# Patient Record
Sex: MALE | Race: WHITE | URBAN - METROPOLITAN AREA
[De-identification: names, ages, dates, MRNs, and addresses within clinical notes are randomized per-mention and may not be internally consistent; named-entity substitution may affect disease eponyms.]

---

## 2024-05-29 ENCOUNTER — APPOINTMENT (OUTPATIENT)
Dept: CT IMAGING | Age: 64
DRG: 023 | End: 2024-05-29
Payer: COMMERCIAL

## 2024-05-29 ENCOUNTER — APPOINTMENT (OUTPATIENT)
Dept: GENERAL RADIOLOGY | Age: 64
DRG: 023 | End: 2024-05-29
Payer: COMMERCIAL

## 2024-05-29 ENCOUNTER — HOSPITAL ENCOUNTER (INPATIENT)
Age: 64
LOS: 8 days | Discharge: NURSING, REHAB OR LONG-TERM CARE | DRG: 023 | End: 2024-06-06
Attending: STUDENT IN AN ORGANIZED HEALTH CARE EDUCATION/TRAINING PROGRAM | Admitting: STUDENT IN AN ORGANIZED HEALTH CARE EDUCATION/TRAINING PROGRAM
Payer: COMMERCIAL

## 2024-05-29 DIAGNOSIS — G06.0 ABSCESS OF BRAIN: ICD-10-CM

## 2024-05-29 DIAGNOSIS — G93.89 BRAIN MASS: Primary | ICD-10-CM

## 2024-05-29 DIAGNOSIS — E04.1 THYROID NODULE: ICD-10-CM

## 2024-05-29 DIAGNOSIS — R29.90 STROKE-LIKE SYMPTOMS: ICD-10-CM

## 2024-05-29 PROBLEM — E11.9 TYPE 2 DIABETES MELLITUS (HCC): Status: ACTIVE | Noted: 2021-02-01

## 2024-05-29 PROBLEM — I82.419 DEEP VENOUS THROMBOSIS OF PROFUNDA FEMORIS VEIN (HCC): Status: ACTIVE | Noted: 2021-02-01

## 2024-05-29 PROBLEM — E78.2 MIXED HYPERLIPIDEMIA: Status: ACTIVE | Noted: 2022-04-14

## 2024-05-29 PROBLEM — N40.0 BENIGN PROSTATIC HYPERPLASIA: Status: ACTIVE | Noted: 2021-02-01

## 2024-05-29 PROBLEM — G47.30 SLEEP APNEA: Status: ACTIVE | Noted: 2022-04-15

## 2024-05-29 LAB
ABO + RH BLD: NORMAL
ALBUMIN SERPL-MCNC: 4 G/DL (ref 3.5–5.2)
ALP SERPL-CCNC: 63 U/L (ref 40–129)
ALT SERPL-CCNC: 13 U/L (ref 0–40)
ANION GAP SERPL CALCULATED.3IONS-SCNC: 11 MMOL/L (ref 7–16)
ARM BAND NUMBER: NORMAL
AST SERPL-CCNC: 8 U/L (ref 0–39)
BASOPHILS # BLD: 0.02 K/UL (ref 0–0.2)
BASOPHILS NFR BLD: 0 % (ref 0–2)
BILIRUB SERPL-MCNC: 0.4 MG/DL (ref 0–1.2)
BLOOD BANK SAMPLE EXPIRATION: NORMAL
BLOOD GROUP ANTIBODIES SERPL: NEGATIVE
BUN SERPL-MCNC: 16 MG/DL (ref 6–23)
CALCIUM SERPL-MCNC: 9.2 MG/DL (ref 8.6–10.2)
CHLORIDE SERPL-SCNC: 98 MMOL/L (ref 98–107)
CO2 SERPL-SCNC: 26 MMOL/L (ref 22–29)
CREAT SERPL-MCNC: 0.9 MG/DL (ref 0.7–1.2)
EOSINOPHIL # BLD: 0.04 K/UL (ref 0.05–0.5)
EOSINOPHILS RELATIVE PERCENT: 0 % (ref 0–6)
ERYTHROCYTE [DISTWIDTH] IN BLOOD BY AUTOMATED COUNT: 14.1 % (ref 11.5–15)
GFR, ESTIMATED: >90 ML/MIN/1.73M2
GLUCOSE BLD-MCNC: 143 MG/DL (ref 74–99)
GLUCOSE SERPL-MCNC: 156 MG/DL (ref 74–99)
HCT VFR BLD AUTO: 39.8 % (ref 37–54)
HGB BLD-MCNC: 13.5 G/DL (ref 12.5–16.5)
IMM GRANULOCYTES # BLD AUTO: 0.06 K/UL (ref 0–0.58)
IMM GRANULOCYTES NFR BLD: 1 % (ref 0–5)
INR PPP: 1.3
LYMPHOCYTES NFR BLD: 0.85 K/UL (ref 1.5–4)
LYMPHOCYTES RELATIVE PERCENT: 9 % (ref 20–42)
MCH RBC QN AUTO: 27.8 PG (ref 26–35)
MCHC RBC AUTO-ENTMCNC: 33.9 G/DL (ref 32–34.5)
MCV RBC AUTO: 81.9 FL (ref 80–99.9)
MONOCYTES NFR BLD: 0.65 K/UL (ref 0.1–0.95)
MONOCYTES NFR BLD: 7 % (ref 2–12)
NEUTROPHILS NFR BLD: 83 % (ref 43–80)
NEUTS SEG NFR BLD: 8.14 K/UL (ref 1.8–7.3)
PARTIAL THROMBOPLASTIN TIME: 28.5 SEC (ref 24.5–35.1)
PLATELET # BLD AUTO: 228 K/UL (ref 130–450)
PMV BLD AUTO: 8.9 FL (ref 7–12)
POTASSIUM SERPL-SCNC: 3.9 MMOL/L (ref 3.5–5)
PROT SERPL-MCNC: 7 G/DL (ref 6.4–8.3)
PROTHROMBIN TIME: 13.7 SEC (ref 9.3–12.4)
RBC # BLD AUTO: 4.86 M/UL (ref 3.8–5.8)
SODIUM SERPL-SCNC: 135 MMOL/L (ref 132–146)
TROPONIN I SERPL HS-MCNC: 18 NG/L (ref 0–11)
WBC OTHER # BLD: 9.8 K/UL (ref 4.5–11.5)

## 2024-05-29 PROCEDURE — 96375 TX/PRO/DX INJ NEW DRUG ADDON: CPT

## 2024-05-29 PROCEDURE — 99285 EMERGENCY DEPT VISIT HI MDM: CPT

## 2024-05-29 PROCEDURE — 86900 BLOOD TYPING SEROLOGIC ABO: CPT

## 2024-05-29 PROCEDURE — 85610 PROTHROMBIN TIME: CPT

## 2024-05-29 PROCEDURE — 2060000000 HC ICU INTERMEDIATE R&B

## 2024-05-29 PROCEDURE — 93005 ELECTROCARDIOGRAM TRACING: CPT | Performed by: STUDENT IN AN ORGANIZED HEALTH CARE EDUCATION/TRAINING PROGRAM

## 2024-05-29 PROCEDURE — 6360000004 HC RX CONTRAST MEDICATION: Performed by: RADIOLOGY

## 2024-05-29 PROCEDURE — 36415 COLL VENOUS BLD VENIPUNCTURE: CPT

## 2024-05-29 PROCEDURE — 86901 BLOOD TYPING SEROLOGIC RH(D): CPT

## 2024-05-29 PROCEDURE — 86850 RBC ANTIBODY SCREEN: CPT

## 2024-05-29 PROCEDURE — 85730 THROMBOPLASTIN TIME PARTIAL: CPT

## 2024-05-29 PROCEDURE — 99222 1ST HOSP IP/OBS MODERATE 55: CPT | Performed by: NEUROLOGICAL SURGERY

## 2024-05-29 PROCEDURE — 80053 COMPREHEN METABOLIC PANEL: CPT

## 2024-05-29 PROCEDURE — 84484 ASSAY OF TROPONIN QUANT: CPT

## 2024-05-29 PROCEDURE — 85025 COMPLETE CBC W/AUTO DIFF WBC: CPT

## 2024-05-29 PROCEDURE — 70496 CT ANGIOGRAPHY HEAD: CPT

## 2024-05-29 PROCEDURE — 71045 X-RAY EXAM CHEST 1 VIEW: CPT

## 2024-05-29 PROCEDURE — 0042T CT BRAIN PERFUSION: CPT

## 2024-05-29 PROCEDURE — 70498 CT ANGIOGRAPHY NECK: CPT

## 2024-05-29 PROCEDURE — 6360000002 HC RX W HCPCS: Performed by: STUDENT IN AN ORGANIZED HEALTH CARE EDUCATION/TRAINING PROGRAM

## 2024-05-29 PROCEDURE — 70450 CT HEAD/BRAIN W/O DYE: CPT

## 2024-05-29 PROCEDURE — 82962 GLUCOSE BLOOD TEST: CPT

## 2024-05-29 PROCEDURE — 96374 THER/PROPH/DIAG INJ IV PUSH: CPT

## 2024-05-29 PROCEDURE — 2580000003 HC RX 258: Performed by: STUDENT IN AN ORGANIZED HEALTH CARE EDUCATION/TRAINING PROGRAM

## 2024-05-29 RX ORDER — ONDANSETRON 4 MG/1
4 TABLET, ORALLY DISINTEGRATING ORAL EVERY 8 HOURS PRN
Status: DISCONTINUED | OUTPATIENT
Start: 2024-05-29 | End: 2024-06-03 | Stop reason: SDUPTHER

## 2024-05-29 RX ORDER — POTASSIUM CHLORIDE 20 MEQ/1
40 TABLET, EXTENDED RELEASE ORAL PRN
Status: DISCONTINUED | OUTPATIENT
Start: 2024-05-29 | End: 2024-06-06 | Stop reason: HOSPADM

## 2024-05-29 RX ORDER — SODIUM CHLORIDE 9 MG/ML
INJECTION, SOLUTION INTRAVENOUS CONTINUOUS
Status: ACTIVE | OUTPATIENT
Start: 2024-05-29 | End: 2024-05-30

## 2024-05-29 RX ORDER — LEVETIRACETAM 500 MG/5ML
500 INJECTION, SOLUTION, CONCENTRATE INTRAVENOUS EVERY 12 HOURS
Status: DISCONTINUED | OUTPATIENT
Start: 2024-05-30 | End: 2024-05-31

## 2024-05-29 RX ORDER — PANTOPRAZOLE SODIUM 40 MG/1
40 TABLET, DELAYED RELEASE ORAL
Status: DISCONTINUED | OUTPATIENT
Start: 2024-05-29 | End: 2024-06-06 | Stop reason: HOSPADM

## 2024-05-29 RX ORDER — MAGNESIUM SULFATE IN WATER 40 MG/ML
2000 INJECTION, SOLUTION INTRAVENOUS PRN
Status: DISCONTINUED | OUTPATIENT
Start: 2024-05-29 | End: 2024-06-06 | Stop reason: HOSPADM

## 2024-05-29 RX ORDER — CELECOXIB 200 MG/1
200 CAPSULE ORAL 2 TIMES DAILY
Status: ON HOLD | COMMUNITY
End: 2024-06-30

## 2024-05-29 RX ORDER — SODIUM CHLORIDE 0.9 % (FLUSH) 0.9 %
5-40 SYRINGE (ML) INJECTION EVERY 12 HOURS SCHEDULED
Status: DISCONTINUED | OUTPATIENT
Start: 2024-05-29 | End: 2024-06-06 | Stop reason: HOSPADM

## 2024-05-29 RX ORDER — ONDANSETRON 2 MG/ML
4 INJECTION INTRAMUSCULAR; INTRAVENOUS EVERY 6 HOURS PRN
Status: DISCONTINUED | OUTPATIENT
Start: 2024-05-29 | End: 2024-06-03 | Stop reason: SDUPTHER

## 2024-05-29 RX ORDER — SODIUM CHLORIDE 9 MG/ML
INJECTION, SOLUTION INTRAVENOUS PRN
Status: DISCONTINUED | OUTPATIENT
Start: 2024-05-29 | End: 2024-06-03 | Stop reason: SDUPTHER

## 2024-05-29 RX ORDER — ROSUVASTATIN CALCIUM 5 MG/1
5 TABLET, COATED ORAL NIGHTLY
Status: DISCONTINUED | OUTPATIENT
Start: 2024-05-29 | End: 2024-06-06 | Stop reason: HOSPADM

## 2024-05-29 RX ORDER — DEXAMETHASONE SODIUM PHOSPHATE 10 MG/ML
10 INJECTION INTRAMUSCULAR; INTRAVENOUS ONCE
Status: COMPLETED | OUTPATIENT
Start: 2024-05-29 | End: 2024-05-29

## 2024-05-29 RX ORDER — POTASSIUM CHLORIDE 7.45 MG/ML
10 INJECTION INTRAVENOUS PRN
Status: DISCONTINUED | OUTPATIENT
Start: 2024-05-29 | End: 2024-06-06 | Stop reason: HOSPADM

## 2024-05-29 RX ORDER — POLYETHYLENE GLYCOL 3350 17 G/17G
17 POWDER, FOR SOLUTION ORAL DAILY PRN
Status: DISCONTINUED | OUTPATIENT
Start: 2024-05-29 | End: 2024-06-06 | Stop reason: HOSPADM

## 2024-05-29 RX ORDER — HEPARIN SODIUM 5000 [USP'U]/ML
5000 INJECTION, SOLUTION INTRAVENOUS; SUBCUTANEOUS EVERY 8 HOURS
Status: DISCONTINUED | OUTPATIENT
Start: 2024-05-29 | End: 2024-06-02

## 2024-05-29 RX ORDER — DEXAMETHASONE SODIUM PHOSPHATE 4 MG/ML
4 INJECTION, SOLUTION INTRA-ARTICULAR; INTRALESIONAL; INTRAMUSCULAR; INTRAVENOUS; SOFT TISSUE EVERY 6 HOURS
Status: DISCONTINUED | OUTPATIENT
Start: 2024-05-30 | End: 2024-06-03

## 2024-05-29 RX ORDER — ACETAMINOPHEN 650 MG/1
650 SUPPOSITORY RECTAL EVERY 6 HOURS PRN
Status: DISCONTINUED | OUTPATIENT
Start: 2024-05-29 | End: 2024-06-06 | Stop reason: HOSPADM

## 2024-05-29 RX ORDER — LEVETIRACETAM 500 MG/5ML
1500 INJECTION, SOLUTION, CONCENTRATE INTRAVENOUS ONCE
Status: COMPLETED | OUTPATIENT
Start: 2024-05-29 | End: 2024-05-29

## 2024-05-29 RX ORDER — SODIUM CHLORIDE 0.9 % (FLUSH) 0.9 %
5-40 SYRINGE (ML) INJECTION PRN
Status: DISCONTINUED | OUTPATIENT
Start: 2024-05-29 | End: 2024-06-06 | Stop reason: HOSPADM

## 2024-05-29 RX ORDER — ACETAMINOPHEN 325 MG/1
650 TABLET ORAL EVERY 6 HOURS PRN
Status: DISCONTINUED | OUTPATIENT
Start: 2024-05-29 | End: 2024-06-06 | Stop reason: HOSPADM

## 2024-05-29 RX ORDER — ROSUVASTATIN CALCIUM 5 MG/1
5 TABLET, COATED ORAL DAILY
Status: ON HOLD | COMMUNITY

## 2024-05-29 RX ADMIN — IOPAMIDOL 100 ML: 755 INJECTION, SOLUTION INTRAVENOUS at 19:18

## 2024-05-29 RX ADMIN — DEXAMETHASONE SODIUM PHOSPHATE 10 MG: 10 INJECTION INTRAMUSCULAR; INTRAVENOUS at 20:18

## 2024-05-29 RX ADMIN — SODIUM CHLORIDE: 9 INJECTION, SOLUTION INTRAVENOUS at 21:23

## 2024-05-29 RX ADMIN — LEVETIRACETAM 1500 MG: 100 INJECTION, SOLUTION INTRAVENOUS at 20:19

## 2024-05-29 RX ADMIN — SODIUM CHLORIDE, PRESERVATIVE FREE 10 ML: 5 INJECTION INTRAVENOUS at 21:24

## 2024-05-29 NOTE — ED PROVIDER NOTES
5/30/24 0027)   iopamidol (ISOVUE-370) 76 % injection 100 mL (100 mLs IntraVENous Given 5/29/24 1918)   levETIRAcetam (KEPPRA) injection 1,500 mg (1,500 mg IntraVENous Given 5/29/24 2019)   dexAMETHasone (DECADRON) injection 10 mg (10 mg IntraVENous Given 5/29/24 2018)       ED Course as of 05/30/24 0217   Wed May 29, 2024   1914 Spoke with Dr. Shelley, neuro IR, who states there is a large mass present.  [KG]   2109 EKG:  This EKG is signed and interpreted by me.    Rate: 63  Rhythm: Sinus  Interpretation: Normal sinus rhythm, normal axis, no acute ST elevations or depressions, QTc is 427  Comparison: no previous EKG available    [KG]      ED Course User Index  [KG] Amber Frederick,             Medical Decision Making/Differential Diagnosis:    CC/HPI Summary, Social Determinants of health, Records Reviewed, DDx, testing done/not done, ED Course, Reassessment, disposition considerations/shared decision making with patient, consults, disposition:        The patient is a 63-year-old male presenting to emergency department with strokelike symptoms.  Patient's right arm is flaccid he has significant weakness of his right leg and forced right-sided gaze that is overcome.  NIH is 10 and last known well was 1300.  Done alert was called on arrival.    The patient is a non-smoker and takes medications as prescribed.  There are no known social detriment to his health.    Prior records reviewed and patient did follow-up with family doctor 3- and records reviewed from this visit.    Differential diagnosis includes was not limited to stroke versus intracranial hemorrhage versus intracranial mass versus TIA.    CMP does not show any significant acute abnormal findings.  Troponin is 18.  CBC is reassuring.  EKG does not show evidence of acute ischemia.  Chest x-ray is reassuring.  CT head does not show any evidence of acute intracranial hemorrhage.  Patient does have multiple intracranial masses present.  No

## 2024-05-29 NOTE — ED NOTES
Stroke/ Marta Alert Time: Marta 1901      Time Neurologist called:  :    Time CT Notified: 1901      BRAIN alert time: (If applicable)

## 2024-05-30 ENCOUNTER — APPOINTMENT (OUTPATIENT)
Dept: MRI IMAGING | Age: 64
DRG: 023 | End: 2024-05-30
Payer: COMMERCIAL

## 2024-05-30 ENCOUNTER — APPOINTMENT (OUTPATIENT)
Dept: ULTRASOUND IMAGING | Age: 64
DRG: 023 | End: 2024-05-30
Payer: COMMERCIAL

## 2024-05-30 ENCOUNTER — HOSPITAL ENCOUNTER (OUTPATIENT)
Dept: RADIATION ONCOLOGY | Age: 64
Discharge: HOME | End: 2024-05-30
Payer: COMMERCIAL

## 2024-05-30 ENCOUNTER — APPOINTMENT (OUTPATIENT)
Dept: NEUROLOGY | Age: 64
DRG: 023 | End: 2024-05-30
Payer: COMMERCIAL

## 2024-05-30 ENCOUNTER — APPOINTMENT (OUTPATIENT)
Dept: CT IMAGING | Age: 64
DRG: 023 | End: 2024-05-30
Attending: STUDENT IN AN ORGANIZED HEALTH CARE EDUCATION/TRAINING PROGRAM
Payer: COMMERCIAL

## 2024-05-30 LAB
ANION GAP SERPL CALCULATED.3IONS-SCNC: 15 MMOL/L (ref 7–16)
BASOPHILS # BLD: 0.01 K/UL (ref 0–0.2)
BASOPHILS NFR BLD: 0 % (ref 0–2)
BUN SERPL-MCNC: 15 MG/DL (ref 6–23)
CALCIUM SERPL-MCNC: 9.4 MG/DL (ref 8.6–10.2)
CHLORIDE SERPL-SCNC: 103 MMOL/L (ref 98–107)
CO2 SERPL-SCNC: 20 MMOL/L (ref 22–29)
CREAT SERPL-MCNC: 0.8 MG/DL (ref 0.7–1.2)
EKG ATRIAL RATE: 63 BPM
EKG P AXIS: 64 DEGREES
EKG P-R INTERVAL: 176 MS
EKG Q-T INTERVAL: 418 MS
EKG QRS DURATION: 96 MS
EKG QTC CALCULATION (BAZETT): 427 MS
EKG R AXIS: -2 DEGREES
EKG T AXIS: 29 DEGREES
EKG VENTRICULAR RATE: 63 BPM
EOSINOPHIL # BLD: 0 K/UL (ref 0.05–0.5)
EOSINOPHILS RELATIVE PERCENT: 0 % (ref 0–6)
ERYTHROCYTE [DISTWIDTH] IN BLOOD BY AUTOMATED COUNT: 14.1 % (ref 11.5–15)
GFR, ESTIMATED: >90 ML/MIN/1.73M2
GLUCOSE SERPL-MCNC: 174 MG/DL (ref 74–99)
HCT VFR BLD AUTO: 43.3 % (ref 37–54)
HGB BLD-MCNC: 14 G/DL (ref 12.5–16.5)
IMM GRANULOCYTES # BLD AUTO: 0.03 K/UL (ref 0–0.58)
IMM GRANULOCYTES NFR BLD: 0 % (ref 0–5)
LYMPHOCYTES NFR BLD: 0.72 K/UL (ref 1.5–4)
LYMPHOCYTES RELATIVE PERCENT: 10 % (ref 20–42)
MCH RBC QN AUTO: 26.7 PG (ref 26–35)
MCHC RBC AUTO-ENTMCNC: 32.3 G/DL (ref 32–34.5)
MCV RBC AUTO: 82.6 FL (ref 80–99.9)
MONOCYTES NFR BLD: 0.17 K/UL (ref 0.1–0.95)
MONOCYTES NFR BLD: 2 % (ref 2–12)
NEUTROPHILS NFR BLD: 88 % (ref 43–80)
NEUTS SEG NFR BLD: 6.67 K/UL (ref 1.8–7.3)
PLATELET # BLD AUTO: 274 K/UL (ref 130–450)
PMV BLD AUTO: 9.3 FL (ref 7–12)
POTASSIUM SERPL-SCNC: 4.3 MMOL/L (ref 3.5–5)
RBC # BLD AUTO: 5.24 M/UL (ref 3.8–5.8)
SODIUM SERPL-SCNC: 138 MMOL/L (ref 132–146)
T4 FREE SERPL-MCNC: 1.5 NG/DL (ref 0.9–1.7)
TSH SERPL DL<=0.05 MIU/L-ACNC: 0.49 UIU/ML (ref 0.27–4.2)
WBC OTHER # BLD: 7.6 K/UL (ref 4.5–11.5)

## 2024-05-30 PROCEDURE — 74177 CT ABD & PELVIS W/CONTRAST: CPT

## 2024-05-30 PROCEDURE — 85025 COMPLETE CBC W/AUTO DIFF WBC: CPT

## 2024-05-30 PROCEDURE — 6360000002 HC RX W HCPCS: Performed by: STUDENT IN AN ORGANIZED HEALTH CARE EDUCATION/TRAINING PROGRAM

## 2024-05-30 PROCEDURE — 99232 SBSQ HOSP IP/OBS MODERATE 35: CPT | Performed by: INTERNAL MEDICINE

## 2024-05-30 PROCEDURE — 87040 BLOOD CULTURE FOR BACTERIA: CPT

## 2024-05-30 PROCEDURE — 6360000004 HC RX CONTRAST MEDICATION: Performed by: RADIOLOGY

## 2024-05-30 PROCEDURE — 84443 ASSAY THYROID STIM HORMONE: CPT

## 2024-05-30 PROCEDURE — 95819 EEG AWAKE AND ASLEEP: CPT | Performed by: PSYCHIATRY & NEUROLOGY

## 2024-05-30 PROCEDURE — 93010 ELECTROCARDIOGRAM REPORT: CPT | Performed by: INTERNAL MEDICINE

## 2024-05-30 PROCEDURE — 36415 COLL VENOUS BLD VENIPUNCTURE: CPT

## 2024-05-30 PROCEDURE — 99223 1ST HOSP IP/OBS HIGH 75: CPT | Performed by: SPECIALIST

## 2024-05-30 PROCEDURE — 80048 BASIC METABOLIC PNL TOTAL CA: CPT

## 2024-05-30 PROCEDURE — 76536 US EXAM OF HEAD AND NECK: CPT

## 2024-05-30 PROCEDURE — 84439 ASSAY OF FREE THYROXINE: CPT

## 2024-05-30 PROCEDURE — 71260 CT THORAX DX C+: CPT

## 2024-05-30 PROCEDURE — 70553 MRI BRAIN STEM W/O & W/DYE: CPT

## 2024-05-30 PROCEDURE — 2060000000 HC ICU INTERMEDIATE R&B

## 2024-05-30 PROCEDURE — 95819 EEG AWAKE AND ASLEEP: CPT

## 2024-05-30 PROCEDURE — A9579 GAD-BASE MR CONTRAST NOS,1ML: HCPCS | Performed by: RADIOLOGY

## 2024-05-30 PROCEDURE — 2580000003 HC RX 258: Performed by: STUDENT IN AN ORGANIZED HEALTH CARE EDUCATION/TRAINING PROGRAM

## 2024-05-30 PROCEDURE — 6370000000 HC RX 637 (ALT 250 FOR IP): Performed by: STUDENT IN AN ORGANIZED HEALTH CARE EDUCATION/TRAINING PROGRAM

## 2024-05-30 RX ORDER — SODIUM CHLORIDE 0.9 % (FLUSH) 0.9 %
5-40 SYRINGE (ML) INJECTION EVERY 12 HOURS SCHEDULED
Status: DISCONTINUED | OUTPATIENT
Start: 2024-05-30 | End: 2024-05-30

## 2024-05-30 RX ORDER — SODIUM CHLORIDE 9 MG/ML
INJECTION, SOLUTION INTRAVENOUS PRN
Status: DISCONTINUED | OUTPATIENT
Start: 2024-05-30 | End: 2024-05-30

## 2024-05-30 RX ORDER — OMEPRAZOLE 20 MG/1
20 CAPSULE, DELAYED RELEASE ORAL DAILY
Status: ON HOLD | COMMUNITY

## 2024-05-30 RX ORDER — SODIUM CHLORIDE 0.9 % (FLUSH) 0.9 %
5-40 SYRINGE (ML) INJECTION PRN
Status: DISCONTINUED | OUTPATIENT
Start: 2024-05-30 | End: 2024-05-30

## 2024-05-30 RX ORDER — SODIUM CHLORIDE 9 MG/ML
INJECTION, SOLUTION INTRAVENOUS CONTINUOUS
Status: DISCONTINUED | OUTPATIENT
Start: 2024-05-30 | End: 2024-05-30

## 2024-05-30 RX ADMIN — HEPARIN SODIUM 5000 UNITS: 5000 INJECTION INTRAVENOUS; SUBCUTANEOUS at 06:23

## 2024-05-30 RX ADMIN — DEXAMETHASONE SODIUM PHOSPHATE 4 MG: 4 INJECTION, SOLUTION INTRAMUSCULAR; INTRAVENOUS at 22:39

## 2024-05-30 RX ADMIN — PANTOPRAZOLE SODIUM 40 MG: 40 TABLET, DELAYED RELEASE ORAL at 06:23

## 2024-05-30 RX ADMIN — SODIUM CHLORIDE: 9 INJECTION, SOLUTION INTRAVENOUS at 06:15

## 2024-05-30 RX ADMIN — LEVETIRACETAM 500 MG: 100 INJECTION INTRAVENOUS at 10:48

## 2024-05-30 RX ADMIN — ACETAMINOPHEN 650 MG: 325 TABLET ORAL at 03:12

## 2024-05-30 RX ADMIN — HEPARIN SODIUM 5000 UNITS: 5000 INJECTION INTRAVENOUS; SUBCUTANEOUS at 22:39

## 2024-05-30 RX ADMIN — LEVETIRACETAM 500 MG: 100 INJECTION INTRAVENOUS at 20:07

## 2024-05-30 RX ADMIN — SODIUM CHLORIDE, PRESERVATIVE FREE 10 ML: 5 INJECTION INTRAVENOUS at 10:49

## 2024-05-30 RX ADMIN — DEXAMETHASONE SODIUM PHOSPHATE 4 MG: 4 INJECTION, SOLUTION INTRAMUSCULAR; INTRAVENOUS at 10:48

## 2024-05-30 RX ADMIN — SODIUM CHLORIDE, PRESERVATIVE FREE 10 ML: 5 INJECTION INTRAVENOUS at 20:08

## 2024-05-30 RX ADMIN — IOPAMIDOL 75 ML: 755 INJECTION, SOLUTION INTRAVENOUS at 14:50

## 2024-05-30 RX ADMIN — ROSUVASTATIN CALCIUM 5 MG: 5 TABLET, COATED ORAL at 00:27

## 2024-05-30 RX ADMIN — ROSUVASTATIN CALCIUM 5 MG: 5 TABLET, COATED ORAL at 20:09

## 2024-05-30 RX ADMIN — DEXAMETHASONE SODIUM PHOSPHATE 4 MG: 4 INJECTION, SOLUTION INTRAMUSCULAR; INTRAVENOUS at 17:28

## 2024-05-30 RX ADMIN — HEPARIN SODIUM 5000 UNITS: 5000 INJECTION INTRAVENOUS; SUBCUTANEOUS at 00:27

## 2024-05-30 RX ADMIN — DEXAMETHASONE SODIUM PHOSPHATE 4 MG: 4 INJECTION, SOLUTION INTRAMUSCULAR; INTRAVENOUS at 03:04

## 2024-05-30 RX ADMIN — GADOTERIDOL 20 ML: 279.3 INJECTION, SOLUTION INTRAVENOUS at 07:26

## 2024-05-30 RX ADMIN — HEPARIN SODIUM 5000 UNITS: 5000 INJECTION INTRAVENOUS; SUBCUTANEOUS at 14:06

## 2024-05-30 ASSESSMENT — PAIN SCALES - GENERAL
PAINLEVEL_OUTOF10: 0
PAINLEVEL_OUTOF10: 0
PAINLEVEL_OUTOF10: 3
PAINLEVEL_OUTOF10: 0

## 2024-05-30 ASSESSMENT — PAIN DESCRIPTION - ORIENTATION: ORIENTATION: MID

## 2024-05-30 ASSESSMENT — PAIN DESCRIPTION - PAIN TYPE: TYPE: ACUTE PAIN

## 2024-05-30 ASSESSMENT — PAIN DESCRIPTION - FREQUENCY: FREQUENCY: INTERMITTENT

## 2024-05-30 ASSESSMENT — PAIN DESCRIPTION - DESCRIPTORS: DESCRIPTORS: ACHING;DULL;DISCOMFORT

## 2024-05-30 ASSESSMENT — PAIN DESCRIPTION - ONSET: ONSET: GRADUAL

## 2024-05-30 ASSESSMENT — PAIN DESCRIPTION - LOCATION: LOCATION: HEAD

## 2024-05-30 NOTE — H&P
Hospitalist History & Physical      PCP: No primary care provider on file.    Date of Admission: 5/29/2024    Date of Service: Pt seen/examined on 5/29/2024 and is admitted to Inpatient with expected LOS greater than two midnights due to medical therapy.      Chief Complaint: Right-sided weakness and seizure-like activity    History Of Present Illness:  63 year-old male patient with history of type 2 diabetes mellitus, HLD, BPH, PARTH, multiple orthopedic surgeries, right lower extremities DVT 2 years ago after he had spider bite, not on anticoagulation, who presented to ER from home with complaints of right side weakness and seizure-like activity at home.  Per wife at bedside patient has been having neck and lower back pain and has been going to chiropractor.  He also was having some confusion and they thought it was a UTI and he went to his primary care physician today and was prescribed Bactrim.  He went into his daily routine, got to work and returned home.  At around 2:00 when his wife came home found him in the floor, unable to move the right side, appeared that he comprehended but he was unable to speak.  He later said that he had a seizure-like activity sitting on the chair and a slide himself down to the floor, that lasted for about 5 minutes.  When his wife saw him he was having shaking on right arm and right leg.  The symptoms are new for him.  He denies headaches, chest pain, palpitations, shortness of breath, cough, abdominal pain, nausea, vomiting, diarrhea, fevers, chills.  He had a colonoscopy done recently with removal of a tiny polyp.  Per wife, a couple years ago his sister was diagnosed with frontal lobe benign brain tumor and had surgery.  His aunt was also diagnosed with a tumor \"in the back of the head\" around the same time    ER evaluation: Down alert was called upon arrival. Interventional neurology was contacted. CT head/ CT perfusion/ CTA head and neck: Perfusion mismatch located in

## 2024-05-30 NOTE — ACP (ADVANCE CARE PLANNING)
Advance Care Planning   Healthcare Decision Maker:    Primary Decision Maker: Valeri Leach - [facility] - [phone]    Click here to complete Healthcare Decision Makers including selection of the Healthcare Decision Maker Relationship (ie \"Primary\").

## 2024-05-30 NOTE — CONSULTS
established.    Encounter time for this patient including preparation and review of imaging and the pertinent clinical history: 85 minutes    NCCN guidelines, version 2024 is used to help review treatment recommendations.        Procedures and process involved with CT simulation and daily radiation treatments were explained.  Toxicities, both expected and less common, were reviewed in detail.  Questions were answered to their apparent satisfaction.     Thank you for the opportunity to participate in multidisciplinary management of this remarkable and pleasant patient.      Dannie Manriquez MD, GRAYSON, FACRO, FACR, FCTSI, FASTRO    Department of Radiation Oncology  [facility]: [phone] (FAX: [phone])  [facility]: [phone] (FAX: [phone])  [facility]:  [phone] (FAX:  [phone])    NOTE: This report was transcribed using voice recognition software. Every effort was made to ensure accuracy; however, inadvertent computerized transcription errors may be present.

## 2024-05-30 NOTE — CARE COORDINATION
I met with pt and his daughter in the room. Pt lives with wife and they are both still working. No hhc pta  but has a fww from past hip surgery. The couple has 5 grown children with all in the area and work. Pt is not a . He has a 2 story home with 2nd floor bed and bath and a 1/2 bath on the first floor with side door to enter and 3 steps. No rails but per family bilateral walls on both sides to enter home to hold on to if needed. His pcp is dr. Velázquez in [place] and saw him yesterday. Pt is diabetic not on insulin but has a working glucometer. Plan per pt is home with wife. Per daughter pt appeared unsteady a little in the room so when testing is done then we will get PT and OT ordered. Oncology , radiation oncology and neurology were consulted. Pt is on iv fluids, decadron and keppra. MRI of the brain, U/S of the thyroid and EEG was done per pt this a.m. CT of chest and abd/pelvis is pending. Pt is having RUE and RLE weakness per nsg. CHIQUITA Dong  5/30/2024    Case Management Assessment  Initial Evaluation    Date/Time of Evaluation: 5/30/2024 11:20 AM  Assessment Completed by: CHIQUITA Dong    If patient is discharged prior to next notation, then this note serves as note for discharge by case management.    Patient Name: Conrado Leach                   YOB: 1960  Diagnosis: Brain mass [G93.89]                   Date / Time: 5/29/2024  7:07 PM    Patient Admission Status: Inpatient   Readmission Risk (Low < 19, Mod (19-27), High > 27): Readmission Risk Score: 8.2    Current PCP: No primary care provider on file.  PCP verified by CM? Yes    Chart Reviewed: Yes      History Provided by: Patient, Child/Family  Patient Orientation: Alert and Oriented    Patient Cognition: Alert    Hospitalization in the last 30 days (Readmission):  No    If yes, Readmission Assessment in  Navigator will be completed.    Advance Directives:      Code Status: Full Code   Patient's Primary Decision Maker is:

## 2024-05-30 NOTE — PROCEDURES
[facility] Neurodiagnostic Report    MRN: [id number]  PATIENT NAME: Conrado Leach  DATE OF REPORT: 2024  DATE OF SERVICE: 2024  PHYSICIAN NAME: Wilber Miguel DO  Referring Physician: Maria Marino Milanes      Patient's : 1960  Patient's Age: 63 y.o.  Gender: male    PROCEDURE: Routine EEG without video      Clinical Interpretation:   This abnormal study showed evidence of:    A potential for focal seizures from the left frontocentral region  Moderate nonspecific cerebral dysfunction of the left frontocentrotemporal region    Structural abnormalities should be considered for the findings above and appropriate imaging obtained if clinically indicated.     No seizures were noted during this study.      ____________________________  Electronically signed by: Wilber Miguel DO, 2024 8:37 AM      Patient Clinical Information   Reason for Study: Patient undergoing evaluation for first time seizure  Patient State: Awake  Primary neurological diagnosis: Seizure  Primary indication for monitoring: Risk stratification    Pertinent Medications and Treatments    levetiracetam     dexamethasone     Sedatives administered: No  Intubated: No  Pharmacological paralytic: No    Reporting Period  Start of Study: 842, 2024   End of Study:  908, 2024       EEG Description  Digital video and scalp EEG monitoring was performed using the standard protocol for this laboratory. Scalp electrodes were applied in the international 10/20 system. Multiple digital montage arrangements were utilized for evaluation. EKG was recorded.     Background:      Occipital rhythm (posterior dominant rhythm or PDR): Present  Frequency: 10 Hz  Voltage: Low  Organization: fair   Reactivity to eye opening/closure: fair     Drowsiness: Present - normal  Sleep: Stage 2 present - normal      Technical and Activation Procedures:  Hyperventilation: Not done  Photic stimulation: Done - physiologic driving

## 2024-05-31 PROCEDURE — 2580000003 HC RX 258: Performed by: STUDENT IN AN ORGANIZED HEALTH CARE EDUCATION/TRAINING PROGRAM

## 2024-05-31 PROCEDURE — 6360000002 HC RX W HCPCS: Performed by: STUDENT IN AN ORGANIZED HEALTH CARE EDUCATION/TRAINING PROGRAM

## 2024-05-31 PROCEDURE — 6370000000 HC RX 637 (ALT 250 FOR IP): Performed by: STUDENT IN AN ORGANIZED HEALTH CARE EDUCATION/TRAINING PROGRAM

## 2024-05-31 PROCEDURE — 99222 1ST HOSP IP/OBS MODERATE 55: CPT | Performed by: PSYCHIATRY & NEUROLOGY

## 2024-05-31 PROCEDURE — 2060000000 HC ICU INTERMEDIATE R&B

## 2024-05-31 PROCEDURE — 6360000002 HC RX W HCPCS: Performed by: PSYCHIATRY & NEUROLOGY

## 2024-05-31 RX ORDER — LEVETIRACETAM 500 MG/1
750 TABLET ORAL 2 TIMES DAILY
Status: COMPLETED | OUTPATIENT
Start: 2024-05-31 | End: 2024-06-02

## 2024-05-31 RX ORDER — LEVETIRACETAM 500 MG/5ML
1000 INJECTION, SOLUTION, CONCENTRATE INTRAVENOUS 2 TIMES DAILY
Status: DISCONTINUED | OUTPATIENT
Start: 2024-06-02 | End: 2024-06-06 | Stop reason: HOSPADM

## 2024-05-31 RX ORDER — LEVETIRACETAM 500 MG/1
1000 TABLET ORAL 2 TIMES DAILY
Status: DISCONTINUED | OUTPATIENT
Start: 2024-06-02 | End: 2024-06-06 | Stop reason: HOSPADM

## 2024-05-31 RX ORDER — LEVETIRACETAM 500 MG/5ML
750 INJECTION, SOLUTION, CONCENTRATE INTRAVENOUS 2 TIMES DAILY
Status: COMPLETED | OUTPATIENT
Start: 2024-05-31 | End: 2024-06-02

## 2024-05-31 RX ADMIN — HEPARIN SODIUM 5000 UNITS: 5000 INJECTION INTRAVENOUS; SUBCUTANEOUS at 15:00

## 2024-05-31 RX ADMIN — DEXAMETHASONE SODIUM PHOSPHATE 4 MG: 4 INJECTION, SOLUTION INTRAMUSCULAR; INTRAVENOUS at 11:26

## 2024-05-31 RX ADMIN — PANTOPRAZOLE SODIUM 40 MG: 40 TABLET, DELAYED RELEASE ORAL at 05:13

## 2024-05-31 RX ADMIN — DEXAMETHASONE SODIUM PHOSPHATE 4 MG: 4 INJECTION, SOLUTION INTRAMUSCULAR; INTRAVENOUS at 22:32

## 2024-05-31 RX ADMIN — DEXAMETHASONE SODIUM PHOSPHATE 4 MG: 4 INJECTION, SOLUTION INTRAMUSCULAR; INTRAVENOUS at 16:57

## 2024-05-31 RX ADMIN — HEPARIN SODIUM 5000 UNITS: 5000 INJECTION INTRAVENOUS; SUBCUTANEOUS at 22:32

## 2024-05-31 RX ADMIN — LEVETIRACETAM 750 MG: 100 INJECTION INTRAVENOUS at 20:52

## 2024-05-31 RX ADMIN — SODIUM CHLORIDE, PRESERVATIVE FREE 10 ML: 5 INJECTION INTRAVENOUS at 20:54

## 2024-05-31 RX ADMIN — LEVETIRACETAM 500 MG: 100 INJECTION INTRAVENOUS at 08:35

## 2024-05-31 RX ADMIN — ROSUVASTATIN CALCIUM 5 MG: 5 TABLET, COATED ORAL at 20:52

## 2024-05-31 RX ADMIN — DEXAMETHASONE SODIUM PHOSPHATE 4 MG: 4 INJECTION, SOLUTION INTRAMUSCULAR; INTRAVENOUS at 05:13

## 2024-05-31 RX ADMIN — SODIUM CHLORIDE, PRESERVATIVE FREE 10 ML: 5 INJECTION INTRAVENOUS at 08:36

## 2024-05-31 RX ADMIN — HEPARIN SODIUM 5000 UNITS: 5000 INJECTION INTRAVENOUS; SUBCUTANEOUS at 05:12

## 2024-05-31 NOTE — CARE COORDINATION
SOCIAL WORK/CASEMANAGEMENT TRANSITION OF CARE PLANNING( TASHA AQUINO, [phone]): pt is for the OR with neurosurgery on Monday for a crani with bx of abxcess. He is on iv keppra and decadron. ID and neurology tos ee. Oncology and radiation oncology are following. PT and OT to eval. I met with pt this a.m. and went over this all with him. Tasha Aquino, LSW  5/31/2024

## 2024-06-01 PROCEDURE — 2580000003 HC RX 258: Performed by: STUDENT IN AN ORGANIZED HEALTH CARE EDUCATION/TRAINING PROGRAM

## 2024-06-01 PROCEDURE — 99232 SBSQ HOSP IP/OBS MODERATE 35: CPT | Performed by: INTERNAL MEDICINE

## 2024-06-01 PROCEDURE — 6370000000 HC RX 637 (ALT 250 FOR IP): Performed by: STUDENT IN AN ORGANIZED HEALTH CARE EDUCATION/TRAINING PROGRAM

## 2024-06-01 PROCEDURE — 99231 SBSQ HOSP IP/OBS SF/LOW 25: CPT | Performed by: PHYSICIAN ASSISTANT

## 2024-06-01 PROCEDURE — 6360000002 HC RX W HCPCS: Performed by: STUDENT IN AN ORGANIZED HEALTH CARE EDUCATION/TRAINING PROGRAM

## 2024-06-01 PROCEDURE — 2060000000 HC ICU INTERMEDIATE R&B

## 2024-06-01 PROCEDURE — 6370000000 HC RX 637 (ALT 250 FOR IP): Performed by: PSYCHIATRY & NEUROLOGY

## 2024-06-01 RX ADMIN — SODIUM CHLORIDE, PRESERVATIVE FREE 10 ML: 5 INJECTION INTRAVENOUS at 05:42

## 2024-06-01 RX ADMIN — HEPARIN SODIUM 5000 UNITS: 5000 INJECTION INTRAVENOUS; SUBCUTANEOUS at 05:40

## 2024-06-01 RX ADMIN — DEXAMETHASONE SODIUM PHOSPHATE 4 MG: 4 INJECTION, SOLUTION INTRAMUSCULAR; INTRAVENOUS at 11:06

## 2024-06-01 RX ADMIN — DEXAMETHASONE SODIUM PHOSPHATE 4 MG: 4 INJECTION, SOLUTION INTRAMUSCULAR; INTRAVENOUS at 05:40

## 2024-06-01 RX ADMIN — PANTOPRAZOLE SODIUM 40 MG: 40 TABLET, DELAYED RELEASE ORAL at 05:41

## 2024-06-01 RX ADMIN — ROSUVASTATIN CALCIUM 5 MG: 5 TABLET, COATED ORAL at 21:03

## 2024-06-01 RX ADMIN — SODIUM CHLORIDE, PRESERVATIVE FREE 5 ML: 5 INJECTION INTRAVENOUS at 09:09

## 2024-06-01 RX ADMIN — LEVETIRACETAM 750 MG: 500 TABLET, FILM COATED ORAL at 21:03

## 2024-06-01 RX ADMIN — SODIUM CHLORIDE, PRESERVATIVE FREE 10 ML: 5 INJECTION INTRAVENOUS at 21:02

## 2024-06-01 RX ADMIN — HEPARIN SODIUM 5000 UNITS: 5000 INJECTION INTRAVENOUS; SUBCUTANEOUS at 23:02

## 2024-06-01 RX ADMIN — DEXAMETHASONE SODIUM PHOSPHATE 4 MG: 4 INJECTION, SOLUTION INTRAMUSCULAR; INTRAVENOUS at 15:57

## 2024-06-01 RX ADMIN — DEXAMETHASONE SODIUM PHOSPHATE 4 MG: 4 INJECTION, SOLUTION INTRAMUSCULAR; INTRAVENOUS at 23:02

## 2024-06-01 RX ADMIN — HEPARIN SODIUM 5000 UNITS: 5000 INJECTION INTRAVENOUS; SUBCUTANEOUS at 15:57

## 2024-06-01 RX ADMIN — LEVETIRACETAM 750 MG: 500 TABLET, FILM COATED ORAL at 09:08

## 2024-06-01 ASSESSMENT — PAIN SCALES - GENERAL: PAINLEVEL_OUTOF10: 0

## 2024-06-02 ENCOUNTER — ANESTHESIA EVENT (OUTPATIENT)
Dept: OPERATING ROOM | Age: 64
DRG: 023 | End: 2024-06-02
Payer: COMMERCIAL

## 2024-06-02 LAB
ABO + RH BLD: NORMAL
ARM BAND NUMBER: NORMAL
BLOOD BANK SAMPLE EXPIRATION: NORMAL
BLOOD GROUP ANTIBODIES SERPL: NEGATIVE

## 2024-06-02 PROCEDURE — 2580000003 HC RX 258: Performed by: STUDENT IN AN ORGANIZED HEALTH CARE EDUCATION/TRAINING PROGRAM

## 2024-06-02 PROCEDURE — 86901 BLOOD TYPING SEROLOGIC RH(D): CPT

## 2024-06-02 PROCEDURE — 2060000000 HC ICU INTERMEDIATE R&B

## 2024-06-02 PROCEDURE — 36415 COLL VENOUS BLD VENIPUNCTURE: CPT

## 2024-06-02 PROCEDURE — 6370000000 HC RX 637 (ALT 250 FOR IP): Performed by: PSYCHIATRY & NEUROLOGY

## 2024-06-02 PROCEDURE — 86900 BLOOD TYPING SEROLOGIC ABO: CPT

## 2024-06-02 PROCEDURE — 99232 SBSQ HOSP IP/OBS MODERATE 35: CPT | Performed by: INTERNAL MEDICINE

## 2024-06-02 PROCEDURE — 99231 SBSQ HOSP IP/OBS SF/LOW 25: CPT | Performed by: PHYSICIAN ASSISTANT

## 2024-06-02 PROCEDURE — 86850 RBC ANTIBODY SCREEN: CPT

## 2024-06-02 PROCEDURE — 6370000000 HC RX 637 (ALT 250 FOR IP): Performed by: STUDENT IN AN ORGANIZED HEALTH CARE EDUCATION/TRAINING PROGRAM

## 2024-06-02 PROCEDURE — 6360000002 HC RX W HCPCS: Performed by: STUDENT IN AN ORGANIZED HEALTH CARE EDUCATION/TRAINING PROGRAM

## 2024-06-02 RX ADMIN — SODIUM CHLORIDE, PRESERVATIVE FREE 10 ML: 5 INJECTION INTRAVENOUS at 21:13

## 2024-06-02 RX ADMIN — LEVETIRACETAM 1000 MG: 500 TABLET, FILM COATED ORAL at 21:14

## 2024-06-02 RX ADMIN — DEXAMETHASONE SODIUM PHOSPHATE 4 MG: 4 INJECTION, SOLUTION INTRAMUSCULAR; INTRAVENOUS at 05:38

## 2024-06-02 RX ADMIN — DEXAMETHASONE SODIUM PHOSPHATE 4 MG: 4 INJECTION, SOLUTION INTRAMUSCULAR; INTRAVENOUS at 23:25

## 2024-06-02 RX ADMIN — DEXAMETHASONE SODIUM PHOSPHATE 4 MG: 4 INJECTION, SOLUTION INTRAMUSCULAR; INTRAVENOUS at 17:02

## 2024-06-02 RX ADMIN — ROSUVASTATIN CALCIUM 5 MG: 5 TABLET, COATED ORAL at 21:14

## 2024-06-02 RX ADMIN — DEXAMETHASONE SODIUM PHOSPHATE 4 MG: 4 INJECTION, SOLUTION INTRAMUSCULAR; INTRAVENOUS at 12:33

## 2024-06-02 RX ADMIN — PANTOPRAZOLE SODIUM 40 MG: 40 TABLET, DELAYED RELEASE ORAL at 05:39

## 2024-06-02 RX ADMIN — HEPARIN SODIUM 5000 UNITS: 5000 INJECTION INTRAVENOUS; SUBCUTANEOUS at 06:23

## 2024-06-02 RX ADMIN — LEVETIRACETAM 750 MG: 500 TABLET, FILM COATED ORAL at 08:38

## 2024-06-02 RX ADMIN — SODIUM CHLORIDE, PRESERVATIVE FREE 10 ML: 5 INJECTION INTRAVENOUS at 08:39

## 2024-06-02 ASSESSMENT — PAIN SCALES - GENERAL: PAINLEVEL_OUTOF10: 0

## 2024-06-03 ENCOUNTER — ANESTHESIA (OUTPATIENT)
Dept: OPERATING ROOM | Age: 64
DRG: 023 | End: 2024-06-03
Payer: COMMERCIAL

## 2024-06-03 PROBLEM — R56.9 NEW ONSET SEIZURE (HCC): Status: ACTIVE | Noted: 2024-06-03

## 2024-06-03 LAB
ANION GAP SERPL CALCULATED.3IONS-SCNC: 11 MMOL/L (ref 7–16)
BASOPHILS # BLD: 0.02 K/UL (ref 0–0.2)
BASOPHILS NFR BLD: 0 % (ref 0–2)
BUN SERPL-MCNC: 22 MG/DL (ref 6–23)
CALCIUM SERPL-MCNC: 9.6 MG/DL (ref 8.6–10.2)
CHLORIDE SERPL-SCNC: 101 MMOL/L (ref 98–107)
CO2 SERPL-SCNC: 24 MMOL/L (ref 22–29)
CREAT SERPL-MCNC: 0.8 MG/DL (ref 0.7–1.2)
EOSINOPHIL # BLD: 0 K/UL (ref 0.05–0.5)
EOSINOPHILS RELATIVE PERCENT: 0 % (ref 0–6)
ERYTHROCYTE [DISTWIDTH] IN BLOOD BY AUTOMATED COUNT: 14.1 % (ref 11.5–15)
GFR, ESTIMATED: >90 ML/MIN/1.73M2
GLUCOSE SERPL-MCNC: 156 MG/DL (ref 74–99)
HCT VFR BLD AUTO: 47.1 % (ref 37–54)
HGB BLD-MCNC: 15.6 G/DL (ref 12.5–16.5)
IMM GRANULOCYTES # BLD AUTO: 0.12 K/UL (ref 0–0.58)
IMM GRANULOCYTES NFR BLD: 1 % (ref 0–5)
LYMPHOCYTES NFR BLD: 1.19 K/UL (ref 1.5–4)
LYMPHOCYTES RELATIVE PERCENT: 11 % (ref 20–42)
MCH RBC QN AUTO: 27.5 PG (ref 26–35)
MCHC RBC AUTO-ENTMCNC: 33.1 G/DL (ref 32–34.5)
MCV RBC AUTO: 82.9 FL (ref 80–99.9)
MONOCYTES NFR BLD: 0.62 K/UL (ref 0.1–0.95)
MONOCYTES NFR BLD: 6 % (ref 2–12)
NEUTROPHILS NFR BLD: 83 % (ref 43–80)
NEUTS SEG NFR BLD: 9.41 K/UL (ref 1.8–7.3)
PLATELET # BLD AUTO: 305 K/UL (ref 130–450)
PMV BLD AUTO: 9 FL (ref 7–12)
POTASSIUM SERPL-SCNC: 5 MMOL/L (ref 3.5–5)
RBC # BLD AUTO: 5.68 M/UL (ref 3.8–5.8)
SODIUM SERPL-SCNC: 136 MMOL/L (ref 132–146)
WBC OTHER # BLD: 11.4 K/UL (ref 4.5–11.5)

## 2024-06-03 PROCEDURE — 87205 SMEAR GRAM STAIN: CPT

## 2024-06-03 PROCEDURE — 2580000003 HC RX 258

## 2024-06-03 PROCEDURE — 7100000001 HC PACU RECOVERY - ADDTL 15 MIN

## 2024-06-03 PROCEDURE — 6360000002 HC RX W HCPCS: Performed by: NEUROLOGICAL SURGERY

## 2024-06-03 PROCEDURE — 61320 CRNEC/CRNOT DRG ICR ABS STTL: CPT | Performed by: NEUROLOGICAL SURGERY

## 2024-06-03 PROCEDURE — 6360000002 HC RX W HCPCS: Performed by: NURSE ANESTHETIST, CERTIFIED REGISTERED

## 2024-06-03 PROCEDURE — 2500000003 HC RX 250 WO HCPCS: Performed by: NEUROLOGICAL SURGERY

## 2024-06-03 PROCEDURE — 6360000002 HC RX W HCPCS: Performed by: STUDENT IN AN ORGANIZED HEALTH CARE EDUCATION/TRAINING PROGRAM

## 2024-06-03 PROCEDURE — 3600000015 HC SURGERY LEVEL 5 ADDTL 15MIN: Performed by: NEUROLOGICAL SURGERY

## 2024-06-03 PROCEDURE — C1713 ANCHOR/SCREW BN/BN,TIS/BN: HCPCS | Performed by: NEUROLOGICAL SURGERY

## 2024-06-03 PROCEDURE — C1729 CATH, DRAINAGE: HCPCS | Performed by: NEUROLOGICAL SURGERY

## 2024-06-03 PROCEDURE — 2580000003 HC RX 258: Performed by: NEUROLOGICAL SURGERY

## 2024-06-03 PROCEDURE — 2580000003 HC RX 258: Performed by: STUDENT IN AN ORGANIZED HEALTH CARE EDUCATION/TRAINING PROGRAM

## 2024-06-03 PROCEDURE — 6360000002 HC RX W HCPCS

## 2024-06-03 PROCEDURE — 6370000000 HC RX 637 (ALT 250 FOR IP): Performed by: NEUROLOGICAL SURGERY

## 2024-06-03 PROCEDURE — 99232 SBSQ HOSP IP/OBS MODERATE 35: CPT | Performed by: NURSE PRACTITIONER

## 2024-06-03 PROCEDURE — 7100000000 HC PACU RECOVERY - FIRST 15 MIN

## 2024-06-03 PROCEDURE — 2500000003 HC RX 250 WO HCPCS

## 2024-06-03 PROCEDURE — A4216 STERILE WATER/SALINE, 10 ML: HCPCS | Performed by: NEUROLOGICAL SURGERY

## 2024-06-03 PROCEDURE — 3600000005 HC SURGERY LEVEL 5 BASE: Performed by: NEUROLOGICAL SURGERY

## 2024-06-03 PROCEDURE — 61320 CRNEC/CRNOT DRG ICR ABS STTL: CPT | Performed by: PHYSICIAN ASSISTANT

## 2024-06-03 PROCEDURE — 87206 SMEAR FLUORESCENT/ACID STAI: CPT

## 2024-06-03 PROCEDURE — 85025 COMPLETE CBC W/AUTO DIFF WBC: CPT

## 2024-06-03 PROCEDURE — 6370000000 HC RX 637 (ALT 250 FOR IP): Performed by: PSYCHIATRY & NEUROLOGY

## 2024-06-03 PROCEDURE — 87077 CULTURE AEROBIC IDENTIFY: CPT

## 2024-06-03 PROCEDURE — 2500000003 HC RX 250 WO HCPCS: Performed by: NURSE ANESTHETIST, CERTIFIED REGISTERED

## 2024-06-03 PROCEDURE — 2000000000 HC ICU R&B

## 2024-06-03 PROCEDURE — 87102 FUNGUS ISOLATION CULTURE: CPT

## 2024-06-03 PROCEDURE — C1763 CONN TISS, NON-HUMAN: HCPCS | Performed by: NEUROLOGICAL SURGERY

## 2024-06-03 PROCEDURE — 87070 CULTURE OTHR SPECIMN AEROBIC: CPT

## 2024-06-03 PROCEDURE — 2720000010 HC SURG SUPPLY STERILE: Performed by: NEUROLOGICAL SURGERY

## 2024-06-03 PROCEDURE — 87116 MYCOBACTERIA CULTURE: CPT

## 2024-06-03 PROCEDURE — 6370000000 HC RX 637 (ALT 250 FOR IP): Performed by: STUDENT IN AN ORGANIZED HEALTH CARE EDUCATION/TRAINING PROGRAM

## 2024-06-03 PROCEDURE — 87015 SPECIMEN INFECT AGNT CONCNTJ: CPT

## 2024-06-03 PROCEDURE — 87075 CULTR BACTERIA EXCEPT BLOOD: CPT

## 2024-06-03 PROCEDURE — A4217 STERILE WATER/SALINE, 500 ML: HCPCS | Performed by: NEUROLOGICAL SURGERY

## 2024-06-03 PROCEDURE — 3700000001 HC ADD 15 MINUTES (ANESTHESIA): Performed by: NEUROLOGICAL SURGERY

## 2024-06-03 PROCEDURE — 80048 BASIC METABOLIC PNL TOTAL CA: CPT

## 2024-06-03 PROCEDURE — 87176 TISSUE HOMOGENIZATION CULTR: CPT

## 2024-06-03 PROCEDURE — 99232 SBSQ HOSP IP/OBS MODERATE 35: CPT | Performed by: INTERNAL MEDICINE

## 2024-06-03 PROCEDURE — 61781 SCAN PROC CRANIAL INTRA: CPT | Performed by: NEUROLOGICAL SURGERY

## 2024-06-03 PROCEDURE — 3700000000 HC ANESTHESIA ATTENDED CARE: Performed by: NEUROLOGICAL SURGERY

## 2024-06-03 PROCEDURE — 2709999900 HC NON-CHARGEABLE SUPPLY: Performed by: NEUROLOGICAL SURGERY

## 2024-06-03 PROCEDURE — 36415 COLL VENOUS BLD VENIPUNCTURE: CPT

## 2024-06-03 DEVICE — PLATE, RIGID
Type: IMPLANTABLE DEVICE | Site: CRANIAL | Status: FUNCTIONAL
Brand: UNIVERSAL NEURO 2, QUIKFLAP

## 2024-06-03 DEVICE — LOW PROFILE BURR HOLE COVER, W/TAB, 14MM
Type: IMPLANTABLE DEVICE | Site: CRANIAL | Status: FUNCTIONAL
Brand: UNIVERSAL NEURO 2

## 2024-06-03 DEVICE — DURAGEN® PLUS DURAL REGENERATION MATRIX, 2 IN X 2 IN (5 CM X 5 CM)
Type: IMPLANTABLE DEVICE | Site: BRAIN | Status: FUNCTIONAL
Brand: DURAGEN® PLUS

## 2024-06-03 DEVICE — SCREW, AXS, SELF-DRILLING
Type: IMPLANTABLE DEVICE | Site: CRANIAL | Status: FUNCTIONAL
Brand: UNIVERSAL NEURO 3

## 2024-06-03 RX ORDER — PROPOFOL 10 MG/ML
INJECTION, EMULSION INTRAVENOUS PRN
Status: DISCONTINUED | OUTPATIENT
Start: 2024-06-03 | End: 2024-06-03 | Stop reason: SDUPTHER

## 2024-06-03 RX ORDER — BACITRACIN ZINC 500 [USP'U]/G
OINTMENT TOPICAL PRN
Status: DISCONTINUED | OUTPATIENT
Start: 2024-06-03 | End: 2024-06-03 | Stop reason: ALTCHOICE

## 2024-06-03 RX ORDER — ROCURONIUM BROMIDE 10 MG/ML
INJECTION, SOLUTION INTRAVENOUS PRN
Status: DISCONTINUED | OUTPATIENT
Start: 2024-06-03 | End: 2024-06-03 | Stop reason: SDUPTHER

## 2024-06-03 RX ORDER — SENNA AND DOCUSATE SODIUM 50; 8.6 MG/1; MG/1
1 TABLET, FILM COATED ORAL 2 TIMES DAILY
Status: DISCONTINUED | OUTPATIENT
Start: 2024-06-03 | End: 2024-06-06 | Stop reason: HOSPADM

## 2024-06-03 RX ORDER — OXYCODONE HYDROCHLORIDE 5 MG/1
5 TABLET ORAL EVERY 4 HOURS PRN
Status: DISCONTINUED | OUTPATIENT
Start: 2024-06-03 | End: 2024-06-06 | Stop reason: HOSPADM

## 2024-06-03 RX ORDER — SODIUM CHLORIDE 9 MG/ML
INJECTION, SOLUTION INTRAVENOUS PRN
Status: DISCONTINUED | OUTPATIENT
Start: 2024-06-03 | End: 2024-06-06 | Stop reason: HOSPADM

## 2024-06-03 RX ORDER — MORPHINE SULFATE 4 MG/ML
4 INJECTION, SOLUTION INTRAMUSCULAR; INTRAVENOUS
Status: DISCONTINUED | OUTPATIENT
Start: 2024-06-03 | End: 2024-06-06 | Stop reason: HOSPADM

## 2024-06-03 RX ORDER — MORPHINE SULFATE 2 MG/ML
2 INJECTION, SOLUTION INTRAMUSCULAR; INTRAVENOUS
Status: DISCONTINUED | OUTPATIENT
Start: 2024-06-03 | End: 2024-06-06 | Stop reason: HOSPADM

## 2024-06-03 RX ORDER — SODIUM CHLORIDE 0.9 % (FLUSH) 0.9 %
5-40 SYRINGE (ML) INJECTION EVERY 12 HOURS SCHEDULED
Status: DISCONTINUED | OUTPATIENT
Start: 2024-06-03 | End: 2024-06-03 | Stop reason: HOSPADM

## 2024-06-03 RX ORDER — NALOXONE HYDROCHLORIDE 0.4 MG/ML
INJECTION, SOLUTION INTRAMUSCULAR; INTRAVENOUS; SUBCUTANEOUS PRN
Status: DISCONTINUED | OUTPATIENT
Start: 2024-06-03 | End: 2024-06-03 | Stop reason: HOSPADM

## 2024-06-03 RX ORDER — SODIUM CHLORIDE, SODIUM LACTATE, POTASSIUM CHLORIDE, CALCIUM CHLORIDE 600; 310; 30; 20 MG/100ML; MG/100ML; MG/100ML; MG/100ML
INJECTION, SOLUTION INTRAVENOUS CONTINUOUS PRN
Status: DISCONTINUED | OUTPATIENT
Start: 2024-06-03 | End: 2024-06-03 | Stop reason: SDUPTHER

## 2024-06-03 RX ORDER — DEXAMETHASONE SODIUM PHOSPHATE 10 MG/ML
INJECTION INTRAMUSCULAR; INTRAVENOUS PRN
Status: DISCONTINUED | OUTPATIENT
Start: 2024-06-03 | End: 2024-06-03 | Stop reason: SDUPTHER

## 2024-06-03 RX ORDER — SODIUM CHLORIDE 0.9 % (FLUSH) 0.9 %
5-40 SYRINGE (ML) INJECTION EVERY 12 HOURS SCHEDULED
Status: DISCONTINUED | OUTPATIENT
Start: 2024-06-03 | End: 2024-06-06 | Stop reason: HOSPADM

## 2024-06-03 RX ORDER — FAMOTIDINE 20 MG/1
20 TABLET, FILM COATED ORAL 2 TIMES DAILY
Status: DISCONTINUED | OUTPATIENT
Start: 2024-06-03 | End: 2024-06-04

## 2024-06-03 RX ORDER — SODIUM CHLORIDE 0.9 % (FLUSH) 0.9 %
5-40 SYRINGE (ML) INJECTION PRN
Status: DISCONTINUED | OUTPATIENT
Start: 2024-06-03 | End: 2024-06-06 | Stop reason: HOSPADM

## 2024-06-03 RX ORDER — LABETALOL HYDROCHLORIDE 5 MG/ML
20 INJECTION, SOLUTION INTRAVENOUS EVERY 10 MIN PRN
Status: DISCONTINUED | OUTPATIENT
Start: 2024-06-03 | End: 2024-06-06 | Stop reason: HOSPADM

## 2024-06-03 RX ORDER — MEPERIDINE HYDROCHLORIDE 25 MG/ML
12.5 INJECTION INTRAMUSCULAR; INTRAVENOUS; SUBCUTANEOUS EVERY 5 MIN PRN
Status: DISCONTINUED | OUTPATIENT
Start: 2024-06-03 | End: 2024-06-03 | Stop reason: HOSPADM

## 2024-06-03 RX ORDER — SODIUM CHLORIDE 0.9 % (FLUSH) 0.9 %
5-40 SYRINGE (ML) INJECTION PRN
Status: DISCONTINUED | OUTPATIENT
Start: 2024-06-03 | End: 2024-06-03 | Stop reason: HOSPADM

## 2024-06-03 RX ORDER — ONDANSETRON 2 MG/ML
4 INJECTION INTRAMUSCULAR; INTRAVENOUS EVERY 6 HOURS PRN
Status: DISCONTINUED | OUTPATIENT
Start: 2024-06-03 | End: 2024-06-06 | Stop reason: HOSPADM

## 2024-06-03 RX ORDER — OXYCODONE HYDROCHLORIDE 10 MG/1
10 TABLET ORAL EVERY 4 HOURS PRN
Status: DISCONTINUED | OUTPATIENT
Start: 2024-06-03 | End: 2024-06-06 | Stop reason: HOSPADM

## 2024-06-03 RX ORDER — SODIUM CHLORIDE 9 MG/ML
INJECTION, SOLUTION INTRAVENOUS CONTINUOUS
Status: DISCONTINUED | OUTPATIENT
Start: 2024-06-03 | End: 2024-06-06 | Stop reason: HOSPADM

## 2024-06-03 RX ORDER — DIPHENHYDRAMINE HYDROCHLORIDE 50 MG/ML
12.5 INJECTION INTRAMUSCULAR; INTRAVENOUS
Status: DISCONTINUED | OUTPATIENT
Start: 2024-06-03 | End: 2024-06-03 | Stop reason: HOSPADM

## 2024-06-03 RX ORDER — LABETALOL HYDROCHLORIDE 5 MG/ML
INJECTION, SOLUTION INTRAVENOUS PRN
Status: DISCONTINUED | OUTPATIENT
Start: 2024-06-03 | End: 2024-06-03 | Stop reason: SDUPTHER

## 2024-06-03 RX ORDER — NITROGLYCERIN 5 MG/ML
INJECTION, SOLUTION INTRAVENOUS PRN
Status: DISCONTINUED | OUTPATIENT
Start: 2024-06-03 | End: 2024-06-03 | Stop reason: SDUPTHER

## 2024-06-03 RX ORDER — HYDROMORPHONE HYDROCHLORIDE 1 MG/ML
0.25 INJECTION, SOLUTION INTRAMUSCULAR; INTRAVENOUS; SUBCUTANEOUS EVERY 5 MIN PRN
Status: DISCONTINUED | OUTPATIENT
Start: 2024-06-03 | End: 2024-06-03 | Stop reason: HOSPADM

## 2024-06-03 RX ORDER — SODIUM CHLORIDE 9 MG/ML
INJECTION, SOLUTION INTRAVENOUS CONTINUOUS PRN
Status: DISCONTINUED | OUTPATIENT
Start: 2024-06-03 | End: 2024-06-03 | Stop reason: SDUPTHER

## 2024-06-03 RX ORDER — BISACODYL 5 MG/1
5 TABLET, DELAYED RELEASE ORAL DAILY
Status: DISCONTINUED | OUTPATIENT
Start: 2024-06-03 | End: 2024-06-06 | Stop reason: HOSPADM

## 2024-06-03 RX ORDER — LIDOCAINE HYDROCHLORIDE AND EPINEPHRINE 5; 5 MG/ML; UG/ML
INJECTION, SOLUTION INFILTRATION; PERINEURAL PRN
Status: DISCONTINUED | OUTPATIENT
Start: 2024-06-03 | End: 2024-06-03 | Stop reason: ALTCHOICE

## 2024-06-03 RX ORDER — EPHEDRINE SULFATE/0.9% NACL/PF 25 MG/5 ML
SYRINGE (ML) INTRAVENOUS PRN
Status: DISCONTINUED | OUTPATIENT
Start: 2024-06-03 | End: 2024-06-03 | Stop reason: SDUPTHER

## 2024-06-03 RX ORDER — SODIUM CHLORIDE 9 MG/ML
INJECTION, SOLUTION INTRAVENOUS CONTINUOUS
Status: DISCONTINUED | OUTPATIENT
Start: 2024-06-03 | End: 2024-06-03 | Stop reason: HOSPADM

## 2024-06-03 RX ORDER — FENTANYL CITRATE 50 UG/ML
INJECTION, SOLUTION INTRAMUSCULAR; INTRAVENOUS PRN
Status: DISCONTINUED | OUTPATIENT
Start: 2024-06-03 | End: 2024-06-03 | Stop reason: SDUPTHER

## 2024-06-03 RX ORDER — SODIUM CHLORIDE 9 MG/ML
INJECTION, SOLUTION INTRAVENOUS PRN
Status: DISCONTINUED | OUTPATIENT
Start: 2024-06-03 | End: 2024-06-03 | Stop reason: HOSPADM

## 2024-06-03 RX ORDER — ONDANSETRON 4 MG/1
4 TABLET, ORALLY DISINTEGRATING ORAL EVERY 8 HOURS PRN
Status: DISCONTINUED | OUTPATIENT
Start: 2024-06-03 | End: 2024-06-06 | Stop reason: HOSPADM

## 2024-06-03 RX ORDER — POLYETHYLENE GLYCOL 3350 17 G/17G
17 POWDER, FOR SOLUTION ORAL DAILY
Status: DISCONTINUED | OUTPATIENT
Start: 2024-06-03 | End: 2024-06-06 | Stop reason: HOSPADM

## 2024-06-03 RX ORDER — ONDANSETRON 2 MG/ML
INJECTION INTRAMUSCULAR; INTRAVENOUS PRN
Status: DISCONTINUED | OUTPATIENT
Start: 2024-06-03 | End: 2024-06-03 | Stop reason: SDUPTHER

## 2024-06-03 RX ORDER — HYDROMORPHONE HYDROCHLORIDE 1 MG/ML
0.5 INJECTION, SOLUTION INTRAMUSCULAR; INTRAVENOUS; SUBCUTANEOUS EVERY 5 MIN PRN
Status: DISCONTINUED | OUTPATIENT
Start: 2024-06-03 | End: 2024-06-03 | Stop reason: HOSPADM

## 2024-06-03 RX ORDER — HYDRALAZINE HYDROCHLORIDE 20 MG/ML
20 INJECTION INTRAMUSCULAR; INTRAVENOUS
Status: DISCONTINUED | OUTPATIENT
Start: 2024-06-03 | End: 2024-06-06 | Stop reason: HOSPADM

## 2024-06-03 RX ORDER — LIDOCAINE HYDROCHLORIDE 20 MG/ML
INJECTION, SOLUTION INTRAVENOUS PRN
Status: DISCONTINUED | OUTPATIENT
Start: 2024-06-03 | End: 2024-06-03 | Stop reason: SDUPTHER

## 2024-06-03 RX ADMIN — WATER 1000 MG: 1 INJECTION INTRAMUSCULAR; INTRAVENOUS; SUBCUTANEOUS at 20:17

## 2024-06-03 RX ADMIN — SODIUM CHLORIDE, POTASSIUM CHLORIDE, SODIUM LACTATE AND CALCIUM CHLORIDE: 600; 310; 30; 20 INJECTION, SOLUTION INTRAVENOUS at 17:00

## 2024-06-03 RX ADMIN — NITROGLYCERIN 25 MCG: 5 INJECTION, SOLUTION INTRAVENOUS at 18:46

## 2024-06-03 RX ADMIN — SODIUM CHLORIDE: 9 INJECTION, SOLUTION INTRAVENOUS at 19:34

## 2024-06-03 RX ADMIN — DEXAMETHASONE SODIUM PHOSPHATE 4 MG: 4 INJECTION, SOLUTION INTRAMUSCULAR; INTRAVENOUS at 11:42

## 2024-06-03 RX ADMIN — SENNOSIDES AND DOCUSATE SODIUM 1 TABLET: 50; 8.6 TABLET ORAL at 20:15

## 2024-06-03 RX ADMIN — PROPOFOL 50 MCG/KG/MIN: 10 INJECTION, EMULSION INTRAVENOUS at 17:35

## 2024-06-03 RX ADMIN — HYDRALAZINE HYDROCHLORIDE 20 MG: 20 INJECTION INTRAMUSCULAR; INTRAVENOUS at 21:32

## 2024-06-03 RX ADMIN — ONDANSETRON 4 MG: 2 INJECTION INTRAMUSCULAR; INTRAVENOUS at 18:23

## 2024-06-03 RX ADMIN — SUGAMMADEX 200 MG: 100 INJECTION, SOLUTION INTRAVENOUS at 18:31

## 2024-06-03 RX ADMIN — FAMOTIDINE 20 MG: 10 INJECTION, SOLUTION INTRAVENOUS at 20:16

## 2024-06-03 RX ADMIN — SODIUM CHLORIDE: 9 INJECTION, SOLUTION INTRAVENOUS at 07:00

## 2024-06-03 RX ADMIN — LIDOCAINE HYDROCHLORIDE 100 MG: 20 INJECTION, SOLUTION INTRAVENOUS at 17:08

## 2024-06-03 RX ADMIN — SODIUM CHLORIDE, PRESERVATIVE FREE 10 ML: 5 INJECTION INTRAVENOUS at 20:15

## 2024-06-03 RX ADMIN — SODIUM CHLORIDE: 9 INJECTION, SOLUTION INTRAVENOUS at 13:18

## 2024-06-03 RX ADMIN — CEFAZOLIN 2000 MG: 2 INJECTION, POWDER, FOR SOLUTION INTRAMUSCULAR; INTRAVENOUS at 17:29

## 2024-06-03 RX ADMIN — SODIUM CHLORIDE: 9 INJECTION, SOLUTION INTRAVENOUS at 17:00

## 2024-06-03 RX ADMIN — OXYCODONE 5 MG: 5 TABLET ORAL at 20:16

## 2024-06-03 RX ADMIN — ROCURONIUM BROMIDE 50 MG: 10 INJECTION, SOLUTION INTRAVENOUS at 17:08

## 2024-06-03 RX ADMIN — PANTOPRAZOLE SODIUM 40 MG: 40 TABLET, DELAYED RELEASE ORAL at 05:49

## 2024-06-03 RX ADMIN — VANCOMYCIN HYDROCHLORIDE 2000 MG: 10 INJECTION, POWDER, LYOPHILIZED, FOR SOLUTION INTRAVENOUS at 20:39

## 2024-06-03 RX ADMIN — DEXAMETHASONE SODIUM PHOSPHATE 4 MG: 4 INJECTION, SOLUTION INTRAMUSCULAR; INTRAVENOUS at 05:49

## 2024-06-03 RX ADMIN — ROCURONIUM BROMIDE 20 MG: 10 INJECTION, SOLUTION INTRAVENOUS at 18:13

## 2024-06-03 RX ADMIN — DEXAMETHASONE SODIUM PHOSPHATE 10 MG: 10 INJECTION INTRAMUSCULAR; INTRAVENOUS at 17:25

## 2024-06-03 RX ADMIN — HYDRALAZINE HYDROCHLORIDE 20 MG: 20 INJECTION INTRAMUSCULAR; INTRAVENOUS at 19:31

## 2024-06-03 RX ADMIN — NITROGLYCERIN 50 MCG: 5 INJECTION, SOLUTION INTRAVENOUS at 18:29

## 2024-06-03 RX ADMIN — LABETALOL HYDROCHLORIDE 5 MG: 5 INJECTION INTRAVENOUS at 18:46

## 2024-06-03 RX ADMIN — EPHEDRINE SULFATE 5 MG: 5 INJECTION INTRAVENOUS at 17:43

## 2024-06-03 RX ADMIN — LEVETIRACETAM 1000 MG: 500 TABLET, FILM COATED ORAL at 08:23

## 2024-06-03 RX ADMIN — LEVETIRACETAM 1000 MG: 500 TABLET, FILM COATED ORAL at 20:15

## 2024-06-03 RX ADMIN — EPHEDRINE SULFATE 5 MG: 5 INJECTION INTRAVENOUS at 17:56

## 2024-06-03 RX ADMIN — PROPOFOL 150 MG: 10 INJECTION, EMULSION INTRAVENOUS at 17:08

## 2024-06-03 RX ADMIN — FENTANYL CITRATE 100 MCG: 0.05 INJECTION, SOLUTION INTRAMUSCULAR; INTRAVENOUS at 17:08

## 2024-06-03 ASSESSMENT — PAIN SCALES - GENERAL
PAINLEVEL_OUTOF10: 0
PAINLEVEL_OUTOF10: 0
PAINLEVEL_OUTOF10: 5
PAINLEVEL_OUTOF10: 0
PAINLEVEL_OUTOF10: 0
PAINLEVEL_OUTOF10: 2
PAINLEVEL_OUTOF10: 0

## 2024-06-03 ASSESSMENT — PAIN DESCRIPTION - DESCRIPTORS: DESCRIPTORS: ACHING;DISCOMFORT;DULL;NAGGING

## 2024-06-03 ASSESSMENT — PAIN - FUNCTIONAL ASSESSMENT: PAIN_FUNCTIONAL_ASSESSMENT: PREVENTS OR INTERFERES SOME ACTIVE ACTIVITIES AND ADLS

## 2024-06-03 ASSESSMENT — PAIN DESCRIPTION - LOCATION: LOCATION: HEAD

## 2024-06-03 ASSESSMENT — PAIN DESCRIPTION - ORIENTATION: ORIENTATION: LEFT

## 2024-06-03 ASSESSMENT — PAIN DESCRIPTION - PAIN TYPE: TYPE: ACUTE PAIN;SURGICAL PAIN

## 2024-06-03 ASSESSMENT — PAIN DESCRIPTION - FREQUENCY: FREQUENCY: INTERMITTENT

## 2024-06-03 NOTE — BRIEF OP NOTE
Brief Postoperative Note      Patient: Conrado Leach  YOB: 1960  MRN: [id number]    Date of Procedure: 6/3/2024    Pre-Op Diagnosis Codes:     * Abscess of brain [G06.0]    Post-Op Diagnosis: Same       Procedure(s):  LEFT CRANIOTOMY ABCESS EVACUATION    Surgeon(s):  Mary Henderson MD    Assistant:  Physician Assistant: Jean Salas PA    Anesthesia: General    Estimated Blood Loss (mL): less than 50     Complications: None    Specimens:   ID Type Source Tests Collected by Time Destination   1 : left frontal mass Tissue Brain CULTURE, ANAEROBIC, CULTURE, FUNGUS, GRAM STAIN, CULTURE, SURGICAL, CULTURE WITH SMEAR, ACID FAST BACILLIUS Mary Henderson MD 6/3/2024 1753    A : left frontal lobe mass Tissue Brain SURGICAL PATHOLOGY Mary Henderson MD 6/3/2024 1752        Implants:  Implant Name Type Inv. Item Serial No.  Lot No. LRB No. Used Action   GRAFT DURA [id number] ULTRAPURE DURAGN + EA - [id number]  GRAFT DURA [id number] ULTRAPURE DURAGN + EA  [employer]  Left 1 Implanted   PLATE BNE BAR L12MM 2 H CRANIOMAXILLOFACIAL TI RIG FOR UNIV - [id number]  PLATE BNE BAR L12MM 2 H CRANIOMAXILLOFACIAL TI RIG FOR UNIV  ESTRELLA CRANIOMAXILLOFACIAL-WD  Left 2 Implanted   COVER BUR H [id number] CRANIOMAXILLOFACIAL PLT LO PROF W/ TAB - [id number]  COVER BUR H [id number] CRANIOMAXILLOFACIAL PLT LO PROF W/ TAB  ESTRELLA CRANIOMAXILLOFACIAL-WD  Left 1 Implanted   SCREW CRANIOMAXILLOFACIAL UNIVERSAL NEURO UN3 SLFTP 1.5X4MM - [id number]  SCREW CRANIOMAXILLOFACIAL UNIVERSAL NEURO UN3 SLFTP 1.5X4MM  ESTRELLA CRANIOMAXILLOFACIAL-WD  Left 5 Implanted         Drains:   Urinary Catheter 06/03/24 Limon (Active)       Findings:  Infection Present At Time Of Surgery (PATOS) (choose all levels that have infection present):  - Organ Space infection (below fascia) present as evidenced by abscess  Other Findings: see dictated op note    Electronically signed by Mary Henderson MD on 6/3/2024 at 6:47 PM   18

## 2024-06-03 NOTE — ANESTHESIA PRE PROCEDURE
Department of Anesthesiology  Preprocedure Note       Name:  Conrado Leach   Age:  63 y.o.  :  1960                                          MRN:  [id number]         Date:  6/3/2024      Surgeon: Surgeon(s):  Mary Henderson MD    Procedure: Procedure(s):  LEFT CRANIOTOMY ABCESS EVACUATION, NIRMALA RUTH    Medications prior to admission:   Prior to Admission medications    Medication Sig Start Date End Date Taking? Authorizing Provider   omeprazole (PRILOSEC) 20 MG delayed release capsule Take 1 capsule by mouth daily   Yes Provider, MD Homar   metFORMIN (GLUCOPHAGE) 500 MG tablet Take 1 tablet by mouth 2 times daily (with meals)   Yes ProviderHomar MD   rosuvastatin (CRESTOR) 5 MG tablet Take 1 tablet by mouth daily   Yes ProviderHomar MD   celecoxib (CELEBREX) 200 MG capsule Take 1 capsule by mouth 2 times daily  24 Yes ProviderHomar MD       Current medications:    Current Facility-Administered Medications   Medication Dose Route Frequency Provider Last Rate Last Admin   • sodium chloride flush 0.9 % injection 5-40 mL  5-40 mL IntraVENous 2 times per day Margaret Birmingham PA       • sodium chloride flush 0.9 % injection 5-40 mL  5-40 mL IntraVENous PRN Margaret Birmingham PA       • 0.9 % sodium chloride infusion   IntraVENous PRN Margaret Birmingham PA       • 0.9 % sodium chloride infusion   IntraVENous Continuous Margaret Birmingham  mL/hr at 24 1318 New Bag at 24 1318   • ceFAZolin (ANCEF) 2,000 mg in sterile water 20 mL IV syringe  2,000 mg IntraVENous See Admin Instructions Margaret Birmingham PA       • sodium chloride flush 0.9 % injection 5-40 mL  5-40 mL IntraVENous 2 times per day Mary Henderson MD       • sodium chloride flush 0.9 % injection 5-40 mL  5-40 mL IntraVENous PRN Mary Henderson MD       • 0.9 % sodium chloride infusion   IntraVENous PRN Mary Henderson MD       • levETIRAcetam (KEPPRA) tablet 1,000 mg  1,000 mg Oral BID Wilber Miguel DO

## 2024-06-03 NOTE — FLOWSHEET NOTE
1852 Admitted to [facility]  via bed from surgery.Drowsy,arousable to name.Walis.Neuro status intact.Dressing dry and intact to left scalp.Call light within reach.Continue to monitor.

## 2024-06-03 NOTE — PROCEDURES
PROCEDURE NOTE  Date: 6/3/2024   Name: Conrado Leach  YOB: 1960    Procedures        MRI screening needed prior to MRI scan.

## 2024-06-03 NOTE — CARE COORDINATION
SOCIAL WORK/CASEMANAGEMENT TRANSITION OF CARE PLANNING( SKYLER PAULSON, JIGNESHW [phone]): pt sitting up in chair at bedside this a.m. PT and OT on hold until after surgery. Pt is  for Craniotomy for biopsy of abscess today with neurosurgery.  ID following but holding off on abx. Hem/onc and radiation oncology following as well. Pt is on iv decadron , keppra and fluids. Will follow. CHIQUITA Dong  6/3/2024

## 2024-06-03 NOTE — ANESTHESIA PROCEDURE NOTES
Arterial Line:    An arterial line was placed using surface landmarks, in the OR for the following indication(s): continuous blood pressure monitoring and blood sampling needed.    A 20 gauge (size), 1 and 3/4 inch (length), Arrow (type) catheter was placed, Seldinger technique not used, into the left radial artery, secured by tape and Tegaderm.  Anesthesia type: General    Events:  patient tolerated procedure well with no complications and EBL < 5mL.  Anesthesiologist: Anthony Bang MD  Resident/CRNA: Darwin Regan APRN - CRNA  Other anesthesia staff: Kaylee Mckeon RN  Performed: Other anesthesia staff   Preanesthetic Checklist  Completed: patient identified, IV checked, site marked, risks and benefits discussed, surgical/procedural consents, equipment checked, pre-op evaluation, timeout performed, anesthesia consent given, oxygen available, monitors applied/VS acknowledged, fire risk safety assessment completed and verbalized and blood product R/B/A discussed and consented

## 2024-06-04 DIAGNOSIS — G93.89 BRAIN MASS: Primary | ICD-10-CM

## 2024-06-04 PROBLEM — G06.0 ABSCESS OF BRAIN: Status: ACTIVE | Noted: 2024-05-29

## 2024-06-04 LAB
ANION GAP SERPL CALCULATED.3IONS-SCNC: 12 MMOL/L (ref 7–16)
BASOPHILS # BLD: 0.01 K/UL (ref 0–0.2)
BASOPHILS NFR BLD: 0 % (ref 0–2)
BUN SERPL-MCNC: 14 MG/DL (ref 6–23)
CALCIUM SERPL-MCNC: 6.7 MG/DL (ref 8.6–10.2)
CHLORIDE SERPL-SCNC: 109 MMOL/L (ref 98–107)
CO2 SERPL-SCNC: 15 MMOL/L (ref 22–29)
CREAT SERPL-MCNC: 0.5 MG/DL (ref 0.7–1.2)
DATE LAST DOSE: ABNORMAL
EOSINOPHIL # BLD: 0.01 K/UL (ref 0.05–0.5)
EOSINOPHILS RELATIVE PERCENT: 0 % (ref 0–6)
ERYTHROCYTE [DISTWIDTH] IN BLOOD BY AUTOMATED COUNT: 14.1 % (ref 11.5–15)
GFR, ESTIMATED: >90 ML/MIN/1.73M2
GLUCOSE SERPL-MCNC: 109 MG/DL (ref 74–99)
HCT VFR BLD AUTO: 41.4 % (ref 37–54)
HGB BLD-MCNC: 13.9 G/DL (ref 12.5–16.5)
IMM GRANULOCYTES # BLD AUTO: 0.12 K/UL (ref 0–0.58)
IMM GRANULOCYTES NFR BLD: 1 % (ref 0–5)
LYMPHOCYTES NFR BLD: 1.24 K/UL (ref 1.5–4)
LYMPHOCYTES RELATIVE PERCENT: 9 % (ref 20–42)
MCH RBC QN AUTO: 27.8 PG (ref 26–35)
MCHC RBC AUTO-ENTMCNC: 33.6 G/DL (ref 32–34.5)
MCV RBC AUTO: 82.8 FL (ref 80–99.9)
MICROORGANISM SPEC CULT: NORMAL
MICROORGANISM SPEC CULT: NORMAL
MONOCYTES NFR BLD: 1.33 K/UL (ref 0.1–0.95)
MONOCYTES NFR BLD: 9 % (ref 2–12)
NEUTROPHILS NFR BLD: 81 % (ref 43–80)
NEUTS SEG NFR BLD: 11.48 K/UL (ref 1.8–7.3)
PLATELET # BLD AUTO: 269 K/UL (ref 130–450)
PMV BLD AUTO: 9 FL (ref 7–12)
POTASSIUM SERPL-SCNC: 3.1 MMOL/L (ref 3.5–5)
RBC # BLD AUTO: 5 M/UL (ref 3.8–5.8)
SERVICE CMNT-IMP: NORMAL
SERVICE CMNT-IMP: NORMAL
SODIUM SERPL-SCNC: 136 MMOL/L (ref 132–146)
SPECIMEN DESCRIPTION: NORMAL
SPECIMEN DESCRIPTION: NORMAL
TME LAST DOSE: ABNORMAL H
VANCOMYCIN DOSE: ABNORMAL MG
VANCOMYCIN TROUGH SERPL-MCNC: 4.2 UG/ML (ref 5–16)
WBC OTHER # BLD: 14.2 K/UL (ref 4.5–11.5)

## 2024-06-04 PROCEDURE — 6370000000 HC RX 637 (ALT 250 FOR IP): Performed by: NEUROLOGICAL SURGERY

## 2024-06-04 PROCEDURE — 2580000003 HC RX 258: Performed by: NEUROLOGICAL SURGERY

## 2024-06-04 PROCEDURE — 2580000003 HC RX 258: Performed by: INTERNAL MEDICINE

## 2024-06-04 PROCEDURE — 05HY33Z INSERTION OF INFUSION DEVICE INTO UPPER VEIN, PERCUTANEOUS APPROACH: ICD-10-PCS | Performed by: STUDENT IN AN ORGANIZED HEALTH CARE EDUCATION/TRAINING PROGRAM

## 2024-06-04 PROCEDURE — 6370000000 HC RX 637 (ALT 250 FOR IP): Performed by: NURSE PRACTITIONER

## 2024-06-04 PROCEDURE — 80202 ASSAY OF VANCOMYCIN: CPT

## 2024-06-04 PROCEDURE — 37799 UNLISTED PX VASCULAR SURGERY: CPT

## 2024-06-04 PROCEDURE — 97530 THERAPEUTIC ACTIVITIES: CPT

## 2024-06-04 PROCEDURE — 80048 BASIC METABOLIC PNL TOTAL CA: CPT

## 2024-06-04 PROCEDURE — 6360000002 HC RX W HCPCS: Performed by: INTERNAL MEDICINE

## 2024-06-04 PROCEDURE — 99232 SBSQ HOSP IP/OBS MODERATE 35: CPT | Performed by: NURSE PRACTITIONER

## 2024-06-04 PROCEDURE — C1751 CATH, INF, PER/CENT/MIDLINE: HCPCS

## 2024-06-04 PROCEDURE — 97535 SELF CARE MNGMENT TRAINING: CPT

## 2024-06-04 PROCEDURE — 6360000002 HC RX W HCPCS: Performed by: NEUROLOGICAL SURGERY

## 2024-06-04 PROCEDURE — 2060000000 HC ICU INTERMEDIATE R&B

## 2024-06-04 PROCEDURE — 97166 OT EVAL MOD COMPLEX 45 MIN: CPT

## 2024-06-04 PROCEDURE — 0W910ZZ DRAINAGE OF CRANIAL CAVITY, OPEN APPROACH: ICD-10-PCS | Performed by: NEUROLOGICAL SURGERY

## 2024-06-04 PROCEDURE — 99291 CRITICAL CARE FIRST HOUR: CPT | Performed by: NURSE PRACTITIONER

## 2024-06-04 PROCEDURE — 85025 COMPLETE CBC W/AUTO DIFF WBC: CPT

## 2024-06-04 PROCEDURE — 99231 SBSQ HOSP IP/OBS SF/LOW 25: CPT | Performed by: STUDENT IN AN ORGANIZED HEALTH CARE EDUCATION/TRAINING PROGRAM

## 2024-06-04 PROCEDURE — 36569 INSJ PICC 5 YR+ W/O IMAGING: CPT

## 2024-06-04 PROCEDURE — 76937 US GUIDE VASCULAR ACCESS: CPT

## 2024-06-04 PROCEDURE — 00U20KZ SUPPLEMENT DURA MATER WITH NONAUTOLOGOUS TISSUE SUBSTITUTE, OPEN APPROACH: ICD-10-PCS | Performed by: NEUROLOGICAL SURGERY

## 2024-06-04 PROCEDURE — 97162 PT EVAL MOD COMPLEX 30 MIN: CPT

## 2024-06-04 RX ORDER — SODIUM CHLORIDE 0.9 % (FLUSH) 0.9 %
5-40 SYRINGE (ML) INJECTION EVERY 12 HOURS SCHEDULED
Status: DISCONTINUED | OUTPATIENT
Start: 2024-06-04 | End: 2024-06-06 | Stop reason: HOSPADM

## 2024-06-04 RX ORDER — SODIUM CHLORIDE 9 MG/ML
INJECTION, SOLUTION INTRAVENOUS PRN
Status: DISCONTINUED | OUTPATIENT
Start: 2024-06-04 | End: 2024-06-06 | Stop reason: HOSPADM

## 2024-06-04 RX ORDER — HEPARIN 100 UNIT/ML
3 SYRINGE INTRAVENOUS PRN
Status: DISCONTINUED | OUTPATIENT
Start: 2024-06-04 | End: 2024-06-06 | Stop reason: HOSPADM

## 2024-06-04 RX ORDER — LIDOCAINE HYDROCHLORIDE 10 MG/ML
5 INJECTION, SOLUTION EPIDURAL; INFILTRATION; INTRACAUDAL; PERINEURAL ONCE
Status: DISCONTINUED | OUTPATIENT
Start: 2024-06-04 | End: 2024-06-06 | Stop reason: HOSPADM

## 2024-06-04 RX ORDER — LABETALOL HYDROCHLORIDE 5 MG/ML
20 INJECTION, SOLUTION INTRAVENOUS EVERY 4 HOURS PRN
Status: CANCELLED | OUTPATIENT
Start: 2024-06-04

## 2024-06-04 RX ORDER — HEPARIN 100 UNIT/ML
3 SYRINGE INTRAVENOUS EVERY 12 HOURS SCHEDULED
Status: DISCONTINUED | OUTPATIENT
Start: 2024-06-04 | End: 2024-06-06 | Stop reason: HOSPADM

## 2024-06-04 RX ORDER — BUTALBITAL, ACETAMINOPHEN AND CAFFEINE 50; 325; 40 MG/1; MG/1; MG/1
1 TABLET ORAL EVERY 4 HOURS PRN
Status: DISCONTINUED | OUTPATIENT
Start: 2024-06-04 | End: 2024-06-06 | Stop reason: HOSPADM

## 2024-06-04 RX ORDER — HYDRALAZINE HYDROCHLORIDE 20 MG/ML
20 INJECTION INTRAMUSCULAR; INTRAVENOUS EVERY 4 HOURS PRN
Status: CANCELLED | OUTPATIENT
Start: 2024-06-04

## 2024-06-04 RX ORDER — SODIUM CHLORIDE 0.9 % (FLUSH) 0.9 %
5-40 SYRINGE (ML) INJECTION PRN
Status: DISCONTINUED | OUTPATIENT
Start: 2024-06-04 | End: 2024-06-06 | Stop reason: HOSPADM

## 2024-06-04 RX ADMIN — PANTOPRAZOLE SODIUM 40 MG: 40 TABLET, DELAYED RELEASE ORAL at 09:14

## 2024-06-04 RX ADMIN — SENNOSIDES AND DOCUSATE SODIUM 1 TABLET: 50; 8.6 TABLET ORAL at 09:11

## 2024-06-04 RX ADMIN — BISACODYL 5 MG: 5 TABLET, COATED ORAL at 09:11

## 2024-06-04 RX ADMIN — SENNOSIDES AND DOCUSATE SODIUM 1 TABLET: 50; 8.6 TABLET ORAL at 22:24

## 2024-06-04 RX ADMIN — BUTALBITAL, ACETAMINOPHEN, AND CAFFEINE 1 TABLET: 50; 325; 40 TABLET ORAL at 14:01

## 2024-06-04 RX ADMIN — SODIUM CHLORIDE, PRESERVATIVE FREE 10 ML: 5 INJECTION INTRAVENOUS at 09:12

## 2024-06-04 RX ADMIN — FAMOTIDINE 20 MG: 20 TABLET, FILM COATED ORAL at 09:11

## 2024-06-04 RX ADMIN — LEVETIRACETAM 1000 MG: 500 TABLET, FILM COATED ORAL at 09:11

## 2024-06-04 RX ADMIN — MEROPENEM 2000 MG: 1 INJECTION, POWDER, FOR SOLUTION INTRAVENOUS at 11:54

## 2024-06-04 RX ADMIN — BUTALBITAL, ACETAMINOPHEN, AND CAFFEINE 1 TABLET: 50; 325; 40 TABLET ORAL at 20:48

## 2024-06-04 RX ADMIN — OXYCODONE 5 MG: 5 TABLET ORAL at 05:54

## 2024-06-04 RX ADMIN — OXYCODONE 5 MG: 5 TABLET ORAL at 17:00

## 2024-06-04 RX ADMIN — OXYCODONE HYDROCHLORIDE 10 MG: 10 TABLET ORAL at 22:24

## 2024-06-04 RX ADMIN — LEVETIRACETAM 1000 MG: 500 TABLET, FILM COATED ORAL at 22:25

## 2024-06-04 RX ADMIN — VANCOMYCIN HYDROCHLORIDE 1750 MG: 10 INJECTION, POWDER, LYOPHILIZED, FOR SOLUTION INTRAVENOUS at 09:56

## 2024-06-04 RX ADMIN — OXYCODONE 5 MG: 5 TABLET ORAL at 00:23

## 2024-06-04 RX ADMIN — POTASSIUM CHLORIDE 40 MEQ: 1500 TABLET, EXTENDED RELEASE ORAL at 09:14

## 2024-06-04 RX ADMIN — BUTALBITAL, ACETAMINOPHEN, AND CAFFEINE 1 TABLET: 50; 325; 40 TABLET ORAL at 10:00

## 2024-06-04 RX ADMIN — MEROPENEM 2000 MG: 1 INJECTION, POWDER, FOR SOLUTION INTRAVENOUS at 22:20

## 2024-06-04 ASSESSMENT — PAIN SCALES - GENERAL
PAINLEVEL_OUTOF10: 0
PAINLEVEL_OUTOF10: 6
PAINLEVEL_OUTOF10: 6
PAINLEVEL_OUTOF10: 0
PAINLEVEL_OUTOF10: 6
PAINLEVEL_OUTOF10: 0
PAINLEVEL_OUTOF10: 6
PAINLEVEL_OUTOF10: 6
PAINLEVEL_OUTOF10: 0
PAINLEVEL_OUTOF10: 6
PAINLEVEL_OUTOF10: 8
PAINLEVEL_OUTOF10: 7
PAINLEVEL_OUTOF10: 0
PAINLEVEL_OUTOF10: 4
PAINLEVEL_OUTOF10: 0
PAINLEVEL_OUTOF10: 10

## 2024-06-04 ASSESSMENT — PAIN DESCRIPTION - LOCATION
LOCATION: HEAD

## 2024-06-04 ASSESSMENT — PAIN DESCRIPTION - FREQUENCY
FREQUENCY: INTERMITTENT

## 2024-06-04 ASSESSMENT — PAIN DESCRIPTION - DESCRIPTORS
DESCRIPTORS: ACHING;SORE
DESCRIPTORS: ACHING;DISCOMFORT;NAGGING
DESCRIPTORS: ACHING;DISCOMFORT;DULL;POUNDING
DESCRIPTORS: ACHING;DISCOMFORT;NAGGING
DESCRIPTORS: THROBBING
DESCRIPTORS: ACHING
DESCRIPTORS: THROBBING
DESCRIPTORS: ACHING
DESCRIPTORS: DISCOMFORT;ACHING;SORE

## 2024-06-04 ASSESSMENT — PAIN DESCRIPTION - ORIENTATION
ORIENTATION: LEFT
ORIENTATION: LEFT
ORIENTATION: LEFT;MID
ORIENTATION: MID;LEFT
ORIENTATION: LEFT

## 2024-06-04 ASSESSMENT — PAIN DESCRIPTION - ONSET
ONSET: GRADUAL
ONSET: GRADUAL

## 2024-06-04 ASSESSMENT — PAIN DESCRIPTION - PAIN TYPE
TYPE: ACUTE PAIN;SURGICAL PAIN
TYPE: ACUTE PAIN;SURGICAL PAIN

## 2024-06-04 ASSESSMENT — PAIN - FUNCTIONAL ASSESSMENT: PAIN_FUNCTIONAL_ASSESSMENT: PREVENTS OR INTERFERES SOME ACTIVE ACTIVITIES AND ADLS

## 2024-06-04 NOTE — OP NOTE
to undergo.  Next, induction of generalized endotracheal anesthesia was then commenced.  Upon completion of induction of generalized endotracheal anesthesia, he received preoperative antibiotics.  Limon catheter was placed.  His head was then pinned using Fay head diaz and his head was then turned toward the right side with a shoulder roll underneath his left shoulder. After this was done, I then used the frameless stereotactic navigation system to create a 3D model of his brain on the stealth navigation system.  Next, I then proceeded to leanne an approximately 6 cm incision in the left frontal region.  His hair was clipped.  This incision was then prepped and draped in usual sterile fashion.  After this was done, a #10 blade was used to make skin incision.  Monopolar cautery was used to dissect through subcutaneous tissue down through the 5 layers of the scalp to the pericranium.  I placed self-retaining Weitlaner retractors into the wound.  I then used a high-speed ludivina with a  bit to drill a single ludivina hole.  After the ludivina hole was drilled, I used a small straight curette to remove the thin layer of pericranium that was left in place.  I then used a Penfield #3 to dissect the dura from the undersurface of the skull.  Next, I then used a craniotome to elevate an approximately 4 x 4 cm bone flap.  Once this bone flap was elevated, I then proceeded to open up the dura using Metzenbaum scissors and a pair of Vicente's in a curvilinear fashion.  I then reflected the dural leaflets posteriorly using single 4-0 Nurolon tack-up stitch.  After this was done, I then used the frameless stereotactic navigation system to map out my corticectomy.  Once the corticectomy was mapped out, I then proceeded to then use the bipolar to start my corticectomy.  Once I performed a corticectomy, I did encounter some purulent material that was sent off for Gram stain and culture and also to Pathology.  I then proceeded to

## 2024-06-04 NOTE — ANESTHESIA POSTPROCEDURE EVALUATION
Department of Anesthesiology  Postprocedure Note    Patient: Conrado Leach  MRN: [id number]  YOB: 1960  Date of evaluation: 6/4/2024    Procedure Summary       Date: 06/03/24 Room / Location: [address]    Anesthesia Start: 1700 Anesthesia Stop: 1847    Procedure: LEFT CRANIOTOMY ABCESS EVACUATION (Left: Head) Diagnosis:       Abscess of brain      (Abscess of brain [G06.0])    Surgeons: Mary Henderson MD Responsible Provider: Anthony Bang MD    Anesthesia Type: General ASA Status: 3            Anesthesia Type: General    Angelita Phase I: Angelita Score: 9    Angelita Phase II:      Anesthesia Post Evaluation    Patient location during evaluation: ICU  Patient participation: complete - patient participated  Level of consciousness: awake and alert  Airway patency: patent  Nausea & Vomiting: no nausea and no vomiting  Cardiovascular status: hemodynamically stable  Respiratory status: acceptable  Hydration status: euvolemic  Multimodal analgesia pain management approach  Pain management: adequate        No notable events documented.

## 2024-06-04 NOTE — CARE COORDINATION
6/4 Care Coordination:6/3  Pt in [facility] POD#1.  s/p left crani and abscess evacuation. Awake,Alert. Await PT/OT evals. There was a Order placed for PM&R.  With Current status unclear on discharge needs await Therapy.    CM/SW will continue to follow for discharge planning.   Amaury YAÑEZ,RN--BC  [phone]

## 2024-06-05 ENCOUNTER — APPOINTMENT (OUTPATIENT)
Dept: MRI IMAGING | Age: 64
DRG: 023 | End: 2024-06-05
Payer: COMMERCIAL

## 2024-06-05 LAB
ANION GAP SERPL CALCULATED.3IONS-SCNC: 11 MMOL/L (ref 7–16)
BASOPHILS # BLD: 0.02 K/UL (ref 0–0.2)
BASOPHILS NFR BLD: 0 % (ref 0–2)
BUN SERPL-MCNC: 16 MG/DL (ref 6–23)
CALCIUM SERPL-MCNC: 8.7 MG/DL (ref 8.6–10.2)
CHLORIDE SERPL-SCNC: 98 MMOL/L (ref 98–107)
CO2 SERPL-SCNC: 22 MMOL/L (ref 22–29)
CREAT SERPL-MCNC: 0.8 MG/DL (ref 0.7–1.2)
EOSINOPHIL # BLD: 0.03 K/UL (ref 0.05–0.5)
EOSINOPHILS RELATIVE PERCENT: 0 % (ref 0–6)
ERYTHROCYTE [DISTWIDTH] IN BLOOD BY AUTOMATED COUNT: 14.6 % (ref 11.5–15)
GFR, ESTIMATED: >90 ML/MIN/1.73M2
GLUCOSE SERPL-MCNC: 144 MG/DL (ref 74–99)
HCT VFR BLD AUTO: 40.7 % (ref 37–54)
HGB BLD-MCNC: 14 G/DL (ref 12.5–16.5)
IMM GRANULOCYTES # BLD AUTO: 0.06 K/UL (ref 0–0.58)
IMM GRANULOCYTES NFR BLD: 1 % (ref 0–5)
LYMPHOCYTES NFR BLD: 1.02 K/UL (ref 1.5–4)
LYMPHOCYTES RELATIVE PERCENT: 11 % (ref 20–42)
MCH RBC QN AUTO: 27.6 PG (ref 26–35)
MCHC RBC AUTO-ENTMCNC: 34.4 G/DL (ref 32–34.5)
MCV RBC AUTO: 80.1 FL (ref 80–99.9)
MONOCYTES NFR BLD: 1.21 K/UL (ref 0.1–0.95)
MONOCYTES NFR BLD: 13 % (ref 2–12)
NEUTROPHILS NFR BLD: 76 % (ref 43–80)
NEUTS SEG NFR BLD: 7.22 K/UL (ref 1.8–7.3)
PLATELET # BLD AUTO: 213 K/UL (ref 130–450)
PMV BLD AUTO: 9 FL (ref 7–12)
POTASSIUM SERPL-SCNC: 4.2 MMOL/L (ref 3.5–5)
RBC # BLD AUTO: 5.08 M/UL (ref 3.8–5.8)
SODIUM SERPL-SCNC: 131 MMOL/L (ref 132–146)
WBC OTHER # BLD: 9.6 K/UL (ref 4.5–11.5)

## 2024-06-05 PROCEDURE — 6370000000 HC RX 637 (ALT 250 FOR IP): Performed by: NEUROLOGICAL SURGERY

## 2024-06-05 PROCEDURE — A9577 INJ MULTIHANCE: HCPCS | Performed by: RADIOLOGY

## 2024-06-05 PROCEDURE — 6360000004 HC RX CONTRAST MEDICATION: Performed by: RADIOLOGY

## 2024-06-05 PROCEDURE — 70553 MRI BRAIN STEM W/O & W/DYE: CPT

## 2024-06-05 PROCEDURE — 2580000003 HC RX 258: Performed by: NEUROLOGICAL SURGERY

## 2024-06-05 PROCEDURE — 6360000002 HC RX W HCPCS: Performed by: INTERNAL MEDICINE

## 2024-06-05 PROCEDURE — 85025 COMPLETE CBC W/AUTO DIFF WBC: CPT

## 2024-06-05 PROCEDURE — 99231 SBSQ HOSP IP/OBS SF/LOW 25: CPT | Performed by: STUDENT IN AN ORGANIZED HEALTH CARE EDUCATION/TRAINING PROGRAM

## 2024-06-05 PROCEDURE — 6370000000 HC RX 637 (ALT 250 FOR IP): Performed by: NURSE PRACTITIONER

## 2024-06-05 PROCEDURE — 6360000002 HC RX W HCPCS: Performed by: NEUROLOGICAL SURGERY

## 2024-06-05 PROCEDURE — 2580000003 HC RX 258: Performed by: INTERNAL MEDICINE

## 2024-06-05 PROCEDURE — 2060000000 HC ICU INTERMEDIATE R&B

## 2024-06-05 PROCEDURE — 80048 BASIC METABOLIC PNL TOTAL CA: CPT

## 2024-06-05 RX ADMIN — ROSUVASTATIN CALCIUM 5 MG: 5 TABLET, COATED ORAL at 00:49

## 2024-06-05 RX ADMIN — PANTOPRAZOLE SODIUM 40 MG: 40 TABLET, DELAYED RELEASE ORAL at 06:05

## 2024-06-05 RX ADMIN — LEVETIRACETAM 1000 MG: 500 TABLET, FILM COATED ORAL at 09:08

## 2024-06-05 RX ADMIN — SODIUM CHLORIDE, PRESERVATIVE FREE 10 ML: 5 INJECTION INTRAVENOUS at 04:19

## 2024-06-05 RX ADMIN — SENNOSIDES AND DOCUSATE SODIUM 1 TABLET: 50; 8.6 TABLET ORAL at 09:08

## 2024-06-05 RX ADMIN — LEVETIRACETAM 1000 MG: 500 TABLET, FILM COATED ORAL at 21:41

## 2024-06-05 RX ADMIN — OXYCODONE HYDROCHLORIDE 10 MG: 10 TABLET ORAL at 21:40

## 2024-06-05 RX ADMIN — SODIUM CHLORIDE, PRESERVATIVE FREE 10 ML: 5 INJECTION INTRAVENOUS at 00:54

## 2024-06-05 RX ADMIN — MEROPENEM 2000 MG: 1 INJECTION, POWDER, FOR SOLUTION INTRAVENOUS at 04:18

## 2024-06-05 RX ADMIN — HEPARIN 300 UNITS: 100 SYRINGE at 09:08

## 2024-06-05 RX ADMIN — BISACODYL 5 MG: 5 TABLET, COATED ORAL at 09:11

## 2024-06-05 RX ADMIN — BUTALBITAL, ACETAMINOPHEN, AND CAFFEINE 1 TABLET: 50; 325; 40 TABLET ORAL at 16:35

## 2024-06-05 RX ADMIN — MEROPENEM 2000 MG: 1 INJECTION, POWDER, FOR SOLUTION INTRAVENOUS at 12:46

## 2024-06-05 RX ADMIN — VANCOMYCIN HYDROCHLORIDE 1750 MG: 10 INJECTION, POWDER, LYOPHILIZED, FOR SOLUTION INTRAVENOUS at 00:49

## 2024-06-05 RX ADMIN — MEROPENEM 2000 MG: 1 INJECTION, POWDER, FOR SOLUTION INTRAVENOUS at 21:42

## 2024-06-05 RX ADMIN — GADOBENATE DIMEGLUMINE 20 ML: 529 INJECTION, SOLUTION INTRAVENOUS at 17:45

## 2024-06-05 RX ADMIN — SENNOSIDES AND DOCUSATE SODIUM 1 TABLET: 50; 8.6 TABLET ORAL at 21:40

## 2024-06-05 RX ADMIN — BUTALBITAL, ACETAMINOPHEN, AND CAFFEINE 1 TABLET: 50; 325; 40 TABLET ORAL at 12:36

## 2024-06-05 ASSESSMENT — PAIN - FUNCTIONAL ASSESSMENT: PAIN_FUNCTIONAL_ASSESSMENT: PREVENTS OR INTERFERES SOME ACTIVE ACTIVITIES AND ADLS

## 2024-06-05 ASSESSMENT — PAIN DESCRIPTION - ORIENTATION
ORIENTATION: LEFT
ORIENTATION: LEFT

## 2024-06-05 ASSESSMENT — PAIN DESCRIPTION - LOCATION
LOCATION: HEAD

## 2024-06-05 ASSESSMENT — PAIN SCALES - GENERAL
PAINLEVEL_OUTOF10: 6
PAINLEVEL_OUTOF10: 6
PAINLEVEL_OUTOF10: 4
PAINLEVEL_OUTOF10: 6
PAINLEVEL_OUTOF10: 6
PAINLEVEL_OUTOF10: 3

## 2024-06-05 ASSESSMENT — PAIN DESCRIPTION - FREQUENCY: FREQUENCY: INTERMITTENT

## 2024-06-05 ASSESSMENT — PAIN DESCRIPTION - DESCRIPTORS
DESCRIPTORS: THROBBING
DESCRIPTORS: ACHING
DESCRIPTORS: ACHING
DESCRIPTORS: THROBBING

## 2024-06-05 ASSESSMENT — PAIN DESCRIPTION - ONSET: ONSET: GRADUAL

## 2024-06-05 ASSESSMENT — PAIN DESCRIPTION - PAIN TYPE: TYPE: ACUTE PAIN

## 2024-06-05 NOTE — CARE COORDINATION
Met with the patient and patient's wife at the bedside.  Discussed ARU program with both of them.  Both are in agreement that patient should admit to our ARU.  Patient lives with wife in a 2 story home with bed and hath upstairs. There are 3 steps to enter.  Patient was independent, drove and worked PTA.  Patient and wife understand he will be discharged home with IV antibiotics.  Wife states their daughter is a nurse and can help.  Will begin precert for ARU.

## 2024-06-05 NOTE — ADT AUTH CERT
mg  4 mg IntraVENous Q6H Milanes Marino, Maria, MD   4 mg at 06/03/24 0549    rosuvastatin (CRESTOR) tablet 5 mg  5 mg Oral Nightly Milanes Marino, Maria, MD   5 mg at 06/02/24 2114                  REVIEW OF SYSTEMS:    CONSTITUTIONAL:  Denies fever, chill or rigors.  HEENT: denies blurring of vision or double vision, denies hearing problem  RESPIRATORY: denies cough, shortness of breath, sputum expectoration.  CARDIOVASCULAR:  Denies palpitation or chest pain   GASTROINTESTINAL:  Denies abdomen pain, diarrhea or constipation,, nausea or vomiting.  GENITOURINARY:  Denies burning urination or frequency of urination  INTEGUMENT: denies wound , rash  HEMATOLOGIC/LYMPHATIC:  Denies lymph node swelling, gum bleeding or easy bruising.  MUSCULOSKELETAL:  Denies leg pain , joint pain , joint swelling  NEUROLOGICAL: weakness         PHYSICAL EXAM:       Vitals:          Vitals:     06/03/24 0753   BP: (Abnormal) 140/71   Pulse: 50   Resp: 18   Temp: 97.8 °F (36.6 °C)   SpO2: 97%         General Appearance:    Awake, alert , no acute distress.   Head:    Normocephalic, atraumatic   Eyes:    No pallor, no icterus,   Ears:    No obvious deformity or drainage.   Nose:   No nasal drainage   Throat:   Mucosa moist, no oral thrush   Neck:   Supple, no lymphadenopathy   Back:     no CVA tenderness   Lungs:     Clear to auscultation bilaterally, no wheeze    Heart:    Regular rate and rhythm, no murmur   Abdomen:     Soft, non-tender, bowel sounds present    Extremities:   No edema, no cyanosis    Pulses:   Dorsalis pedis palpable    Skin:   no rashes       CBC with Differential:            Lab Results   Component Value Date/Time     WBC 11.4 06/03/2024 09:19 AM     RBC 5.68 06/03/2024 09:19 AM     HGB 15.6 06/03/2024 09:19 AM     HCT 47.1 06/03/2024 09:19 AM      06/03/2024 09:19 AM     MCV 82.9 06/03/2024 09:19 AM     MCH 27.5 06/03/2024 09:19 AM     MCHC 33.1 06/03/2024 09:19 AM     RDW 14.1 06/03/2024 09:19 AM     
located in the left frontal lobe corresponding to   intra-axial masses with surrounding edema.  These findings suggest   intracranial neoplasm.  MRI with and without contrast recommended.   2. Additional enhancing lesion is seen along right aspect of midline falx   measuring 1.2 x 0.6 cm.   3. No evidence of intracranial hemorrhage.   4. No evidence of intracranial arterial stenosis.   5. No stenosis involving the carotid arteries or vertebral arteries.   6. Hypoattenuating nodule associated with the left lobe of the thyroid gland   measures up to 9 mm.  Ultrasound recommended for further evaluation.   7. Results were called by Dr. Kevin Mitchell to Dr. PAULA on   5/29/2024 at 19:54.           CTA NECK W CONTRAST   Final Result   1. Perfusion mismatch located in the left frontal lobe corresponding to   intra-axial masses with surrounding edema.  These findings suggest   intracranial neoplasm.  MRI with and without contrast recommended.   2. Additional enhancing lesion is seen along right aspect of midline falx   measuring 1.2 x 0.6 cm.   3. No evidence of intracranial hemorrhage.   4. No evidence of intracranial arterial stenosis.   5. No stenosis involving the carotid arteries or vertebral arteries.   6. Hypoattenuating nodule associated with the left lobe of the thyroid gland   measures up to 9 mm.  Ultrasound recommended for further evaluation.   7. Results were called by Dr. Kevin Mitchell to Dr. PAULA on   5/29/2024 at 19:54.           CT BRAIN PERFUSION   Final Result   1. Perfusion mismatch located in the left frontal lobe corresponding to   intra-axial masses with surrounding edema.  These findings suggest   intracranial neoplasm.  MRI with and without contrast recommended.   2. Additional enhancing lesion is seen along right aspect of midline falx   measuring 1.2 x 0.6 cm.   3. No evidence of intracranial hemorrhage.   4. No evidence of intracranial arterial stenosis.   5. No stenosis

## 2024-06-05 NOTE — CARE COORDINATION
Chart reviewed and case reviewed in IDR.   Patient POD #2 left crani and abscess evacuation  with Dr Henderson.  MRI of the brain to be completed.  Call palced to MRI department re: timing.  Patient refused yesterday d/t HA, they can send for him today at 5 pm.  IV Vancomycin discontinued, IV Merrem Q 8 hrs for 6-8 weeks per ID and PICC in place.  Spoke with Debra, liaison with ARU at [facility].  They can accept and will initiate authorization for transition of care today.  Met with the patient, his wife Valeri, and Debra, liaison with ARU at the bedside and reviewed above.  Patient agreeable to MRI.  Understanding of ARU program expressed and all questions answered to the best of our combined abilities.  Will Continue to follow for further transition of care planning needs.       Xin Landin RN.  P:  [phone]

## 2024-06-05 NOTE — CONSULTS
[facility]              [address] [place] [address]                              CONSULTATION      PATIENT NAME: GERBER HINSON                : 1960  MED REC NO: [id number]                        ROOM: 8503  ACCOUNT NO: [id number]                       ADMIT DATE: 2024  PROVIDER: Mary Henderson MD    NEUROSURGERY CONSULT    CONSULT DATE:  2024      REASON FOR CONSULT:  Left frontal brain lesion.    HISTORY OF PRESENT ILLNESS:  The patient is a 63-year-old gentleman, who has had some altered mental status over the last 3-4 days.  He has had confusion and some brain fog.  He saw his primary care physician today who suspected having a UTI, was started on some antibiotics.  However, when he got home, he apparently had a seizure and was ultimately brought to [facility] for further evaluation and management.  Since then, he has had some right upper extremity and right lower extremity weakness.  Denies any headache or any new loss of control of bowel or bladder function.  He does have tingling in his right upper extremity.    PAST MEDICAL HISTORY:  Positive for diabetes.    PAST SURGICAL HISTORY:  Positive for bilateral hip surgery, ankle surgery, shoulder surgery, and appendectomy.    SOCIAL HISTORY:  Negative tobacco or alcohol use.    FAMILY HISTORY:  Positive for heart disease in his father.    ALLERGIES:  HE HAS NO KNOWN DRUG ALLERGIES.      HOME MEDICATIONS:  Include metformin.    REVIEW OF SYSTEMS:  HEENT:  Negative for headache, double vision, or blurry vision.  CARDIOVASCULAR:  Negative for chest pain, arrhythmia, or palpitations.  RESPIRATORY:  Negative for shortness of breath, asthma, bronchitis, or pneumonia.  GASTROINTESTINAL:  Negative for heartburn, nausea, vomiting, diarrhea, or constipation.  GENITOURINARY:  Negative for hematuria or dysuria.  HEMATOLOGIC:  Negative for easy bleeding or 
[facility]  Hematology/Oncology  Consult      Patient Name: Conrado Leach  YOB: 1960  PCP: Mohan Velázquez DO   Referring Provider:      Reason for Consultation:   Chief Complaint   Patient presents with    Cerebrovascular Accident     L[address] Right side flaccid naida alert        History of Present Illness:   This is a 63-year-old male patient with no known history on file who presented with R sided weakness, confusion, aphasia, and seizure activity. CT of the head with contrast demonstrated two lesions the first of which measured 3 x 2.4 and the second 2.7 x 2.3 cm in the left frontal lobe region. These lesions were associated with vasogenic edema. MRI of the brain on 5/30/2024 demonstrated 2 lobulated peripherally enhancing lesion involving the left frontal lobe measuring 3.1 and 2.6 cm and a third extra-axial lesion along the right anterior falx measuring 12 mm which may represent a meningioma.  CT A/P and CT chest are active in process for complete staging. Radiation oncology are following. Neurosurgery consulted with plans for left craniotomy. CMP, LFT',s nad CBC all unremarkable. Consultation for brain mass      Diagnostic Data:     History reviewed. No pertinent past medical history.    Patient Active Problem List    Diagnosis Date Noted    Brain mass 05/29/2024    Sleep apnea 04/15/2022    Mixed hyperlipidemia 04/14/2022    Benign prostatic hyperplasia 02/01/2021    Deep venous thrombosis of profunda femoris vein (HCC) 02/01/2021    Type 2 diabetes mellitus (HCC) 02/01/2021        History reviewed. No pertinent surgical history.    Family History  History reviewed. No pertinent family history.    Social History    TOBACCO:   reports that he has never smoked. He has never used smokeless tobacco.  ETOH:   reports no history of alcohol use.    Home Medications  Prior to Admission medications    Medication Sig Start Date End Date Taking? Authorizing Provider   omeprazole 
Patient not seen but chart reviewed.  Reviewed PT/OT evaluations.  Patient is appropriate for inpatient rehab.  PM&R will follow for payor source pre-authorization and ARU bed availability.  Thank you for the consult.      Marek Mcknight,   Physical Medicine and Rehabilitation   
for abscess VS mass.  EEG was done which did show a potential for seizures from the left frontocentral region.    He reports no smoking history or history of radiation exposure.  He states that he works for [employer] in [facility] and has not had any chemical exposures while working there.        Social History:  Social History     Tobacco Use    Smoking status: Never    Smokeless tobacco: Never   Vaping Use    Vaping Use: Never used   Substance Use Topics    Alcohol use: Never    Drug use: Never        Current Medications:      Current Facility-Administered Medications   Medication Dose Route Frequency Provider Last Rate Last Admin    levETIRAcetam (KEPPRA) tablet 750 mg  750 mg Oral BID Wilber Miguel DO        Or    levETIRAcetam (KEPPRA) injection 750 mg  750 mg IntraVENous BID Wilber Miguel DO        [START ON 6/2/2024] levETIRAcetam (KEPPRA) tablet 1,000 mg  1,000 mg Oral BID Wilber Miguel DO        Or    [START ON 6/2/2024] levETIRAcetam (KEPPRA) injection 1,000 mg  1,000 mg IntraVENous BID Wilber Miguel DO        sodium chloride flush 0.9 % injection 5-40 mL  5-40 mL IntraVENous 2 times per day Milanes Marino, Maria, MD   10 mL at 05/31/24 0836    sodium chloride flush 0.9 % injection 5-40 mL  5-40 mL IntraVENous PRN Milanes Marino, Maria, MD        0.9 % sodium chloride infusion   IntraVENous PRN Milanes Marino, Maria, MD        potassium chloride (KLOR-CON M) extended release tablet 40 mEq  40 mEq Oral PRN Milanes Marino, Maria, MD        Or    potassium bicarb-citric acid (EFFER-K) effervescent tablet 40 mEq  40 mEq Oral PRN Milanes Marino, Maria, MD        Or    potassium chloride 10 mEq/100 mL IVPB (Peripheral Line)  10 mEq IntraVENous PRN Milanes Marino, Maria, MD        magnesium sulfate 2000 mg in 50 mL IVPB premix  2,000 mg IntraVENous PRN Milanes Marino, Maria, MD        ondansetron (ZOFRAN-ODT) disintegrating tablet 4 mg  4 mg Oral Q8H PRN Milanes Marino, Maria, MD        Or 
HGB 14.0 05/30/2024 05:36 AM    HCT 43.3 05/30/2024 05:36 AM     05/30/2024 05:36 AM    MCV 82.6 05/30/2024 05:36 AM    MCH 26.7 05/30/2024 05:36 AM    MCHC 32.3 05/30/2024 05:36 AM    RDW 14.1 05/30/2024 05:36 AM    LYMPHOPCT 10 05/30/2024 05:36 AM    MONOPCT 2 05/30/2024 05:36 AM    EOSPCT 0 05/30/2024 05:36 AM    BASOPCT 0 05/30/2024 05:36 AM    MONOSABS 0.17 05/30/2024 05:36 AM    EOSABS 0.00 05/30/2024 05:36 AM    BASOSABS 0.01 05/30/2024 05:36 AM       CMP     Lab Results   Component Value Date/Time     05/30/2024 05:36 AM    K 4.3 05/30/2024 05:36 AM     05/30/2024 05:36 AM    CO2 20 05/30/2024 05:36 AM    BUN 15 05/30/2024 05:36 AM    CREATININE 0.8 05/30/2024 05:36 AM    LABGLOM >90 05/30/2024 05:36 AM    GLUCOSE 174 05/30/2024 05:36 AM    CALCIUM 9.4 05/30/2024 05:36 AM    BILITOT 0.4 05/29/2024 07:28 PM    ALKPHOS 63 05/29/2024 07:28 PM    AST 8 05/29/2024 07:28 PM    ALT 13 05/29/2024 07:28 PM         Hepatic Function Panel:    Lab Results   Component Value Date/Time    ALKPHOS 63 05/29/2024 07:28 PM    ALT 13 05/29/2024 07:28 PM    AST 8 05/29/2024 07:28 PM    BILITOT 0.4 05/29/2024 07:28 PM       PT/INR:    Lab Results   Component Value Date/Time    PROTIME 13.7 05/29/2024 07:28 PM    INR 1.3 05/29/2024 07:28 PM       TSH:    Lab Results   Component Value Date/Time    TSH 0.49 05/30/2024 05:36 AM       U/A:  No results found for: \"NITRITE\", \"COLORU\", \"PHUR\", \"LABCAST\", \"WBCUA\", \"RBCUA\", \"MUCUS\", \"TRICHOMONAS\", \"YEAST\", \"BACTERIA\", \"CLARITYU\", \"SPECGRAV\", \"LEUKOCYTESUR\", \"UROBILINOGEN\", \"BILIRUBINUR\", \"BLOODU\", \"GLUCOSEU\", \"AMORPHOUS\"    ABG:  No results found for: \"BWA0OZS\", \"BEART\", \"D9TNEDIZ\", \"PHART\", \"THGBART\", \"QYR1FFK\", \"PO2ART\", \"QJL1HJE\"    MICROBIOLOGY:    Blood culture - neg to date         Radiology :    Circumferential thickening of the rectum concerning for proctitis.   Underlying polyp or mass be difficult to exclude with some degrees of   limitations due to metallic

## 2024-06-06 ENCOUNTER — HOSPITAL ENCOUNTER (INPATIENT)
Age: 64
LOS: 29 days | Discharge: HOME | End: 2024-07-05
Attending: PHYSICAL MEDICINE & REHABILITATION | Admitting: PHYSICAL MEDICINE & REHABILITATION
Payer: COMMERCIAL

## 2024-06-06 VITALS
OXYGEN SATURATION: 95 % | WEIGHT: 217 LBS | SYSTOLIC BLOOD PRESSURE: 140 MMHG | DIASTOLIC BLOOD PRESSURE: 69 MMHG | RESPIRATION RATE: 20 BRPM | TEMPERATURE: 98.1 F | HEIGHT: 71 IN | BODY MASS INDEX: 30.38 KG/M2 | HEART RATE: 53 BPM

## 2024-06-06 DIAGNOSIS — R56.9 NEW ONSET SEIZURE (HCC): ICD-10-CM

## 2024-06-06 DIAGNOSIS — R60.0 EDEMA OF RIGHT UPPER ARM: ICD-10-CM

## 2024-06-06 DIAGNOSIS — G06.0 BRAIN ABSCESS: Primary | ICD-10-CM

## 2024-06-06 LAB
ANION GAP SERPL CALCULATED.3IONS-SCNC: 11 MMOL/L (ref 7–16)
BASOPHILS # BLD: 0.02 K/UL (ref 0–0.2)
BASOPHILS NFR BLD: 0 % (ref 0–2)
BUN SERPL-MCNC: 15 MG/DL (ref 6–23)
CALCIUM SERPL-MCNC: 8.3 MG/DL (ref 8.6–10.2)
CHLORIDE SERPL-SCNC: 100 MMOL/L (ref 98–107)
CO2 SERPL-SCNC: 23 MMOL/L (ref 22–29)
CREAT SERPL-MCNC: 0.7 MG/DL (ref 0.7–1.2)
EOSINOPHIL # BLD: 0.17 K/UL (ref 0.05–0.5)
EOSINOPHILS RELATIVE PERCENT: 2 % (ref 0–6)
ERYTHROCYTE [DISTWIDTH] IN BLOOD BY AUTOMATED COUNT: 14.6 % (ref 11.5–15)
GFR, ESTIMATED: >90 ML/MIN/1.73M2
GLUCOSE SERPL-MCNC: 126 MG/DL (ref 74–99)
HCT VFR BLD AUTO: 42 % (ref 37–54)
HGB BLD-MCNC: 13.9 G/DL (ref 12.5–16.5)
IMM GRANULOCYTES # BLD AUTO: 0.06 K/UL (ref 0–0.58)
IMM GRANULOCYTES NFR BLD: 1 % (ref 0–5)
LYMPHOCYTES NFR BLD: 1.21 K/UL (ref 1.5–4)
LYMPHOCYTES RELATIVE PERCENT: 15 % (ref 20–42)
MCH RBC QN AUTO: 27.2 PG (ref 26–35)
MCHC RBC AUTO-ENTMCNC: 33.1 G/DL (ref 32–34.5)
MCV RBC AUTO: 82.2 FL (ref 80–99.9)
MICROORGANISM SPEC CULT: ABNORMAL
MICROORGANISM/AGENT SPEC: ABNORMAL
MONOCYTES NFR BLD: 0.94 K/UL (ref 0.1–0.95)
MONOCYTES NFR BLD: 12 % (ref 2–12)
NEUTROPHILS NFR BLD: 70 % (ref 43–80)
NEUTS SEG NFR BLD: 5.69 K/UL (ref 1.8–7.3)
PLATELET # BLD AUTO: 209 K/UL (ref 130–450)
PMV BLD AUTO: 9 FL (ref 7–12)
POTASSIUM SERPL-SCNC: 4.1 MMOL/L (ref 3.5–5)
RBC # BLD AUTO: 5.11 M/UL (ref 3.8–5.8)
SERVICE CMNT-IMP: ABNORMAL
SODIUM SERPL-SCNC: 134 MMOL/L (ref 132–146)
SPECIMEN DESCRIPTION: ABNORMAL
WBC OTHER # BLD: 8.1 K/UL (ref 4.5–11.5)

## 2024-06-06 PROCEDURE — 2580000003 HC RX 258: Performed by: PHYSICAL MEDICINE & REHABILITATION

## 2024-06-06 PROCEDURE — 6370000000 HC RX 637 (ALT 250 FOR IP): Performed by: STUDENT IN AN ORGANIZED HEALTH CARE EDUCATION/TRAINING PROGRAM

## 2024-06-06 PROCEDURE — 85025 COMPLETE CBC W/AUTO DIFF WBC: CPT

## 2024-06-06 PROCEDURE — 6370000000 HC RX 637 (ALT 250 FOR IP): Performed by: NURSE PRACTITIONER

## 2024-06-06 PROCEDURE — 2580000003 HC RX 258: Performed by: INTERNAL MEDICINE

## 2024-06-06 PROCEDURE — 97530 THERAPEUTIC ACTIVITIES: CPT

## 2024-06-06 PROCEDURE — 2580000003 HC RX 258: Performed by: NEUROLOGICAL SURGERY

## 2024-06-06 PROCEDURE — 6370000000 HC RX 637 (ALT 250 FOR IP): Performed by: NEUROLOGICAL SURGERY

## 2024-06-06 PROCEDURE — 80048 BASIC METABOLIC PNL TOTAL CA: CPT

## 2024-06-06 PROCEDURE — 6370000000 HC RX 637 (ALT 250 FOR IP): Performed by: PHYSICAL MEDICINE & REHABILITATION

## 2024-06-06 PROCEDURE — 97535 SELF CARE MNGMENT TRAINING: CPT

## 2024-06-06 PROCEDURE — 6360000002 HC RX W HCPCS: Performed by: PHYSICAL MEDICINE & REHABILITATION

## 2024-06-06 PROCEDURE — 6360000002 HC RX W HCPCS: Performed by: INTERNAL MEDICINE

## 2024-06-06 PROCEDURE — 6360000002 HC RX W HCPCS: Performed by: STUDENT IN AN ORGANIZED HEALTH CARE EDUCATION/TRAINING PROGRAM

## 2024-06-06 PROCEDURE — 99231 SBSQ HOSP IP/OBS SF/LOW 25: CPT | Performed by: STUDENT IN AN ORGANIZED HEALTH CARE EDUCATION/TRAINING PROGRAM

## 2024-06-06 PROCEDURE — 1280000000 HC REHAB R&B

## 2024-06-06 RX ORDER — LEVETIRACETAM 500 MG/1
1000 TABLET ORAL 2 TIMES DAILY
Qty: 60 TABLET | Refills: 0 | Status: ON HOLD | OUTPATIENT
Start: 2024-06-06

## 2024-06-06 RX ORDER — POLYETHYLENE GLYCOL 3350 17 G/17G
17 POWDER, FOR SOLUTION ORAL DAILY
Status: DISCONTINUED | OUTPATIENT
Start: 2024-06-07 | End: 2024-06-07

## 2024-06-06 RX ORDER — SENNA AND DOCUSATE SODIUM 50; 8.6 MG/1; MG/1
1 TABLET, FILM COATED ORAL 2 TIMES DAILY
Status: CANCELLED | OUTPATIENT
Start: 2024-06-06

## 2024-06-06 RX ORDER — ROSUVASTATIN CALCIUM 10 MG/1
5 TABLET, COATED ORAL NIGHTLY
Status: CANCELLED | OUTPATIENT
Start: 2024-06-06

## 2024-06-06 RX ORDER — OXYCODONE HYDROCHLORIDE 5 MG/1
5 TABLET ORAL EVERY 4 HOURS PRN
Status: CANCELLED | OUTPATIENT
Start: 2024-06-06

## 2024-06-06 RX ORDER — HEPARIN 100 UNIT/ML
3 SYRINGE INTRAVENOUS EVERY 12 HOURS SCHEDULED
Status: DISCONTINUED | OUTPATIENT
Start: 2024-06-06 | End: 2024-07-05 | Stop reason: HOSPADM

## 2024-06-06 RX ORDER — ROSUVASTATIN CALCIUM 10 MG/1
5 TABLET, COATED ORAL NIGHTLY
Status: DISCONTINUED | OUTPATIENT
Start: 2024-06-06 | End: 2024-07-05 | Stop reason: HOSPADM

## 2024-06-06 RX ORDER — OXYCODONE HYDROCHLORIDE 10 MG/1
10 TABLET ORAL EVERY 4 HOURS PRN
Status: CANCELLED | OUTPATIENT
Start: 2024-06-06

## 2024-06-06 RX ORDER — OXYCODONE HYDROCHLORIDE 10 MG/1
10 TABLET ORAL EVERY 4 HOURS PRN
Status: DISCONTINUED | OUTPATIENT
Start: 2024-06-06 | End: 2024-06-21

## 2024-06-06 RX ORDER — BUTALBITAL, ACETAMINOPHEN AND CAFFEINE 50; 325; 40 MG/1; MG/1; MG/1
1 TABLET ORAL EVERY 4 HOURS PRN
Status: DISCONTINUED | OUTPATIENT
Start: 2024-06-06 | End: 2024-07-05 | Stop reason: HOSPADM

## 2024-06-06 RX ORDER — MECOBALAMIN 5000 MCG
5 TABLET,DISINTEGRATING ORAL NIGHTLY
Status: DISCONTINUED | OUTPATIENT
Start: 2024-06-06 | End: 2024-07-05 | Stop reason: HOSPADM

## 2024-06-06 RX ORDER — PANTOPRAZOLE SODIUM 40 MG/1
40 TABLET, DELAYED RELEASE ORAL
Status: CANCELLED | OUTPATIENT
Start: 2024-06-07

## 2024-06-06 RX ORDER — LACTOBACILLUS RHAMNOSUS GG 10B CELL
1 CAPSULE ORAL DAILY
Status: DISCONTINUED | OUTPATIENT
Start: 2024-06-07 | End: 2024-07-05 | Stop reason: HOSPADM

## 2024-06-06 RX ORDER — ACETAMINOPHEN 325 MG/1
650 TABLET ORAL EVERY 4 HOURS PRN
Status: DISCONTINUED | OUTPATIENT
Start: 2024-06-06 | End: 2024-06-06 | Stop reason: HOSPADM

## 2024-06-06 RX ORDER — BISACODYL 5 MG/1
5 TABLET, DELAYED RELEASE ORAL DAILY
Status: CANCELLED | OUTPATIENT
Start: 2024-06-07

## 2024-06-06 RX ORDER — BUTALBITAL, ACETAMINOPHEN AND CAFFEINE 50; 325; 40 MG/1; MG/1; MG/1
1 TABLET ORAL EVERY 4 HOURS PRN
Status: CANCELLED | OUTPATIENT
Start: 2024-06-06

## 2024-06-06 RX ORDER — ERGOCALCIFEROL 1.25 MG/1
50000 CAPSULE ORAL WEEKLY
Status: DISCONTINUED | OUTPATIENT
Start: 2024-06-07 | End: 2024-07-05 | Stop reason: HOSPADM

## 2024-06-06 RX ORDER — CALCIUM CARBONATE 500 MG/1
500 TABLET, CHEWABLE ORAL 3 TIMES DAILY PRN
Status: DISCONTINUED | OUTPATIENT
Start: 2024-06-06 | End: 2024-07-05 | Stop reason: HOSPADM

## 2024-06-06 RX ORDER — HEPARIN 100 UNIT/ML
3 SYRINGE INTRAVENOUS PRN
Status: DISCONTINUED | OUTPATIENT
Start: 2024-06-06 | End: 2024-07-05 | Stop reason: HOSPADM

## 2024-06-06 RX ORDER — SODIUM CHLORIDE 0.9 % (FLUSH) 0.9 %
5-40 SYRINGE (ML) INJECTION 2 TIMES DAILY
Status: DISCONTINUED | OUTPATIENT
Start: 2024-06-06 | End: 2024-07-05 | Stop reason: HOSPADM

## 2024-06-06 RX ORDER — POLYETHYLENE GLYCOL 3350 17 G/17G
17 POWDER, FOR SOLUTION ORAL DAILY
Status: CANCELLED | OUTPATIENT
Start: 2024-06-07

## 2024-06-06 RX ORDER — POLYETHYLENE GLYCOL 3350 17 G/17G
17 POWDER, FOR SOLUTION ORAL DAILY PRN
Status: CANCELLED | OUTPATIENT
Start: 2024-06-06

## 2024-06-06 RX ORDER — HEPARIN 100 UNIT/ML
3 SYRINGE INTRAVENOUS PRN
Status: CANCELLED | OUTPATIENT
Start: 2024-06-06

## 2024-06-06 RX ORDER — SENNA AND DOCUSATE SODIUM 50; 8.6 MG/1; MG/1
1 TABLET, FILM COATED ORAL 2 TIMES DAILY
Status: DISCONTINUED | OUTPATIENT
Start: 2024-06-06 | End: 2024-06-07

## 2024-06-06 RX ORDER — OXYCODONE HYDROCHLORIDE 5 MG/1
5 TABLET ORAL EVERY 4 HOURS PRN
Status: DISCONTINUED | OUTPATIENT
Start: 2024-06-06 | End: 2024-06-21

## 2024-06-06 RX ORDER — PANTOPRAZOLE SODIUM 40 MG/1
40 TABLET, DELAYED RELEASE ORAL
Status: DISCONTINUED | OUTPATIENT
Start: 2024-06-07 | End: 2024-07-05 | Stop reason: HOSPADM

## 2024-06-06 RX ORDER — HEPARIN 100 UNIT/ML
3 SYRINGE INTRAVENOUS EVERY 12 HOURS SCHEDULED
Status: CANCELLED | OUTPATIENT
Start: 2024-06-06

## 2024-06-06 RX ORDER — BISACODYL 5 MG/1
5 TABLET, DELAYED RELEASE ORAL DAILY
Status: DISCONTINUED | OUTPATIENT
Start: 2024-06-07 | End: 2024-06-06

## 2024-06-06 RX ORDER — BISACODYL 10 MG
10 SUPPOSITORY, RECTAL RECTAL DAILY PRN
Status: DISCONTINUED | OUTPATIENT
Start: 2024-06-06 | End: 2024-06-11

## 2024-06-06 RX ORDER — ONDANSETRON 4 MG/1
4 TABLET, ORALLY DISINTEGRATING ORAL EVERY 8 HOURS PRN
Status: DISCONTINUED | OUTPATIENT
Start: 2024-06-06 | End: 2024-07-05 | Stop reason: HOSPADM

## 2024-06-06 RX ORDER — POLYETHYLENE GLYCOL 3350 17 G/17G
17 POWDER, FOR SOLUTION ORAL DAILY PRN
Status: DISCONTINUED | OUTPATIENT
Start: 2024-06-06 | End: 2024-06-06

## 2024-06-06 RX ADMIN — ROSUVASTATIN CALCIUM 5 MG: 5 TABLET, COATED ORAL at 03:18

## 2024-06-06 RX ADMIN — OXYCODONE HYDROCHLORIDE 10 MG: 10 TABLET ORAL at 21:21

## 2024-06-06 RX ADMIN — ROSUVASTATIN CALCIUM 5 MG: 10 TABLET, FILM COATED ORAL at 21:22

## 2024-06-06 RX ADMIN — BUTALBITAL, ACETAMINOPHEN, AND CAFFEINE 1 TABLET: 50; 325; 40 TABLET ORAL at 18:45

## 2024-06-06 RX ADMIN — Medication 5 MG: at 21:21

## 2024-06-06 RX ADMIN — SENNOSIDES AND DOCUSATE SODIUM 1 TABLET: 50; 8.6 TABLET ORAL at 09:12

## 2024-06-06 RX ADMIN — SODIUM CHLORIDE, PRESERVATIVE FREE 10 ML: 5 INJECTION INTRAVENOUS at 03:32

## 2024-06-06 RX ADMIN — BUTALBITAL, ACETAMINOPHEN, AND CAFFEINE 1 TABLET: 50; 325; 40 TABLET ORAL at 14:47

## 2024-06-06 RX ADMIN — WATER 2000 MG: 1 INJECTION INTRAMUSCULAR; INTRAVENOUS; SUBCUTANEOUS at 17:04

## 2024-06-06 RX ADMIN — HEPARIN 300 UNITS: 100 SYRINGE at 21:22

## 2024-06-06 RX ADMIN — OXYCODONE HYDROCHLORIDE 10 MG: 10 TABLET ORAL at 03:17

## 2024-06-06 RX ADMIN — PANTOPRAZOLE SODIUM 40 MG: 40 TABLET, DELAYED RELEASE ORAL at 05:53

## 2024-06-06 RX ADMIN — MEROPENEM 2000 MG: 1 INJECTION, POWDER, FOR SOLUTION INTRAVENOUS at 03:32

## 2024-06-06 RX ADMIN — LEVETIRACETAM 1000 MG: 500 TABLET, FILM COATED ORAL at 09:12

## 2024-06-06 RX ADMIN — MEROPENEM 2000 MG: 1 INJECTION, POWDER, FOR SOLUTION INTRAVENOUS at 12:50

## 2024-06-06 RX ADMIN — SODIUM CHLORIDE: 9 INJECTION, SOLUTION INTRAVENOUS at 02:51

## 2024-06-06 RX ADMIN — SODIUM CHLORIDE, PRESERVATIVE FREE 10 ML: 5 INJECTION INTRAVENOUS at 21:23

## 2024-06-06 RX ADMIN — DOCUSATE SODIUM 50MG AND SENNOSIDES 8.6MG 1 TABLET: 8.6; 5 TABLET, FILM COATED ORAL at 21:21

## 2024-06-06 RX ADMIN — BISACODYL 5 MG: 5 TABLET, COATED ORAL at 09:12

## 2024-06-06 ASSESSMENT — PAIN DESCRIPTION - ORIENTATION
ORIENTATION: LEFT
ORIENTATION: LEFT

## 2024-06-06 ASSESSMENT — PAIN DESCRIPTION - LOCATION
LOCATION: HEAD

## 2024-06-06 ASSESSMENT — PAIN DESCRIPTION - DESCRIPTORS
DESCRIPTORS: ACHING
DESCRIPTORS: ACHING;NAGGING
DESCRIPTORS: THROBBING;SHARP
DESCRIPTORS: ACHING

## 2024-06-06 ASSESSMENT — PAIN SCALES - GENERAL
PAINLEVEL_OUTOF10: 3
PAINLEVEL_OUTOF10: 7
PAINLEVEL_OUTOF10: 2
PAINLEVEL_OUTOF10: 5
PAINLEVEL_OUTOF10: 4
PAINLEVEL_OUTOF10: 0

## 2024-06-06 ASSESSMENT — PAIN - FUNCTIONAL ASSESSMENT: PAIN_FUNCTIONAL_ASSESSMENT: PREVENTS OR INTERFERES SOME ACTIVE ACTIVITIES AND ADLS

## 2024-06-06 ASSESSMENT — PAIN SCALES - WONG BAKER
WONGBAKER_NUMERICALRESPONSE: HURTS A LITTLE BIT
WONGBAKER_NUMERICALRESPONSE: HURTS A LITTLE BIT

## 2024-06-06 NOTE — PROGRESS NOTES
4 Eyes Skin Assessment     NAME:  Conrado Leach  YOB: 1960  MEDICAL RECORD NUMBER:  [id number]    The patient is being assessed for  Admission    I agree that at least one RN has performed a thorough Head to Toe Skin Assessment on the patient. ALL assessment sites listed below have been assessed.      Areas assessed by both nurses:    Head, Face, Ears, Shoulders, Back, Chest, Arms, Elbows, Hands, Sacrum. Buttock, Coccyx, Ischium, Legs. Feet and Heels, and Under Medical Devices         Does the Patient have a Wound? Yes wound(s) were present on assessment. LDA wound assessment was Initiated and completed by RN       Johnnie Prevention initiated by RN: Yes  Wound Care Orders initiated by RN: No    Pressure Injury (Stage 3,4, Unstageable, DTI, NWPT, and Complex wounds) if present, place Wound referral order by RN under : No    New Ostomies, if present place, Ostomy referral order under : No     Nurse 1 eSignature: Electronically signed by Chelsy Maria RN on 6/6/24 at 5:17 PM EDT    **SHARE this note so that the co-signing nurse can place an eSignature**    Nurse 2 eSignature: Electronically signed by Juan M Ugalde RN on 6/6/24 at 5:28 PM EDT

## 2024-06-06 NOTE — PROGRESS NOTES
Dr mcknight notifed of RUE and RLE flaccid since admit to floor. Pt and his wife state this is not new. Therapies stated pt very weak but had some strength. Dr Mcknight aware of change. No other neuro deficits noted and pt has full sensation of RUE and RLE. Dr Mcknight stated given results of pts ct of head today no intervention at this time just continue to monitor. Left message to notify cooper LOVE of above.

## 2024-06-06 NOTE — CARE COORDINATION
Met with the patient at the bedside and gave him ARU room number and visiting hours. Attempted to call wife but only number listed for her is her work number and she was not there.  Patient to admit to [id number].

## 2024-06-06 NOTE — DISCHARGE SUMMARY
Additional enhancing extra-axial lesion is seen along right aspect of the midline falx measuring 1.2 x 0.6 cm. Anterior cerebral arteries are patent without evidence of stenosis.  No evidence of stenosis involving the middle cerebral arteries.  No evidence of stenosis involving the posterior cerebral arteries.  No stenosis involving the basilar artery.  Normal vertebrobasilar junction.  No findings to suggest dural venous sinus thrombosis.  No evidence of intracranial aneurysm CTA neck: No stenosis involving the common carotid arteries.  Carotid bulbs are unremarkable.  No stenosis involving proximal or distal cervical internal carotid arteries.  Both vertebral arteries are patent without evidence of stenosis or dissection.  There is a hypoattenuating nodule associated with the left lobe of the thyroid gland measuring up to 9 mm. CT head: There are masslike opacities located in the left frontal lobe as described above measuring approximately 3 x 2.4 cm and 2.7 x 2.3 cm with surrounding edema.  No evidence of acute intracranial hemorrhage.  No midline shift. Basilar cisterns are patent.  No hydrocephalus.     1. Perfusion mismatch located in the left frontal lobe corresponding to intra-axial masses with surrounding edema.  These findings suggest intracranial neoplasm.  MRI with and without contrast recommended. 2. Additional enhancing lesion is seen along right aspect of midline falx measuring 1.2 x 0.6 cm. 3. No evidence of intracranial hemorrhage. 4. No evidence of intracranial arterial stenosis. 5. No stenosis involving the carotid arteries or vertebral arteries. 6. Hypoattenuating nodule associated with the left lobe of the thyroid gland measures up to 9 mm.  Ultrasound recommended for further evaluation. 7. Results were called by Dr. Kevin Mitchell to Dr. PAULA on 5/29/2024 at 19:54.       Discharge Medications:      Medication List        START taking these medications      levETIRAcetam 500 MG

## 2024-06-06 NOTE — CARE COORDINATION
Chart reviewed and case reviewed in IDR and with Dr Burden.  MRI of the brain completed last evening with multiple findings.  Message sent to KATE Robles via [employer]where with Dr Henderson and notified of above.  They will review the scan.  IV Merrem Q 8 hrs with right PICC in place.  Call received from Debra, liaison with ARU at [facility].  Patient has been approved to transition to rehab when medically stable.  Dr Mcknight would like to see what neurosurgery thinks of the MRI results prior to transition of care.  Await input from Neurosurgery for transition of care planning.  Auth is good for 48 hours for transition to ARU per Debra.  Will continue to follow for further transition of care planning needs.       Xin Landin RN.  P:  [phone]      Input from Neurosurgery noted.  Perfect Serve message sent to Dr Milanes Marino re: discharge to ARU.  She will discharge and ID will need to reconcile antibiotics.  Spoke with bedside nurse Steve and notified of above.  He will notify ID of need for antibiotics to be ordered.  Patient to transition to ARU at [facility] today.       Xin Landin RN.

## 2024-06-06 NOTE — PROGRESS NOTES
[facility] Hospitalist Progress Note    Admitting Date and Time: 5/29/2024  7:07 PM  Admit Dx: Brain mass [G93.89]    Synopsis:    63 year-old male patient with history of type 2 diabetes mellitus, HLD, BPH, PARTH, multiple orthopedic surgeries, right lower extremities DVT 2 years ago after he had spider bite, not on anticoagulation, who presented to ER from home with complaints of right side weakness and seizure-like activity at home.  Per wife at bedside patient has been having neck and lower back pain and has been going to chiropractor.  He also was having some confusion and they thought it was a UTI and he went to his primary care physician today and was prescribed Bactrim.  He went into his daily routine, got to work and returned home.  At around 2:00 when his wife came home found him in the floor, unable to move the right side, appeared that he comprehended but he was unable to speak.  He later said that he had a seizure-like activity sitting on the chair and a slide himself down to the floor, that lasted for about 5 minutes.  When his wife saw him he was having shaking on right arm and right leg.  The symptoms are new for him.  He denies headaches, chest pain, palpitations, shortness of breath, cough, abdominal pain, nausea, vomiting, diarrhea, fevers, chills.  He had a colonoscopy done recently with removal of a tiny polyp.  Per wife, a couple years ago his sister was diagnosed with frontal lobe benign brain tumor and had surgery.  His aunt was also diagnosed with a tumor \"in the back of the head\" around the same time     Marta alert was called upon arrival. Interventional neurology was contacted. CT head/ CT perfusion/ CTA head and neck: Perfusion mismatch located in the left frontal lobe corresponding to intra-axial masses with surrounding edema.  These findings suggest intracranial neoplasm. No evidence of intracranial hemorrhage. No evidence of intracranial arterial stenosis. No stenosis involving 
       [facility] Hospitalist Progress Note    Admitting Date and Time: 5/29/2024  7:07 PM  Admit Dx: Brain mass [G93.89]    Synopsis:    63 year-old male patient with history of type 2 diabetes mellitus, HLD, BPH, PARTH, multiple orthopedic surgeries, right lower extremities DVT 2 years ago after he had spider bite, not on anticoagulation, who presented to ER from home with complaints of right side weakness and seizure-like activity at home.  Per wife at bedside patient has been having neck and lower back pain and has been going to chiropractor.  He also was having some confusion and they thought it was a UTI and he went to his primary care physician today and was prescribed Bactrim.  He went into his daily routine, got to work and returned home.  At around 2:00 when his wife came home found him in the floor, unable to move the right side, appeared that he comprehended but he was unable to speak.  He later said that he had a seizure-like activity sitting on the chair and a slide himself down to the floor, that lasted for about 5 minutes.  When his wife saw him he was having shaking on right arm and right leg.  The symptoms are new for him.  He denies headaches, chest pain, palpitations, shortness of breath, cough, abdominal pain, nausea, vomiting, diarrhea, fevers, chills.  He had a colonoscopy done recently with removal of a tiny polyp.  Per wife, a couple years ago his sister was diagnosed with frontal lobe benign brain tumor and had surgery.  His aunt was also diagnosed with a tumor \"in the back of the head\" around the same time     Marta alert was called upon arrival. Interventional neurology was contacted. CT head/ CT perfusion/ CTA head and neck: Perfusion mismatch located in the left frontal lobe corresponding to intra-axial masses with surrounding edema.  These findings suggest intracranial neoplasm. No evidence of intracranial hemorrhage. No evidence of intracranial arterial stenosis. No stenosis involving 
       [facility] Hospitalist Progress Note    Admitting Date and Time: 5/29/2024  7:07 PM  Admit Dx: Brain mass [G93.89]    Synopsis:    63 year-old male patient with history of type 2 diabetes mellitus, HLD, BPH, PARTH, multiple orthopedic surgeries, right lower extremities DVT 2 years ago after he had spider bite, not on anticoagulation, who presented to ER from home with complaints of right side weakness and seizure-like activity at home.  Per wife at bedside patient has been having neck and lower back pain and has been going to chiropractor.  He also was having some confusion and they thought it was a UTI and he went to his primary care physician today and was prescribed Bactrim.  He went into his daily routine, got to work and returned home.  At around 2:00 when his wife came home found him in the floor, unable to move the right side, appeared that he comprehended but he was unable to speak.  He later said that he had a seizure-like activity sitting on the chair and a slide himself down to the floor, that lasted for about 5 minutes.  When his wife saw him he was having shaking on right arm and right leg.  The symptoms are new for him.  He denies headaches, chest pain, palpitations, shortness of breath, cough, abdominal pain, nausea, vomiting, diarrhea, fevers, chills.  He had a colonoscopy done recently with removal of a tiny polyp.  Per wife, a couple years ago his sister was diagnosed with frontal lobe benign brain tumor and had surgery.  His aunt was also diagnosed with a tumor \"in the back of the head\" around the same time     Marta alert was called upon arrival. Interventional neurology was contacted. CT head/ CT perfusion/ CTA head and neck: Perfusion mismatch located in the left frontal lobe corresponding to intra-axial masses with surrounding edema.  These findings suggest intracranial neoplasm. No evidence of intracranial hemorrhage. No evidence of intracranial arterial stenosis. No stenosis involving 
  Date: 6/3/2024       Patient Name: Conrado Laech  : 1960      MRN: [id number]    PT order received. Chart has been reviewed. PT evaluation will be on hold due to planned OR 6/3/24 \"Craniotomy for biopsy of abscess\". Will continue to follow and complete evaluation at later time.     Wilber Blas, PT     
  [facility]  [facility]  Progress Note    SUBJECTIVE:  Chief Complaint   Patient presents with    Cerebrovascular Accident     Lkw 1300 Right side flaccid naida alert     Sitting on side of bed.  Afebrile. No nausea, vomiting, diarrhea.    Review of systems:  As stated above in the chief complaint, otherwise negative.    Medications:  Scheduled Meds:   levETIRAcetam  750 mg Oral BID    Or    levETIRAcetam  750 mg IntraVENous BID    [START ON 2024] levETIRAcetam  1,000 mg Oral BID    Or    [START ON 2024] levETIRAcetam  1,000 mg IntraVENous BID    sodium chloride flush  5-40 mL IntraVENous 2 times per day    pantoprazole  40 mg Oral QAM AC    dexAMETHasone  4 mg IntraVENous Q6H    rosuvastatin  5 mg Oral Nightly    heparin (porcine)  5,000 Units SubCUTAneous Q8H     Continuous Infusions:   sodium chloride       PRN Meds:sodium chloride flush, sodium chloride, potassium chloride **OR** potassium alternative oral replacement **OR** potassium chloride, magnesium sulfate, ondansetron **OR** ondansetron, polyethylene glycol, acetaminophen **OR** acetaminophen    OBJECTIVE:  BP (!) 123/55   Pulse 52   Temp 97 °F (36.1 °C) (Temporal)   Resp 18   Ht 1.803 m (5' 11\")   Wt 99.2 kg (218 lb 11.2 oz)   SpO2 98%   BMI 30.50 kg/m²   Temp  Av °F (36.1 °C)  Min: 95.7 °F (35.4 °C)  Max: 98.1 °F (36.7 °C)  Constitutional: No acute distress  Skin: Warm and dry. No rashes were noted.   Neuro: Alert and oriented  HEENT: Round and reactive pupils.  Moist mucous membranes.  No ulcerations or thrush.  Chest: .Respirations unlabored. Breath sounds clear.   Cardiovascular: RRR  Abdomen: Soft.  Bowel sounds present  Extremities: No lower extremity edema    Lines: PIV      Laboratory and Tests:  Lab Results   Component Value Date    WBC 7.6 2024    WBC 9.8 2024    HGB 14.0 2024    HCT 43.3 2024    MCV 82.6 2024     2024 
  [facility]  [facility]  Progress Note    SUBJECTIVE:  Chief Complaint   Patient presents with    Cerebrovascular Accident     Lkw 1300 Right side flaccid naida alert     Returning from bathroom.  Afebrile. No nausea, vomiting, diarrhea.    Review of systems:  As stated above in the chief complaint, otherwise negative.    Medications:  Scheduled Meds:   levETIRAcetam  1,000 mg Oral BID    Or    levETIRAcetam  1,000 mg IntraVENous BID    sodium chloride flush  5-40 mL IntraVENous 2 times per day    pantoprazole  40 mg Oral QAM AC    dexAMETHasone  4 mg IntraVENous Q6H    rosuvastatin  5 mg Oral Nightly    heparin (porcine)  5,000 Units SubCUTAneous Q8H     Continuous Infusions:   sodium chloride       PRN Meds:sodium chloride flush, sodium chloride, potassium chloride **OR** potassium alternative oral replacement **OR** potassium chloride, magnesium sulfate, ondansetron **OR** ondansetron, polyethylene glycol, acetaminophen **OR** acetaminophen    OBJECTIVE:  /62   Pulse 59   Temp 98.6 °F (37 °C) (Temporal)   Resp 16   Ht 1.803 m (5' 11\")   Wt 99.2 kg (218 lb 11.2 oz)   SpO2 98%   BMI 30.50 kg/m²   Temp  Av.7 °F (36.5 °C)  Min: 97 °F (36.1 °C)  Max: 98.6 °F (37 °C)  Constitutional: No acute distress  Skin: Warm and dry. No rashes were noted.   Neuro: Alert and oriented  HEENT: Round and reactive pupils.  Moist mucous membranes.  No ulcerations or thrush.  Chest: .Respirations unlabored. Breath sounds clear.   Cardiovascular: RRR  Abdomen: Soft.  Bowel sounds present  Extremities: No lower extremity edema    Lines: PIV      Laboratory and Tests:  Lab Results   Component Value Date    WBC 7.6 2024    WBC 9.8 2024    HGB 14.0 2024    HCT 43.3 2024    MCV 82.6 2024     2024       Radiology:  Reviewed    Microbiology:   Blood cultures 2024 no growth 1 day    IMPRESSION:      Brain lesions - likely malignant lesion    
4 Eyes Skin Assessment     NAME:  Conrado Leach  YOB: 1960  MEDICAL RECORD NUMBER:  [id number]    The patient is being assessed for  Admission    I agree that at least one RN has performed a thorough Head to Toe Skin Assessment on the patient. ALL assessment sites listed below have been assessed.      Areas assessed by both nurses:    Head, Face, Ears, Shoulders, Back, Chest, Arms, Elbows, Hands, Sacrum. Buttock, Coccyx, Ischium, Legs. Feet and Heels, and Under Medical Devices         Does the Patient have a Wound? No noted wound(s)       Johnnie Prevention initiated by RN: Yes  Wound Care Orders initiated by RN: No    Pressure Injury (Stage 3,4, Unstageable, DTI, NWPT, and Complex wounds) if present, place Wound referral order by RN under : No    New Ostomies, if present place, Ostomy referral order under : No     Nurse 1 eSignature: Electronically signed by Jonnie Cisneros RN on 5/30/24 at 12:48 AM EDT    **SHARE this note so that the co-signing nurse can place an eSignature**    Nurse 2 eSignature: Electronically signed by Janis Cristina RN on 5/30/24 at 4:14 AM EDT  
Acute Rehab Pre-Admission Screen      Referral date: 06/04/2024  Onset/Hospital Admit Date: 5/29/2024  7:07 PM    Current Location: 8518/8518-B    Name: Conrado Leach  YOB: 1960  Age: 63 y.o.  Admitting Diagnosis: Brain Abscess   Address: [address] [address]  Home Phone: [phone] (home)  Social Security #:     Sex: male  Race:A. White  Ethnicity: A. No, not of ,  or Filipino origin    Marital Status:      Ethnic/Cultural/Confucianism Considerations: none                ADVANCED DIRECTIVES:     Full code   Copies are not in the chart                 COVERAGE INFORMATION   Primary Insurer: [employer]  Authorization #: [id number]  Verified coverage with : Patient, Family/Caregiver, Financial department, and Insurance carrier      MEDICAL UPDATE:  History of present admission:  Patient is a 63 year-old male with history of type 2 diabetes mellitus, HLD, BPH, PARTH, multiple orthopedic surgeries, right lower extremities DVT 2 years ago after he had spider bite, not on anticoagulation, who presented to ER from home with complaints of right side weakness and seizure-like activity at home.  Per wife at bedside patient has been having neck and lower back pain and has been going to chiropractor.  He also was having some confusion and they thought it was a UTI and he went to his primary care physician and was prescribed Bactrim.  He went into his daily routine, went to work and returned home.  At around 2:00 when his wife came home found him in the floor, unable to move the right side, appeared that he comprehended but he was unable to speak.  He later said that he had a seizure-like activity sitting on the chair and a slide himself down to the floor, that lasted for about 5 minutes. Workup revealed two masses in the left frontal lobe with adjacent vasogenic edema. EEG positive for left fronto central seizures. Neurosurgery and neurology was consulted. On 06/03/2024 
Brief Neurology Note  Patient off floor when attempting to see today. Will see tomorrow. Please call in interim if any neurologic concerns.    Electronically signed by Wilber Miguel DO on 5/30/2024 at 5:15 PM   
Called and notified Dr. Henderson MRI brain results.  
Chg double lumen picc Placement 6/4/2024    Product number: [id number]   Lot Number: [id number]      Ultrasound: yes   Right Basilic vein:                Upper Arm Circumference: (CM) 40cm    Size:(FR)/GUAGE 5.5fr/43cm    Exposed Length: (CM) 2cm    Internal Length: (CM) 41cm   Cut: (CM) 12cm   Vein Measurement: 0.58cm              Lidocaine Given: yes-given in picc kit  Cecily Pineda RN  6/4/2024  11:30 AM        beba                  
Comprehensive Nutrition Assessment    Type and Reason for Visit:  Initial, Wound (ICU LOS)    Nutrition Recommendations/Plan:   Continue Diet, consider Carb Controlled however d/t noted DM hx.    Will Start ONS and monitor.       Malnutrition Assessment:  Malnutrition Status:  At risk for malnutrition (Comment) (06/04/24 1332)    Context:  Acute Illness     Findings of the 6 clinical characteristics of malnutrition:  Energy Intake:  Mild decrease in energy intake (Comment) (decreased post-op, ~75% avg prior)  Weight Loss:  Unable to assess (2/2 poor EMR wt hx pta)     Body Fat Loss:  No significant body fat loss     Muscle Mass Loss:  No significant muscle mass loss    Fluid Accumulation:  No significant fluid accumulation     Strength:  Not Performed    Nutrition Assessment:    Pt adm w/ AMS x ~3-4d and sudden onset Rt sided ataxia and Sz-like activity just pta.  PMHx DM, HLD, BPH, DVT, PARTH.  Adm w/ New onset Sz, Brain Abscesses, vasogenic edema and mutliple lesions concerrning for mets/possible meningioma, s/p Lt Crani w/ Abscess Evac 6/3.  At risk d/t increased needs for wound healing and decreased susie/intake post-op.  Will Start ONS to aid in wound healing/PO intake and monitor.    Nutrition Related Findings:    A&O, dentition WNL, Abd/BS WDL, trace RLE edema, -I/O's, hyperglycemia, hypo K+/Cr Wound Type: Surgical Incision       Current Nutrition Intake & Therapies:    Average Meal Intake: 26-50%  Average Supplements Intake: None Ordered  ADULT DIET; Regular    Anthropometric Measures:  Height: 180.3 cm (5' 11\")  Ideal Body Weight (IBW): 172 lbs (78 kg)    Admission Body Weight: 100.7 kg (222 lb) (actual 5/29)  Current Body Weight: 98.4 kg (217 lb) (bed 6/1),   IBW. Weight Source: Bed Scale  Current BMI (kg/m2): 30.3  Usual Body Weight:  (UTO UBW 2/2 poor EMR wt hx pta)     Weight Adjustment For: No Adjustment                 BMI Categories: Obese Class 1 (BMI 30.0-34.9)    Estimated Daily Nutrient 
Consult received. Will discuss with Dr. Mcknight for appropriateness for ARU. Therapies pending.   
Department of Internal Medicine  Infectious Diseases  Progress  Note      C/C :   Brain abscess (?)    Denies fever or chills  Denies headache , nausea, vomiting   Afebrile     Current Facility-Administered Medications   Medication Dose Route Frequency Provider Last Rate Last Admin    sodium chloride flush 0.9 % injection 5-40 mL  5-40 mL IntraVENous 2 times per day Margaret Birmingham PA        sodium chloride flush 0.9 % injection 5-40 mL  5-40 mL IntraVENous PRN Margaret Birmingham PA        0.9 % sodium chloride infusion   IntraVENous PRN Margaret Birmingham PA        0.9 % sodium chloride infusion   IntraVENous Continuous Margaret Birmingham  mL/hr at 06/03/24 0700 New Bag at 06/03/24 0700    ceFAZolin (ANCEF) 2,000 mg in sterile water 20 mL IV syringe  2,000 mg IntraVENous See Admin Instructions Margaret Birmingham PA        sodium chloride flush 0.9 % injection 5-40 mL  5-40 mL IntraVENous 2 times per day Mary Henderson MD        sodium chloride flush 0.9 % injection 5-40 mL  5-40 mL IntraVENous PRN Mary Henderson MD        0.9 % sodium chloride infusion   IntraVENous PRN Mary Henderson MD        levETIRAcetam (KEPPRA) tablet 1,000 mg  1,000 mg Oral BID Wilber Miguel DO   1,000 mg at 06/03/24 0823    Or    levETIRAcetam (KEPPRA) injection 1,000 mg  1,000 mg IntraVENous BID Wilber Miguel,         sodium chloride flush 0.9 % injection 5-40 mL  5-40 mL IntraVENous 2 times per day Milanes Marino, Maria, MD   10 mL at 06/02/24 2113    sodium chloride flush 0.9 % injection 5-40 mL  5-40 mL IntraVENous PRN Milanes Marino, Maria, MD   10 mL at 06/01/24 0542    0.9 % sodium chloride infusion   IntraVENous PRN Milanes Marino, Maria, MD        potassium chloride (KLOR-CON M) extended release tablet 40 mEq  40 mEq Oral PRN Milanes Marino, Maria, MD        Or    potassium bicarb-citric acid (EFFER-K) effervescent tablet 40 mEq  40 mEq Oral PRN Milanes Marino, Maria, MD        Or    potassium chloride 10 mEq/100 mL IVPB 
Department of Internal Medicine  Infectious Diseases  Progress  Note      C/C :   Brain abscess s/p craniotomy and drainage  ( 6/3)     Denies fever or chills  Reports headache   Afebrile     Current Facility-Administered Medications   Medication Dose Route Frequency Provider Last Rate Last Admin    butalbital-acetaminophen-caffeine (FIORICET, ESGIC) per tablet 1 tablet  1 tablet Oral Q4H PRN Chitra RenéeVALERIE howard - CNS   1 tablet at 06/04/24 2048    meropenem (MERREM) 2,000 mg in sodium chloride 0.9 % 100 mL IVPB  2,000 mg IntraVENous Q8H Limbu, Macho BRADY MD   Stopped at 06/05/24 0718    lidocaine PF 1 % injection 5 mL  5 mL IntraDERmal Once LimbuMacho MD        sodium chloride flush 0.9 % injection 5-40 mL  5-40 mL IntraVENous 2 times per day LimbuMacho MD   10 mL at 06/05/24 0054    sodium chloride flush 0.9 % injection 5-40 mL  5-40 mL IntraVENous PRN LimbuMacho MD        0.9 % sodium chloride infusion   IntraVENous PRN Limbrosemary, Macho BRADY MD        heparin (PF) 100 UNIT/ML injection 300 Units  3 mL IntraVENous 2 times per day Macho Peck MD   300 Units at 06/05/24 0908    heparin (PF) 100 UNIT/ML injection 300 Units  3 mL IntraVENous PRN LimbMacho riley MD        sodium chloride flush 0.9 % injection 5-40 mL  5-40 mL IntraVENous 2 times per day Anthony Bang MD        sodium chloride flush 0.9 % injection 5-40 mL  5-40 mL IntraVENous PRN Anthony Bang MD        0.9 % sodium chloride infusion   IntraVENous PRN Anthony Bang MD        sodium chloride flush 0.9 % injection 5-40 mL  5-40 mL IntraVENous 2 times per day Mary Henderson MD        sodium chloride flush 0.9 % injection 5-40 mL  5-40 mL IntraVENous PRN Mary Henderson MD   10 mL at 06/05/24 0419    0.9 % sodium chloride infusion   IntraVENous PRN Mary Henderson MD        ondansetron (ZOFRAN-ODT) disintegrating tablet 4 mg  4 mg Oral Q8H PRN Mary Henderson MD        Or    ondansetron (ZOFRAN) injection 4 mg  4 mg IntraVENous Q6H PRN 
Department of Internal Medicine  Infectious Diseases  Progress  Note      C/C :   Brain abscess s/p craniotomy and drainage  ( 6/3)     Denies fever or chills  Reports headache   Afebrile     Current Facility-Administered Medications   Medication Dose Route Frequency Provider Last Rate Last Admin    vancomycin (VANCOCIN) 1,750 mg in sodium chloride 0.9 % 500 mL IVPB  1,750 mg IntraVENous Q12H Mary Henderson MD        butalbital-acetaminophen-caffeine (FIORICET, ESGIC) per tablet 1 tablet  1 tablet Oral Q4H PRN Renée Buenrostro APRN - CNS        sodium chloride flush 0.9 % injection 5-40 mL  5-40 mL IntraVENous 2 times per day Anthony Bang MD        sodium chloride flush 0.9 % injection 5-40 mL  5-40 mL IntraVENous PRN Anthony Bang MD        0.9 % sodium chloride infusion   IntraVENous PRN Anthony Bang MD        sodium chloride flush 0.9 % injection 5-40 mL  5-40 mL IntraVENous 2 times per day Mary Henderson MD        sodium chloride flush 0.9 % injection 5-40 mL  5-40 mL IntraVENous PRN Mary Henderson MD   10 mL at 06/03/24 2015    0.9 % sodium chloride infusion   IntraVENous PRN Mary Henderson MD        ondansetron (ZOFRAN-ODT) disintegrating tablet 4 mg  4 mg Oral Q8H PRN Mary Henderson MD        Or    ondansetron (ZOFRAN) injection 4 mg  4 mg IntraVENous Q6H PRN Mary Henderson MD        0.9 % sodium chloride infusion   IntraVENous Continuous Mary Henderson MD 50 mL/hr at 06/04/24 0645 Rate Verify at 06/04/24 0645    oxyCODONE (ROXICODONE) immediate release tablet 5 mg  5 mg Oral Q4H PRN Mary Henderson MD   5 mg at 06/04/24 0554    Or    oxyCODONE HCl (OXY-IR) immediate release tablet 10 mg  10 mg Oral Q4H PRN Mary Henderson MD        morphine (PF) injection 2 mg  2 mg IntraVENous Q2H PRN Mary Henderson MD        Or    morphine sulfate (PF) injection 4 mg  4 mg IntraVENous Q2H PRN Mary Henderson MD        polyethylene glycol (GLYCOLAX) packet 17 g  17 g Oral Daily Mary Henderson MD        bisacodyl (DULCOLAX) EC 
Department of Neurosurgery  Progress Note    CHIEF COMPLAINT: left brain abscess     SUBJECTIVE:  Sitting up shaving. All questions answered at this time    REVIEW OF SYSTEMS :  Constitutional: Negative for chills and fever.    Neurological: Negative for dizziness, tremors and speech change.     OBJECTIVE:   VITALS:  BP (!) 123/55   Pulse 52   Temp 97 °F (36.1 °C) (Temporal)   Resp 18   Ht 1.803 m (5' 11\")   Wt 99.2 kg (218 lb 11.2 oz)   SpO2 98%   BMI 30.50 kg/m²     PHYSICAL:  Alert, oriented  Appears stated age  PERRL  EOMI  RUE 4/5, RLE 1/5 otherwise Strength full  Sensation intact to light touch    DATA:  CBC:   Lab Results   Component Value Date/Time    WBC 7.6 05/30/2024 05:36 AM    RBC 5.24 05/30/2024 05:36 AM    HGB 14.0 05/30/2024 05:36 AM    HCT 43.3 05/30/2024 05:36 AM    MCV 82.6 05/30/2024 05:36 AM    MCH 26.7 05/30/2024 05:36 AM    MCHC 32.3 05/30/2024 05:36 AM    RDW 14.1 05/30/2024 05:36 AM     05/30/2024 05:36 AM    MPV 9.3 05/30/2024 05:36 AM     BMP:    Lab Results   Component Value Date/Time     05/30/2024 05:36 AM    K 4.3 05/30/2024 05:36 AM     05/30/2024 05:36 AM    CO2 20 05/30/2024 05:36 AM    BUN 15 05/30/2024 05:36 AM    CREATININE 0.8 05/30/2024 05:36 AM    CALCIUM 9.4 05/30/2024 05:36 AM    LABGLOM >90 05/30/2024 05:36 AM    GLUCOSE 174 05/30/2024 05:36 AM     PT/INR:    Lab Results   Component Value Date/Time    PROTIME 13.7 05/29/2024 07:28 PM    INR 1.3 05/29/2024 07:28 PM     PTT:    Lab Results   Component Value Date/Time    APTT 28.5 05/29/2024 07:28 PM   [APTT}    Current Inpatient Medications  Current Facility-Administered Medications: levETIRAcetam (KEPPRA) tablet 750 mg, 750 mg, Oral, BID **OR** levETIRAcetam (KEPPRA) injection 750 mg, 750 mg, IntraVENous, BID  [START ON 6/2/2024] levETIRAcetam (KEPPRA) tablet 1,000 mg, 1,000 mg, Oral, BID **OR** [START ON 6/2/2024] levETIRAcetam (KEPPRA) injection 1,000 mg, 1,000 mg, IntraVENous, BID  sodium 
Department of Neurosurgery  Progress Note    CHIEF COMPLAINT: left brain abscess     SUBJECTIVE:  Walking halls. Plan for OR tomorrow    REVIEW OF SYSTEMS :  Constitutional: Negative for chills and fever.    Neurological: Negative for dizziness, tremors and speech change.     OBJECTIVE:   VITALS:  /83   Pulse 54   Temp 97.4 °F (36.3 °C)   Resp 18   Ht 1.803 m (5' 11\")   Wt 99.2 kg (218 lb 11.2 oz)   SpO2 98%   BMI 30.50 kg/m²     PHYSICAL:  Alert, oriented  Appears stated age  PERRL  EOMI  RUE 4/5, RLE 1/5 otherwise Strength full  Sensation intact to light touch    DATA:  CBC:   Lab Results   Component Value Date/Time    WBC 7.6 05/30/2024 05:36 AM    RBC 5.24 05/30/2024 05:36 AM    HGB 14.0 05/30/2024 05:36 AM    HCT 43.3 05/30/2024 05:36 AM    MCV 82.6 05/30/2024 05:36 AM    MCH 26.7 05/30/2024 05:36 AM    MCHC 32.3 05/30/2024 05:36 AM    RDW 14.1 05/30/2024 05:36 AM     05/30/2024 05:36 AM    MPV 9.3 05/30/2024 05:36 AM     BMP:    Lab Results   Component Value Date/Time     05/30/2024 05:36 AM    K 4.3 05/30/2024 05:36 AM     05/30/2024 05:36 AM    CO2 20 05/30/2024 05:36 AM    BUN 15 05/30/2024 05:36 AM    CREATININE 0.8 05/30/2024 05:36 AM    CALCIUM 9.4 05/30/2024 05:36 AM    LABGLOM >90 05/30/2024 05:36 AM    GLUCOSE 174 05/30/2024 05:36 AM     PT/INR:    Lab Results   Component Value Date/Time    PROTIME 13.7 05/29/2024 07:28 PM    INR 1.3 05/29/2024 07:28 PM     PTT:    Lab Results   Component Value Date/Time    APTT 28.5 05/29/2024 07:28 PM   [APTT}    Current Inpatient Medications  Current Facility-Administered Medications: levETIRAcetam (KEPPRA) tablet 1,000 mg, 1,000 mg, Oral, BID **OR** levETIRAcetam (KEPPRA) injection 1,000 mg, 1,000 mg, IntraVENous, BID  sodium chloride flush 0.9 % injection 5-40 mL, 5-40 mL, IntraVENous, 2 times per day  sodium chloride flush 0.9 % injection 5-40 mL, 5-40 mL, IntraVENous, PRN  0.9 % sodium chloride infusion, , IntraVENous, 
Department of Neurosurgery  Progress Note    CHIEF COMPLAINT: s/p left crani and abscess evacuation 6/3    SUBJECTIVE:  Awake and alert. C/o headache    REVIEW OF SYSTEMS :  Constitutional: Negative for chills and fever.    Neurological: Negative for dizziness, tremors and speech change.     OBJECTIVE:   VITALS:  /60   Pulse 62   Temp 98.8 °F (37.1 °C) (Oral)   Resp 14   Ht 1.803 m (5' 11\")   Wt 98.9 kg (218 lb)   SpO2 97%   BMI 30.40 kg/m²     PHYSICAL:  Alert, oriented  Appears stated age  PERRL  EOMI  RUE 4/5, RLE 1/5 otherwise Strength full  Sensation intact to light touch    DATA:  CBC:   Lab Results   Component Value Date/Time    WBC 14.2 06/04/2024 05:35 AM    RBC 5.00 06/04/2024 05:35 AM    HGB 13.9 06/04/2024 05:35 AM    HCT 41.4 06/04/2024 05:35 AM    MCV 82.8 06/04/2024 05:35 AM    MCH 27.8 06/04/2024 05:35 AM    MCHC 33.6 06/04/2024 05:35 AM    RDW 14.1 06/04/2024 05:35 AM     06/04/2024 05:35 AM    MPV 9.0 06/04/2024 05:35 AM     BMP:    Lab Results   Component Value Date/Time     06/04/2024 05:35 AM    K 3.1 06/04/2024 05:35 AM     06/04/2024 05:35 AM    CO2 15 06/04/2024 05:35 AM    BUN 14 06/04/2024 05:35 AM    CREATININE 0.5 06/04/2024 05:35 AM    CALCIUM 6.7 06/04/2024 05:35 AM    LABGLOM >90 06/04/2024 05:35 AM    GLUCOSE 109 06/04/2024 05:35 AM     PT/INR:    Lab Results   Component Value Date/Time    PROTIME 13.7 05/29/2024 07:28 PM    INR 1.3 05/29/2024 07:28 PM     PTT:    Lab Results   Component Value Date/Time    APTT 28.5 05/29/2024 07:28 PM   [APTT}    Current Inpatient Medications  Current Facility-Administered Medications: vancomycin (VANCOCIN) 1,750 mg in sodium chloride 0.9 % 500 mL IVPB, 1,750 mg, IntraVENous, Q12H  butalbital-acetaminophen-caffeine (FIORICET, ESGIC) per tablet 1 tablet, 1 tablet, Oral, Q4H PRN  meropenem (MERREM) 2,000 mg in sodium chloride 0.9 % 100 mL IVPB, 2,000 mg, IntraVENous, Q8H  lidocaine PF 1 % injection 5 mL, 5 mL, 
Department of Neurosurgery  Progress Note    CHIEF COMPLAINT: s/p left crani and abscess evacuation 6/3    SUBJECTIVE: Doing well, still with some headaches, no new complaints.     REVIEW OF SYSTEMS :  Constitutional: Negative for chills and fever.    Neurological: Negative for dizziness, tremors and speech change.     OBJECTIVE:   VITALS:  /64   Pulse 62   Temp 96.8 °F (36 °C)   Resp 18   Ht 1.803 m (5' 11\")   Wt 98.4 kg (217 lb)   SpO2 94%   BMI 30.27 kg/m²     PHYSICAL:  Alert, oriented  Appears stated age  PERRL  EOMI  RUE 4/5, RLE 2/5 otherwise Strength full  Sensation intact to light touch    DATA:  CBC:   Lab Results   Component Value Date/Time    WBC 8.1 06/06/2024 05:56 AM    RBC 5.11 06/06/2024 05:56 AM    HGB 13.9 06/06/2024 05:56 AM    HCT 42.0 06/06/2024 05:56 AM    MCV 82.2 06/06/2024 05:56 AM    MCH 27.2 06/06/2024 05:56 AM    MCHC 33.1 06/06/2024 05:56 AM    RDW 14.6 06/06/2024 05:56 AM     06/06/2024 05:56 AM    MPV 9.0 06/06/2024 05:56 AM     BMP:    Lab Results   Component Value Date/Time     06/06/2024 05:56 AM    K 4.1 06/06/2024 05:56 AM     06/06/2024 05:56 AM    CO2 23 06/06/2024 05:56 AM    BUN 15 06/06/2024 05:56 AM    CREATININE 0.7 06/06/2024 05:56 AM    CALCIUM 8.3 06/06/2024 05:56 AM    LABGLOM >90 06/06/2024 05:56 AM    GLUCOSE 126 06/06/2024 05:56 AM     PT/INR:    Lab Results   Component Value Date/Time    PROTIME 13.7 05/29/2024 07:28 PM    INR 1.3 05/29/2024 07:28 PM     PTT:    Lab Results   Component Value Date/Time    APTT 28.5 05/29/2024 07:28 PM   [APTT}    Current Inpatient Medications  Current Facility-Administered Medications: butalbital-acetaminophen-caffeine (FIORICET, ESGIC) per tablet 1 tablet, 1 tablet, Oral, Q4H PRN  meropenem (MERREM) 2,000 mg in sodium chloride 0.9 % 100 mL IVPB, 2,000 mg, IntraVENous, Q8H  lidocaine PF 1 % injection 5 mL, 5 mL, IntraDERmal, Once  sodium chloride flush 0.9 % injection 5-40 mL, 5-40 mL, IntraVENous, 2 
Dr Miguel notified of neuro consult per trini.  Pt added to census  
Dr Myers notified via office personnel of radiation oncology consult.  Pt added to census  
Dr bellamy notified of oncology consult per perfectserve/answering service. Pt added to census  
EEG completed.  Report to follow.  Bri Ervin    
[facility]  Neurology Follow Up     Date:  6/4/2024  Patient Name:  Conrado Leach  YOB: 1960  MRN: [id number]     PCP:  Mohan Velázquez DO   Referring:  No ref. provider found      Chief Complaint: seizure    Significant PMH: Type 2 diabetes, HLD, BPH, PARTH, DVT      History of Present Illness:  Conrado Leach is a 63 y.o. right handed male presenting on 5/29 for evaluation of seizure.  Around 4 PM on May 29 the patient reports initially that his right side had gone numb.  This was accompanied shortly by weakness.  He was unable to lift his arm or speak.  This was followed by about 10 minutes of shaking on his right side.  He was awake and alert during this event and was able to lower himself to the floor. He reports that he was unable to speak until approximately 5:30 PM after arriving at [facility].    MRI brain with and without contrast was done which did show an enhancing lesion within the left frontal lobe concerning for abscess VS mass.      EEG was done which did show a potential for seizures from the left frontocentral region--started on Keppra    He reports no smoking history or history of radiation exposure.  He states that he works for [employer] in [facility] and has not had any chemical exposures while working there.    6/3: to OR for L frontal crani abscess    Subjective:  He is seen in ICU, sitting up in bed eating lunch.  He states that his right-sided weakness is \"about the same\", and is complaining of a 10/10 headache from his surgery.  No other new neuro issues and no breakthrough seizures.    No family at the bedside but neurology spoke with his wife yesterday    No chest pain or palpitations  No SOB  No vertigo, lightheadedness or loss of consciousness  No falls, tripping or stumbling  No incontinence of bowels or bladder  No itching or bruising appreciated  No speech or swallowing troubles    ROS otherwise negative     Allergies:    
ID was consulted @ 08:27 am.  
INTRAOPERATIVE CONSULTATION (with FROZEN SECTION)  Left frontal lobe mass -acute inflammation,necrosis, and increased cellularity                                      -no additional tissue available for permanent sections per surgeon                                      - gram stain and culture sent by surgeon  
Infectious diease consult sent to Dr. Peck.  
Neuro Science Intensive Care Unit  Critical Care  Critical Care Consult Note  6/4/2024      Date of Admission: 05/30/2024  Date of ICU Admission: 06/03/2024    CC: Follow up for post operatively for crani    HOSPITAL COURSE/OVERNIGHT EVENTS:  62 yo man presented to ED with right side weakness and seizure-like activity at home. PMH includes: type 2 diabetes mellitus, HLD, BPH, PARTH, multiple orthopedic surgeries, right lower extremities DVT 2 years ago after he had spider bite. Stroke alert called.  Imaging revealed left frontal lobe masses.  Admitted to [facility].    06/03 Underwent craniotomy with abscess evacuation.  Admitted to [facility].    06/04 No issues overnight.  Required 2 doses of hydralazine.      PMH:   Past Medical History:   Diagnosis Date    BPH (benign prostatic hyperplasia)     DVT (deep venous thrombosis) (HCC)     HLD (hyperlipidemia)     PARTH (obstructive sleep apnea)     Type 2 diabetes mellitus (HCC)      PSH:   Past Surgical History:   Procedure Laterality Date    CRANIOTOMY Left 6/3/2024    LEFT CRANIOTOMY ABCESS EVACUATION performed by Mary Henderson MD at [facility]     Home Medications:   Prior to Admission medications    Medication Sig Start Date End Date Taking? Authorizing Provider   omeprazole (PRILOSEC) 20 MG delayed release capsule Take 1 capsule by mouth daily   Yes ProviderHomar MD   metFORMIN (GLUCOPHAGE) 500 MG tablet Take 1 tablet by mouth 2 times daily (with meals)   Yes Homar Mendez MD   rosuvastatin (CRESTOR) 5 MG tablet Take 1 tablet by mouth daily   Yes Homar Mendez MD   celecoxib (CELEBREX) 200 MG capsule Take 1 capsule by mouth 2 times daily  6/30/24 Yes Homar Mendez MD     Allergies:   Allergies   Allergen Reactions    Coumadin [Warfarin] Nausea And Vomiting       Social History:  Social History     Socioeconomic History    Marital status:      Spouse name: Not on file    Number of children: Not on file    Years of 
Notified Dr. Calderon of pt's HR in the low 40s  
Nurse to nurse report called to 85.   
Occupational Therapy  OT BEDSIDE TREATMENT NOTE   [facility]  [address], [place]       Date:2024  Patient Name: Conrado Leach  MRN: [id number]  : 1960  Room: [facility]     Per OT Eval:    Evaluating OT: Elizabeth Carlson, OTD,  OTR/L; [id number]     Referring Provider:     Mary Henderson MD       Specific Provider Orders/Date: OT eval and treat (24)     Diagnosis: Brain mass [G93.89]      Reason for admission: Pt admitted with brain mass requiring L craniotomy.     Surgery/Procedures: 6/3: LEFT CRANIOTOMY ABCESS EVACUATION       Pertinent Medical History:    Past Medical History        Past Medical History:   Diagnosis Date    BPH (benign prostatic hyperplasia)      Brain abscess 2024    DVT (deep venous thrombosis) ([facility])      HLD (hyperlipidemia)      New onset seizure ([facility]) 2024    PARTH (obstructive sleep apnea)      Status post craniotomy 2024    Type 2 diabetes mellitus (HCC)           *Precautions:  Fall Risk, R inattention, R ekaterina, cognition, alarms+      Assessment of current deficits   [x] Functional mobility          [x]ADLs           [x] Strength                  [x]Cognition   [x] Functional transfers        [x] IADLs         [x] Safety Awareness   [x]Endurance   [x] Fine Coordination           [x] ROM           [] Vision/perception    [x]Sensation     [x]Gross Motor Coordination [x] Balance    [] Delirium                  [x]Motor Control     [x] Communication     OT PLAN OF CARE   OT POC based on physician orders, patient diagnosis and results of clinical assessment.        Frequency/Duration: 2-5 days/wk for 1-2 weeks PRN    Specific OT Treatment Interventions to include:   * Instruction/training on adapted ADL techniques and AE recommendations to increase functional independence within precautions       * Training on energy conservation strategies, correct breathing pattern and techniques to improve 
Occupational Therapy  Received OT order, Reviewed Chart.  Spoke w/ patient and RN.  Both confirmed pt is completing ADLs, Transfers, Mobility in room w/ SUP for safety. RN will obtain WW for pt's use in room.  He is tentatively scheduled to undergo Craniotomy w/ Dr. Henderson on 6-3-24.  Will hold Therapy assessment until pt is medically appropriate to participate in upright ax post-op.  Thank you for this referral.  Fany Cervantes, MOT, OTR/L  # [id number]    
Omar;led Dr. Peck's office to inform of the need to reconcile DC antibiotics for the patient to go to ARU.     Steve Diallo RN    
PM&R was consulted @ 6432 pm.  
Pharmacy Consultation Note  (Antibiotic Dosing and Monitoring)    Initial consult date: 6/3/24  Consulting physician/provider: Dr. Henderson  Drug: Vancomycin  Indication: CNS    Age/  Gender Height Weight IBW  Allergy Information   63 y.o./male 180.3 cm (5' 11\") 100.7 kg (222 lb 1.6 oz)     Ideal body weight: 75.3 kg (166 lb 0.1 oz)  Adjusted ideal body weight: 84.7 kg (186 lb 12.9 oz)   Coumadin [warfarin]      Renal Function:  Recent Labs     06/03/24  0919 06/04/24  0535   BUN 22 14   CREATININE 0.8 0.5*         Intake/Output Summary (Last 24 hours) at 6/4/2024 0900  Last data filed at 6/4/2024 0800  Gross per 24 hour   Intake 4167.22 ml   Output 2700 ml   Net 1467.22 ml         Vancomycin Monitoring:  Trough:    Recent Labs     06/04/24  0535   VANCOTROUGH 4.2*     Random:  No results for input(s): \"VANCORANDOM\" in the last 72 hours.    Vancomycin Administration Times:    Recent vancomycin administrations                     vancomycin (VANCOCIN) 2,000 mg in sodium chloride 0.9 % 500 mL IVPB (mg) 2,000 mg New Bag 06/03/24 2039                  Assessment:  Patient is a 63 y.o. male who has been initiated on vancomycin  Estimated Creatinine Clearance: 181 mL/min (A) (based on SCr of 0.5 mg/dL (L)).    Plan:  Continue vancomycin 1750mg q12h  Plan to check vancomycin trough tomorrow  Will continue to monitor renal function   Pharmacy to follow    Thank you for your consult    Jocelyn Torres, PharmD, BCPS, BCCCP 6/4/2024 9:00 AM      
Pharmacy Consultation Note  (Antibiotic Dosing and Monitoring)    Initial consult date: 6/3/24  Consulting physician/provider: Dr. Henderson  Drug: Vancomycin  Indication: CNS    Age/  Gender Height Weight IBW  Allergy Information   63 y.o./male 180.3 cm (5' 11\") 100.7 kg (222 lb 1.6 oz)     Ideal body weight: 75.3 kg (166 lb 0.1 oz)  Adjusted ideal body weight: 84.9 kg (187 lb 1.3 oz)   Coumadin [warfarin]      Renal Function:  Recent Labs     06/03/24  0919   BUN 22   CREATININE 0.8       Intake/Output Summary (Last 24 hours) at 6/3/2024 2324  Last data filed at 6/3/2024 2213  Gross per 24 hour   Intake 2944.3 ml   Output 1400 ml   Net 1544.3 ml       Vancomycin Monitoring:  Trough:  No results for input(s): \"VANCOTROUGH\" in the last 72 hours.  Random:  No results for input(s): \"VANCORANDOM\" in the last 72 hours.    Vancomycin Administration Times:    Recent vancomycin administrations                     vancomycin (VANCOCIN) 2,000 mg in sodium chloride 0.9 % 500 mL IVPB (mg) 2,000 mg New Bag 06/03/24 2039                          Assessment:  Patient is a 63 y.o. male who has been initiated on vancomycin  Estimated Creatinine Clearance: 113 mL/min (based on SCr of 0.8 mg/dL).  To dose vancomycin, pharmacy will be utilizing ilustrum calculation software for goal AUC/MELVA 400-600 mg/L-hr (predicted AUC/MELVA = 500, Tr =15.5 mcg/mL)    Plan:  Received 2000 mg load.  Will start vancomycin 1500 mg IV every 12 hours  Will check vancomycin levels when appropriate  Will continue to monitor renal function   Pharmacy to follow    Thank you for your consult    Fabian Colon PharmD BCPS 6/3/2024 11:24 PM    
Pt attached to telemetry monitor in line room.  8503-B telemetry removed and given to 8SE RN  
To MRI via cart    
Vancomycin has been discontinued   Clinical Pharmacy to sign-off  Physician to re-consult pharmacy if future dosing is needed    Thank you for the consult,  Emily Sullivan, PharmD, BCPS 6/5/2024 8:46 AM    
Functional transfer/mobility training/DME recommendations for increased independence, safety, and fall prevention  * Patient/Family education to increase follow through with safety techniques and functional independence  * Recommendation of environmental modifications for increased safety with functional transfers/mobility and ADLs  * Cognitive retraining/development of therapeutic activities to improve problem solving, judgement, memory, and attention for increased safety/participation in ADL/IADL tasks  * Sensory re-education to improve body/limb awareness, maintain/improve skin integrity, and improve hand/UE motor function  * Visual-perceptual training to improve environmental scanning, visual attention/focus, and oculomotor skills for increased safety/independence with functional transfers/mobility and ADLs  * Splinting/positioning for increased function, prevention of contractures, and improve skin integrity  * Therapeutic exercise to improve motor endurance, ROM, and functional strength for ADLs/functional transfers  * Therapeutic activities to facilitate/challenge dynamic balance, stand tolerance for increased safety and independence with ADLs  * Therapeutic activities to facilitate gross/fine motor skills for increased independence with ADLs  * Neuro-muscular re-education: facilitation of righting/equilibrium reactions, midline orientation, scapular stability/mobility, normalization of muscle tone, and facilitation of volitional active controled movement  * Positioning to improve skin integrity, interaction with environment and functional independence  * Delirium prevention/treatment  * Manual techniques for edema management    Modified Juan Scale   Score     Description  0             No symptoms  1             No significant disability despite symptoms  2             Slight disability; able to look after own affairs  3             Moderate disability; able to ambulate without assist/ requires assist with 
PRN **OR** potassium bicarb-citric acid (EFFER-K) effervescent tablet 40 mEq, 40 mEq, Oral, PRN **OR** potassium chloride 10 mEq/100 mL IVPB (Peripheral Line), 10 mEq, IntraVENous, PRN  magnesium sulfate 2000 mg in 50 mL IVPB premix, 2,000 mg, IntraVENous, PRN  ondansetron (ZOFRAN-ODT) disintegrating tablet 4 mg, 4 mg, Oral, Q8H PRN **OR** ondansetron (ZOFRAN) injection 4 mg, 4 mg, IntraVENous, Q6H PRN  polyethylene glycol (GLYCOLAX) packet 17 g, 17 g, Oral, Daily PRN  acetaminophen (TYLENOL) tablet 650 mg, 650 mg, Oral, Q6H PRN **OR** acetaminophen (TYLENOL) suppository 650 mg, 650 mg, Rectal, Q6H PRN  pantoprazole (PROTONIX) tablet 40 mg, 40 mg, Oral, QAM AC  levETIRAcetam (KEPPRA) injection 500 mg, 500 mg, IntraVENous, Q12H  dexAMETHasone (DECADRON) injection 4 mg, 4 mg, IntraVENous, Q6H  rosuvastatin (CRESTOR) tablet 5 mg, 5 mg, Oral, Nightly  heparin (porcine) injection 5,000 Units, 5,000 Units, SubCUTAneous, Q8H    ASSESSMENT:   This is a 62 y/o male who MRI shows 2 left frontal abscesses     PLAN:  MRI reviewed and discussed in detail  Plan for OR on Monday for Craniotomy for biopsy of abscess       Electronically signed by KATE Carl on 5/30/2024 at 4:16 PM    
at rest 123/65 mmHg Blood Pressure post session 112/68 mmHg   BP in chair: 130/66    Functional Status Score-Intensive Care Unit (FSS-ICU)   Rolling -/7   Supine to sit transfer 3/7   Unsupported sitting  4/7   Sit to stand transfers 3/7   Ambulation 2/7   Total  12/35     Therapeutic Exercises:  STS x2 reps     Patient education  Pt educated on safety, PT POC     Patient response to education:   Pt verbalized understanding Pt demonstrated skill Pt requires further education in this area   Yes  Yes  Yes      ASSESSMENT:    Conditions Requiring Skilled Therapeutic Intervention:    [x]Decreased strength     []Decreased ROM  [x]Decreased functional mobility  [x]Decreased balance   [x]Decreased endurance   [x]Decreased posture  []Decreased sensation  []Decreased coordination   []Decreased vision  [x]Decreased safety awareness   []Increased pain       Comments:  NP reported pt was medically stable.  Pt was in bed upon arrival, agreeable to initial evaluation.  Pt reported pain in head prior to activity, but remained constant throughout session. Pt initiated bed mobility but required increased assistance to sit EOB. No significant strength deficits noted in BLEs with MMT. Upon standing, pt was unsteady and completed shuffled steps to chair with difficulty advancing RLE. WW given for further ambulation into hallway, demonstrating step-to gait pattern. IR inattention noted with activity and pt required cues to attend to RUE and shuffled RLE. Pt also demonstrated R drift and required assistance to occasionally manage WW. Fatigue limited activity.  Pt was left in chair with all needs met and call light in reach.  All lines remained intact and chair alarm on.  Educated pt on safety and need for assistance when getting out of chair; pt understood and agreed to use call light.  RN aware. Pt would benefit from an intensive rehabilitation program.     Treatment:  Patient practiced and was instructed in the following treatment:  
for abscess VS mass.  EEG was done which did show a potential for seizures from the left frontocentral region.    He reports no smoking history or history of radiation exposure.  He states that he works for [employer] in [facility] and has not had any chemical exposures while working there.    6/1: doing okay. Sitting in chair at bedside with wife present. Planned for OR on Monday. No seizure activity. On Keppra 750 mg BID with plans to increase to 1000 mg BID on 6/3.   Allergies:      Allergies   Allergen Reactions    Coumadin [Warfarin] Nausea And Vomiting        Physical Examination  Vitals   Vitals:    06/01/24 0354 06/01/24 0747 06/01/24 1136 06/01/24 1543   BP:  121/68 (!) 123/55 124/68   Pulse:  51 52 51   Resp:  18 18 18   Temp:  97.4 °F (36.3 °C) 97 °F (36.1 °C) 97.6 °F (36.4 °C)   TempSrc:  Temporal Temporal Temporal   SpO2:  97% 98% 98%   Weight: 99.2 kg (218 lb 11.2 oz)      Height:            General: Patient appears in no acute distress.   HEENT: Normocephalic, atraumatic  Chest: no dyspnea  Heart: RRR  Extremities/Peripheral vascular: No edema/swelling noted.     Neurologic Examination    Mental Status  Alert, and oriented to person, place and time. Speech is fluent with intact comprehension.  Attention and memory are good.    Cranial Nerves  II. Visual fields full to confrontation bilaterally.   III, IV, VI: Pupils equally round and reactive to light, 3 to 2 mm bilaterally.   EOMs: full, no nystagmus.   V. Facial sensation intact to light touch bilaterally  VII: Facial movements: Mild right nasolabial fold flattening  VIII: Hearing intact to voice  IX,X: Palate elevates symmetrically. No dysarthria  XI: Sternocleidomastoid and trapezius 5/5 bilaterally   XII: Tongue is midline    Motor  5/5 in left upper and lower extremities, 4+/5 in right upper and lower extremity    Sensation  Light Touch: Intact distally in all four limbs    Reflexes    No babinski     Coordination  Rapid alternating 
99.2 kg (218 lb 11.2 oz)   SpO2 98%   BMI 30.50 kg/m²     General Appearance: alert and oriented to person, place and time and in no acute distress  Skin: warm and dry  Head: normocephalic and atraumatic  Eyes: pupils equal, round, and reactive to light, extraocular eye movements intact, conjunctivae normal  Neck: neck supple and non tender without mass   Pulmonary/Chest: clear to auscultation bilaterally- no wheezes, rales or rhonchi, normal air movement, no respiratory distress  Cardiovascular: normal rate, normal S1 and S2 and no carotid bruits  Abdomen: soft, non-tender, non-distended, normal bowel sounds, no masses or organomegaly  Extremities: no cyanosis, no clubbing and no edema  Neurologic: no cranial nerve deficit and speech normal        No results for input(s): \"NA\", \"K\", \"CL\", \"CO2\", \"BUN\", \"CREATININE\", \"GLUCOSE\", \"CALCIUM\" in the last 72 hours.      No results for input(s): \"WBC\", \"RBC\", \"HGB\", \"HCT\", \"MCV\", \"MCH\", \"MCHC\", \"RDW\", \"PLT\", \"MPV\" in the last 72 hours.        Assessment:    Seizure-like activity/right-sided weakness likely 2/2 new multiple brain masses - MRI showed at least 3 brain lesions  Left inferior thyroid gland nodule seen on US  T2DM  HLD  Chronic neck/low back pain      Plan:  Neurosurgery following: for craniotomy biopsy of abscess on 6/3  Neurology following:  Keppra uptitrated to 1000 mg twice daily  Continue dexamethasone 4 mg every 6 hours  ID following: Recommended holding off antibiotics for now as patient is not septic, follow brain biopsy/culture  Cancer workup with CT chest, abdomen/pelvis negative for metastatic disease  Continue home meds with Protonix and Crestor      DC planning: Pending above    NOTE: This report was transcribed using voice recognition software. Every effort was made to ensure accuracy; however, inadvertent computerized transcription errors may be present.  Electronically signed by Ant Jasso MD on 6/2/2024 at 11:05 AM  
Line)  10 mEq IntraVENous PRN Mary Henderson MD        magnesium sulfate 2000 mg in 50 mL IVPB premix  2,000 mg IntraVENous PRN Mary Henderson MD        polyethylene glycol (GLYCOLAX) packet 17 g  17 g Oral Daily PRN Mary Henderson MD        acetaminophen (TYLENOL) tablet 650 mg  650 mg Oral Q6H PRN Mary Henderson MD   650 mg at 05/30/24 0312    Or    acetaminophen (TYLENOL) suppository 650 mg  650 mg Rectal Q6H PRN Mary Henderson MD        pantoprazole (PROTONIX) tablet 40 mg  40 mg Oral QAM AC Mary Henderson MD   40 mg at 06/06/24 0553    rosuvastatin (CRESTOR) tablet 5 mg  5 mg Oral Nightly Mary Henderson MD   5 mg at 06/06/24 0318           REVIEW OF SYSTEMS:    CONSTITUTIONAL:  Denies fever, chill or rigors.  HEENT: Headache   RESPIRATORY: denies cough, shortness of breath, sputum expectoration.  CARDIOVASCULAR:  Denies palpitation or chest pain   GASTROINTESTINAL:  Denies abdomen pain, diarrhea or constipation,, nausea or vomiting.  GENITOURINARY:  Denies burning urination or frequency of urination  INTEGUMENT: denies wound , rash  HEMATOLOGIC/LYMPHATIC:  Denies lymph node swelling, gum bleeding or easy bruising.  MUSCULOSKELETAL:  Denies leg pain , joint pain , joint swelling  NEUROLOGICAL: weakness       PHYSICAL EXAM:      Vitals:     Blood Pressure (Abnormal) 140/69   Pulse 53   Temperature 98.1 °F (36.7 °C) (Temporal)   Respiration 20   Height 1.803 m (5' 11\")   Weight 98.4 kg (217 lb)   Oxygen Saturation 95%   Body Mass Index 30.27 kg/m²     General Appearance:    Awake, alert , no acute distress.   Head:    Normocephalic, wound dressed    Eyes:    No pallor, no icterus,   Ears:    No obvious deformity or drainage.   Nose:   No nasal drainage   Throat:   Mucosa moist, no oral thrush   Neck:   Supple, no lymphadenopathy   Back:     no CVA tenderness   Lungs:     Clear to auscultation bilaterally, no wheeze    Heart:    Regular rate and rhythm, no murmur   Abdomen:     Soft, non-tender, bowel sounds 
PRN         Objective:    BP (!) 123/55   Pulse 52   Temp 97 °F (36.1 °C) (Temporal)   Resp 18   Ht 1.803 m (5' 11\")   Wt 99.2 kg (218 lb 11.2 oz)   SpO2 98%   BMI 30.50 kg/m²     General Appearance: alert and oriented to person, place and time and in no acute distress  Skin: warm and dry  Head: normocephalic and atraumatic  Eyes: pupils equal, round, and reactive to light, extraocular eye movements intact, conjunctivae normal  Neck: neck supple and non tender without mass   Pulmonary/Chest: clear to auscultation bilaterally- no wheezes, rales or rhonchi, normal air movement, no respiratory distress  Cardiovascular: normal rate, normal S1 and S2 and no carotid bruits  Abdomen: soft, non-tender, non-distended, normal bowel sounds, no masses or organomegaly  Extremities: no cyanosis, no clubbing and no edema  Neurologic: no cranial nerve deficit and speech normal        Recent Labs     05/29/24 1928 05/30/24  0536    138   K 3.9 4.3   CL 98 103   CO2 26 20*   BUN 16 15   CREATININE 0.9 0.8   GLUCOSE 156* 174*   CALCIUM 9.2 9.4       Recent Labs     05/29/24 1928 05/30/24  0536   WBC 9.8 7.6   RBC 4.86 5.24   HGB 13.5 14.0   HCT 39.8 43.3   MCV 81.9 82.6   MCH 27.8 26.7   MCHC 33.9 32.3   RDW 14.1 14.1    274   MPV 8.9 9.3         Assessment:    Seizure-like activity/right-sided weakness likely 2/2 new multiple brain masses - MRI showed at least 3 brain lesions  Left inferior thyroid gland nodule seen on US  T2DM  HLD  Chronic neck/low back pain      Plan:  Neurosurgery following: for craniotomy biopsy of abscess on 6/3  Neurology following:  Planning to titrate Keppra up to 1000 mg twice daily over the next 3 days  Continue dexamethasone 4 mg every 6 hours  Continue Decadron and Keppra  ID consulted: Recommended holding off antibiotics for now as patient is not septic, follow brain biopsy culture  Cancer workup with CT chest, abdomen/pelvis negative for metastatic disease  Continue home meds with 
PRN  potassium chloride (KLOR-CON M) extended release tablet 40 mEq, 40 mEq, Oral, PRN **OR** potassium bicarb-citric acid (EFFER-K) effervescent tablet 40 mEq, 40 mEq, Oral, PRN **OR** potassium chloride 10 mEq/100 mL IVPB (Peripheral Line), 10 mEq, IntraVENous, PRN  magnesium sulfate 2000 mg in 50 mL IVPB premix, 2,000 mg, IntraVENous, PRN  polyethylene glycol (GLYCOLAX) packet 17 g, 17 g, Oral, Daily PRN  acetaminophen (TYLENOL) tablet 650 mg, 650 mg, Oral, Q6H PRN **OR** acetaminophen (TYLENOL) suppository 650 mg, 650 mg, Rectal, Q6H PRN  pantoprazole (PROTONIX) tablet 40 mg, 40 mg, Oral, QAM AC  rosuvastatin (CRESTOR) tablet 5 mg, 5 mg, Oral, Nightly    ASSESSMENT:   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  
rehab   Incidental sub centimeter hypo attenuating left thyroid nodule:  Ultrasound done.  Findings meets criteria for FNA.  Likely as outpatient  Type 2 diabetes mellitus. Hold metformin due to IV contrast use. Humalog small dose correction scale.  Monitor blood glucose especially in the light of use of steroids.  Hb A1C  Hyperlipidemia: Resume rosuvastatin 5 mg nightly  Chronic neck and low back pain.  On Celebrex.  Hold it  Asymmetry calf diameter. No pain. Hx of DVT. LE venous doppler .   DVT prophylaxis: Heparin held                DVT prophylaxis SCD    DISPOSITION: Continue hospitalization, on IV antibiotics. Awaiting precert for ARU     Medications:  REVIEWED DAILY    Infusion Medications    sodium chloride      sodium chloride      sodium chloride      sodium chloride 50 mL/hr at 06/06/24 0251     Scheduled Medications    meropenem  2,000 mg IntraVENous Q8H    lidocaine PF  5 mL IntraDERmal Once    sodium chloride flush  5-40 mL IntraVENous 2 times per day    heparin flush  3 mL IntraVENous 2 times per day    sodium chloride flush  5-40 mL IntraVENous 2 times per day    sodium chloride flush  5-40 mL IntraVENous 2 times per day    polyethylene glycol  17 g Oral Daily    bisacodyl  5 mg Oral Daily    sennosides-docusate sodium  1 tablet Oral BID    levETIRAcetam  1,000 mg Oral BID    Or    levETIRAcetam  1,000 mg IntraVENous BID    sodium chloride flush  5-40 mL IntraVENous 2 times per day    pantoprazole  40 mg Oral QAM AC    rosuvastatin  5 mg Oral Nightly     PRN Meds: butalbital-acetaminophen-caffeine, sodium chloride flush, sodium chloride, heparin flush, sodium chloride flush, sodium chloride, sodium chloride flush, sodium chloride, ondansetron **OR** ondansetron, oxyCODONE **OR** oxyCODONE, morphine **OR** morphine, labetalol, hydrALAZINE, sodium chloride flush, potassium chloride **OR** potassium alternative oral replacement **OR** potassium chloride, magnesium sulfate, polyethylene glycol, 
for further characterization.      ACR TI-RADS recommendations:      TR5 (>= 7 points):  FNA if >= 1 cm; follow-up if 0.5-0.9 cm in 1, 2, 3, 4,   and 5 years      TR4 (4-6 points):  FNA if >= 1.5 cm; follow-up if 1.0-1.4 cm in 1, 2, 3, and   5 years      TR3 (3 points):  FNA if >= 2.5 cm; follow-up if 1.5-2.4 cm in 1, 3, and 5   years      TR2 (2 points):  No FNA or follow-up      TR1 (0 points):  No FNA or follow-up      ACR TI-RADS recommends that no more than two nodules with the highest ACR   TI-RADS point total should be biopsied and no more than four nodules should   be followed.         MRI BRAIN W WO CONTRAST   Final Result   1. There are 2 lobulated peripherally enhancing lesions within the left   frontal lobe measuring up to 3.1 and 2.6 cm.  These demonstrate central   restricted diffusion with peripheral susceptibility suggesting   intraparenchymal abscesses.  There is moderate surrounding edema with minimal   left-to-right bowing of the anterior midline structures.   2. Otherwise, no convincing acute intracranial abnormality.  No acute infarct.   3. There is an extra-axial enhancing mass along the right anterior falx   measuring up to 12 mm likely representing a meningioma.   These results were sent to the CORE Team on 5/30/2024 at 8:05 am to be   communicated to the referring/covering health care provider/office.         XR CHEST PORTABLE   Final Result   No acute cardiopulmonary process.         CT HEAD WO CONTRAST   Final Result   1. Perfusion mismatch located in the left frontal lobe corresponding to   intra-axial masses with surrounding edema.  These findings suggest   intracranial neoplasm.  MRI with and without contrast recommended.   2. Additional enhancing lesion is seen along right aspect of midline falx   measuring 1.2 x 0.6 cm.   3. No evidence of intracranial hemorrhage.   4. No evidence of intracranial arterial stenosis.   5. No stenosis involving the carotid arteries or vertebral arteries. 
x 2.4 cm and 2.7 x 2.3 cm with surrounding edema.  No evidence of acute intracranial hemorrhage.  No midline shift. Basilar cisterns are patent.  No hydrocephalus.     1. Perfusion mismatch located in the left frontal lobe corresponding to intra-axial masses with surrounding edema.  These findings suggest intracranial neoplasm.  MRI with and without contrast recommended. 2. Additional enhancing lesion is seen along right aspect of midline falx measuring 1.2 x 0.6 cm. 3. No evidence of intracranial hemorrhage. 4. No evidence of intracranial arterial stenosis. 5. No stenosis involving the carotid arteries or vertebral arteries. 6. Hypoattenuating nodule associated with the left lobe of the thyroid gland measures up to 9 mm.  Ultrasound recommended for further evaluation. 7. Results were called by Dr. Kevin Mitchell to Dr. PAULA on 5/29/2024 at 19:54.     CT BRAIN PERFUSION    Result Date: 5/29/2024  EXAMINATION: CTA OF THE HEAD WITH CONTRAST WITH PERFUSION; CT OF THE HEAD WITHOUT CONTRAST; CTA OF THE NECK; CTA OF THE HEAD WITH CONTRAST 5/29/2024 7:18 pm: TECHNIQUE: CTA of the head/brain was performed with the administration of intravenous contrast. Multiplanar reformatted images are provided for review.  MIP images are provided for review. Automated exposure control, iterative reconstruction, and/or weight based adjustment of the mA/kV was utilized to reduce the radiation dose to as low as reasonably achievable.; CT of the head was performed without the administration of intravenous contrast. Automated exposure control, iterative reconstruction, and/or weight based adjustment of the mA/kV was utilized to reduce the radiation dose to as low as reasonably achievable.; CTA of the neck was performed with the administration of intravenous contrast. Multiplanar reformatted images are provided for review. MIP images are provided for review. Stenosis of the internal carotid arteries measured using NASCET criteria. 
focus.  Neurology recommendations appreciated.  Now on Keppra at 1000 mg twice daily  Right arm flaccidity in the setting of above. PT/OT kasey  Incidental sub centimeter hypo attenuating left thyroid nodule:  Ultrasound done.  Findings meets criteria for FNA.  Likely as outpatient  Type 2 diabetes mellitus. Hold metformin due to IV contrast use. Humalog small dose correction scale.  Monitor blood glucose especially in the light of use of steroids.  Hb A1C  Hyperlipidemia: Resume rosuvastatin 5 mg nightly  Chronic neck and low back pain.  On Celebrex.  Hold it  Asymmetry calf diameter. No pain. Hx of DVT. LE venous doppler .   DVT prophylaxis: Heparin held                DVT prophylaxis SCD    DISPOSITION: Continue hospitalization, on IV antibiotics    Medications:  REVIEWED DAILY    Infusion Medications    sodium chloride      sodium chloride      sodium chloride      sodium chloride 50 mL/hr at 06/04/24 1441     Scheduled Medications    vancomycin  1,750 mg IntraVENous Q12H    meropenem  2,000 mg IntraVENous Q8H    lidocaine PF  5 mL IntraDERmal Once    sodium chloride flush  5-40 mL IntraVENous 2 times per day    heparin flush  3 mL IntraVENous 2 times per day    sodium chloride flush  5-40 mL IntraVENous 2 times per day    sodium chloride flush  5-40 mL IntraVENous 2 times per day    polyethylene glycol  17 g Oral Daily    bisacodyl  5 mg Oral Daily    sennosides-docusate sodium  1 tablet Oral BID    levETIRAcetam  1,000 mg Oral BID    Or    levETIRAcetam  1,000 mg IntraVENous BID    sodium chloride flush  5-40 mL IntraVENous 2 times per day    pantoprazole  40 mg Oral QAM AC    rosuvastatin  5 mg Oral Nightly     PRN Meds: butalbital-acetaminophen-caffeine, sodium chloride flush, sodium chloride, heparin flush, sodium chloride flush, sodium chloride, sodium chloride flush, sodium chloride, ondansetron **OR** ondansetron, oxyCODONE **OR** oxyCODONE, morphine **OR** morphine, labetalol, hydrALAZINE, sodium chloride

## 2024-06-06 NOTE — PLAN OF CARE
Problem: Chronic Conditions and Co-morbidities  Goal: Patient's chronic conditions and co-morbidity symptoms are monitored and maintained or improved  5/30/2024 1712 by Grayson Begum RN  Outcome: Progressing     Problem: Discharge Planning  Goal: Discharge to home or other facility with appropriate resources  5/30/2024 1712 by Grayson Begum RN  Outcome: Progressing     Problem: Safety - Adult  Goal: Free from fall injury  5/31/2024 0023 by Elsa Lind RN  Outcome: Progressing  5/31/2024 0023 by Elsa Lind RN  Outcome: Progressing  5/30/2024 1712 by Grayson Begum RN  Outcome: Progressing     Problem: Pain  Goal: Verbalizes/displays adequate comfort level or baseline comfort level  5/30/2024 1712 by Grayson Begum RN  Outcome: Progressing     
  Problem: Chronic Conditions and Co-morbidities  Goal: Patient's chronic conditions and co-morbidity symptoms are monitored and maintained or improved  Outcome: Progressing     Problem: Discharge Planning  Goal: Discharge to home or other facility with appropriate resources  Outcome: Progressing     
  Problem: Chronic Conditions and Co-morbidities  Goal: Patient's chronic conditions and co-morbidity symptoms are monitored and maintained or improved  Outcome: Progressing     Problem: Discharge Planning  Goal: Discharge to home or other facility with appropriate resources  Outcome: Progressing     Problem: Safety - Adult  Goal: Free from fall injury  Outcome: Progressing     
  Problem: Chronic Conditions and Co-morbidities  Goal: Patient's chronic conditions and co-morbidity symptoms are monitored and maintained or improved  Outcome: Progressing     Problem: Discharge Planning  Goal: Discharge to home or other facility with appropriate resources  Outcome: Progressing     Problem: Safety - Adult  Goal: Free from fall injury  Outcome: Progressing     Problem: Pain  Goal: Verbalizes/displays adequate comfort level or baseline comfort level  Outcome: Progressing     
  Problem: Chronic Conditions and Co-morbidities  Goal: Patient's chronic conditions and co-morbidity symptoms are monitored and maintained or improved  Outcome: Progressing     Problem: Discharge Planning  Goal: Discharge to home or other facility with appropriate resources  Outcome: Progressing     Problem: Safety - Adult  Goal: Free from fall injury  Outcome: Progressing     Problem: Pain  Goal: Verbalizes/displays adequate comfort level or baseline comfort level  Outcome: Progressing  Flowsheets  Taken 6/5/2024 0000  Verbalizes/displays adequate comfort level or baseline comfort level: Encourage patient to monitor pain and request assistance  Taken 6/4/2024 2048  Verbalizes/displays adequate comfort level or baseline comfort level: Encourage patient to monitor pain and request assistance     Problem: Neurosensory - Adult  Goal: Absence of seizures  Outcome: Progressing  Flowsheets  Taken 6/5/2024 0000  Absence of seizures: Monitor for seizure activity.  If seizure occurs, document type and location of movements and any associated apnea  Taken 6/4/2024 2048  Absence of seizures: Monitor for seizure activity.  If seizure occurs, document type and location of movements and any associated apnea     Problem: Nutrition Deficit:  Goal: Optimize nutritional status  Outcome: Progressing     
  Problem: Chronic Conditions and Co-morbidities  Goal: Patient's chronic conditions and co-morbidity symptoms are monitored and maintained or improved  Outcome: Progressing  Flowsheets (Taken 6/4/2024 0831)  Care Plan - Patient's Chronic Conditions and Co-Morbidity Symptoms are Monitored and Maintained or Improved:   Update acute care plan with appropriate goals if chronic or comorbid symptoms are exacerbated and prevent overall improvement and discharge   Collaborate with multidisciplinary team to address chronic and comorbid conditions and prevent exacerbation or deterioration   Monitor and assess patient's chronic conditions and comorbid symptoms for stability, deterioration, or improvement     Problem: Discharge Planning  Goal: Discharge to home or other facility with appropriate resources  Outcome: Progressing  Flowsheets (Taken 6/4/2024 0831)  Discharge to home or other facility with appropriate resources:   Arrange for needed discharge resources and transportation as appropriate   Arrange for interpreters to assist at discharge as needed   Refer to discharge planning if patient needs post-hospital services based on physician order or complex needs related to functional status, cognitive ability or social support system   Identify discharge learning needs (meds, wound care, etc)   Identify barriers to discharge with patient and caregiver     Problem: Neurosensory - Adult  Goal: Absence of seizures  Outcome: Progressing  Flowsheets (Taken 6/4/2024 0831)  Absence of seizures:   Support airway/breathing, administer oxygen as needed   If seizure occurs, turn head to side and suction secretions as needed   Diagnostic studies as ordered   Administer anticonvulsants as ordered   Monitor for seizure activity.  If seizure occurs, document type and location of movements and any associated apnea     
  Problem: Chronic Conditions and Co-morbidities  Goal: Patient's chronic conditions and co-morbidity symptoms are monitored and maintained or improved  Outcome: Progressing  Flowsheets (Taken 6/5/2024 2015)  Care Plan - Patient's Chronic Conditions and Co-Morbidity Symptoms are Monitored and Maintained or Improved: Monitor and assess patient's chronic conditions and comorbid symptoms for stability, deterioration, or improvement     Problem: Discharge Planning  Goal: Discharge to home or other facility with appropriate resources  Outcome: Progressing  Flowsheets (Taken 6/5/2024 2015)  Discharge to home or other facility with appropriate resources: Identify barriers to discharge with patient and caregiver     Problem: Safety - Adult  Goal: Free from fall injury  Outcome: Progressing     Problem: Pain  Goal: Verbalizes/displays adequate comfort level or baseline comfort level  Outcome: Progressing  Flowsheets  Taken 6/6/2024 0000  Verbalizes/displays adequate comfort level or baseline comfort level: Encourage patient to monitor pain and request assistance  Taken 6/5/2024 2015  Verbalizes/displays adequate comfort level or baseline comfort level: Encourage patient to monitor pain and request assistance     Problem: Neurosensory - Adult  Goal: Absence of seizures  Outcome: Progressing  Flowsheets (Taken 6/5/2024 2015)  Absence of seizures: Monitor for seizure activity.  If seizure occurs, document type and location of movements and any associated apnea     Problem: Nutrition Deficit:  Goal: Optimize nutritional status  Outcome: Progressing     
Monitor for seizure activity.  If seizure occurs, document type and location of movements and any associated apnea     Problem: Nutrition Deficit:  Goal: Optimize nutritional status  6/6/2024 1427 by Steve Diallo, RN  Outcome: Adequate for Discharge  6/6/2024 0617 by Dora Sebastian, RN  Outcome: Progressing

## 2024-06-07 LAB
ALBUMIN SERPL-MCNC: 3.3 G/DL (ref 3.5–5.2)
ALP SERPL-CCNC: 58 U/L (ref 40–129)
ALT SERPL-CCNC: 29 U/L (ref 0–40)
ANION GAP SERPL CALCULATED.3IONS-SCNC: 11 MMOL/L (ref 7–16)
AST SERPL-CCNC: 14 U/L (ref 0–39)
BILIRUB SERPL-MCNC: 0.4 MG/DL (ref 0–1.2)
BUN SERPL-MCNC: 16 MG/DL (ref 6–23)
CALCIUM SERPL-MCNC: 8.8 MG/DL (ref 8.6–10.2)
CHLORIDE SERPL-SCNC: 98 MMOL/L (ref 98–107)
CO2 SERPL-SCNC: 23 MMOL/L (ref 22–29)
CREAT SERPL-MCNC: 0.7 MG/DL (ref 0.7–1.2)
ERYTHROCYTE [DISTWIDTH] IN BLOOD BY AUTOMATED COUNT: 14.6 % (ref 11.5–15)
GFR, ESTIMATED: >90 ML/MIN/1.73M2
GLUCOSE SERPL-MCNC: 123 MG/DL (ref 74–99)
HCT VFR BLD AUTO: 43.3 % (ref 37–54)
HGB BLD-MCNC: 14.5 G/DL (ref 12.5–16.5)
MCH RBC QN AUTO: 27.5 PG (ref 26–35)
MCHC RBC AUTO-ENTMCNC: 33.5 G/DL (ref 32–34.5)
MCV RBC AUTO: 82.2 FL (ref 80–99.9)
MICROORGANISM SPEC CULT: NORMAL
MICROORGANISM/AGENT SPEC: NORMAL
MICROORGANISM/AGENT SPEC: NORMAL
PLATELET # BLD AUTO: 196 K/UL (ref 130–450)
PMV BLD AUTO: 9 FL (ref 7–12)
POTASSIUM SERPL-SCNC: 4.3 MMOL/L (ref 3.5–5)
PROT SERPL-MCNC: 6.2 G/DL (ref 6.4–8.3)
RBC # BLD AUTO: 5.27 M/UL (ref 3.8–5.8)
SERVICE CMNT-IMP: NORMAL
SODIUM SERPL-SCNC: 132 MMOL/L (ref 132–146)
SPECIMEN DESCRIPTION: NORMAL
WBC OTHER # BLD: 7.8 K/UL (ref 4.5–11.5)

## 2024-06-07 PROCEDURE — 97110 THERAPEUTIC EXERCISES: CPT

## 2024-06-07 PROCEDURE — 6370000000 HC RX 637 (ALT 250 FOR IP): Performed by: STUDENT IN AN ORGANIZED HEALTH CARE EDUCATION/TRAINING PROGRAM

## 2024-06-07 PROCEDURE — 97530 THERAPEUTIC ACTIVITIES: CPT

## 2024-06-07 PROCEDURE — 1280000000 HC REHAB R&B

## 2024-06-07 PROCEDURE — 92610 EVALUATE SWALLOWING FUNCTION: CPT

## 2024-06-07 PROCEDURE — 97166 OT EVAL MOD COMPLEX 45 MIN: CPT

## 2024-06-07 PROCEDURE — 80053 COMPREHEN METABOLIC PANEL: CPT

## 2024-06-07 PROCEDURE — 92523 SPEECH SOUND LANG COMPREHEN: CPT

## 2024-06-07 PROCEDURE — 36415 COLL VENOUS BLD VENIPUNCTURE: CPT

## 2024-06-07 PROCEDURE — 97161 PT EVAL LOW COMPLEX 20 MIN: CPT

## 2024-06-07 PROCEDURE — 6370000000 HC RX 637 (ALT 250 FOR IP): Performed by: PHYSICAL MEDICINE & REHABILITATION

## 2024-06-07 PROCEDURE — 6360000002 HC RX W HCPCS: Performed by: STUDENT IN AN ORGANIZED HEALTH CARE EDUCATION/TRAINING PROGRAM

## 2024-06-07 PROCEDURE — 97112 NEUROMUSCULAR REEDUCATION: CPT

## 2024-06-07 PROCEDURE — 97535 SELF CARE MNGMENT TRAINING: CPT

## 2024-06-07 PROCEDURE — 99223 1ST HOSP IP/OBS HIGH 75: CPT | Performed by: PHYSICAL MEDICINE & REHABILITATION

## 2024-06-07 PROCEDURE — 2580000003 HC RX 258: Performed by: PHYSICAL MEDICINE & REHABILITATION

## 2024-06-07 PROCEDURE — 6360000002 HC RX W HCPCS: Performed by: PHYSICAL MEDICINE & REHABILITATION

## 2024-06-07 PROCEDURE — 85027 COMPLETE CBC AUTOMATED: CPT

## 2024-06-07 RX ORDER — DEXAMETHASONE 1 MG
1 TABLET ORAL EVERY 8 HOURS SCHEDULED
Status: DISCONTINUED | OUTPATIENT
Start: 2024-06-20 | End: 2024-06-07

## 2024-06-07 RX ORDER — DEXAMETHASONE 1 MG
2 TABLET ORAL EVERY 8 HOURS SCHEDULED
Status: DISCONTINUED | OUTPATIENT
Start: 2024-06-14 | End: 2024-06-07

## 2024-06-07 RX ORDER — ACETAMINOPHEN 325 MG/1
650 TABLET ORAL EVERY 6 HOURS PRN
Status: DISCONTINUED | OUTPATIENT
Start: 2024-06-07 | End: 2024-07-05 | Stop reason: HOSPADM

## 2024-06-07 RX ORDER — POLYETHYLENE GLYCOL 3350 17 G/17G
17 POWDER, FOR SOLUTION ORAL 2 TIMES DAILY
Status: DISCONTINUED | OUTPATIENT
Start: 2024-06-07 | End: 2024-07-05 | Stop reason: HOSPADM

## 2024-06-07 RX ORDER — DEXAMETHASONE 1 MG
2 TABLET ORAL EVERY 12 HOURS SCHEDULED
Status: DISCONTINUED | OUTPATIENT
Start: 2024-06-17 | End: 2024-06-07

## 2024-06-07 RX ORDER — SENNA AND DOCUSATE SODIUM 50; 8.6 MG/1; MG/1
2 TABLET, FILM COATED ORAL 2 TIMES DAILY
Status: DISCONTINUED | OUTPATIENT
Start: 2024-06-07 | End: 2024-07-05 | Stop reason: HOSPADM

## 2024-06-07 RX ORDER — LEVETIRACETAM 500 MG/1
1000 TABLET ORAL 2 TIMES DAILY
Status: DISCONTINUED | OUTPATIENT
Start: 2024-06-07 | End: 2024-07-05 | Stop reason: HOSPADM

## 2024-06-07 RX ORDER — DEXAMETHASONE 1 MG
1 TABLET ORAL EVERY 12 HOURS SCHEDULED
Status: DISCONTINUED | OUTPATIENT
Start: 2024-06-23 | End: 2024-06-07

## 2024-06-07 RX ORDER — DEXAMETHASONE 1 MG
4 TABLET ORAL EVERY 12 HOURS SCHEDULED
Status: DISCONTINUED | OUTPATIENT
Start: 2024-06-07 | End: 2024-06-07

## 2024-06-07 RX ORDER — DEXAMETHASONE 1 MG
4 TABLET ORAL ONCE
Status: DISCONTINUED | OUTPATIENT
Start: 2024-06-07 | End: 2024-06-07

## 2024-06-07 RX ORDER — DEXAMETHASONE 1 MG
1 TABLET ORAL DAILY
Status: DISCONTINUED | OUTPATIENT
Start: 2024-06-27 | End: 2024-06-07

## 2024-06-07 RX ADMIN — HEPARIN 300 UNITS: 100 SYRINGE at 12:33

## 2024-06-07 RX ADMIN — OXYCODONE 5 MG: 5 TABLET ORAL at 19:25

## 2024-06-07 RX ADMIN — PANTOPRAZOLE SODIUM 40 MG: 40 TABLET, DELAYED RELEASE ORAL at 05:42

## 2024-06-07 RX ADMIN — DOCUSATE SODIUM 50MG AND SENNOSIDES 8.6MG 1 TABLET: 8.6; 5 TABLET, FILM COATED ORAL at 09:26

## 2024-06-07 RX ADMIN — Medication 1 CAPSULE: at 09:26

## 2024-06-07 RX ADMIN — LEVETIRACETAM 1000 MG: 500 TABLET, FILM COATED ORAL at 22:00

## 2024-06-07 RX ADMIN — SODIUM CHLORIDE, PRESERVATIVE FREE 10 ML: 5 INJECTION INTRAVENOUS at 12:33

## 2024-06-07 RX ADMIN — SENNOSIDES AND DOCUSATE SODIUM 2 TABLET: 50; 8.6 TABLET ORAL at 22:00

## 2024-06-07 RX ADMIN — HEPARIN 300 UNITS: 100 SYRINGE at 22:00

## 2024-06-07 RX ADMIN — SODIUM CHLORIDE, PRESERVATIVE FREE 10 ML: 5 INJECTION INTRAVENOUS at 22:00

## 2024-06-07 RX ADMIN — POLYETHYLENE GLYCOL 3350 17 GRAM ORAL POWDER PACKET 17 G: at 22:00

## 2024-06-07 RX ADMIN — ROSUVASTATIN CALCIUM 5 MG: 10 TABLET, FILM COATED ORAL at 22:00

## 2024-06-07 RX ADMIN — Medication 5 MG: at 22:00

## 2024-06-07 RX ADMIN — OXYCODONE HYDROCHLORIDE 10 MG: 10 TABLET ORAL at 05:45

## 2024-06-07 RX ADMIN — LEVETIRACETAM 1000 MG: 500 TABLET, FILM COATED ORAL at 13:02

## 2024-06-07 RX ADMIN — WATER 2000 MG: 1 INJECTION INTRAMUSCULAR; INTRAVENOUS; SUBCUTANEOUS at 03:30

## 2024-06-07 RX ADMIN — ERGOCALCIFEROL 50000 UNITS: 1.25 CAPSULE ORAL at 09:26

## 2024-06-07 RX ADMIN — POLYETHYLENE GLYCOL 3350 17 G: 17 POWDER, FOR SOLUTION ORAL at 09:26

## 2024-06-07 RX ADMIN — WATER 2000 MG: 1 INJECTION INTRAMUSCULAR; INTRAVENOUS; SUBCUTANEOUS at 16:42

## 2024-06-07 ASSESSMENT — PAIN SCALES - WONG BAKER: WONGBAKER_NUMERICALRESPONSE: HURTS A LITTLE BIT

## 2024-06-07 ASSESSMENT — PAIN SCALES - GENERAL
PAINLEVEL_OUTOF10: 3
PAINLEVEL_OUTOF10: 7
PAINLEVEL_OUTOF10: 3

## 2024-06-07 ASSESSMENT — PAIN DESCRIPTION - DESCRIPTORS
DESCRIPTORS: ACHING;DISCOMFORT;DULL
DESCRIPTORS: ACHING;DISCOMFORT

## 2024-06-07 ASSESSMENT — PAIN DESCRIPTION - LOCATION
LOCATION: HEAD
LOCATION: HEAD

## 2024-06-07 NOTE — PROGRESS NOTES
SPEECH/LANGUAGE PATHOLOGY  CLINICAL ASSESSMENT OF SWALLOWING FUNCTION   and PLAN OF CARE    PATIENT NAME:  Conrado Leach  (male)     MRN:  [id number]    :  1960  (63 y.o.)  STATUS:  Inpatient: Room 5509/5509-A    TODAY'S DATE:  2024  REFERRING PROVIDER: Dr. Mcknight  SPECIFIC PROVIDER ORDER: Speech language pathology evaluation Date of order:  24  REASON FOR REFERRAL: brain abscess    EVALUATING THERAPIST: Courtney Amor, SLP                 RESULTS:    DYSPHAGIA DIAGNOSIS:   Clinical indicators of mild-moderate oral phase dysphagia       DIET RECOMMENDATIONS:  Soft and bite size consistency solids (IDDSI level 6) with  thin liquids (IDDSI level 0)     FEEDING RECOMMENDATIONS:     Assistance level:  Supervision is needed during all oral intake      Compensatory strategies recommended: Thorough oral care to prevent colonization of oral bacteria   Upright in bed/ chair as tolerated  Encourage oral clearing of bolus before next bite/sip is taken  Slow rate of intake   SINGLE cup sips  SMALL bites  Liquid wash to help clear oral cavity of thicker consistency items      Discussed recommendations with:  patient nurse in person    SPEECH THERAPY  PLAN OF CARE   The dysphagia POC is established based on physician order, dysphagia diagnosis and results of clinical assessment     Skilled SLP intervention for dysphagia management up to 6 x per week until goals met, pt plateaus in function and/or discharged from Acute Rehab    Conditions Requiring Skilled Therapeutic Intervention for dysphagia:     Patient is performing below functional baseline d/t  current acute condition, Multiple diagnoses, multiple medications, and increased dependency upon caregivers.    Specific dysphagia interventions to include:     Training in positioning for improved integrity of swallow  Compensatory strategy training   Trials of upgraded diet/liquid     Specific instructions for next treatment:  development and training of compensatory

## 2024-06-07 NOTE — CARE COORDINATION
Acute Rehab Social Work Assessment     PCP: Dr. Jules Velázquez ([facility]) [phone]    Patient Resides: \"Se\" lives with wife- Valeri.  They have (2) dogs - 25#     Home Architecture : They live in an old farm house built in 1800's.  There are (2) entrances if he would enter thru the back there is (1) step (1) rail.  If he would enter thru the side there are (4 + 1) step up (1) rail.  Once inside- there are another (3) steps (0) rail to get to main living area.  Se's bed/full bath are on the 2nd floor with (7 + 7) steps up (0) rails.  Se uses a tub/shower with curtain.  There is a 1/2 bath on 1st floor.    Will you return to your own home? Yes         Primary Caregivers: Valeri (62) works at [employer] 8-5pm.  They have (5) children that all live local.  Sons; Chris (40), Kelvin (32) and Fly (25) along with dauhgters; Carmen Dozier (44) is an RN at [employer] and Kaylen Yancey (38).  Valeri speaks of a niece- Pam Solomon that helps out as needed.       Level of Function PTA : Se was independent prior to admission.  He graduated from .    Employment: He was working as a  for [employer] 40hrs/wk.    DME Pta  : None    Community Agency Involvement PTA : None    Family Teaching: to be scheduled    NAME RELATION PRIMARY # ADDITIONAL #    Valeri Leach wife W: [phone] C: [phone]    Carmen Dozier,RN daughter C: [phone] [facility]             Height: 5'11  Weight: 218  SSN#:     According to Valeri, Se is \"a man of few words.  He is an introvert and doesn't like to conversate. Prefers to just answer yes/no questions.\"    Aba Pearce, LSW  6/7/2024

## 2024-06-07 NOTE — PROGRESS NOTES
Physical Therapy  ARU initial evaluation     Evaluating Therapist: Mindy Canchola, PT, DPT [id number]    ROOM: [address] [place], [place] [address]A  DIAGNOSIS: brain abscess   PRECAUTIONS: Falls, R hemiparesis, R UE sling, R inattention, flat affect, cognition    HPI: Pt is a 63 year old pt who presented to ER from home with complaints of right side weakness and seizure-like activity at home. NIH of 10 upon arrival. Per wife at bedside patient has been having neck and lower back pain and has been going to chiropractor.  He also was having some confusion and they thought it was a UTI and he went to his primary care physician and was prescribed Bactrim.  He went into his daily routine, went to work and returned home.  At around 2:00 when his wife came home found him in the floor, unable to move the right side, appeared that he comprehended but he was unable to speak.  He later said that he had a seizure-like activity sitting on the chair and a slide himself down to the floor, that lasted for about 5 minutes. Workup revealed two masses in the left frontal lobe with adjacent vasogenic edema. EEG positive for left fronto central seizures. Neurosurgery and neurology was consulted. On 06/03/2024 patient underwent a left craniotomy abscess evacuation by Dr. Henderson. ID consulted and placed patient on IV antibiotics. Updated MRI brain w/wo contrast on 6/3/24 showed interval surgical drainage of one of the two left frontal lobe abscesses; mild increase in size of other abscess; slight increase in vasogenic edema; 3mm left-to-right MLS; stable right meningioma.  NSGY reviewed MRI brain prior to admission to ARU with no additional recommendations.  Hospital course was complicated by seizure, weakness, hypotension.      Pt has PMHx of type 2 diabetes mellitus, HLD, BPH, PARTH, multiple orthopedic surgeries, right lower extremities DVT 2 years ago after he had spider bite, not on anticoagulation     Social:  Pt lives with wife in a 2 story home with 3 step(s)  to enter and 1 rail(s).    Prior to admission: Pt was independent and working full time.     Owns [employer].      Initial Evaluation  Date: 6/7/24 AM     PM    Short Term Goals Long Term Goals    Was pt agreeable to Eval/treatment? Yes        Does pt have pain? Denies pain        Bed Mobility  Rolling: ModA  Supine to sit: ModA  Sit to supine: ModA  Scooting: ModA   SBA Independent   Transfers Sit to stand: MaxA  Stand to sit: MaxA  Stand pivot: ModA x2 with L limb advancement assistance no AD  Slide board: WC <> Mat table Mak    5xSTS: NT   Mak  5xSTS: TBD Supervision   5xSTS: TBD   Ambulation    15 feet x2 reps with L hallway rail and ModA assistance with R limb advancement (R KI, R DF ace wrap, sock over toe box)     10mWT: NT  6mWT: NT   150 feet with AAD with Mak    10mWT: TBD  6mWT: TBD >300 feet with AAD with supervision     10mWT: TBD  6mWT: TBD   Walking 10 feet on uneven surface NT    10 feet with AAD with Mak 10 feet with AAD with supervision    Wheel Chair Mobility NT       Car Transfers NT   Mak Supervision    Stair negotiation: ascended and descended  NT    4-8 steps with 1 rail with Mak 8-12 steps with 1 rail with supervision    Curb Step:   ascended and descended NT   4 inch step with AAD and Mak 4 inch step with AAD and supervision    Picking up object off the floor NT    Will  cone/shoe with Min assist Will  cone/shoe with supervision assist   BLE ROM WFL       BLE Strength L LE: 5/5 all major muscle groups     R LE: 0/5 hip flexion, extension, knee flexion, extension, ankle DF, PF       Balance  Static sitting: SBA  Dynamic sitting: Mak    Static standing: Mak at hallway rail    Dynamic standing: ModA at hallway rail     BBS: NT  FGA: NT     BBS: TBD  FGA: TBD   BBS: TBD  FGA: TBD   Date Family Teach Completed None to date        Is additional Family Teaching Needed?  Y or N Y       Hindering Progress R hemiparesis and inattention        PT recommended ELOS 3-4 weeks        Team's

## 2024-06-07 NOTE — PROGRESS NOTES
Occupational Therapy  Facility/Department: [address] REHAB  Occupational Therapy Initial Assessment    Name: Conrado Leach  : 1960  MRN: [id number]  Date of Service: 2024  Room: 5509A    Referring Practitioner: MD Juan Luis  Diagnosis: Brain mass L frontal lobe- 6/3/24- L crani abscess evacuation  Additional Pertinent Hx: hx R shoulder surgery, BPH, hx DVT, HTN, DM, HLD & seizures  Restrictions/Precautions: Fall Risk, R neglect, seizures, R hemiplegia & R UE edema  Discharge Recommendations: Home with 24 hour Sup/assist     Subjective   General  Chart Reviewed: Yes  Additional Pertinent Hx:  hx R shoulder surgery, BPH, hx DVT, HTN, DM, HLD & seizures  Family / Caregiver Present: No  Referring Practitioner: MD Mckenzie  Diagnosis:  Brain mass L frontal lobe- 6/3/24- L crani abscess evacuation  Subjective: Pt presents supine in bed & was agreeable to OT intervention  Pain Comments: Pt did c/o pain @ end range RUE during PROM during OT session     Social/Functional History  Lives With: wife Radha  Type of Home: 2-story  Home Layout: 2 level  Home Access: Stairs to enter with rails  Entrance Stairs - Number of Steps: 3 STEVEN 1HR & 3 STEVEN 1HR to living level; pt report 6 steps no HR landing 4 steps no HR to 2nd floor  Bathroom Shower/Tub: Tub/Shower unit with curtain  Bathroom Toilet:  standard  Home Equipment: none  ADL Assistance: independent  Homemaking Assistance: independent  Ambulation Assistance: independent  Transfer Assistance: independent  Active : Yes  Mode of Transportation: Car  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

## 2024-06-07 NOTE — H&P
PM&R Admission History & Physical Examination  Patient: Conrado Leach  Age/sex: 63 y.o. male  Medical Record #: [id number]    Consulting physician: Dr. Marek Mcknight DO  Primary care provider: Mohan Velázquez DO    Chief complaint:   Impairments and activities limitations in ADLs and mobility secondary to brain abscess    HPI:   Conrado Leach is a 63 y.o. male with past medical history significant for BPH, history of DVT, HLD, PARTH, T2DM who presented to [facility] on 5/29/24 for AMS.  Reportedly, he had approximately 3-4 day history of confusion, brain fog, and ataxia.  He presented to ED with an apparent seizure.  Initial CTH revealed 2 frontal lobe brain tumors.  He underwent left craniotomy with abscess evacuation on 6/3/24 with Dr. Henderson.  He was seen by ID and is now on IV Rocephin 2 g q12H x 6-8 weeks.  Updated MRI brain w/wo contrast on 6/3/24 showed interval surgical drainage of one of the two left frontal lobe abscesses; mild increase in size of other abscess; slight increase in vasogenic edema; 3mm left-to-right MLS; stable right meningioma.  NSGY reviewed MRI brain prior to admission to ARU with no additional recommendations.  Hospital course was complicated by seizure, weakness, hypotension.  Patient was admitted to [facility] ARU on 6/6/2024.     Present status: Currently has no major complaints.  States he is constipated.  Afebrile.    Pain: None  PO intake: Adequate  BM: None for several days  Micturition: Volitional   DVT prophylaxis with SCDs.   Weight bearing status: WBAT      Past Medical History:   Diagnosis Date    BPH (benign prostatic hyperplasia)     Brain abscess 05/2024    DVT (deep venous thrombosis) (HCC)     HLD (hyperlipidemia)     New onset seizure (HCC) 05/2024    PARTH (obstructive sleep apnea)     Status post craniotomy 05/2024    Type 2 diabetes mellitus (HCC)        Past Surgical History:   Procedure Laterality Date    CRANIOTOMY Left 6/3/2024     risks of significant decompensation.  His rehab diagnosis is brain abscess with comorbidities of BPH, history of DVT, HLD, PARTH, T2DM.  More detailed medical necessity is documented in Post-admission physician evaluation above.  He will begin a comprehensive rehabilitation program with intense therapies (Physiatry, RN, PT, OT, ST) for at least 3 hours/day, 5 days/week for an estimated duration of 21 days.  Current functional levels are noted above.  Functional goals are as follows:     To have patient become more independent and to return home.  Medical/ Functional Prognosis: Good  Anticipated Functional Outcomes/Goals and Interventions:  1.Therapy Functional Outcome/Goal: Anticipate for modified independent for bed mobility  Anticipated interventions: Physician management, PT, OT, ST, Dietitian, Rehab Nursing, Case management  2. Therapy Functional Outcome/Goal: Anticipate modified independent for transfers  Anticipated interventions: Physician management, PT, OT, ST, Case management, Dietitian, Rehab Nursing  3. Therapy Functional Outcome/Goal: Anticipate modified independent for ambulation  Anticipated interventions: Physician management, PT, OT, ST, Case management, Dietitian, Rehab Nursing  4.Therapy Functional Outcome/Goal: Anticipate modified independent for self-care  Anticipated interventions: Physician management, PT, OT, ST, Case management, Dietitian, Rehab Nursing  5.Therapy Functional Outcome/Goal: Anticipate modified independent for functional communication and swallowing  Anticipated interventions: Physician management, PT, OT, ST, Case management, Dietitian, Rehab Nursing    Required Therapy  PT: 1 hour per day at least 5 days per week with additional therapy on as needed basis.  Comments: PT to improve pt's strength, endurance, bed mobility, transfers (sit-stand), standing balance, gait quality on level surfaces and stairs, coordination and functional ADL skills. Will also work to improve pt's

## 2024-06-07 NOTE — PLAN OF CARE
Problem: Safety - Adult  Goal: Free from fall injury  6/6/2024 2307 by Miracle Ko RN  Outcome: Progressing  Flowsheets (Taken 6/6/2024 2100)  Free From Fall Injury: Instruct family/caregiver on patient safety  6/6/2024 1643 by Chelsy Maria RN  Outcome: Progressing     Problem: Discharge Planning  Goal: Discharge to home or other facility with appropriate resources  6/6/2024 2307 by Miracle Ko RN  Outcome: Progressing  6/6/2024 1643 by Chelsy Maria RN  Outcome: Progressing     Problem: Skin/Tissue Integrity  Goal: Absence of new skin breakdown  Description: 1.  Monitor for areas of redness and/or skin breakdown  2.  Assess vascular access sites hourly  3.  Every 4-6 hours minimum:  Change oxygen saturation probe site  4.  Every 4-6 hours:  If on nasal continuous positive airway pressure, respiratory therapy assess nares and determine need for appliance change or resting period.  Outcome: Progressing     Problem: ABCDS Injury Assessment  Goal: Absence of physical injury  Outcome: Progressing     Problem: Pain  Goal: Verbalizes/displays adequate comfort level or baseline comfort level  Outcome: Progressing

## 2024-06-07 NOTE — PROGRESS NOTES
Notified Dr. Henderson via phone of re-consult, MRI reviewed, no plans for surgical intervention. NSGY to continue to follow.

## 2024-06-07 NOTE — PROGRESS NOTES
Occupational Therapy  OCCUPATIONAL THERAPY DAILY NOTE    Date:2024  Patient Name: Conrado Leach  MRN: [id number]  : 1960  Room: [address] [place] [address]-A     Referring Practitioner: MD Juan Luis  Diagnosis: Brain mass L frontal lobe- 6/3/24- L crani abscess evacuation  Additional Pertinent Hx: hx R shoulder surgery, BPH, hx DVT, HTN, DM, HLD & seizures    Precautions: Fall Risk, R neglect, seizures, R hemiplegia & R UE edema  Discharge Recommendations: Home with 24 hour Sup/assist      Functional Assessment:   Date Status AE  Comments   Feeding 24  Min A  Per eval    Grooming 24 Max A  \"         Oral Care 24  Max A     Bathing 24  Max A/Dep     UB Dressing 24  Max A     LB Dressing 24  Dep     Footwear 24  Dep     Toileting 24  Dep     Homemaking   TBD  \"     Functional Transfers / Balance:   Date Status DME  Comments   Sit Balance 24  Min A Blade tray on w/c  Demo'd sitting balance in w/c during therapeutic exercises at table.    Stand Balance 24  Max A  Per eval    [] Tub  [] Shower   Transfer   TBD  \"   Commode   Transfer 24  NT- for patient safety     Functional   Mobility 24  NT     Other: bed rolling R<>L    Supine to sit     SPT    Sit <>stand  24 Mod/max A      Max A    Mod/Max A x2 assist    Max A- 2 person for safety                  \"     Functional Exercises / Activity:   IE    RUE PROM exercises with assist for shoulder, scapula, elbow, wrist and hand to increase ROM at all joints while promoting muscular input to facilitate movement.   RUE PROM ex's using skate on figure 8 board needing therapist assist for follow thru and proper technique.  Pt tolerating 3-4 reps of 10 ea with therapist incorporating muscle facilitation and tapping techniques during task. PT educated using  LUE to assist RUE using skate for additional RUE ROM exercises.  Pt tolerated 3-4 reps of 10  needing cues and assist for follow thru and visually

## 2024-06-07 NOTE — CONSULTS
Department of Internal Medicine  Infectious Diseases  Progress  Note      C/C :   Brain abscess s/p craniotomy and drainage  ( 6/3)     Denies fever or chills  Reports headache - improving   Denies  nausea, vomiting   Afebrile     Current Facility-Administered Medications   Medication Dose Route Frequency Provider Last Rate Last Admin    sennosides-docusate sodium (SENOKOT-S) 8.6-50 MG tablet 2 tablet  2 tablet Oral BID Marek Mcknight DO        polyethylene glycol (GLYCOLAX) packet 17 g  17 g Oral BID Marek Mcknight DO        levETIRAcetam (KEPPRA) tablet 1,000 mg  1,000 mg Oral BID Marek Mcknight DO   1,000 mg at 06/07/24 1302    acetaminophen (TYLENOL) tablet 650 mg  650 mg Oral Q6H PRN Marek Mcknight DO        pantoprazole (PROTONIX) tablet 40 mg  40 mg Oral QAM AC Milanes Marino, Maria, MD   40 mg at 06/07/24 0542    rosuvastatin (CRESTOR) tablet 5 mg  5 mg Oral Nightly Milanes Marino, Maria, MD   5 mg at 06/06/24 2122    oxyCODONE (ROXICODONE) immediate release tablet 5 mg  5 mg Oral Q4H PRN Milanes Marino, Maria, MD        Or    oxyCODONE HCl (OXY-IR) immediate release tablet 10 mg  10 mg Oral Q4H PRN Milanes Marino, Maria, MD   10 mg at 06/07/24 0545    heparin (PF) 100 UNIT/ML injection 300 Units  3 mL IntraVENous PRN Milanes Marino, Maria, MD        butalbital-acetaminophen-caffeine (FIORICET, ESGIC) per tablet 1 tablet  1 tablet Oral Q4H PRN Milanes Marino, Maria, MD   1 tablet at 06/06/24 1845    heparin (PF) 100 UNIT/ML injection 300 Units  3 mL IntraVENous 2 times per day Milanes Marino, Maria, MD   300 Units at 06/07/24 1233    bisacodyl (DULCOLAX) suppository 10 mg  10 mg Rectal Daily PRN Marek Mcknight DO        lactobacillus (CULTURELLE) capsule 1 capsule  1 capsule Oral Daily Marek Mcknight DO   1 capsule at 06/07/24 0926    melatonin disintegrating tablet 5 mg  5 mg Oral Nightly Marek Mcknight DO   5 mg at 06/06/24 2121    ondansetron (ZOFRAN-ODT) disintegrating

## 2024-06-07 NOTE — PROGRESS NOTES
Physical Therapy  ARU treatment note     Evaluating Therapist: Mindy Canchola, PT, DPT [id number]    ROOM: [address] [place], [place] [address]  DIAGNOSIS: brain abscess   PRECAUTIONS: Falls, R hemiparesis, R UE sling, R inattention, flat affect, cognition    HPI: Pt is a 63 year old pt who presented to ER from home with complaints of right side weakness and seizure-like activity at home. NIH of 10 upon arrival. Per wife at bedside patient has been having neck and lower back pain and has been going to chiropractor.  He also was having some confusion and they thought it was a UTI and he went to his primary care physician and was prescribed Bactrim.  He went into his daily routine, went to work and returned home.  At around 2:00 when his wife came home found him in the floor, unable to move the right side, appeared that he comprehended but he was unable to speak.  He later said that he had a seizure-like activity sitting on the chair and a slide himself down to the floor, that lasted for about 5 minutes. Workup revealed two masses in the left frontal lobe with adjacent vasogenic edema. EEG positive for left fronto central seizures. Neurosurgery and neurology was consulted. On 06/03/2024 patient underwent a left craniotomy abscess evacuation by Dr. Henderson. ID consulted and placed patient on IV antibiotics. Updated MRI brain w/wo contrast on 6/3/24 showed interval surgical drainage of one of the two left frontal lobe abscesses; mild increase in size of other abscess; slight increase in vasogenic edema; 3mm left-to-right MLS; stable right meningioma.  NSGY reviewed MRI brain prior to admission to ARU with no additional recommendations.  Hospital course was complicated by seizure, weakness, hypotension.      Pt has PMHx of type 2 diabetes mellitus, HLD, BPH, APRTH, multiple orthopedic surgeries, right lower extremities DVT 2 years ago after he had spider bite, not on anticoagulation     Social:  Pt lives with wife in a 2 story home with 3 step(s) to

## 2024-06-07 NOTE — PROGRESS NOTES
SPEECH/LANGUAGE PATHOLOGY  SPEECH/LANGUAGE/COGNITIVE EVALUATION      PATIENT NAME:  Conrado Leach  (male)     MRN:  [id number]    :  1960  (63 y.o.)  STATUS:  Inpatient: Room 5509/5509-A    TODAY'S DATE:  2024  REFERRING PROVIDER:  Dr. Mcknight   SPECIFIC PROVIDER ORDER: Speech language pathology evaluation  Date of order:  24  REASON FOR REFERRAL: brain abscess  EVALUATING THERAPIST: Courtney Amor, SLP    ADMITTING DIAGNOSIS: Brain abscess [G06.0]    VISIT DIAGNOSIS:      SPEECH THERAPY  PLAN OF CARE   The speech therapy  POC is established based on physician order, speech pathology diagnosis and results of clinical assessment     SPEECH PATHOLOGY DIAGNOSIS:    Moderate cognitive-linguistic deficits    Speech Pathology intervention is recommended up to 6 times per week for LOS or when goals are met with emphasis on the following:      Conditions Requiring Skilled Therapeutic Intervention for speech, language and/or cognition    Cognitive linguistic impairment  Decreased short term memory  Decreased problem solving skills   Decreased thought organization    Specific Speech Therapy Interventions to Include:   Therapeutic tasks for Cognition    Specific instructions for next treatment:     To initiate POC    SHORT/LONG TERM GOALS  Pt will improve immediate, short term, recent memory during structured and unstructured tasks with 90% accuracy   Pt will improve problem solving/thought organization during structured and unstructured tasks with 90% accuracy   Pt will improve receptive and expressive language skills with adequate thought content, organization, and processing time to facilitate improved communication with minimal.    Patient stated goals: Agreed with above,     Rehabilitation Potential/Prognosis: fair     _______________________________________________________________________  ASSESSMENT:  MOTOR SPEECH       Oral Peripheral Examination   Adequate lingual/labial strength     Parameters of Speech

## 2024-06-08 PROCEDURE — 51798 US URINE CAPACITY MEASURE: CPT

## 2024-06-08 PROCEDURE — 6370000000 HC RX 637 (ALT 250 FOR IP): Performed by: STUDENT IN AN ORGANIZED HEALTH CARE EDUCATION/TRAINING PROGRAM

## 2024-06-08 PROCEDURE — 92526 ORAL FUNCTION THERAPY: CPT

## 2024-06-08 PROCEDURE — 97110 THERAPEUTIC EXERCISES: CPT

## 2024-06-08 PROCEDURE — 6360000002 HC RX W HCPCS: Performed by: PHYSICAL MEDICINE & REHABILITATION

## 2024-06-08 PROCEDURE — 1280000000 HC REHAB R&B

## 2024-06-08 PROCEDURE — 6370000000 HC RX 637 (ALT 250 FOR IP): Performed by: PHYSICAL MEDICINE & REHABILITATION

## 2024-06-08 PROCEDURE — 6360000002 HC RX W HCPCS: Performed by: STUDENT IN AN ORGANIZED HEALTH CARE EDUCATION/TRAINING PROGRAM

## 2024-06-08 PROCEDURE — 2580000003 HC RX 258: Performed by: PHYSICAL MEDICINE & REHABILITATION

## 2024-06-08 RX ADMIN — Medication 5 MG: at 22:07

## 2024-06-08 RX ADMIN — WATER 2000 MG: 1 INJECTION INTRAMUSCULAR; INTRAVENOUS; SUBCUTANEOUS at 17:03

## 2024-06-08 RX ADMIN — POLYETHYLENE GLYCOL 3350 17 GRAM ORAL POWDER PACKET 17 G: at 08:03

## 2024-06-08 RX ADMIN — PANTOPRAZOLE SODIUM 40 MG: 40 TABLET, DELAYED RELEASE ORAL at 05:19

## 2024-06-08 RX ADMIN — ROSUVASTATIN CALCIUM 5 MG: 10 TABLET, FILM COATED ORAL at 22:07

## 2024-06-08 RX ADMIN — SODIUM CHLORIDE, PRESERVATIVE FREE 10 ML: 5 INJECTION INTRAVENOUS at 09:45

## 2024-06-08 RX ADMIN — OXYCODONE HYDROCHLORIDE 10 MG: 10 TABLET ORAL at 22:14

## 2024-06-08 RX ADMIN — SENNOSIDES AND DOCUSATE SODIUM 2 TABLET: 50; 8.6 TABLET ORAL at 22:07

## 2024-06-08 RX ADMIN — LEVETIRACETAM 1000 MG: 500 TABLET, FILM COATED ORAL at 08:03

## 2024-06-08 RX ADMIN — LEVETIRACETAM 1000 MG: 500 TABLET, FILM COATED ORAL at 22:07

## 2024-06-08 RX ADMIN — SODIUM CHLORIDE, PRESERVATIVE FREE 10 ML: 5 INJECTION INTRAVENOUS at 22:08

## 2024-06-08 RX ADMIN — SENNOSIDES AND DOCUSATE SODIUM 2 TABLET: 50; 8.6 TABLET ORAL at 08:03

## 2024-06-08 RX ADMIN — Medication 1 CAPSULE: at 08:03

## 2024-06-08 RX ADMIN — HEPARIN 300 UNITS: 100 SYRINGE at 22:07

## 2024-06-08 RX ADMIN — HEPARIN 300 UNITS: 100 SYRINGE at 09:45

## 2024-06-08 RX ADMIN — POLYETHYLENE GLYCOL 3350 17 GRAM ORAL POWDER PACKET 17 G: at 22:09

## 2024-06-08 RX ADMIN — WATER 2000 MG: 1 INJECTION INTRAMUSCULAR; INTRAVENOUS; SUBCUTANEOUS at 05:18

## 2024-06-08 ASSESSMENT — PAIN SCALES - GENERAL: PAINLEVEL_OUTOF10: 7

## 2024-06-08 ASSESSMENT — PAIN DESCRIPTION - DESCRIPTORS: DESCRIPTORS: DISCOMFORT;ACHING

## 2024-06-08 ASSESSMENT — PAIN DESCRIPTION - LOCATION: LOCATION: HEAD

## 2024-06-08 NOTE — PROGRESS NOTES
Progress Note - covering Marek Mcknight DO     Subjective/   63 y.o. year old male on the rehab unit for right-sided weakness secondary to brain abscess and resection.  He is reporting some constipation.  His wife is concerned that he is not voiding enough.  He would like his bowel medicines increased.  No new weakness.  No new numbness or tingling.          Objective/   VITALS:  /61   Pulse 65   Temp 97.1 °F (36.2 °C) (Temporal)   Resp 18   Ht 1.803 m (5' 11\")   Wt 99 kg (218 lb 4.1 oz)   SpO2 97%   BMI 30.44 kg/m²   24HR INTAKE/OUTPUT:  No intake or output data in the 24 hours ending 06/08/24 1356  Constitutional:  Alert, awake, no apparent distress   Cardiovascular:  S1, S2 without m/r/g   Respiratory:  CTA B without w/r/r   Abdomen: Positive bowel sounds  Ext: No pitting LE edema, mild swelling of the right upper extremity.  Neuro: Awake and alert.  Right-sided weakness.  Flat affect.    Functional Level        Swallowing                          Current Status             4--Minimal Assistance               Short Term Goal         5--Supervision               Long Term Goal          6--Modified Independent                                      Receptive                          Current Status             4--Minimal Assistance               Short Term Goal         5--Supervision               Long Term Goal          6--Modified Independent              Expressive                          Current Status             7--Independent                         Short Term Goal         7--Independent                         Long Term Goal          7--Independent                                                                           Problem Solving                          Current Status             3--Moderate Assistance                        Short Term Goal         4--Minimal Assistance               Long Term Goal          5--Supervision                  Memory

## 2024-06-08 NOTE — PROGRESS NOTES
Occupational Therapy  OCCUPATIONAL THERAPY DAILY NOTE    Date:2024  Patient Name: Conrado Leach  MRN: [id number]  : 1960  Room: [address] [place] [address]A     Referring Practitioner: MD Juan Luis  Diagnosis: Brain mass L frontal lobe- 6/3/24- L crani abscess evacuation  Additional Pertinent Hx: hx R shoulder surgery, BPH, hx DVT, HTN, DM, HLD & seizures    Precautions: Fall Risk, R neglect, seizures, R hemiplegia & R UE edema  Discharge Recommendations: Home with 24 hour Sup/assist      Functional Assessment:   Date Status AE  Comments   Feeding 24  Min A     Grooming 24 Max A  Deferred ADL tasks since patient in bed with gown on arrival after having staff change his clothes earlier. .          Oral Care 24  Max A  \"   Bathing 24  Max A/Dep     UB Dressing 24  Max A     LB Dressing 24  Dep     Footwear 24  Dep     Toileting 24  Dep     Homemaking   TBD  \"     Functional Transfers / Balance:   Date Status DME  Comments   Sit Balance 24  Min A Blade tray on w/c  Deferred sitting, standing, or transfers since patient out of w/c and lying down in bed per request.      Stand Balance 24  Max A  \"   [] Tub  [] Shower   Transfer   TBD     Commode   Transfer 24  NT- for patient safety     Functional   Mobility 24  NT     Other: bed rolling R<>L    Supine to sit     SPT    Sit <>stand  24 Mod/max A      Max A    Mod/Max A x2 assist    Max A- 2 person for safety                       Functional Exercises / Activity:    RUE PROM exercises conducted in bed (gravity eliminated) for shoulder flexion/extension, shoulder abduction/adduction, elbow and wrist flexion/extension and open/close R hand/fingers needing assistance to increase ROM at all joints.   RUE positioning by therapist prior to ROM exercises to maintain joint integrity and pain mgmt with any movement.  Pt tolerated 4-5 reps of 10 ea.  Pt needed assist and cues for RUE SROM using LUE to assist

## 2024-06-08 NOTE — PROGRESS NOTES
Department of Internal Medicine  Infectious Diseases  Progress  Note      C/C :   Brain abscess s/p craniotomy and drainage  ( 6/3)     Denies fever or chills  Denies headache   Denies  nausea, vomiting   Afebrile     Current Facility-Administered Medications   Medication Dose Route Frequency Provider Last Rate Last Admin    sennosides-docusate sodium (SENOKOT-S) 8.6-50 MG tablet 2 tablet  2 tablet Oral BID Marek Mcknight L, DO   2 tablet at 06/08/24 0803    polyethylene glycol (GLYCOLAX) packet 17 g  17 g Oral BID Venkat Mcknightdict L, DO   17 g at 06/08/24 0803    levETIRAcetam (KEPPRA) tablet 1,000 mg  1,000 mg Oral BID Marek Mcknight L, DO   1,000 mg at 06/08/24 0803    acetaminophen (TYLENOL) tablet 650 mg  650 mg Oral Q6H PRN Marek Mcknight DO        pantoprazole (PROTONIX) tablet 40 mg  40 mg Oral QAM AC Milanes Marino, Maria, MD   40 mg at 06/08/24 0519    rosuvastatin (CRESTOR) tablet 5 mg  5 mg Oral Nightly Milanes Marino, Maria, MD   5 mg at 06/07/24 2200    oxyCODONE (ROXICODONE) immediate release tablet 5 mg  5 mg Oral Q4H PRN Milanes Marino, Maria, MD   5 mg at 06/07/24 1925    Or    oxyCODONE HCl (OXY-IR) immediate release tablet 10 mg  10 mg Oral Q4H PRN Milanes Marino, Maria, MD   10 mg at 06/07/24 0545    heparin (PF) 100 UNIT/ML injection 300 Units  3 mL IntraVENous PRN Milanes Marino, Maria, MD        butalbital-acetaminophen-caffeine (FIORICET, ESGIC) per tablet 1 tablet  1 tablet Oral Q4H PRN Milanes Marino, Maria, MD   1 tablet at 06/06/24 1845    heparin (PF) 100 UNIT/ML injection 300 Units  3 mL IntraVENous 2 times per day Milanes Marino, Maria, MD   300 Units at 06/08/24 0945    bisacodyl (DULCOLAX) suppository 10 mg  10 mg Rectal Daily PRN Marek Mcknight DO        lactobacillus (CULTURELLE) capsule 1 capsule  1 capsule Oral Daily Marek Mcknight DO   1 capsule at 06/08/24 0803    melatonin disintegrating tablet 5 mg  5 mg Oral Nightly Marek Mcknight DO   5 mg at

## 2024-06-08 NOTE — PLAN OF CARE
Problem: Safety - Adult  Goal: Free from fall injury  6/8/2024 1312 by Kimberli Jacques RN  Outcome: Progressing  Flowsheets (Taken 6/8/2024 1311)  Free From Fall Injury: Instruct family/caregiver on patient safety  6/8/2024 0023 by Cristian Beach RN  Outcome: Progressing     Problem: Discharge Planning  Goal: Discharge to home or other facility with appropriate resources  6/8/2024 1312 by Kimberli Jacques RN  Outcome: Progressing  6/8/2024 0023 by Cristian Beach RN  Outcome: Progressing     Problem: Skin/Tissue Integrity  Goal: Absence of new skin breakdown  Description: 1.  Monitor for areas of redness and/or skin breakdown  2.  Assess vascular access sites hourly  3.  Every 4-6 hours minimum:  Change oxygen saturation probe site  4.  Every 4-6 hours:  If on nasal continuous positive airway pressure, respiratory therapy assess nares and determine need for appliance change or resting period.  6/8/2024 0023 by Cristian Beach RN  Outcome: Progressing     Problem: ABCDS Injury Assessment  Goal: Absence of physical injury  6/8/2024 0023 by Cristian Beach RN  Outcome: Progressing     Problem: Pain  Goal: Verbalizes/displays adequate comfort level or baseline comfort level  6/8/2024 1312 by Kimberli Jacques RN  Outcome: Progressing  6/8/2024 0023 by Cristian Beach RN  Outcome: Progressing     Problem: Chronic Conditions and Co-morbidities  Goal: Patient's chronic conditions and co-morbidity symptoms are monitored and maintained or improved  6/8/2024 0023 by Cristian Beach RN  Outcome: Progressing

## 2024-06-08 NOTE — PROGRESS NOTES
Speech Language Pathology  ACUTE REHABILITATION--DAILY PROGRESS NOTE            SWALLOWING:      Diet:  Dysphagia 3, Soft/advanced (Soft & Bite-sized) solids with thin liquids (IDDSI level 0)    Patient actively participated in functionally-based mealtime assessment for breakfast meal; required min assistance to set up meal tray; pt was able to feed self independently. Reviewed need for slow rate of intake and regulated bite size prior to initiation of PO intake.     Oral prep/oral-     Mild to mod oral containment issues were identified.     Prolonged but functional oral prep and A-P propulsion of bolus were noted with eventual clearance of oral residuals given liquid wash or additional mastication/dry swallow. Cues needed to assure oral clearance at this time.      Pharyngeal-     Clear vocal quality was maintained throughout.     No overt clinical indicators of aspiration were apparent w/ thin from cup or soft/bite sized solids.      Intake: Fair this meal, pt reported food was cold but did not inform SLP of this to warm up for pt, SLP encouraged pt to inform staff of pt needs.     Completed education with the patient regarding type of swallowing impairment. Reviewed current solid/liquid consistency diet recommendations and reinforced need for consistent use of compensatory strategies to ensure safe PO intake. Reviewed aspiration precautions. Encouraged patient to engage SLP in unstructured Q&A session relative to identified deficit areas -- patient indicated understanding of all information provided via satisfactory verbal response. Pt left w/ nursing staff following session. Pt reported not able to have bowel movement as of late, RN informed.       LANGUAGE:    Not formally addressed, appropriate response times noted.      COGNITION:      Pt alert, cooperative for session, not formally addressed.        SPEECH:    Functional       Minute Tracking:    Individual minutes:     0 minutes  Concurrent minutes:    30

## 2024-06-08 NOTE — CONSULTS
Already following.  MRI reviewed. No additional surgical intervention   Continue ABX    Mary Henderson MD

## 2024-06-09 PROCEDURE — 2580000003 HC RX 258: Performed by: PHYSICAL MEDICINE & REHABILITATION

## 2024-06-09 PROCEDURE — 6370000000 HC RX 637 (ALT 250 FOR IP): Performed by: STUDENT IN AN ORGANIZED HEALTH CARE EDUCATION/TRAINING PROGRAM

## 2024-06-09 PROCEDURE — 1280000000 HC REHAB R&B

## 2024-06-09 PROCEDURE — 6370000000 HC RX 637 (ALT 250 FOR IP): Performed by: PHYSICAL MEDICINE & REHABILITATION

## 2024-06-09 PROCEDURE — 97530 THERAPEUTIC ACTIVITIES: CPT

## 2024-06-09 PROCEDURE — 6360000002 HC RX W HCPCS: Performed by: STUDENT IN AN ORGANIZED HEALTH CARE EDUCATION/TRAINING PROGRAM

## 2024-06-09 PROCEDURE — 6360000002 HC RX W HCPCS: Performed by: PHYSICAL MEDICINE & REHABILITATION

## 2024-06-09 RX ADMIN — SODIUM CHLORIDE, PRESERVATIVE FREE 10 ML: 5 INJECTION INTRAVENOUS at 09:41

## 2024-06-09 RX ADMIN — SENNOSIDES AND DOCUSATE SODIUM 2 TABLET: 50; 8.6 TABLET ORAL at 09:06

## 2024-06-09 RX ADMIN — OXYCODONE 5 MG: 5 TABLET ORAL at 21:00

## 2024-06-09 RX ADMIN — POLYETHYLENE GLYCOL 3350 17 GRAM ORAL POWDER PACKET 17 G: at 09:06

## 2024-06-09 RX ADMIN — HEPARIN 300 UNITS: 100 SYRINGE at 21:02

## 2024-06-09 RX ADMIN — PANTOPRAZOLE SODIUM 40 MG: 40 TABLET, DELAYED RELEASE ORAL at 06:38

## 2024-06-09 RX ADMIN — WATER 2000 MG: 1 INJECTION INTRAMUSCULAR; INTRAVENOUS; SUBCUTANEOUS at 15:55

## 2024-06-09 RX ADMIN — LEVETIRACETAM 1000 MG: 500 TABLET, FILM COATED ORAL at 21:00

## 2024-06-09 RX ADMIN — POLYETHYLENE GLYCOL 3350 17 GRAM ORAL POWDER PACKET 17 G: at 21:00

## 2024-06-09 RX ADMIN — MAGNESIUM HYDROXIDE 30 ML: 400 SUSPENSION ORAL at 09:12

## 2024-06-09 RX ADMIN — ROSUVASTATIN CALCIUM 5 MG: 10 TABLET, FILM COATED ORAL at 21:00

## 2024-06-09 RX ADMIN — LEVETIRACETAM 1000 MG: 500 TABLET, FILM COATED ORAL at 09:06

## 2024-06-09 RX ADMIN — Medication 1 CAPSULE: at 09:06

## 2024-06-09 RX ADMIN — WATER 2000 MG: 1 INJECTION INTRAMUSCULAR; INTRAVENOUS; SUBCUTANEOUS at 04:36

## 2024-06-09 RX ADMIN — Medication 5 MG: at 21:00

## 2024-06-09 RX ADMIN — SODIUM CHLORIDE, PRESERVATIVE FREE 10 ML: 5 INJECTION INTRAVENOUS at 21:01

## 2024-06-09 RX ADMIN — HEPARIN 300 UNITS: 100 SYRINGE at 09:40

## 2024-06-09 RX ADMIN — SENNOSIDES AND DOCUSATE SODIUM 2 TABLET: 50; 8.6 TABLET ORAL at 21:01

## 2024-06-09 ASSESSMENT — PAIN SCALES - GENERAL: PAINLEVEL_OUTOF10: 3

## 2024-06-09 ASSESSMENT — PAIN DESCRIPTION - LOCATION: LOCATION: ARM;HEAD

## 2024-06-09 ASSESSMENT — PAIN DESCRIPTION - ORIENTATION: ORIENTATION: LEFT

## 2024-06-09 ASSESSMENT — PAIN DESCRIPTION - DESCRIPTORS: DESCRIPTORS: ACHING

## 2024-06-09 NOTE — PROGRESS NOTES
Department of Internal Medicine  Infectious Diseases  Progress  Note      C/C :   Brain abscess s/p craniotomy and drainage  ( 6/3)     Denies fever or chills  Denies headache   Denies  nausea, vomiting   Reports weakness right side     Afebrile     Current Facility-Administered Medications   Medication Dose Route Frequency Provider Last Rate Last Admin    sennosides-docusate sodium (SENOKOT-S) 8.6-50 MG tablet 2 tablet  2 tablet Oral BID BelMarek moore L, DO   2 tablet at 06/09/24 0906    polyethylene glycol (GLYCOLAX) packet 17 g  17 g Oral BID Venkat Mcknightdict L, DO   17 g at 06/09/24 0906    levETIRAcetam (KEPPRA) tablet 1,000 mg  1,000 mg Oral BID Kendra Mcknightct L, DO   1,000 mg at 06/09/24 0906    acetaminophen (TYLENOL) tablet 650 mg  650 mg Oral Q6H PRN Marek Mcknight DO        pantoprazole (PROTONIX) tablet 40 mg  40 mg Oral QAM AC Milanes Marino, Maria, MD   40 mg at 06/09/24 0638    rosuvastatin (CRESTOR) tablet 5 mg  5 mg Oral Nightly Milanes Marino, Maria, MD   5 mg at 06/08/24 2207    oxyCODONE (ROXICODONE) immediate release tablet 5 mg  5 mg Oral Q4H PRN Milanes Marino, Maria, MD   5 mg at 06/07/24 1925    Or    oxyCODONE HCl (OXY-IR) immediate release tablet 10 mg  10 mg Oral Q4H PRN Milanes Marino, Maria, MD   10 mg at 06/08/24 2214    heparin (PF) 100 UNIT/ML injection 300 Units  3 mL IntraVENous PRN Milanes Marino, Maria, MD        butalbital-acetaminophen-caffeine (FIORICET, ESGIC) per tablet 1 tablet  1 tablet Oral Q4H PRN Milanes Marino, Maria, MD   1 tablet at 06/06/24 1845    heparin (PF) 100 UNIT/ML injection 300 Units  3 mL IntraVENous 2 times per day Milanes Marino, Maria, MD   300 Units at 06/09/24 0940    bisacodyl (DULCOLAX) suppository 10 mg  10 mg Rectal Daily PRN Marek Mcknight DO        lactobacillus (CULTURELLE) capsule 1 capsule  1 capsule Oral Daily Marek Mcknight DO   1 capsule at 06/09/24 0906    melatonin disintegrating tablet 5 mg  5 mg Oral Nightly Juan Luis,      no CVA tenderness   Lungs:     Clear to auscultation bilaterally, no wheeze    Heart:    Regular rate and rhythm, no murmur   Abdomen:     Soft, non-tender, bowel sounds present    Extremities:   No edema, no cyanosis    Pulses:   Dorsalis pedis palpable    Skin:   no rashes      CBC with Differential:      Lab Results   Component Value Date/Time    WBC 7.8 06/07/2024 05:08 AM    RBC 5.27 06/07/2024 05:08 AM    HGB 14.5 06/07/2024 05:08 AM    HCT 43.3 06/07/2024 05:08 AM     06/07/2024 05:08 AM    MCV 82.2 06/07/2024 05:08 AM    MCH 27.5 06/07/2024 05:08 AM    MCHC 33.5 06/07/2024 05:08 AM    RDW 14.6 06/07/2024 05:08 AM    LYMPHOPCT 15 06/06/2024 05:56 AM    MONOPCT 12 06/06/2024 05:56 AM    EOSPCT 2 06/06/2024 05:56 AM    BASOPCT 0 06/06/2024 05:56 AM    MONOSABS 0.94 06/06/2024 05:56 AM    EOSABS 0.17 06/06/2024 05:56 AM    BASOSABS 0.02 06/06/2024 05:56 AM       CMP     Lab Results   Component Value Date/Time     06/07/2024 05:08 AM    K 4.3 06/07/2024 05:08 AM    CL 98 06/07/2024 05:08 AM    CO2 23 06/07/2024 05:08 AM    BUN 16 06/07/2024 05:08 AM    CREATININE 0.7 06/07/2024 05:08 AM    LABGLOM >90 06/07/2024 05:08 AM    GLUCOSE 123 06/07/2024 05:08 AM    CALCIUM 8.8 06/07/2024 05:08 AM    BILITOT 0.4 06/07/2024 05:08 AM    ALKPHOS 58 06/07/2024 05:08 AM    AST 14 06/07/2024 05:08 AM    ALT 29 06/07/2024 05:08 AM         Hepatic Function Panel:    Lab Results   Component Value Date/Time    ALKPHOS 58 06/07/2024 05:08 AM    ALT 29 06/07/2024 05:08 AM    AST 14 06/07/2024 05:08 AM    BILITOT 0.4 06/07/2024 05:08 AM       PT/INR:    Lab Results   Component Value Date/Time    PROTIME 13.7 05/29/2024 07:28 PM    INR 1.3 05/29/2024 07:28 PM       TSH:    Lab Results   Component Value Date/Time    TSH 0.49 05/30/2024 05:36 AM       U/A:  No results found for: \"NITRITE\", \"COLORU\", \"PHUR\", \"LABCAST\", \"WBCUA\", \"RBCUA\", \"MUCUS\", \"TRICHOMONAS\", \"YEAST\", \"BACTERIA\", \"CLARITYU\", \"SPECGRAV\", \"LEUKOCYTESUR\",

## 2024-06-09 NOTE — PROGRESS NOTES
Physical Therapy  ARU treatment note     Evaluating Therapist: Mindy Canchola, PT, DPT [id number]    ROOM: [address] [place], [place] [address]  DIAGNOSIS: brain abscess   PRECAUTIONS: Falls, R hemiparesis, R UE sling, R inattention, flat affect, cognition    HPI: Pt is a 63 year old pt who presented to ER from home with complaints of right side weakness and seizure-like activity at home. NIH of 10 upon arrival. Per wife at bedside patient has been having neck and lower back pain and has been going to chiropractor.  He also was having some confusion and they thought it was a UTI and he went to his primary care physician and was prescribed Bactrim.  He went into his daily routine, went to work and returned home.  At around 2:00 when his wife came home found him in the floor, unable to move the right side, appeared that he comprehended but he was unable to speak.  He later said that he had a seizure-like activity sitting on the chair and a slide himself down to the floor, that lasted for about 5 minutes. Workup revealed two masses in the left frontal lobe with adjacent vasogenic edema. EEG positive for left fronto central seizures. Neurosurgery and neurology was consulted. On 06/03/2024 patient underwent a left craniotomy abscess evacuation by Dr. Henderson. ID consulted and placed patient on IV antibiotics. Updated MRI brain w/wo contrast on 6/3/24 showed interval surgical drainage of one of the two left frontal lobe abscesses; mild increase in size of other abscess; slight increase in vasogenic edema; 3mm left-to-right MLS; stable right meningioma.  NSGY reviewed MRI brain prior to admission to ARU with no additional recommendations.  Hospital course was complicated by seizure, weakness, hypotension.      Pt has PMHx of type 2 diabetes mellitus, HLD, BPH, PARTH, multiple orthopedic surgeries, right lower extremities DVT 2 years ago after he had spider bite, not on anticoagulation     Social:  Pt lives with wife in a 2 story home with 3 step(s) to

## 2024-06-09 NOTE — PROGRESS NOTES
Progress Note - covering Marek Mcknight, DO     Subjective/   63 y.o. year old male on the rehab unit for brain abscess status postresection and resultant right hemiplegia.  He did have 2 good bowel movements yesterday.  He is tolerating therapy.  Seeing little in the way of neuro recovery to date.  No shortness of breath or chest pain.          Objective/   VITALS:  BP (!) 125/58   Pulse 64   Temp 97 °F (36.1 °C) (Temporal)   Resp 18   Ht 1.803 m (5' 11\")   Wt 99 kg (218 lb 4.1 oz)   SpO2 98%   BMI 30.44 kg/m²   24HR INTAKE/OUTPUT:    Intake/Output Summary (Last 24 hours) at 6/9/2024 1331  Last data filed at 6/9/2024 0948  Gross per 24 hour   Intake --   Output 200 ml   Net -200 ml     Constitutional:  Alert, awake, no apparent distress   Cardiovascular:  S1, S2 without m/r/g   Respiratory:  CTA B without w/r/r   Abdomen: Positive bowel sounds  Ext: Mild subluxation right shoulder.  No calf tenderness or cords.  There does appear to be less swelling of the right upper extremity.  He has 1+ bilateral LE edema, worse on the right.  Neuro: Dense right-sided weakness.  Flat affect.  Good sitting balance in his wheelchair.    Functional Level      Initial Evaluation  Date: 6/7/24 AM         Short Term Goals Long Term Goals    Was pt agreeable to Eval/treatment? Yes  Yes         Does pt have pain? Denies pain  Denied pain          Bed Mobility  Rolling: ModA  Supine to sit: ModA  Sit to supine: ModA  Scooting: ModA Rolling: NT  Supine to sit: NT  Sit to supine: NT  Scooting: ModA   SBA Independent   Transfers Sit to stand: MaxA  Stand to sit: MaxA  Stand pivot: ModA x2 with L limb advancement assistance no AD  Slide board: WC <> Mat table Mak     5xSTS: NT Sit to stand: MaxA  Stand to sit: MaxA  Stand pivot: NT  Slide board: NT   Mak  5xSTS: TBD Supervision   5xSTS: TBD   Ambulation    15 feet x2 reps with L hallway rail and ModA assistance with R limb advancement (R KI, R DF ace wrap, sock over toe  calcium carbonate, magnesium hydroxide  No intake/output data recorded.  I/O this shift:  In: -   Out: 200 [Urine:200]    Labs reviewed  CBC:   Recent Labs     06/07/24  0508   WBC 7.8   HGB 14.5        BMP:    Recent Labs     06/07/24  0508      K 4.3   CL 98   CO2 23   BUN 16   CREATININE 0.7   GLUCOSE 123*     Hepatic:   Recent Labs     06/07/24  0508   AST 14   ALT 29   BILITOT 0.4   ALKPHOS 58     BNP: No results for input(s): \"BNP\" in the last 72 hours.  Lipids: No results for input(s): \"CHOL\", \"HDL\" in the last 72 hours.    Invalid input(s): \"LDLCALCU\"  INR: No results for input(s): \"INR\" in the last 72 hours.    Assessment/  Patient Active Problem List:     Benign prostatic hyperplasia     Deep venous thrombosis of profunda femoris vein (HCC)     Mixed hyperlipidemia     Sleep apnea     Type 2 diabetes mellitus (HCC)     Brain mass     New onset seizure (HCC)     Brain abscess      Plan/  Continue rehab program  Continue antibiotics per infectious disease.  Note reviewed  Continue mechanical DVT prophylaxis  Continue bowel regimen he did have a good bowel movement yesterday.  He typically has 2 bowel movements per day.  Family present at the bedside.  All questions answered.          Conrado Hunter MD

## 2024-06-09 NOTE — PLAN OF CARE
Problem: Safety - Adult  Goal: Free from fall injury  6/9/2024 1249 by Claudio Patrick RN  Outcome: Progressing  6/9/2024 0118 by Cristian Beach RN  Outcome: Progressing     Problem: Discharge Planning  Goal: Discharge to home or other facility with appropriate resources  6/9/2024 1249 by Claudio Patrick RN  Outcome: Progressing  6/9/2024 0118 by Cristian Beach RN  Outcome: Progressing     Problem: Skin/Tissue Integrity  Goal: Absence of new skin breakdown  Description: 1.  Monitor for areas of redness and/or skin breakdown  2.  Assess vascular access sites hourly  3.  Every 4-6 hours minimum:  Change oxygen saturation probe site  4.  Every 4-6 hours:  If on nasal continuous positive airway pressure, respiratory therapy assess nares and determine need for appliance change or resting period.  6/9/2024 1249 by Claudio Patrick RN  Outcome: Progressing  6/9/2024 0118 by Cristian Beach RN  Outcome: Progressing     Problem: ABCDS Injury Assessment  Goal: Absence of physical injury  6/9/2024 1249 by Claudio Patrick RN  Outcome: Progressing  6/9/2024 0118 by Cristian Beach RN  Outcome: Progressing     Problem: Pain  Goal: Verbalizes/displays adequate comfort level or baseline comfort level  6/9/2024 1249 by Claudio Patrick RN  Outcome: Progressing  6/9/2024 0118 by Cristian Beach RN  Outcome: Progressing     Problem: Chronic Conditions and Co-morbidities  Goal: Patient's chronic conditions and co-morbidity symptoms are monitored and maintained or improved  6/9/2024 1249 by Claudio Patrick RN  Outcome: Progressing  6/9/2024 0118 by Cristian Beach RN  Outcome: Progressing

## 2024-06-10 ENCOUNTER — APPOINTMENT (OUTPATIENT)
Dept: GENERAL RADIOLOGY | Age: 64
End: 2024-06-10
Attending: PHYSICAL MEDICINE & REHABILITATION
Payer: COMMERCIAL

## 2024-06-10 PROCEDURE — 2580000003 HC RX 258: Performed by: PHYSICAL MEDICINE & REHABILITATION

## 2024-06-10 PROCEDURE — 97112 NEUROMUSCULAR REEDUCATION: CPT

## 2024-06-10 PROCEDURE — 6370000000 HC RX 637 (ALT 250 FOR IP): Performed by: PHYSICAL MEDICINE & REHABILITATION

## 2024-06-10 PROCEDURE — 74018 RADEX ABDOMEN 1 VIEW: CPT

## 2024-06-10 PROCEDURE — 97129 THER IVNTJ 1ST 15 MIN: CPT

## 2024-06-10 PROCEDURE — 97130 THER IVNTJ EA ADDL 15 MIN: CPT

## 2024-06-10 PROCEDURE — 97530 THERAPEUTIC ACTIVITIES: CPT

## 2024-06-10 PROCEDURE — 6360000002 HC RX W HCPCS: Performed by: STUDENT IN AN ORGANIZED HEALTH CARE EDUCATION/TRAINING PROGRAM

## 2024-06-10 PROCEDURE — 92526 ORAL FUNCTION THERAPY: CPT

## 2024-06-10 PROCEDURE — 97535 SELF CARE MNGMENT TRAINING: CPT

## 2024-06-10 PROCEDURE — 1280000000 HC REHAB R&B

## 2024-06-10 PROCEDURE — 97110 THERAPEUTIC EXERCISES: CPT

## 2024-06-10 PROCEDURE — 6360000002 HC RX W HCPCS: Performed by: PHYSICAL MEDICINE & REHABILITATION

## 2024-06-10 PROCEDURE — 6370000000 HC RX 637 (ALT 250 FOR IP): Performed by: STUDENT IN AN ORGANIZED HEALTH CARE EDUCATION/TRAINING PROGRAM

## 2024-06-10 PROCEDURE — 99233 SBSQ HOSP IP/OBS HIGH 50: CPT | Performed by: PHYSICAL MEDICINE & REHABILITATION

## 2024-06-10 RX ORDER — LACTULOSE 10 G/15ML
20 SOLUTION ORAL 3 TIMES DAILY
Status: DISCONTINUED | OUTPATIENT
Start: 2024-06-10 | End: 2024-07-05 | Stop reason: HOSPADM

## 2024-06-10 RX ADMIN — Medication 5 MG: at 20:59

## 2024-06-10 RX ADMIN — Medication 1 CAPSULE: at 09:34

## 2024-06-10 RX ADMIN — LEVETIRACETAM 1000 MG: 500 TABLET, FILM COATED ORAL at 09:34

## 2024-06-10 RX ADMIN — POLYETHYLENE GLYCOL 3350 17 GRAM ORAL POWDER PACKET 17 G: at 09:34

## 2024-06-10 RX ADMIN — SENNOSIDES AND DOCUSATE SODIUM 2 TABLET: 50; 8.6 TABLET ORAL at 20:59

## 2024-06-10 RX ADMIN — HEPARIN 300 UNITS: 100 SYRINGE at 09:34

## 2024-06-10 RX ADMIN — SENNOSIDES AND DOCUSATE SODIUM 2 TABLET: 50; 8.6 TABLET ORAL at 09:34

## 2024-06-10 RX ADMIN — POLYETHYLENE GLYCOL 3350 17 GRAM ORAL POWDER PACKET 17 G: at 20:59

## 2024-06-10 RX ADMIN — SODIUM CHLORIDE, PRESERVATIVE FREE 10 ML: 5 INJECTION INTRAVENOUS at 09:35

## 2024-06-10 RX ADMIN — LACTULOSE 20 G: 20 SOLUTION ORAL at 20:58

## 2024-06-10 RX ADMIN — LEVETIRACETAM 1000 MG: 500 TABLET, FILM COATED ORAL at 20:59

## 2024-06-10 RX ADMIN — LACTULOSE 20 G: 20 SOLUTION ORAL at 17:00

## 2024-06-10 RX ADMIN — PANTOPRAZOLE SODIUM 40 MG: 40 TABLET, DELAYED RELEASE ORAL at 06:31

## 2024-06-10 RX ADMIN — ROSUVASTATIN CALCIUM 5 MG: 10 TABLET, FILM COATED ORAL at 20:59

## 2024-06-10 RX ADMIN — HEPARIN 300 UNITS: 100 SYRINGE at 20:58

## 2024-06-10 RX ADMIN — WATER 2000 MG: 1 INJECTION INTRAMUSCULAR; INTRAVENOUS; SUBCUTANEOUS at 04:27

## 2024-06-10 RX ADMIN — SODIUM CHLORIDE, PRESERVATIVE FREE 10 ML: 5 INJECTION INTRAVENOUS at 20:59

## 2024-06-10 RX ADMIN — WATER 2000 MG: 1 INJECTION INTRAMUSCULAR; INTRAVENOUS; SUBCUTANEOUS at 17:00

## 2024-06-10 NOTE — PROGRESS NOTES
Speech Language Pathology  ACUTE REHABILITATION--DAILY PROGRESS NOTE            SWALLOWING:      Diet:  Dysphagia 3, Soft/advanced (Soft & Bite-sized) solids with thin liquids (IDDSI level 0)    Patient actively participated in functionally-based mealtime assessment for lunch meal; required min assistance to set up meal tray; pt was able to feed self independently. Reviewed need for slow rate of intake and regulated bite size prior to initiation of PO intake.     Oral prep/oral-     Mild to mod oral containment issues were identified.     Prolonged but functional oral prep and A-P propulsion of bolus were noted with eventual clearance of oral residuals given liquid wash or additional mastication/dry swallow. Cues needed to assure oral clearance at this time.      Pharyngeal-     Clear vocal quality was maintained throughout.     No overt clinical indicators of aspiration were apparent w/ thin from cup or soft/bite sized solids.      Intake: Fair     Completed education with the patient regarding type of swallowing impairment. Reviewed current solid/liquid consistency diet recommendations and reinforced need for consistent use of compensatory strategies to ensure safe PO intake. Reviewed aspiration precautions. Encouraged patient to engage SLP in unstructured Q&A session relative to identified deficit areas -- patient indicated understanding of all information provided via satisfactory verbal response. Pt left w/ nursing staff following session. Pt reported not able to have bowel movement as of late, RN informed.       LANGUAGE:    Not formally addressed, appropriate response times noted.      COGNITION:      Patient seen for skilled cognitive tx. Patient alert and oriented to all concepts. Patient completed simple problem solving/reasoning tasks assoc w/ simple card and dice game. Patient completed w/ 80% acc w/ occ verbal cues required. Patient demonstrated fair STM/recall of rules of games w/ occ verbal cues

## 2024-06-10 NOTE — PROGRESS NOTES
enter and 1 rail(s).    Prior to admission: Pt was independent and working full time.     Owns [employer].      Initial Evaluation  Date: 6/7/24 AM         Short Term Goals Long Term Goals    Was pt agreeable to Eval/treatment? Yes  Yes yes     Does pt have pain? Denies pain  Denied pain  No c/o pain     Bed Mobility  Rolling: ModA  Supine to sit: ModA  Sit to supine: ModA  Scooting: ModA Rolling: NT  Supine to sit: NT  Sit to supine: NT  Scooting: NT NT SBA Independent   Transfers Sit to stand: MaxA  Stand to sit: MaxA  Stand pivot: ModA x2 with L limb advancement assistance no AD  Slide board: WC <> Mat table Mak    5xSTS: NT Sit to stand: ModA  Stand to sit: ModA  Stand pivot: MaxA to L and R with L hemicane, R ankle DF Ace wrap  Slide board: NT Sit to stand: ModA  Stand to sit: ModA  Stand pivot: NT  Slide board: NT Mak  5xSTS: TBD Supervision   5xSTS: TBD   Ambulation    15 feet x2 reps with L hallway rail and ModA assistance with R limb advancement (R KI, R DF ace wrap, sock over toe box)     10mWT: NT  6mWT: NT 50 feet x 1 rep and 60 feet x 1 rep with L hemicane with MaxA     (R DF ace wrap, sock over toe box, RUE sling)     WC follow   55 feet x 4 reps with L hemicane with MaxA    (R DF ace wrap, sock over toe box, RUE sling)     WC follow 150 feet with AAD with Mak    10mWT: TBD  6mWT: TBD >300 feet with AAD with supervision     10mWT: TBD  6mWT: TBD   Walking 10 feet on uneven surface NT  NT NT 10 feet with AAD with Mak 10 feet with AAD with supervision    Wheel Chair Mobility NT NT NT     Car Transfers NT NT NT Mak Supervision    Stair negotiation: ascended and descended  NT  NT NT 4-8 steps with 1 rail with Mak 8-12 steps with 1 rail with supervision    Curb Step:   ascended and descended NT NT NT 4 inch step with AAD and Mak 4 inch step with AAD and supervision    Picking up object off the floor NT  NT NT Will  cone/shoe with Min assist Will  cone/shoe with supervision assist   BLE ROM WFL  discrepancy. Plan to progress gait training tomorrow with continued strategies to improve R limb advancement.       AM  Time in: 0830  Time out: 0915    PM  Time in: 1515  Time out: 1600    Pt is making good progress toward established Physical Therapy goals.  Continue with physical therapy current plan of care.    Elie Segovia, PT, DPT  Board Certified Clinical Specialist in Neurologic Physical Therapy  [id number]

## 2024-06-10 NOTE — PROGRESS NOTES
Department of Internal Medicine  Infectious Diseases  Progress  Note      C/C :   Brain abscess s/p craniotomy and drainage  ( 6/3)     Denies fever or chills  Denies headache   Denies  nausea, vomiting   Reports weakness right side     Afebrile     Current Facility-Administered Medications   Medication Dose Route Frequency Provider Last Rate Last Admin    sennosides-docusate sodium (SENOKOT-S) 8.6-50 MG tablet 2 tablet  2 tablet Oral BID BelMarek moore L, DO   2 tablet at 06/10/24 0934    polyethylene glycol (GLYCOLAX) packet 17 g  17 g Oral BID Venkat Mcknightdict L, DO   17 g at 06/10/24 0934    levETIRAcetam (KEPPRA) tablet 1,000 mg  1,000 mg Oral BID Venkat Mcknightdict L, DO   1,000 mg at 06/10/24 0934    acetaminophen (TYLENOL) tablet 650 mg  650 mg Oral Q6H PRN Marek Mcknight DO        pantoprazole (PROTONIX) tablet 40 mg  40 mg Oral QAM AC Milanes Marino, Maria, MD   40 mg at 06/10/24 0631    rosuvastatin (CRESTOR) tablet 5 mg  5 mg Oral Nightly Milanes Marino, Maria, MD   5 mg at 06/09/24 2100    oxyCODONE (ROXICODONE) immediate release tablet 5 mg  5 mg Oral Q4H PRN Milanes Marino, Maria, MD   5 mg at 06/09/24 2100    Or    oxyCODONE HCl (OXY-IR) immediate release tablet 10 mg  10 mg Oral Q4H PRN Milanes Marino, Maria, MD   10 mg at 06/08/24 2214    heparin (PF) 100 UNIT/ML injection 300 Units  3 mL IntraVENous PRN Milanes Marino, Maria, MD        butalbital-acetaminophen-caffeine (FIORICET, ESGIC) per tablet 1 tablet  1 tablet Oral Q4H PRN Milanes Marino, Maria, MD   1 tablet at 06/06/24 1845    heparin (PF) 100 UNIT/ML injection 300 Units  3 mL IntraVENous 2 times per day Milanes Marino, Maria, MD   300 Units at 06/10/24 0934    bisacodyl (DULCOLAX) suppository 10 mg  10 mg Rectal Daily PRN Marek Mcknight DO        lactobacillus (CULTURELLE) capsule 1 capsule  1 capsule Oral Daily Marek Mcknight DO   1 capsule at 06/10/24 0934    melatonin disintegrating tablet 5 mg  5 mg Oral Nightly Juan Luis  \"LEUKOCYTESUR\", \"UROBILINOGEN\", \"BILIRUBINUR\", \"BLOODU\", \"GLUCOSEU\", \"AMORPHOUS\"    ABG:  No results found for: \"XHC5HSL\", \"BEART\", \"K2ACTUXG\", \"PHART\", \"THGBART\", \"MNU4NMS\", \"PO2ART\", \"ALK9OBA\"    MICROBIOLOGY:    Blood culture - neg to date         .BRAIN      Special Requests LEFT FRONTAL MASS    Direct Exam NO Polymorphonuclear leukocytes     NO EPITHELIAL CELLS     FEW GRAM POSITIVE COCCI Abnormal     Culture PENDING       .BRAIN      Special Requests LEFT FRONTAL MASS    Direct Exam NO Polymorphonuclear leukocytes     NO EPITHELIAL CELLS     FEW GRAM POSITIVE COCCI Abnormal     Culture STREPTOCOCCI, ALPHA HEMOLYTIC HEAVY GROWTH Identification and sensitivity to follow. Abnormal      Susceptibility    Streptococcus intermedius (1)    Antibiotic Interpretation Microscan Method Status   ampicillin Sensitive <=0.25 BACTERIAL SUSCEPTIBILITY PANEL MELVA Final   penicillin Sensitive <=0.06 BACTERIAL SUSCEPTIBILITY PANEL MELVA Final   cefotaxime Sensitive <=0.12 BACTERIAL SUSCEPTIBILITY PANEL MELVA Final   cefTRIAXone Sensitive <=0.12 BACTERIAL SUSCEPTIBILITY PANEL MELVA Final   clindamycin Sensitive <=0.25 BACTERIAL SUSCEPTIBILITY PANEL MELVA Final   erythromycin Sensitive <=0.12 BACTERIAL SUSCEPTIBILITY PANEL MELVA Final   levofloxacin Sensitive 0.5 BACTERIAL SUSCEPTIBILITY PANEL MELVA Final   linezolid Sensitive <=2 BACTERIAL SUSCEPTIBILITY PANEL MELVA Final   vancomycin Sensitive 0.25 BACTERIAL SUSCEPTIBILITY PANEL MELVA Pura              Radiology :    Circumferential thickening of the rectum concerning for proctitis.   Underlying polyp or mass be difficult to exclude with some degrees of   limitations due to metallic streak artifact from bilateral hip   arthroplasties.  Consider colonoscopy for further evaluation especially in   the setting of a known primary.   2. Moderate to large prostatomegaly. Correlation with PSA values.     Brain MRI -    1. There are 2 lobulated peripherally enhancing lesions within the left   frontal

## 2024-06-10 NOTE — PROGRESS NOTES
PM&R Progress Note  Patient: Conrado Leach  Age/sex: 63 y.o. male  Medical Record #: [id number]  Location: 5509/5509-A  Admission date: 6/6/2024    CC: ARU follow up; brain abscess    S: Patient was seen and examined in his room today.  No acute events reported over the weekend.  No major concerns today from patient.  He states he needs to have a BM but also admits to loose stool.  Abdomen is tympanic and distended - will get KUB to r/o obstruction.  Denies chest pain, sob, abd pain.  All pertinent labs reviewed.      No change in PFSH since H&P done on 6/7/24 unless noted above.    Meds:  Scheduled  sennosides-docusate sodium, 2 tablet, BID  polyethylene glycol, 17 g, BID  levETIRAcetam, 1,000 mg, BID  pantoprazole, 40 mg, QAM AC  rosuvastatin, 5 mg, Nightly  heparin flush, 3 mL, 2 times per day  lactobacillus, 1 capsule, Daily  melatonin, 5 mg, Nightly  vitamin D, 50,000 Units, Weekly  cefTRIAXone (ROCEPHIN) IV, 2,000 mg, Q12H  sodium chloride flush, 5-40 mL, BID        PRN  acetaminophen, 650 mg, Q6H PRN  oxyCODONE, 5 mg, Q4H PRN   Or  oxyCODONE, 10 mg, Q4H PRN  heparin flush, 3 mL, PRN  butalbital-acetaminophen-caffeine, 1 tablet, Q4H PRN  bisacodyl, 10 mg, Daily PRN  ondansetron, 4 mg, Q8H PRN  calcium carbonate, 500 mg, TID PRN  magnesium hydroxide, 30 mL, Daily PRN        O:  Vitals:    06/09/24 0900 06/09/24 2057 06/09/24 2130 06/10/24 0915   BP: (!) 125/58 134/75  (!) 154/53   Pulse: 64 57  70   Resp: 18 16 16 16   Temp: 97 °F (36.1 °C) 97.4 °F (36.3 °C)  97.7 °F (36.5 °C)   TempSrc: Temporal Temporal  Temporal   SpO2: 98% 95%  100%   Weight:       Height:           GEN: NAD, conversational   HEENT: left crani, EOMI  RESP: CTAB, no R/R/W   CV: +S1 S2, RR   ABD: NT, distended, tympanic    : no arrington   EXT: Abnormal aROM, No clubbing/cyanosis, 2+ pulses   SKIN: No erythema, warm.  L frontal craniotomy with staples, C/D/I  PSYCH: Appropriate mood given current situation with flat affect  NEURO: Alert, no new  admission  - Bladder: Volitional.  Monitor for retention     # ID - brain abscess  - Reconsult to ID for continuity of care  - Antibiotics: Per ID, Rocephin 2g q12H for 6-8 weeks.       # Skin - incision  - Maintain skin integrity.    - Turns q2H.     # Psych -   - Melatonin 5 mg QHS for insomnia     # Disposition/social - likely discharge home, will discuss in team rounds.     # Code status: Full Code    # Rehab Discharge Goals: Modified independent with mobility and ADLs    Marek Mcknight DO, FAAPM&R  Physical Medicine and Rehabilitation   Brain Injury Medicine    Please note that the above documentation was prepared using voice recognition software.  Every attempt was made to ensure accuracy but there may be spelling, grammatical, and contextual errors.

## 2024-06-10 NOTE — PLAN OF CARE
Problem: Safety - Adult  Goal: Free from fall injury  6/10/2024 0037 by Janet Rae RN  Outcome: Progressing  6/9/2024 1249 by Claudio Patrick RN  Outcome: Progressing     Problem: Discharge Planning  Goal: Discharge to home or other facility with appropriate resources  6/10/2024 0037 by Janet Rae RN  Outcome: Progressing  Flowsheets (Taken 6/9/2024 2057)  Discharge to home or other facility with appropriate resources: Identify barriers to discharge with patient and caregiver  6/9/2024 1249 by Claudio Patrick RN  Outcome: Progressing     Problem: Skin/Tissue Integrity  Goal: Absence of new skin breakdown  Description: 1.  Monitor for areas of redness and/or skin breakdown  2.  Assess vascular access sites hourly  3.  Every 4-6 hours minimum:  Change oxygen saturation probe site  4.  Every 4-6 hours:  If on nasal continuous positive airway pressure, respiratory therapy assess nares and determine need for appliance change or resting period.  6/10/2024 0037 by Janet Rae RN  Outcome: Progressing  6/9/2024 1249 by Claudio Patrick RN  Outcome: Progressing     Problem: ABCDS Injury Assessment  Goal: Absence of physical injury  6/10/2024 0037 by Janet Rae RN  Outcome: Progressing  6/9/2024 1249 by Claudio Patrick RN  Outcome: Progressing     Problem: Pain  Goal: Verbalizes/displays adequate comfort level or baseline comfort level  6/10/2024 0037 by Janet Rae RN  Outcome: Progressing  Flowsheets (Taken 6/9/2024 2057)  Verbalizes/displays adequate comfort level or baseline comfort level:   Encourage patient to monitor pain and request assistance   Assess pain using appropriate pain scale  6/9/2024 1249 by Claudio Patrick RN  Outcome: Progressing     Problem: Chronic Conditions and Co-morbidities  Goal: Patient's chronic conditions and co-morbidity symptoms are monitored and maintained or improved  6/10/2024 0037 by Janet Rae RN  Outcome: Progressing  Flowsheets (Taken 6/9/2024 2057)  Care Plan - Patient's Chronic

## 2024-06-10 NOTE — PROGRESS NOTES
Occupational Therapy  OCCUPATIONAL THERAPY DAILY NOTE    Date:6/10/2024  Patient Name: Conrado Leach  MRN: [id number]  : 1960  Room: [address] [place] [address]A     Referring Practitioner: MD Juan Luis  Diagnosis: Brain mass L frontal lobe- 6/3/24- L crani abscess evacuation  Additional Pertinent Hx: hx R shoulder surgery, BPH, hx DVT, HTN, DM, HLD & seizures    Precautions: Fall Risk, R neglect, seizures, R hemiplegia & R UE edema  Discharge Recommendations: Home with 24 hour Sup/assist      Functional Assessment:   Date Status AE  Comments   Feeding 24  Min A     Grooming 24 Max A           Oral Care 24  Max A     Bathing 24  Max A/Dep     UB Dressing 6/10/24  Max A  Estevan pullover shirt seated upright in w/c   LB Dressing 6/10/24  Dep  Doff breif/shorts in bed due to being soiled, donning of new brief/shorts, rolling side to side in bed to pull over hips   Footwear 6/10/24 maxA  Estevan slip on shoes, total assist to R foot, pt requiring assistance to begin to place foot into L shoe, pt then able to slide into shoe   Toileting 6/10/24  Dep  Pt incontinent of urine upon arrival   Homemaking   TBD       Functional Transfers / Balance:   Date Status DME  Comments   Sit Balance 24  Min A Blade tray on w/c     Stand Balance 6/10/24  Max A  Standing at table. R lean requiring assist to correct to midline.   [] Tub  [] Shower   Transfer   TBD     Commode   Transfer 24  NT- for patient safety     Functional   Mobility 24  NT     Other: bed rolling R<>L    Supine to sit     SPT      Sit <>stand       EOB<>W/C 6/7/24      6/10/24    6/7/24      6/10/24      6/10/24 Mod/max A      maxA    Mod/Max A x2 assist    Max A X1 & SBA x1    maxA                  For safety. Completed x2      Slide board transfer to L side, assistance to place/remove board and safely complete transfer     Functional Exercises / Activity:  -Therapeutic leisure card and dice games completed seated at table with focus on increasing attention  to R side, visual scanning, functional reaching and midline crossing. R UE positioned throughout activity for WB. Pt able to follow simple rules to new game with Min A and Min cues to recognize errors.   -Static standing completed at table x2 reps x~2 minutes with Max A for standing balance completed while WB through R UE for neuromuscular facilitation and to provide proprioceptive input. R lateral lean in standing requiring cues and assist to correct to midline.     -Theraband exercises to RUE for ~12reps x2 in 5 planes, with rest breaks, focusing on increasing of strength and ROM of BUE's    Sensory / Neuromuscular Re-Education:      Cognitive Skills:follows 1 step commands/decreased STM   Status Comments   Problem   Solving Poor     Memory poor    Sequencing poor    Safety poor      Visual Perception:  R neglect     Education:  Pt educated on safety during sit<>stand transfers.  Pt education is ongoing.   Pt educated on ekaterina dressing techniques to assist with UB dressing tasks    [] Family teach completed on:    Pain Level: none reported.     Additional Notes:       Patient has made fair  progress during treatment sessions toward set goals. Therapy emphasis to obtain goals:  Current Treatment Recommendations: Strengthening, Coordination training, ROM, Balance training, Self-Care / ADL, Functional mobility training, Safety education & training, Home management training, Endurance training, Patient/Caregiver education & training, Gait training, Neuromuscular re-education, Equipment evaluation, education, & procurement, Positioning   Goals  Long Term Goals  Time Frame for Long term goals : 6 weeks to address above problem areas  Long Term Goal 1: Pt demo  independent to eat all meals  Long Term Goal 2: Pt demo  Mod I grooming seated  @ sink level & demo G- safety  Long Term Goal 3: Pt demo Min A to bathe @ shower level  & or sponge bathing both seated & standing & demo G- safety & insight  Long Term Goal 4: Pt demo

## 2024-06-11 PROBLEM — K56.7 ILEUS (HCC): Status: ACTIVE | Noted: 2024-06-11

## 2024-06-11 LAB
ANION GAP SERPL CALCULATED.3IONS-SCNC: 11 MMOL/L (ref 7–16)
BASOPHILS # BLD: 0.03 K/UL (ref 0–0.2)
BASOPHILS NFR BLD: 0 % (ref 0–2)
BUN SERPL-MCNC: 17 MG/DL (ref 6–23)
CALCIUM SERPL-MCNC: 9.4 MG/DL (ref 8.6–10.2)
CHLORIDE SERPL-SCNC: 99 MMOL/L (ref 98–107)
CO2 SERPL-SCNC: 25 MMOL/L (ref 22–29)
CREAT SERPL-MCNC: 0.7 MG/DL (ref 0.7–1.2)
EOSINOPHIL # BLD: 0.08 K/UL (ref 0.05–0.5)
EOSINOPHILS RELATIVE PERCENT: 1 % (ref 0–6)
ERYTHROCYTE [DISTWIDTH] IN BLOOD BY AUTOMATED COUNT: 14.6 % (ref 11.5–15)
ERYTHROCYTE [SEDIMENTATION RATE] IN BLOOD BY WESTERGREN METHOD: 11 MM/HR (ref 0–15)
GFR, ESTIMATED: >90 ML/MIN/1.73M2
GLUCOSE SERPL-MCNC: 146 MG/DL (ref 74–99)
HCT VFR BLD AUTO: 43.4 % (ref 37–54)
HGB BLD-MCNC: 14.5 G/DL (ref 12.5–16.5)
IMM GRANULOCYTES # BLD AUTO: 0.05 K/UL (ref 0–0.58)
IMM GRANULOCYTES NFR BLD: 1 % (ref 0–5)
LYMPHOCYTES NFR BLD: 1.09 K/UL (ref 1.5–4)
LYMPHOCYTES RELATIVE PERCENT: 11 % (ref 20–42)
MCH RBC QN AUTO: 27.4 PG (ref 26–35)
MCHC RBC AUTO-ENTMCNC: 33.4 G/DL (ref 32–34.5)
MCV RBC AUTO: 81.9 FL (ref 80–99.9)
MONOCYTES NFR BLD: 0.83 K/UL (ref 0.1–0.95)
MONOCYTES NFR BLD: 9 % (ref 2–12)
NEUTROPHILS NFR BLD: 79 % (ref 43–80)
NEUTS SEG NFR BLD: 7.63 K/UL (ref 1.8–7.3)
PLATELET # BLD AUTO: 203 K/UL (ref 130–450)
PMV BLD AUTO: 9 FL (ref 7–12)
POTASSIUM SERPL-SCNC: 4.1 MMOL/L (ref 3.5–5)
RBC # BLD AUTO: 5.3 M/UL (ref 3.8–5.8)
SODIUM SERPL-SCNC: 135 MMOL/L (ref 132–146)
WBC OTHER # BLD: 9.7 K/UL (ref 4.5–11.5)

## 2024-06-11 PROCEDURE — 1280000000 HC REHAB R&B

## 2024-06-11 PROCEDURE — 99233 SBSQ HOSP IP/OBS HIGH 50: CPT | Performed by: PHYSICAL MEDICINE & REHABILITATION

## 2024-06-11 PROCEDURE — 97530 THERAPEUTIC ACTIVITIES: CPT

## 2024-06-11 PROCEDURE — 6360000002 HC RX W HCPCS: Performed by: PHYSICAL MEDICINE & REHABILITATION

## 2024-06-11 PROCEDURE — 85652 RBC SED RATE AUTOMATED: CPT

## 2024-06-11 PROCEDURE — 80048 BASIC METABOLIC PNL TOTAL CA: CPT

## 2024-06-11 PROCEDURE — 97130 THER IVNTJ EA ADDL 15 MIN: CPT

## 2024-06-11 PROCEDURE — 6370000000 HC RX 637 (ALT 250 FOR IP): Performed by: STUDENT IN AN ORGANIZED HEALTH CARE EDUCATION/TRAINING PROGRAM

## 2024-06-11 PROCEDURE — 85025 COMPLETE CBC W/AUTO DIFF WBC: CPT

## 2024-06-11 PROCEDURE — 6370000000 HC RX 637 (ALT 250 FOR IP): Performed by: PHYSICAL MEDICINE & REHABILITATION

## 2024-06-11 PROCEDURE — 99222 1ST HOSP IP/OBS MODERATE 55: CPT | Performed by: SURGERY

## 2024-06-11 PROCEDURE — 97535 SELF CARE MNGMENT TRAINING: CPT

## 2024-06-11 PROCEDURE — 97129 THER IVNTJ 1ST 15 MIN: CPT

## 2024-06-11 PROCEDURE — 6360000002 HC RX W HCPCS: Performed by: STUDENT IN AN ORGANIZED HEALTH CARE EDUCATION/TRAINING PROGRAM

## 2024-06-11 PROCEDURE — 97110 THERAPEUTIC EXERCISES: CPT

## 2024-06-11 PROCEDURE — 2580000003 HC RX 258: Performed by: PHYSICAL MEDICINE & REHABILITATION

## 2024-06-11 RX ORDER — BISACODYL 10 MG
10 SUPPOSITORY, RECTAL RECTAL DAILY
Status: DISCONTINUED | OUTPATIENT
Start: 2024-06-12 | End: 2024-06-29

## 2024-06-11 RX ADMIN — HEPARIN 300 UNITS: 100 SYRINGE at 21:34

## 2024-06-11 RX ADMIN — SENNOSIDES AND DOCUSATE SODIUM 2 TABLET: 50; 8.6 TABLET ORAL at 08:25

## 2024-06-11 RX ADMIN — POLYETHYLENE GLYCOL 3350 17 GRAM ORAL POWDER PACKET 17 G: at 08:25

## 2024-06-11 RX ADMIN — ROSUVASTATIN CALCIUM 5 MG: 10 TABLET, FILM COATED ORAL at 21:29

## 2024-06-11 RX ADMIN — LEVETIRACETAM 1000 MG: 500 TABLET, FILM COATED ORAL at 08:25

## 2024-06-11 RX ADMIN — LEVETIRACETAM 1000 MG: 500 TABLET, FILM COATED ORAL at 21:33

## 2024-06-11 RX ADMIN — LACTULOSE 20 G: 20 SOLUTION ORAL at 15:08

## 2024-06-11 RX ADMIN — LACTULOSE 20 G: 20 SOLUTION ORAL at 08:25

## 2024-06-11 RX ADMIN — LACTULOSE 20 G: 20 SOLUTION ORAL at 21:34

## 2024-06-11 RX ADMIN — PANTOPRAZOLE SODIUM 40 MG: 40 TABLET, DELAYED RELEASE ORAL at 06:11

## 2024-06-11 RX ADMIN — SODIUM CHLORIDE, PRESERVATIVE FREE 10 ML: 5 INJECTION INTRAVENOUS at 21:34

## 2024-06-11 RX ADMIN — BISACODYL 10 MG: 10 SUPPOSITORY RECTAL at 12:24

## 2024-06-11 RX ADMIN — Medication 5 MG: at 21:28

## 2024-06-11 RX ADMIN — WATER 2000 MG: 1 INJECTION INTRAMUSCULAR; INTRAVENOUS; SUBCUTANEOUS at 04:20

## 2024-06-11 RX ADMIN — SODIUM CHLORIDE, PRESERVATIVE FREE 10 ML: 5 INJECTION INTRAVENOUS at 08:26

## 2024-06-11 RX ADMIN — WATER 2000 MG: 1 INJECTION INTRAMUSCULAR; INTRAVENOUS; SUBCUTANEOUS at 15:08

## 2024-06-11 RX ADMIN — Medication 1 CAPSULE: at 08:25

## 2024-06-11 RX ADMIN — HEPARIN 300 UNITS: 100 SYRINGE at 08:25

## 2024-06-11 RX ADMIN — SENNOSIDES AND DOCUSATE SODIUM 2 TABLET: 50; 8.6 TABLET ORAL at 21:34

## 2024-06-11 RX ADMIN — POLYETHYLENE GLYCOL 3350 17 GRAM ORAL POWDER PACKET 17 G: at 21:37

## 2024-06-11 NOTE — PROGRESS NOTES
Occupational Therapy  OCCUPATIONAL THERAPY DAILY NOTE    Date:2024  Patient Name: Conrado Leach  MRN: [id number]  : 1960  Room: [address] [place] [address]-A     Referring Practitioner: MD Juan Luis  Diagnosis: Brain mass L frontal lobe- 6/3/24- L crani abscess evacuation  Additional Pertinent Hx: hx R shoulder surgery, BPH, hx DVT, HTN, DM, HLD & seizures    Precautions: Fall Risk, R neglect, seizures, R hemiplegia & R UE edema  Discharge Recommendations: Home with 24 hour Sup/assist      Functional Assessment:   Date Status AE  Comments   Feeding 24  Min A     Grooming 24 Max A           Oral Care 24  Max A     Bathing 24  Max A/Dep     UB Dressing 24  Max A Bed  To octaviano/doff hospital gown while in bed needing cues for blade technique and threading RUE first.    LB Dressing 24  Dep Bed  To octaviano briefs and hospital pants in bed during  pm session including garments up over hips needing assist.    Footwear 24 maxA bed To octaviano slip on shoes needing max/total assist while in bed.    Toileting 24  Dep  Pt having loose bowel issues after having enema about 12:30pm per consult with nurse in pm session needing Dep assist for toilet hygiene/clothing mgmt. Pt performed numerous bed rolling R/L sides with assist  using bed rails with cues.    Homemaking   TBD       Functional Transfers / Balance:   Date Status DME  Comments   Sit Balance 24  Min A Blade tray on w/c  Demol;d in am session during BUE AAROM exercises and using table top task with cones.    Stand Balance 6/10/24  Max A     [] Tub  [] Shower   Transfer   TBD     Commode   Transfer 24  NT- for patient safety     Functional   Mobility 24  NT     Other: bed rolling R<>L    Supine to sit     SPT      Sit <>stand       EOB<>W/C 6/11/24      6/10/24    6/7/24      6/10/24      6/10/24 Mod to  max  A      maxA    Mod/Max A x2 assist    Max A X1 & SBA x1    maxA Bed rails  Mod A to R rolling to Right side using LUE/LLE to assist with

## 2024-06-11 NOTE — PROGRESS NOTES
PM&R Progress Note  Patient: Conrado Leach  Age/sex: 63 y.o. male  Medical Record #: [id number]  Location: 5509/5509-A  Admission date: 6/6/2024    CC: ARU follow up; brain abscess    S: Patient was seen and examined in his room today.  No acute events reported overnight.  Patient still unable to have BM - KUB reviewed with ileus.   Denies chest pain, sob, abd pain.  He does not have much of an appetite.  All pertinent labs reviewed.      No change in PFSH since H&P done on 6/7/24 unless noted above.    Meds:  Scheduled  lactulose, 20 g, TID  sennosides-docusate sodium, 2 tablet, BID  polyethylene glycol, 17 g, BID  levETIRAcetam, 1,000 mg, BID  pantoprazole, 40 mg, QAM AC  rosuvastatin, 5 mg, Nightly  heparin flush, 3 mL, 2 times per day  lactobacillus, 1 capsule, Daily  melatonin, 5 mg, Nightly  vitamin D, 50,000 Units, Weekly  cefTRIAXone (ROCEPHIN) IV, 2,000 mg, Q12H  sodium chloride flush, 5-40 mL, BID        PRN  acetaminophen, 650 mg, Q6H PRN  [Held by provider] oxyCODONE, 5 mg, Q4H PRN   Or  [Held by provider] oxyCODONE, 10 mg, Q4H PRN  heparin flush, 3 mL, PRN  butalbital-acetaminophen-caffeine, 1 tablet, Q4H PRN  bisacodyl, 10 mg, Daily PRN  ondansetron, 4 mg, Q8H PRN  calcium carbonate, 500 mg, TID PRN  magnesium hydroxide, 30 mL, Daily PRN        O:  Vitals:    06/09/24 2130 06/10/24 0915 06/10/24 2045 06/11/24 0815   BP:  (!) 154/53 (!) 141/79 (!) 154/90   Pulse:  70 71 78   Resp: 16 16 16 16   Temp:  97.7 °F (36.5 °C) 97.1 °F (36.2 °C) 98.1 °F (36.7 °C)   TempSrc:  Temporal Temporal Temporal   SpO2:  100% 98% 98%   Weight:       Height:           GEN: NAD, conversational   HEENT: left crani, EOMI  RESP: CTAB, no R/R/W   CV: +S1 S2, RR   ABD: NT, distended, tympanic    : no arrington   EXT: Abnormal aROM, No clubbing/cyanosis, 2+ pulses   SKIN: No erythema, warm.  L frontal craniotomy with staples, C/D/I  PSYCH: Appropriate mood given current situation with flat affect  NEURO: Alert, no new focal deficits  Patient was admitted to [facility] ARU on 6/6/2024.     Patient has impairments in:  Demonstrating impaired cognition, impaired communication, impaired strength, impaired coordination, impaired ROM, impaired balance, impaired safety awareness, decreased endurance.    PLAN:  I. Rehabilitation - PT, OT, ST, Rehab Nursing    II. Medical management:  # Neuro - left frontal brain abscess x2     # S/p left craniotomy with abscess evacuation on 6/3/24 with Dr. Henderson  - MRI brain w/wo contrast on 6/3/24 showed interval surgical drainage of one of the two left frontal lobe abscesses; mild increase in size of other abscess; slight increase in vasogenic edema; 3mm left-to-right MLS; stable right meningioma  - Re-consult NSGY to follow on ARU for continuity of care, right flaccid UE/LE, new MRI results.   - Keppra 1000 mg BID  - Can consider Amantadine as neurostimulant, depending on rehab course.   - IV antibiotics, per ID  - Completed decadron taper.  Can consider another course, if indicated.   - Spasticity: none present   - PRECAUTION: FALLS, seizure    - PT/OT  - Post-discharge, follow with PCP, NSGY, Neurology, ID    # ileus  - Bowel rest.  Clear liquid diet.    - Continue Miralax BID, senokot-S BID, lactulose tid.    - Soap suds enema today.    - Mobilization  - Hold opioids  - If no improvements, will consult general surgery for assistance.      # CVS - HLD  - Crestor 5 mg nightly  - Monitor daily and adjust as needed                # Pulm - no acute issues  # Endocrine - no acute issues     # Pain control   - Acetaminophen 650 mg q6H PRN for mild pain, oxycodone 5-10 mg q4PRN for moderate-severe pain     # DVT prophylaxis - SCDs      # GI/bowel/FEN -   - Diet: soft and bite-sized, thin liquids.  - Bowel program: Polyethylene glycol BID, Senokot-S 2 tabs BID, MoM PRN  - GI prophylaxis: Protonix 40 mg daily  - Given high prevalence of Vitamin D deficiency in [place]: supplement with ergocalciferol 50K

## 2024-06-11 NOTE — PROGRESS NOTES
Patient given a soap eda enema with 500ml of tap water with no results. PRN suppository was then given with no results. Patient was then placed on the bedside commode, 1500ml liquids brown stool noted. No relief for patient. Dr. Mcknight notified, will consult general surgery.

## 2024-06-11 NOTE — PROGRESS NOTES
Physical Therapy  Daily Treatment Note   Evaluating Therapist: Mindy Canchola PT, DPT [id number]     ROOM: [address] [place], [place] [address]  DIAGNOSIS: brain abscess   PRECAUTIONS: Falls, R hemiparesis, R UE sling, R inattention, flat affect, cognition    HPI: Pt is a 63 year old pt who presented to ER from home with complaints of right side weakness and seizure-like activity at home. NIH of 10 upon arrival. Per wife at bedside patient has been having neck and lower back pain and has been going to chiropractor.  He also was having some confusion and they thought it was a UTI and he went to his primary care physician and was prescribed Bactrim.  He went into his daily routine, went to work and returned home.  At around 2:00 when his wife came home found him in the floor, unable to move the right side, appeared that he comprehended but he was unable to speak.  He later said that he had a seizure-like activity sitting on the chair and a slide himself down to the floor, that lasted for about 5 minutes. Workup revealed two masses in the left frontal lobe with adjacent vasogenic edema. EEG positive for left fronto central seizures. Neurosurgery and neurology was consulted. On 06/03/2024 patient underwent a left craniotomy abscess evacuation by Dr. Henderson. ID consulted and placed patient on IV antibiotics. Updated MRI brain w/wo contrast on 6/3/24 showed interval surgical drainage of one of the two left frontal lobe abscesses; mild increase in size of other abscess; slight increase in vasogenic edema; 3mm left-to-right MLS; stable right meningioma.  NSGY reviewed MRI brain prior to admission to ARU with no additional recommendations.  Hospital course was complicated by seizure, weakness, hypotension.       Pt has PMHx of type 2 diabetes mellitus, HLD, BPH, PARTH, multiple orthopedic surgeries, right lower extremities DVT 2 years ago after he had spider bite, not on anticoagulation      Social:  Pt lives with wife in a 2 story home with 3 step(s)  to enter and 1 rail(s).    Prior to admission: Pt was independent and working full time.      Owns [employer].        Initial Evaluation  Date: 6/7/24 AM     PM    Short Term Goals Long Term Goals    Was pt agreeable to Eval/treatment? Yes  Yes yes       Does pt have pain? Denies pain  Moderate abdominal discomfort from reported constipation No c/o pain       Bed Mobility  Rolling: ModA  Supine to sit: ModA  Sit to supine: ModA  Scooting: ModA Rolling: ModA  Supine to sit: ModA  Sit to supine: ModA  Scooting: ModA (mat table) Rolling: ModA  Supine to sit: ModA  Sit to supine: ModA  Scooting: ModA (hospital bed) SBA Independent   Transfers Sit to stand: MaxA  Stand to sit: MaxA  Stand pivot: ModA x2 with L limb advancement assistance no AD  Slide board: WC <> Mat table Mak     5xSTS: NT Sit to stand: NT  Stand to sit: NT  Stand pivot: NT  Slide board: ModA to L from WC<>shayla table x 4 reps Sit to stand: ModA  Stand to sit: ModA  Stand pivot: MaxA with L hemicane  Slide board: ModA from WC<>hospital bed Mak  5xSTS: TBD Supervision   5xSTS: TBD   Ambulation    15 feet x2 reps with L hallway rail and ModA assistance with R limb advancement (R KI, R DF ace wrap, sock over toe box)      10mWT: NT  6mWT: NT NT    35 feet x 1 rep with L hemicane with MaxA     (R DF ace wrap, )      WC follow 150 feet with AAD with Mak     10mWT: TBD  6mWT: TBD >300 feet with AAD with supervision      10mWT: TBD  6mWT: TBD   Walking 10 feet on uneven surface NT  NT NT 10 feet with AAD with Mak 10 feet with AAD with supervision    Wheel Chair Mobility NT 35 feet with L extremities with ModA (difficulty conceptualizing unilateral extremity propulsion technique) NT       Car Transfers NT NT NT Mak Supervision    Stair negotiation: ascended and descended  NT  NT NT 4-8 steps with 1 rail with Mak 8-12 steps with 1 rail with supervision    Curb Step:   ascended and descended NT NT NT 4 inch step with AAD and Mak 4 inch step with AAD and supervision

## 2024-06-11 NOTE — PLAN OF CARE
Problem: Safety - Adult  Goal: Free from fall injury  Outcome: Progressing  Flowsheets (Taken 6/11/2024 8517)  Free From Fall Injury: Instruct family/caregiver on patient safety     Problem: Discharge Planning  Goal: Discharge to home or other facility with appropriate resources  Outcome: Progressing     Problem: Pain  Goal: Verbalizes/displays adequate comfort level or baseline comfort level  Outcome: Progressing

## 2024-06-11 NOTE — PROGRESS NOTES
Speech Language Pathology  ACUTE REHABILITATION--DAILY PROGRESS NOTE            SWALLOWING:      Diet:  Dysphagia 3, Soft/advanced (Soft & Bite-sized) solids with thin liquids (IDDSI level 0)    Not targeted this date. Patient downgraded to clear liquid diet per MD d/t ileus.       LANGUAGE:    Not formally addressed, appropriate response times noted.      COGNITION:      Patient seen for skilled cognitive tx. Patient alert and oriented to all concepts. Patient completed simple problem solving/reasoning tasks assoc w/ identifying problems within a scenario with 70% acc with min verbal cues required to formulate appropriate solutions. Patient completed sequencing task of min complexity w/ 80% acc w/ min verbal cues required to increase attention to detail.        SPEECH:    Functional       Minute Tracking:    Individual minutes:     0 minutes  Concurrent minutes:    45 minutes  Co-treatment minutes:   0 minutes    Total minutes for 6/11/2024:   45 minutes      SAFETY:      Not addressed    EDUCATION:    Edu re: swallow strats/diet recs this AM.     OUTCOME:    Progress made towards goals. Will continue SP intervention as per previously established POC.    SP recommended after discharge:  TBD  Supervision recommended at discharge: TBD      FIMS SCORES                           Swallowing                          Current Status             4--Minimal Assistance               Short Term Goal         5--Supervision               Long Term Goal          6--Modified Independent                                      Receptive                          Current Status             4--Minimal Assistance               Short Term Goal         5--Supervision               Long Term Goal          6--Modified Independent              Expressive                          Current Status             7--Independent                         Short Term Goal         7--Independent                         Long Term Goal          7--Independent  swallow function to ensure airway protection during PO intake to maintain adequate nutrition/hydration and decrease signs/symptoms of aspiration to less than 1 x/day.

## 2024-06-11 NOTE — H&P
General Surgery Consult    Patient's Name/Date of Birth: Conrado Leach /1960 (63 y.o.)    Date: June 11, 2024     CC:Ileus    HPI:  63 year old who was found to have a brain abscess and underwent a Left crani on 6/3. Currently in rehab and has had increased distention. KUB shows concern for ileus. Was placed on a bowel regimen and states he is feeling a lot better. States that he had a large BM. Denies nay N/V. Still distended on exam, but states it has decreased. Denies any Fever/ Chills     Past Medical History:   Diagnosis Date    BPH (benign prostatic hyperplasia)     Brain abscess 05/2024    DVT (deep venous thrombosis) (HCC)     HLD (hyperlipidemia)     New onset seizure (HCC) 05/2024    PARTH (obstructive sleep apnea)     Status post craniotomy 05/2024    Type 2 diabetes mellitus (HCC)        Past Surgical History:   Procedure Laterality Date    CRANIOTOMY Left 6/3/2024    LEFT CRANIOTOMY ABCESS EVACUATION performed by Mary Henderson MD at [facility] [place] [facility]       Current Facility-Administered Medications   Medication Dose Route Frequency Provider Last Rate Last Admin    [START ON 6/12/2024] bisacodyl (DULCOLAX) suppository 10 mg  10 mg Rectal Daily Gianfrate, Gianmarino C, DO        lactulose (CHRONULAC) 10 GM/15ML solution 20 g  20 g Oral TID Kendra Mcknightct L, DO   20 g at 06/11/24 1508    sennosides-docusate sodium (SENOKOT-S) 8.6-50 MG tablet 2 tablet  2 tablet Oral BID Kendra Mcknightct L, DO   2 tablet at 06/11/24 0825    polyethylene glycol (GLYCOLAX) packet 17 g  17 g Oral BID Juan Luis Sterling L, DO   17 g at 06/11/24 0825    levETIRAcetam (KEPPRA) tablet 1,000 mg  1,000 mg Oral BID Kendra Mcknightct L, DO   1,000 mg at 06/11/24 0825    acetaminophen (TYLENOL) tablet 650 mg  650 mg Oral Q6H PRN Marek Mcknight L, DO        pantoprazole (PROTONIX) tablet 40 mg  40 mg Oral QAM AC Milanes Marino, Maria, MD   40 mg at 06/11/24 0611    rosuvastatin (CRESTOR) tablet 5 mg  5 mg Oral Nightly Milanes Marino, Maria, MD

## 2024-06-11 NOTE — PROGRESS NOTES
Physical Therapy  Weekly Team Note    Supervising Therapist: Elie Segovia, PT, DPT     ROOM: 5509/5509-A  DIAGNOSIS: brain abscess   PRECAUTIONS: Falls, R hemiparesis, R UE sling, R inattention, flat affect, cognition    HPI: Pt is a 63 year old pt who presented to ER from home with complaints of right side weakness and seizure-like activity at home. NIH of 10 upon arrival. Per wife at bedside patient has been having neck and lower back pain and has been going to chiropractor.  He also was having some confusion and they thought it was a UTI and he went to his primary care physician and was prescribed Bactrim.  He went into his daily routine, went to work and returned home.  At around 2:00 when his wife came home found him in the floor, unable to move the right side, appeared that he comprehended but he was unable to speak.  He later said that he had a seizure-like activity sitting on the chair and a slide himself down to the floor, that lasted for about 5 minutes. Workup revealed two masses in the left frontal lobe with adjacent vasogenic edema. EEG positive for left fronto central seizures. Neurosurgery and neurology was consulted. On 06/03/2024 patient underwent a left craniotomy abscess evacuation by Dr. Henderson. ID consulted and placed patient on IV antibiotics. Updated MRI brain w/wo contrast on 6/3/24 showed interval surgical drainage of one of the two left frontal lobe abscesses; mild increase in size of other abscess; slight increase in vasogenic edema; 3mm left-to-right MLS; stable right meningioma.  NSGY reviewed MRI brain prior to admission to ARU with no additional recommendations.  Hospital course was complicated by seizure, weakness, hypotension.       Pt has PMHx of type 2 diabetes mellitus, HLD, BPH, PARTH, multiple orthopedic surgeries, right lower extremities DVT 2 years ago after he had spider bite, not on anticoagulation      Social:  Pt lives with wife in a 2 story home with 3 step(s) to enter and 1

## 2024-06-11 NOTE — PROGRESS NOTES
1 capsule  1 capsule Oral Daily Belcik, Glenwood L, DO   1 capsule at 06/11/24 0825    melatonin disintegrating tablet 5 mg  5 mg Oral Nightly Belcik, Glenwood L, DO   5 mg at 06/10/24 2059    ondansetron (ZOFRAN-ODT) disintegrating tablet 4 mg  4 mg Oral Q8H PRN Belcik, Glenwood L, DO        calcium carbonate (TUMS) chewable tablet 500 mg  500 mg Oral TID PRN Belcik, Glenwood L, DO        magnesium hydroxide (MILK OF MAGNESIA) 400 MG/5ML suspension 30 mL  30 mL Oral Daily PRN Belcik, Glenwood L, DO   30 mL at 06/09/24 0912    vitamin D (ERGOCALCIFEROL) capsule 50,000 Units  50,000 Units Oral Weekly Belcik, Glenwood L, DO   50,000 Units at 06/07/24 0926    cefTRIAXone (ROCEPHIN) 2,000 mg in sterile water 20 mL IV syringe  2,000 mg IntraVENous Q12H Belyusrak, Glenwood L, DO   2,000 mg at 06/11/24 0420    sodium chloride flush 0.9 % injection 5-40 mL  5-40 mL IntraVENous BID Belcik, Glenwood L, DO   10 mL at 06/11/24 0826           REVIEW OF SYSTEMS:    CONSTITUTIONAL:  Denies fever, chill or rigors.  HEENT: Headache - improving   RESPIRATORY: denies cough, shortness of breath, sputum expectoration.  CARDIOVASCULAR:  Denies palpitation or chest pain   GASTROINTESTINAL:  Denies abdomen pain, diarrhea or constipation,, nausea or vomiting.  GENITOURINARY:  Denies burning urination or frequency of urination  INTEGUMENT: denies wound , rash  HEMATOLOGIC/LYMPHATIC:  Denies lymph node swelling, gum bleeding or easy bruising.  MUSCULOSKELETAL:  Denies leg pain , joint pain , joint swelling  NEUROLOGICAL: weakness right side       PHYSICAL EXAM:      Vitals:     Blood Pressure (Abnormal) 154/90   Pulse 78   Temperature 98.1 °F (36.7 °C) (Temporal)   Respiration 16   Height 1.803 m (5' 11\")   Weight 99 kg (218 lb 4.1 oz)   Oxygen Saturation 98%   Body Mass Index 30.44 kg/m²     General Appearance:    Awake, alert , no acute distress.   Head:    Normocephalic, craniotomy wound healing    Eyes:    No pallor, no icterus,    Ears:    No obvious deformity or drainage.   Nose:   No nasal drainage   Throat:   Mucosa moist, no oral thrush   Neck:   Supple, no lymphadenopathy   Back:     no CVA tenderness   Lungs:     Clear to auscultation bilaterally, no wheeze    Heart:    Regular rate and rhythm, no murmur   Abdomen:     Soft, non-tender, bowel sounds present    Extremities:   No edema, no cyanosis    Pulses:   Dorsalis pedis palpable    Skin:   no rashes      CBC with Differential:      Lab Results   Component Value Date/Time    WBC 9.7 06/11/2024 04:40 AM    RBC 5.30 06/11/2024 04:40 AM    HGB 14.5 06/11/2024 04:40 AM    HCT 43.4 06/11/2024 04:40 AM     06/11/2024 04:40 AM    MCV 81.9 06/11/2024 04:40 AM    MCH 27.4 06/11/2024 04:40 AM    MCHC 33.4 06/11/2024 04:40 AM    RDW 14.6 06/11/2024 04:40 AM    LYMPHOPCT 11 06/11/2024 04:40 AM    MONOPCT 9 06/11/2024 04:40 AM    EOSPCT 1 06/11/2024 04:40 AM    BASOPCT 0 06/11/2024 04:40 AM    MONOSABS 0.83 06/11/2024 04:40 AM    EOSABS 0.08 06/11/2024 04:40 AM    BASOSABS 0.03 06/11/2024 04:40 AM       CMP     Lab Results   Component Value Date/Time     06/11/2024 04:40 AM    K 4.1 06/11/2024 04:40 AM    CL 99 06/11/2024 04:40 AM    CO2 25 06/11/2024 04:40 AM    BUN 17 06/11/2024 04:40 AM    CREATININE 0.7 06/11/2024 04:40 AM    LABGLOM >90 06/11/2024 04:40 AM    GLUCOSE 146 06/11/2024 04:40 AM    CALCIUM 9.4 06/11/2024 04:40 AM    BILITOT 0.4 06/07/2024 05:08 AM    ALKPHOS 58 06/07/2024 05:08 AM    AST 14 06/07/2024 05:08 AM    ALT 29 06/07/2024 05:08 AM         Hepatic Function Panel:    Lab Results   Component Value Date/Time    ALKPHOS 58 06/07/2024 05:08 AM    ALT 29 06/07/2024 05:08 AM    AST 14 06/07/2024 05:08 AM    BILITOT 0.4 06/07/2024 05:08 AM       PT/INR:    Lab Results   Component Value Date/Time    PROTIME 13.7 05/29/2024 07:28 PM    INR 1.3 05/29/2024 07:28 PM       TSH:    Lab Results   Component Value Date/Time    TSH 0.49 05/30/2024 05:36 AM       U/A:  No

## 2024-06-12 ENCOUNTER — APPOINTMENT (OUTPATIENT)
Dept: GENERAL RADIOLOGY | Age: 64
End: 2024-06-12
Attending: PHYSICAL MEDICINE & REHABILITATION
Payer: COMMERCIAL

## 2024-06-12 LAB
BILIRUB UR QL STRIP: NEGATIVE
CLARITY UR: CLEAR
COLOR UR: YELLOW
GLUCOSE UR STRIP-MCNC: NEGATIVE MG/DL
HGB UR QL STRIP.AUTO: ABNORMAL
KETONES UR STRIP-MCNC: NEGATIVE MG/DL
LEUKOCYTE ESTERASE UR QL STRIP: NEGATIVE
MICROORGANISM SPEC CULT: NORMAL
MICROORGANISM/AGENT SPEC: NORMAL
NITRITE UR QL STRIP: NEGATIVE
PH UR STRIP: 6 [PH] (ref 5–9)
PROT UR STRIP-MCNC: ABNORMAL MG/DL
RBC #/AREA URNS HPF: ABNORMAL /HPF
SERVICE CMNT-IMP: NORMAL
SP GR UR STRIP: >1.03 (ref 1–1.03)
SPECIMEN DESCRIPTION: NORMAL
SURGICAL PATHOLOGY REPORT: NORMAL
UROBILINOGEN UR STRIP-ACNC: 0.2 EU/DL (ref 0–1)
WBC #/AREA URNS HPF: ABNORMAL /HPF

## 2024-06-12 PROCEDURE — 6370000000 HC RX 637 (ALT 250 FOR IP): Performed by: STUDENT IN AN ORGANIZED HEALTH CARE EDUCATION/TRAINING PROGRAM

## 2024-06-12 PROCEDURE — 97129 THER IVNTJ 1ST 15 MIN: CPT | Performed by: SPEECH-LANGUAGE PATHOLOGIST

## 2024-06-12 PROCEDURE — 74018 RADEX ABDOMEN 1 VIEW: CPT

## 2024-06-12 PROCEDURE — 97110 THERAPEUTIC EXERCISES: CPT

## 2024-06-12 PROCEDURE — 6360000002 HC RX W HCPCS: Performed by: STUDENT IN AN ORGANIZED HEALTH CARE EDUCATION/TRAINING PROGRAM

## 2024-06-12 PROCEDURE — 99233 SBSQ HOSP IP/OBS HIGH 50: CPT | Performed by: PHYSICAL MEDICINE & REHABILITATION

## 2024-06-12 PROCEDURE — 97130 THER IVNTJ EA ADDL 15 MIN: CPT | Performed by: SPEECH-LANGUAGE PATHOLOGIST

## 2024-06-12 PROCEDURE — 81001 URINALYSIS AUTO W/SCOPE: CPT

## 2024-06-12 PROCEDURE — 87086 URINE CULTURE/COLONY COUNT: CPT

## 2024-06-12 PROCEDURE — 97535 SELF CARE MNGMENT TRAINING: CPT

## 2024-06-12 PROCEDURE — 1280000000 HC REHAB R&B

## 2024-06-12 PROCEDURE — 97530 THERAPEUTIC ACTIVITIES: CPT

## 2024-06-12 PROCEDURE — 97112 NEUROMUSCULAR REEDUCATION: CPT

## 2024-06-12 PROCEDURE — 6360000002 HC RX W HCPCS: Performed by: PHYSICAL MEDICINE & REHABILITATION

## 2024-06-12 PROCEDURE — 2580000003 HC RX 258: Performed by: PHYSICAL MEDICINE & REHABILITATION

## 2024-06-12 PROCEDURE — 6370000000 HC RX 637 (ALT 250 FOR IP): Performed by: PHYSICAL MEDICINE & REHABILITATION

## 2024-06-12 RX ADMIN — LEVETIRACETAM 1000 MG: 500 TABLET, FILM COATED ORAL at 21:54

## 2024-06-12 RX ADMIN — BISACODYL 10 MG: 10 SUPPOSITORY RECTAL at 08:41

## 2024-06-12 RX ADMIN — Medication 5 MG: at 21:54

## 2024-06-12 RX ADMIN — WATER 2000 MG: 1 INJECTION INTRAMUSCULAR; INTRAVENOUS; SUBCUTANEOUS at 16:54

## 2024-06-12 RX ADMIN — SODIUM CHLORIDE, PRESERVATIVE FREE 10 ML: 5 INJECTION INTRAVENOUS at 22:45

## 2024-06-12 RX ADMIN — POLYETHYLENE GLYCOL 3350 17 GRAM ORAL POWDER PACKET 17 G: at 08:40

## 2024-06-12 RX ADMIN — WATER 2000 MG: 1 INJECTION INTRAMUSCULAR; INTRAVENOUS; SUBCUTANEOUS at 04:00

## 2024-06-12 RX ADMIN — Medication 1 CAPSULE: at 08:41

## 2024-06-12 RX ADMIN — SENNOSIDES AND DOCUSATE SODIUM 2 TABLET: 50; 8.6 TABLET ORAL at 08:41

## 2024-06-12 RX ADMIN — HEPARIN 300 UNITS: 100 SYRINGE at 08:40

## 2024-06-12 RX ADMIN — SODIUM CHLORIDE, PRESERVATIVE FREE 10 ML: 5 INJECTION INTRAVENOUS at 08:41

## 2024-06-12 RX ADMIN — SENNOSIDES AND DOCUSATE SODIUM 2 TABLET: 50; 8.6 TABLET ORAL at 21:54

## 2024-06-12 RX ADMIN — HEPARIN 300 UNITS: 100 SYRINGE at 22:45

## 2024-06-12 RX ADMIN — LEVETIRACETAM 1000 MG: 500 TABLET, FILM COATED ORAL at 08:41

## 2024-06-12 RX ADMIN — LACTULOSE 20 G: 20 SOLUTION ORAL at 14:29

## 2024-06-12 RX ADMIN — LACTULOSE 20 G: 20 SOLUTION ORAL at 08:41

## 2024-06-12 RX ADMIN — ROSUVASTATIN CALCIUM 5 MG: 10 TABLET, FILM COATED ORAL at 21:54

## 2024-06-12 RX ADMIN — PANTOPRAZOLE SODIUM 40 MG: 40 TABLET, DELAYED RELEASE ORAL at 06:13

## 2024-06-12 NOTE — PROGRESS NOTES
Occupational Therapy  OCCUPATIONAL THERAPY DAILY NOTE    Date:2024  Patient Name: Conrado Leach  MRN: [id number]  : 1960  Room: [address] [place] [address]-A     Referring Practitioner: MD Juan Luis  Diagnosis: Brain mass L frontal lobe- 6/3/24- L crani abscess evacuation  Additional Pertinent Hx: hx R shoulder surgery, BPH, hx DVT, HTN, DM, HLD & seizures  Precautions: Fall Risk, R neglect, seizures, R hemiplegia & R UE edema & clear liquid diet  Discharge Recommendations: Home with 24 hour Sup/assist    Functional Assessment:   Date Status AE  Comments   Feeding 24  Min A     Grooming 24 Mod A  Seated with simple task, washing face using L UE         Oral Care 24  Max A     Bathing 24  Max A/Dep     UB Dressing 24  Max A Bed     LB Dressing 24  Dep BSC To octaviano briefs and shorts seated on BSC session including garments up over hips needing assist.    Footwear 24 maxA bed    Toileting 24  Dep  Pt having loose BM, nsg aware and assisted with hygiene, pt using BSC upon arrival, Mod A for standing balance with nsg assisted with hygiene, dep for clothing management   Homemaking   TBD       Functional Transfers / Balance:   Date Status DME  Comments   Sit Balance 24  Min A  During core dynamic sitting tasks & ADLs    Stand Balance 24  Mod/Max A  Blocking R LE as needed for stability due to weakness   [] Tub  [] Shower   Transfer   TBD     Commode   Transfer 24  Max A  BSC, completed x 2  2nd person for safety due to weakness on R side   Functional   Mobility 24  Max A Blade-cane  Completed with PTA due to level of assist, pt able to complete short household distances with w/c follow, pt required assist to advance R LE, improved with weight shifting using blade-cane   Other: bed rolling R<>L    Supine to sit     SPT      Sit <>stand       EOB<>W/C 6/11/24      6/10/24    6/12/24      6/12/24      6/10/24 Mod to  max  A      maxA    Mod/Max A      Mod A       maxA Bed rails

## 2024-06-12 NOTE — PROGRESS NOTES
PM&R Progress Note  Patient: Conrado Leach  Age/sex: 63 y.o. male  Medical Record #: [id number]  Location: 5509/5509-A  Admission date: 6/6/2024    CC: ARU follow up; brain abscess    S: Patient was seen and examined in therapy today.  No acute events reported overnight.  Appreciate general surgery recommendations regarding ileus.  This AM, had no BM with meds and suppository - repeat KUB ordered, looks similar to prior, + ileus.  This afternoon, had large watery BM out.  Patient states he is feeling better after BM.  Denies chest pain, sob, abd pain.  He does not have much of an appetite.  All pertinent labs reviewed.  Per team meeting, it was relayed to me that wife and our staff had concerns about a depressed mood.  Discussed with patient and he states he is fine and would not like any medication started for this.     No change in PFSH since H&P done on 6/7/24 unless noted above.    Meds:  Scheduled  bisacodyl, 10 mg, Daily  lactulose, 20 g, TID  sennosides-docusate sodium, 2 tablet, BID  polyethylene glycol, 17 g, BID  levETIRAcetam, 1,000 mg, BID  pantoprazole, 40 mg, QAM AC  rosuvastatin, 5 mg, Nightly  heparin flush, 3 mL, 2 times per day  lactobacillus, 1 capsule, Daily  melatonin, 5 mg, Nightly  vitamin D, 50,000 Units, Weekly  cefTRIAXone (ROCEPHIN) IV, 2,000 mg, Q12H  sodium chloride flush, 5-40 mL, BID        PRN  acetaminophen, 650 mg, Q6H PRN  [Held by provider] oxyCODONE, 5 mg, Q4H PRN   Or  [Held by provider] oxyCODONE, 10 mg, Q4H PRN  heparin flush, 3 mL, PRN  butalbital-acetaminophen-caffeine, 1 tablet, Q4H PRN  ondansetron, 4 mg, Q8H PRN  calcium carbonate, 500 mg, TID PRN  magnesium hydroxide, 30 mL, Daily PRN        O:  Vitals:    06/10/24 2045 06/11/24 0815 06/11/24 2030 06/12/24 0830   BP: (!) 141/79 (!) 154/90 (!) 144/77 131/82   Pulse: 71 78 67 71   Resp: 16 16 18 16   Temp: 97.1 °F (36.2 °C) 98.1 °F (36.7 °C) (!) 96.1 °F (35.6 °C) 97.7 °F (36.5 °C)   TempSrc: Temporal Temporal Tympanic

## 2024-06-12 NOTE — PROGRESS NOTES
Speech Language Pathology  ACUTE REHABILITATION--DAILY PROGRESS NOTE            SWALLOWING:      Diet:  Dysphagia 3, Soft/advanced (Soft & Bite-sized) solids with thin liquids (IDDSI level 0)      LANGUAGE:    Not formally addressed, appropriate response times noted.      COGNITION:      Patient seen for skilled cognitive tx. Patient alert and oriented to all concepts. Patient completed money management, attention, and flexible thinking tasks as he was a given a restaurant menu. He independently requested to glasses as he indicated he could not read without. Patient initially required prompting to orient to prices found on the right-side of the menu. When given a calculator, patient required moderate prompting to use the decimal point button, however after several trials, he independently recalled its use. He accurately included all sandwiches found at the top and bottom of the page independently and required minimal verbal prompting to generate his personal order and recall what he selected in order to determine a total.     SPEECH:    Functional, monotone inflection       Minute Tracking:    Individual minutes:     0 minutes  Concurrent minutes:    30 minutes  Co-treatment minutes:   0 minutes    Total minutes for 6/12/2024:   30  minutes    15 minute variance d/t patient transport to X-Ray.         SAFETY:      Not addressed    EDUCATION:    Educated on how ST addresses memory and cognition.     OUTCOME:    Progress made towards goals. Will continue SP intervention as per previously established POC.    SP recommended after discharge:  TBD  Supervision recommended at discharge: TBD      FIMS SCORES                           Swallowing                          Current Status             4--Minimal Assistance               Short Term Goal         5--Supervision               Long Term Goal          6--Modified Independent                                      Receptive                          Current Status

## 2024-06-12 NOTE — CARE COORDINATION
Per Team- Plan re eval next week ( 4-6 weeks). Updated patient and wife- Valeri. LTG: Mod A/ Mod I/Independent. Team recommends 24 hour hands on assistance. Valeri verbalized \"I have no freaking idea what I'm going to do. I can't stay off work forever.\"  Suggested adult  and private duty.      Dr. Mcknight to consult General Surgery for ilius. Discussed the best plan to enter/exit their home.  Valeri plans to bring Bill in the back entrance where there is (1) step (1) rail.  Once inside, she will make 1st floor accommodations to eliminate the need for him to go up/down to his 2nd floor.  Patient appears depressed and has displayed this in therapy. Patient has a R- lateral lean and neglect. Patient receives cues for technique and safety for poor carry over. Patient downgraded to clear liquids pending surgery consult.  He was on soft bite sized/ thin liquids.     Aba Pearce, CHIQUITA  6/12/2024

## 2024-06-12 NOTE — PROGRESS NOTES
Patient had suppository this morning with no results, he states he feels \"full\" and \"uncomfortable.\"

## 2024-06-12 NOTE — PROGRESS NOTES
Physical Therapy  Weekly Team Note    Supervising Therapist: Elie Segovia, PT, DPT     ROOM: 5509/5509-A  DIAGNOSIS: brain abscess   PRECAUTIONS: Falls, R hemiparesis, R UE sling, R inattention, flat affect, cognition    HPI: Pt is a 63 year old pt who presented to ER from home with complaints of right side weakness and seizure-like activity at home. NIH of 10 upon arrival. Per wife at bedside patient has been having neck and lower back pain and has been going to chiropractor.  He also was having some confusion and they thought it was a UTI and he went to his primary care physician and was prescribed Bactrim.  He went into his daily routine, went to work and returned home.  At around 2:00 when his wife came home found him in the floor, unable to move the right side, appeared that he comprehended but he was unable to speak.  He later said that he had a seizure-like activity sitting on the chair and a slide himself down to the floor, that lasted for about 5 minutes. Workup revealed two masses in the left frontal lobe with adjacent vasogenic edema. EEG positive for left fronto central seizures. Neurosurgery and neurology was consulted. On 06/03/2024 patient underwent a left craniotomy abscess evacuation by Dr. Henderson. ID consulted and placed patient on IV antibiotics. Updated MRI brain w/wo contrast on 6/3/24 showed interval surgical drainage of one of the two left frontal lobe abscesses; mild increase in size of other abscess; slight increase in vasogenic edema; 3mm left-to-right MLS; stable right meningioma.  NSGY reviewed MRI brain prior to admission to ARU with no additional recommendations.  Hospital course was complicated by seizure, weakness, hypotension.       Pt has PMHx of type 2 diabetes mellitus, HLD, BPH, PARTH, multiple orthopedic surgeries, right lower extremities DVT 2 years ago after he had spider bite, not on anticoagulation      Social:  Pt lives with wife in a 2 story home with 1 STEVEN with 1 HR,

## 2024-06-12 NOTE — PROGRESS NOTES
Occupational Therapy  OCCUPATIONAL THERAPY DAILY NOTE    Date:2024  Patient Name: Conrado Leach  MRN: [id number]  : 1960  Room: [address] [place] [address]-A     Referring Practitioner: MD Juan Luis  Diagnosis: Brain mass L frontal lobe- 6/3/24- L crani abscess evacuation  Additional Pertinent Hx: hx R shoulder surgery, BPH, hx DVT, HTN, DM, HLD & seizures  Precautions: Fall Risk, R neglect, seizures, R hemiplegia & R UE edema & clear liquid diet  Discharge Recommendations: Home with 24 hour Sup/assist    Functional Assessment:   Date Status AE  Comments   Feeding 24  Min A     Grooming 24 Max A           Oral Care 24  Max A     Bathing 24  Max A/Dep     UB Dressing 24  Max A Bed  To octaviano/doff hospital gown while in bed needing cues for blade technique and threading RUE first.    LB Dressing 24  Dep Bed  To octaviano briefs and hospital pants in bed during  pm session including garments up over hips needing assist.    Footwear 24 maxA bed To octaviano slip on shoes needing max/total assist while in bed.    Toileting 24  Dep  Pt having loose bowel issues after having enema about 12:30pm per consult with nurse in pm session needing Dep assist for toilet hygiene/clothing mgmt. Pt performed numerous bed rolling R/L sides with assist  using bed rails with cues.    Homemaking   TBD       Functional Transfers / Balance:   Date Status DME  Comments   Sit Balance 24  Min A Blade tray on w/c  Demol;d in am session during BUE AAROM exercises and using table top task with cones.    Stand Balance 6/10/24  Max A     [] Tub  [] Shower   Transfer   TBD     Commode   Transfer 24  NT- for patient safety     Functional   Mobility 24  NT     Other: bed rolling R<>L    Supine to sit     SPT      Sit <>stand       EOB<>W/C 6/11/24      6/10/24    6/7/24      6/10/24      6/10/24 Mod to  max  A      maxA    Mod/Max A x2 assist    Max A X1 & SBA x1    maxA Bed rails  Mod A to R rolling to Right side using

## 2024-06-12 NOTE — PATIENT CARE CONFERENCE
[facility]  INPATIENT ACUTE REHABILITATION  TEAM CONFERENCE NOTE/PATIENT PLAN OF CARE    The physician was present and led this team conference    Date: 2024  Admission date: 2024  Patient Name: Conrado Leach        MRN: [id number]    : 1960  (63 y.o.)  Gender: male   Rehab diagnosis/surgery with date:  Brain abscess left frontal lobe  left craniotomy with abscess evacuation 6/3/24 per Dr. Henderson  Impairment Group Code:  2.1      MEDICAL/FUNCTIONAL HISTORY/STATUS:  has a new ileus over last several days, general surgery consulted, on clear liquids at present  has right side hemiparesis and right inattention  along with cognitive deficits, right upper is in a sling, has a heavy right lateral lean    Consultations/Labs/X-rays: ID following for antibiotic management, davidaly on IV Rocephin every 12 hours for 6-8 weeks (cultures of abscess grew strep)  general surgery saw on 24-no surgical plans    MEDICATION UPDATE:    lactulose, 20 g, TID  sennosides-docusate sodium, 2 tablet, BID  polyethylene glycol, 17 g, BID  levETIRAcetam, 1,000 mg, BID  pantoprazole, 40 mg, QAM AC  rosuvastatin, 5 mg, Nightly  heparin flush, 3 mL, 2 times per day  lactobacillus, 1 capsule, Daily  melatonin, 5 mg, Nightly  vitamin D, 50,000 Units, Weekly  cefTRIAXone (ROCEPHIN) IV, 2,000 mg, Q12H  sodium chloride flush, 5-40 mL, BID    NURSING :    Bowel:   Always Continent  []   Occasionally incontinent  []   Frequently incontinent  [x]   Always incontinent  []   Not occurred  []     Bladder:   Always Continent  []    Incontinent less than daily[]   Incontinent  daily [x]   Always incontinent  []   No urine output    []   Indwelling catheter []       Toilet Hygiene:   Current level : dependent  Short term Toilet hygiene goal: max assist  Long term toilet hygiene goal:  min assist    Skin integrity: left scalp incision open to air  Pain: none    NUTRITION    Diet  clear liquids-may advance today  Liquid  consistency   thin    SOCIAL INFORMATION:  Lives with: spouse  Prior community services:  none  Home Architecture:  old farmhouse, back entry is 1 step and 1 rail, 3 steps into main living area, no rail, to 2nd floor is 7+7 with 1/2 bath first floor, full bed and bath 2nd floor   Prior Level of function:  independent, was working  DME:  none    FAMILY / PATIENT EDUCATION:  safety and self care are ongoing with patient    PHYSICAL THERAPY    Bed mobility:   Current level: mod assist, needs  help with trunk  Short term bed mobility goal: standby assist.  Long term bed mobility goal: independent.    Chair/bed transfers:  Current level: max-mod assist for pivot, transfer board transfers are mod assist, from the wheelchair to the hospital bed  Short term Chair/bed transfers goal: min assist.  Long term Chair/bed transfers goal: supervision.      Ambulation:   Current level: 35 ft with a left hemicane at max assist, tone present in right quad but not interfering with therapy at this time  Short term ambulation goal: 150 ft at min assist.  Long term ambulation goal: 300 ft at supervision.    Wheelchair Mobility:  Current level: 35 ft using left side at mod assist  Short term wheelchair goal: met  Long term wheelchair goal: 300 ft at supervision.      Car transfers:   Current level: to be assessed.    Stairs:   Current level : to be assessed.    Lower Extremity Strength Issues:    L LE: 5/5 all major muscle groups      R LE: 0/5 hip flexion, extension, knee flexion, extension, ankle DF, PF        OCCUPATIONAL THERAPY:      Tub/shower:   Current level: to be assessed.      Feeding:  Current level: min assist, right neglect hinders  Short term feeding goal: supervision.  Long term feeding goal: Modified independent.    Grooming:   Current level: max assist while seated  Short term grooming goal: mod assist.  Long term grooming goal: supervision.    Bathing:  Current level: max--dependent for a sponge bath  Short term bathing

## 2024-06-12 NOTE — PLAN OF CARE
Problem: Safety - Adult  Goal: Free from fall injury  6/11/2024 2301 by Kat Rhodes, RN  Outcome: Progressing  6/11/2024 0940 by Kimberli Jacques RN  Outcome: Progressing  Flowsheets (Taken 6/11/2024 0938)  Free From Fall Injury: Instruct family/caregiver on patient safety     Problem: Discharge Planning  Goal: Discharge to home or other facility with appropriate resources  6/11/2024 0940 by Kimberli Jacques, RN  Outcome: Progressing     Problem: Skin/Tissue Integrity  Goal: Absence of new skin breakdown  Description: 1.  Monitor for areas of redness and/or skin breakdown  2.  Assess vascular access sites hourly  3.  Every 4-6 hours minimum:  Change oxygen saturation probe site  4.  Every 4-6 hours:  If on nasal continuous positive airway pressure, respiratory therapy assess nares and determine need for appliance change or resting period.  Outcome: Progressing     Problem: ABCDS Injury Assessment  Goal: Absence of physical injury  Outcome: Progressing     Problem: Pain  Goal: Verbalizes/displays adequate comfort level or baseline comfort level  6/11/2024 0940 by Kimberli Jacques, RN  Outcome: Progressing

## 2024-06-12 NOTE — PROGRESS NOTES
Physical Therapy  Daily Treatment Note   Evaluating Therapist: Mindy Canchola, PT, DPT [id number]  Supervising therapist : Elie Segovia PT   ROOM: 5509/5509-A  DIAGNOSIS: brain abscess   PRECAUTIONS: Falls, R hemiparesis, R UE sling, R inattention, flat affect, cognition    HPI: Pt is a 63 year old pt who presented to ER from home with complaints of right side weakness and seizure-like activity at home. NIH of 10 upon arrival. Per wife at bedside patient has been having neck and lower back pain and has been going to chiropractor.  He also was having some confusion and they thought it was a UTI and he went to his primary care physician and was prescribed Bactrim.  He went into his daily routine, went to work and returned home.  At around 2:00 when his wife came home found him in the floor, unable to move the right side, appeared that he comprehended but he was unable to speak.  He later said that he had a seizure-like activity sitting on the chair and a slide himself down to the floor, that lasted for about 5 minutes. Workup revealed two masses in the left frontal lobe with adjacent vasogenic edema. EEG positive for left fronto central seizures. Neurosurgery and neurology was consulted. On 06/03/2024 patient underwent a left craniotomy abscess evacuation by Dr. Henderson. ID consulted and placed patient on IV antibiotics. Updated MRI brain w/wo contrast on 6/3/24 showed interval surgical drainage of one of the two left frontal lobe abscesses; mild increase in size of other abscess; slight increase in vasogenic edema; 3mm left-to-right MLS; stable right meningioma.  NSGY reviewed MRI brain prior to admission to ARU with no additional recommendations.  Hospital course was complicated by seizure, weakness, hypotension.       Pt has PMHx of type 2 diabetes mellitus, HLD, BPH, PARTH, multiple orthopedic surgeries, right lower extremities DVT 2 years ago after he had spider bite, not on anticoagulation      Social:  Pt lives with  [id number]  Time out: 1515     Pt is making good progress toward established Physical Therapy goals.  Continue with physical therapy current plan of care.    Beth Lombardo [id number]

## 2024-06-12 NOTE — PLAN OF CARE
Problem: Safety - Adult  Goal: Free from fall injury  6/12/2024 0952 by Kimberli Jacques RN  Outcome: Progressing  6/11/2024 2301 by Kat Rhodes RN  Outcome: Progressing     Problem: Discharge Planning  Goal: Discharge to home or other facility with appropriate resources  Outcome: Progressing     Problem: Skin/Tissue Integrity  Goal: Absence of new skin breakdown  Description: 1.  Monitor for areas of redness and/or skin breakdown  2.  Assess vascular access sites hourly  3.  Every 4-6 hours minimum:  Change oxygen saturation probe site  4.  Every 4-6 hours:  If on nasal continuous positive airway pressure, respiratory therapy assess nares and determine need for appliance change or resting period.  6/11/2024 2301 by Kat Rhodes RN  Outcome: Progressing     Problem: ABCDS Injury Assessment  Goal: Absence of physical injury  6/11/2024 2301 by Kat Rhodes RN  Outcome: Progressing     Problem: Pain  Goal: Verbalizes/displays adequate comfort level or baseline comfort level  Outcome: Progressing

## 2024-06-13 PROCEDURE — 97130 THER IVNTJ EA ADDL 15 MIN: CPT | Performed by: SPEECH-LANGUAGE PATHOLOGIST

## 2024-06-13 PROCEDURE — 97129 THER IVNTJ 1ST 15 MIN: CPT | Performed by: SPEECH-LANGUAGE PATHOLOGIST

## 2024-06-13 PROCEDURE — 97110 THERAPEUTIC EXERCISES: CPT

## 2024-06-13 PROCEDURE — 6370000000 HC RX 637 (ALT 250 FOR IP): Performed by: STUDENT IN AN ORGANIZED HEALTH CARE EDUCATION/TRAINING PROGRAM

## 2024-06-13 PROCEDURE — 6360000002 HC RX W HCPCS: Performed by: STUDENT IN AN ORGANIZED HEALTH CARE EDUCATION/TRAINING PROGRAM

## 2024-06-13 PROCEDURE — 99233 SBSQ HOSP IP/OBS HIGH 50: CPT | Performed by: PHYSICAL MEDICINE & REHABILITATION

## 2024-06-13 PROCEDURE — 2580000003 HC RX 258: Performed by: PHYSICAL MEDICINE & REHABILITATION

## 2024-06-13 PROCEDURE — 6360000002 HC RX W HCPCS: Performed by: PHYSICAL MEDICINE & REHABILITATION

## 2024-06-13 PROCEDURE — 97530 THERAPEUTIC ACTIVITIES: CPT

## 2024-06-13 PROCEDURE — 6370000000 HC RX 637 (ALT 250 FOR IP): Performed by: PHYSICAL MEDICINE & REHABILITATION

## 2024-06-13 PROCEDURE — 1280000000 HC REHAB R&B

## 2024-06-13 RX ORDER — TAMSULOSIN HYDROCHLORIDE 0.4 MG/1
0.4 CAPSULE ORAL DAILY
Status: DISCONTINUED | OUTPATIENT
Start: 2024-06-13 | End: 2024-07-05 | Stop reason: HOSPADM

## 2024-06-13 RX ORDER — BETHANECHOL CHLORIDE 5 MG
10 TABLET ORAL 3 TIMES DAILY
Status: DISCONTINUED | OUTPATIENT
Start: 2024-06-13 | End: 2024-07-05 | Stop reason: HOSPADM

## 2024-06-13 RX ADMIN — POLYETHYLENE GLYCOL 3350 17 GRAM ORAL POWDER PACKET 17 G: at 20:40

## 2024-06-13 RX ADMIN — LEVETIRACETAM 1000 MG: 500 TABLET, FILM COATED ORAL at 20:33

## 2024-06-13 RX ADMIN — HEPARIN 300 UNITS: 100 SYRINGE at 09:40

## 2024-06-13 RX ADMIN — SODIUM CHLORIDE, PRESERVATIVE FREE 10 ML: 5 INJECTION INTRAVENOUS at 09:40

## 2024-06-13 RX ADMIN — LACTULOSE 20 G: 20 SOLUTION ORAL at 16:18

## 2024-06-13 RX ADMIN — PANTOPRAZOLE SODIUM 40 MG: 40 TABLET, DELAYED RELEASE ORAL at 07:04

## 2024-06-13 RX ADMIN — TAMSULOSIN HYDROCHLORIDE 0.4 MG: 0.4 CAPSULE ORAL at 09:39

## 2024-06-13 RX ADMIN — ROSUVASTATIN CALCIUM 5 MG: 10 TABLET, FILM COATED ORAL at 20:33

## 2024-06-13 RX ADMIN — Medication 1 CAPSULE: at 09:39

## 2024-06-13 RX ADMIN — WATER 2000 MG: 1 INJECTION INTRAMUSCULAR; INTRAVENOUS; SUBCUTANEOUS at 04:12

## 2024-06-13 RX ADMIN — Medication 5 MG: at 20:33

## 2024-06-13 RX ADMIN — WATER 2000 MG: 1 INJECTION INTRAMUSCULAR; INTRAVENOUS; SUBCUTANEOUS at 16:18

## 2024-06-13 RX ADMIN — BETHANECHOL CHLORIDE 10 MG: 5 TABLET ORAL at 16:18

## 2024-06-13 RX ADMIN — SENNOSIDES AND DOCUSATE SODIUM 2 TABLET: 50; 8.6 TABLET ORAL at 09:39

## 2024-06-13 RX ADMIN — BISACODYL 10 MG: 10 SUPPOSITORY RECTAL at 12:21

## 2024-06-13 RX ADMIN — LACTULOSE 20 G: 20 SOLUTION ORAL at 20:33

## 2024-06-13 RX ADMIN — LEVETIRACETAM 1000 MG: 500 TABLET, FILM COATED ORAL at 09:39

## 2024-06-13 RX ADMIN — BETHANECHOL CHLORIDE 10 MG: 5 TABLET ORAL at 20:33

## 2024-06-13 RX ADMIN — SENNOSIDES AND DOCUSATE SODIUM 2 TABLET: 50; 8.6 TABLET ORAL at 20:33

## 2024-06-13 RX ADMIN — POLYETHYLENE GLYCOL 3350 17 GRAM ORAL POWDER PACKET 17 G: at 09:39

## 2024-06-13 RX ADMIN — LACTULOSE 20 G: 20 SOLUTION ORAL at 09:39

## 2024-06-13 RX ADMIN — BETHANECHOL CHLORIDE 10 MG: 5 TABLET ORAL at 09:39

## 2024-06-13 ASSESSMENT — PAIN SCALES - GENERAL: PAINLEVEL_OUTOF10: 0

## 2024-06-13 NOTE — PROGRESS NOTES
Comprehensive Nutrition Assessment    Type and Reason for Visit:  Initial, RD Nutrition Re-Screen/LOS    Nutrition Recommendations/Plan:   Continue current diet regimen ; ADAT, pt on CLD  Will monitor     Malnutrition Assessment:  Malnutrition Status:  At risk for malnutrition (Comment) (06/13/24 0900)    Context:  Acute Illness     Findings of the 6 clinical characteristics of malnutrition:  Energy Intake:  Mild decrease in energy intake (Comment)  Weight Loss:  Unable to assess (d/t lack of wt hx per EMR)     Body Fat Loss:  No significant body fat loss     Muscle Mass Loss:  No significant muscle mass loss    Fluid Accumulation:  No significant fluid accumulation     Strength:  Not Performed    Nutrition Assessment:    pt adm for therapy 2/2 brain abscess/AMS s/p crani/abscess evac 6/3; PMhx of DVT,DM; repeat KUB shows ileus- pt on CLD; pt tolerating therapy; pt reports poor appetite; ADAT ; continue current diet regimen; will monitor.    Nutrition Related Findings:    hyperglycemia; -I/O; A&Ox4; active BS; abd distention; trace edema Wound Type: Surgical Incision       Current Nutrition Intake & Therapies:    Average Meal Intake: 51-75%  Average Supplements Intake: Unable to assess  ADULT ORAL NUTRITION SUPPLEMENT; Breakfast, Dinner; Wound Healing Oral Supplement  ADULT ORAL NUTRITION SUPPLEMENT; Breakfast, Lunch; Fortified Gelatin Oral Supplement  ADULT DIET; Clear Liquid    Anthropometric Measures:  Height: 180.3 cm (5' 10.98\")  Ideal Body Weight (IBW): 172 lbs (78 kg)       Current Body Weight: 98.4 kg (216 lb 14.9 oz) (6/4-BS), 126.1 % IBW. Weight Source: Bed Scale  Current BMI (kg/m2): 30.3  Usual Body Weight:  (UTO d/t lack of wt hx per EMR)     Weight Adjustment For: No Adjustment                 BMI Categories: Obese Class 1 (BMI 30.0-34.9)    Estimated Daily Nutrient Needs:  Energy Requirements Based On: Formula  Weight Used for Energy Requirements: Current  Energy (kcal/day): 3739-3083  Weight Used

## 2024-06-13 NOTE — PROGRESS NOTES
Physical Therapy  Daily Treatment Note   Evaluating Therapist: Mindy Canchola, PT, DPT [id number]  Supervising therapist : Elie Segovia PT   ROOM: 5509/5509-A  DIAGNOSIS: brain abscess   PRECAUTIONS: Falls, R hemiparesis, R UE sling, R inattention, flat affect, cognition    HPI: Pt is a 63 year old pt who presented to ER from home with complaints of right side weakness and seizure-like activity at home. NIH of 10 upon arrival. Per wife at bedside patient has been having neck and lower back pain and has been going to chiropractor.  He also was having some confusion and they thought it was a UTI and he went to his primary care physician and was prescribed Bactrim.  He went into his daily routine, went to work and returned home.  At around 2:00 when his wife came home found him in the floor, unable to move the right side, appeared that he comprehended but he was unable to speak.  He later said that he had a seizure-like activity sitting on the chair and a slide himself down to the floor, that lasted for about 5 minutes. Workup revealed two masses in the left frontal lobe with adjacent vasogenic edema. EEG positive for left fronto central seizures. Neurosurgery and neurology was consulted. On 06/03/2024 patient underwent a left craniotomy abscess evacuation by Dr. Henderson. ID consulted and placed patient on IV antibiotics. Updated MRI brain w/wo contrast on 6/3/24 showed interval surgical drainage of one of the two left frontal lobe abscesses; mild increase in size of other abscess; slight increase in vasogenic edema; 3mm left-to-right MLS; stable right meningioma.  NSGY reviewed MRI brain prior to admission to ARU with no additional recommendations.  Hospital course was complicated by seizure, weakness, hypotension.       Pt has PMHx of type 2 diabetes mellitus, HLD, BPH, PARTH, multiple orthopedic surgeries, right lower extremities DVT 2 years ago after he had spider bite, not on anticoagulation      Social:  Pt lives with

## 2024-06-13 NOTE — PROGRESS NOTES
PM&R Progress Note  Patient: Conrado Leach  Age/sex: 63 y.o. male  Medical Record #: [id number]  Location: 5509/5509-A  Admission date: 6/6/2024    CC: ARU follow up; brain abscess    S: Patient was seen and examined in his room today.  Daughter ([facility] nurse) present and questions answered.  Will repeat CTH today to re-eval brain edema/abscess.  No acute events reported overnight.  Large liquid BM yesterday nothing yet today, + distended.  Denies chest pain, sob, abd pain.  Has appetite, wants to trial solids.  All pertinent labs reviewed.  Asking about recovery - unfortunately unable to predict but will continue therapies for best possible outcomes.      Yesterday, patient having difficulty urinating.  Limon inserted.  UA looks clean, culture pending.      No change in PFSH since H&P done on 6/7/24 unless noted above.    Meds:  Scheduled  tamsulosin, 0.4 mg, Daily  bethanechol, 10 mg, TID  bisacodyl, 10 mg, Daily  lactulose, 20 g, TID  sennosides-docusate sodium, 2 tablet, BID  polyethylene glycol, 17 g, BID  levETIRAcetam, 1,000 mg, BID  pantoprazole, 40 mg, QAM AC  rosuvastatin, 5 mg, Nightly  heparin flush, 3 mL, 2 times per day  lactobacillus, 1 capsule, Daily  melatonin, 5 mg, Nightly  vitamin D, 50,000 Units, Weekly  cefTRIAXone (ROCEPHIN) IV, 2,000 mg, Q12H  sodium chloride flush, 5-40 mL, BID        PRN  acetaminophen, 650 mg, Q6H PRN  [Held by provider] oxyCODONE, 5 mg, Q4H PRN   Or  [Held by provider] oxyCODONE, 10 mg, Q4H PRN  heparin flush, 3 mL, PRN  butalbital-acetaminophen-caffeine, 1 tablet, Q4H PRN  ondansetron, 4 mg, Q8H PRN  calcium carbonate, 500 mg, TID PRN  magnesium hydroxide, 30 mL, Daily PRN        O:  Vitals:    06/12/24 0830 06/12/24 1913 06/13/24 0800 06/13/24 0855   BP: 131/82 122/68 117/71    Pulse: 71 58 67    Resp: 16 18 16    Temp: 97.7 °F (36.5 °C) 97.5 °F (36.4 °C) 97.5 °F (36.4 °C)    TempSrc: Temporal Temporal Temporal    SpO2: 100% 97% 97%    Weight:       Height:    1.803 m

## 2024-06-13 NOTE — PROGRESS NOTES
Speech Language Pathology  ACUTE REHABILITATION--DAILY PROGRESS NOTE            SWALLOWING:      Diet:  Dysphagia 3, Soft/advanced (Soft & Bite-sized) solids with thin liquids (IDDSI level 0)     Pt currently on clear liquids per physician order.       LANGUAGE:    Not formally addressed, appropriate response times noted. Patient was more willing to engage in conversation with SLP than previous, however, responses continued to be short. Following additional SLP questions, patient did expand on his utterances.      COGNITION:      Patient seen for skilled cognitive tx. Patient alert and oriented to all concepts. Patient reported beginning to feel weak due to his clear liquid diet. Patient was given a medication label and answered 7/10 questions accurately. Patient required an initial prompt to orient to the warning label as he focused on the questions.     Patient was given a TV schedule and answered corresponding questions with 100% accuracy. Patient self-corrected himself regarding how many days the schedule covered.     Patient listened to voice mails and answered 8/10 inferential questions accurately when given a choice of 3 options, indicating fair attention / orientation and flexible thinking to relevant information / details.     Patient seems hesitant at times to ask for help when faced with a problem-solving situation. He reported needed his glasses cleaned which was completed. He also stated he could not change the channel  on his TV, but denied further assistance.       SPEECH:    Functional, monotone inflection       Minute Tracking:    Individual minutes:     45 minutes  Concurrent minutes:    0 minutes  Co-treatment minutes:   0 minutes    Total minutes for 6/13/2024:   45  minutes        SAFETY:      Not addressed    EDUCATION:    Educated on how ST addresses memory and cognition.     OUTCOME:    Progress made towards goals. Will continue SP intervention as per previously established POC.    SP

## 2024-06-13 NOTE — PROGRESS NOTES
Department of Internal Medicine  Infectious Diseases  Progress  Note      C/C :   Brain abscess s/p craniotomy and drainage  ( 6/3)     Denies fever or chills  Denies headache   Denies  nausea, vomiting   Reports weakness right side     Afebrile     Current Facility-Administered Medications   Medication Dose Route Frequency Provider Last Rate Last Admin    tamsulosin (FLOMAX) capsule 0.4 mg  0.4 mg Oral Daily BelKendra moorect L, DO   0.4 mg at 06/13/24 0939    bethanechol (URECHOLINE) tablet 10 mg  10 mg Oral TID Kendra Mcknightct L, DO   10 mg at 06/13/24 0939    bisacodyl (DULCOLAX) suppository 10 mg  10 mg Rectal Daily GianfrRadha rucker C, DO   10 mg at 06/13/24 1221    lactulose (CHRONULAC) 10 GM/15ML solution 20 g  20 g Oral TID Kendra Mcknightct L, DO   20 g at 06/13/24 0939    sennosides-docusate sodium (SENOKOT-S) 8.6-50 MG tablet 2 tablet  2 tablet Oral BID Venkat Mcknightdict L, DO   2 tablet at 06/13/24 0939    polyethylene glycol (GLYCOLAX) packet 17 g  17 g Oral BID Kendra Mcknightct L, DO   17 g at 06/13/24 0939    levETIRAcetam (KEPPRA) tablet 1,000 mg  1,000 mg Oral BID Venkat Mcknightdict L, DO   1,000 mg at 06/13/24 0939    acetaminophen (TYLENOL) tablet 650 mg  650 mg Oral Q6H PRN Marek Mcknight L, DO        pantoprazole (PROTONIX) tablet 40 mg  40 mg Oral QAM AC Milanes Marino, Maria, MD   40 mg at 06/13/24 0704    rosuvastatin (CRESTOR) tablet 5 mg  5 mg Oral Nightly Milanes Marino, Maria, MD   5 mg at 06/12/24 2154    [Held by provider] oxyCODONE (ROXICODONE) immediate release tablet 5 mg  5 mg Oral Q4H PRN Milanes Marino, Maria, MD   5 mg at 06/09/24 2100    Or    [Held by provider] oxyCODONE HCl (OXY-IR) immediate release tablet 10 mg  10 mg Oral Q4H PRN Milanes Marino, Maria, MD   10 mg at 06/08/24 1794    heparin (PF) 100 UNIT/ML injection 300 Units  3 mL IntraVENous PRN Milanes Marino, Maria, MD        butalbital-acetaminophen-caffeine (FIORICET, ESGIC) per tablet 1 tablet  1 tablet Oral Q4H

## 2024-06-13 NOTE — PROGRESS NOTES
6/12/24  Max A     Functional   Mobility 6/12/24  Max A Blade-cane     Other: bed rolling R<>L    Supine to sit         SPT          Sit <>stand       Gym mat <>W/C 6/11/24 6/13/24 6/13/24 6/13/24 6/13/24 Mod to  max  A      Mod A        Max A          Mod A       maxA Bed rails       Gym mat         Blade-cane  Side by side         blade-cane      Blade cane  .     PM: Pt needed cues & assist with LLE mgmt/ LUE placement on mat versus pulling up on therapist during supine to sit with RUE in sling     PM: SPT completed x2 with collaboration with PTA for safety due to  patient's  RLE weakness and unable to move RLE to take steps or slide on floor.   PM: Pt engaged in sit to stand transfers w/c<>blade cane x2 reps with assist from PTA for patient safety and balance.      PM: celina'susanna in pm session        Functional Exercises / Activity:  AM Session: Therapist completed R UE PROM/AAROM exercises on furniture slider using incline wedge needing Max/Dep assist and cues for follow thru.  Pt tolerated 1# wt on RUE/LUE during AAROM exercises to increase ROM/strength and muscular input for RUE to increase active movement.  Pt tolerated 3-4  reps of 10 ea   Max/Dep assist with RUE on skate using figure 8 board wearing 1# wt for muscle input with cues tolerating 3-4 reps of 10 ea for shoulder abduction/adduction and scapula protraction/retraction along with elbow flexion/extension.       PM Session: See above for pm session in regards to SPT, sit<>stand and seat level trunk control/core strength exercises.        Sensory / Neuromuscular Re-Education:      Cognitive Skills:follows 1 step commands/decreased STM   Status Comments   Problem   Solving Poor +/fair-    Memory Poor+/fair-    Sequencing Poor+/fair-    Safety Poor+/fair-      Visual Perception:  R neglect     Education:  Pt educated BUE SROM exercises using LUE to assist RUE to increase RUE ROM/muscle input to promote functional movement and use.   Pt  education is ongoing.     [] Family teach completed on:    Pain Level: none reported.     Additional Notes:       Patient has made fair  progress during treatment sessions toward set goals. Therapy emphasis to obtain goals:  Current Treatment Recommendations: Strengthening, Coordination training, ROM, Balance training, Self-Care / ADL, Functional mobility training, Safety education & training, Home management training, Endurance training, Patient/Caregiver education & training, Gait training, Neuromuscular re-education, Equipment evaluation, education, & procurement, Positioning   Goals  Long Term Goals  Time Frame for Long term goals : 6 weeks to address above problem areas  Long Term Goal 1: Pt demo  independent to eat all meals  Long Term Goal 2: Pt demo  Sup grooming seated  @ sink level & demo G- safety  Long Term Goal 3: Pt demo Min A to bathe @ shower level  & or sponge bathing both seated & standing & demo G- safety & insight  Long Term Goal 4: Pt demo Sup UE & Min A LE dress to don depends &  pants; Mod A to don socks & shoes & demo G- safety & insight  Long Term Goal 5: Pt demo Min A commode trf using appropriate DME to ensure pt safety. Pt demo Min A toileting & demo G- safety & insight  Long Term Goal 6: Pt demo Min A tub trf  using appropriate DME to esnure pt safety & pt demo G- safety & insight  Long Term Goal 7: Pt demo Mod A light homemaking activity & demo G- safety & insight  Long Term Goal 8: Pt demo G- endurance for a 45-30 minute functional activity  Long Term Goal 9: Pt will tolerate PROM/AAROM/AROM RUE in all planes to facilitate functional movement & pt ability to functionally use his RUE  Long Term Goal 10: Pt demo G- safety, insight & reasoning with min vc's during all ADLs, transfers, mobility & IADLs.   Patient goals : \"Everything.\"  6/12/24-Pt POC & goals are updated on this date. Pt lives with his wife 2 dogs- farm house- to enter through back & has to walk through grass & 1 STEVEN 1HR &

## 2024-06-13 NOTE — PROGRESS NOTES
Patient had two large liquid bowel movements, RN notified Dr. Mcknight that patient stated he wished to not have the soap suds enema at this time. Dr. Mcknight approved of skipping the enema. Patient still took all oral bowel medications

## 2024-06-14 ENCOUNTER — TELEPHONE (OUTPATIENT)
Dept: NEUROSURGERY | Age: 64
End: 2024-06-14

## 2024-06-14 ENCOUNTER — APPOINTMENT (OUTPATIENT)
Dept: CT IMAGING | Age: 64
End: 2024-06-14
Attending: PHYSICAL MEDICINE & REHABILITATION
Payer: COMMERCIAL

## 2024-06-14 LAB
ANION GAP SERPL CALCULATED.3IONS-SCNC: 11 MMOL/L (ref 7–16)
BUN SERPL-MCNC: 10 MG/DL (ref 6–23)
CALCIUM SERPL-MCNC: 9.1 MG/DL (ref 8.6–10.2)
CHLORIDE SERPL-SCNC: 97 MMOL/L (ref 98–107)
CO2 SERPL-SCNC: 27 MMOL/L (ref 22–29)
CREAT SERPL-MCNC: 0.8 MG/DL (ref 0.7–1.2)
ERYTHROCYTE [DISTWIDTH] IN BLOOD BY AUTOMATED COUNT: 14.5 % (ref 11.5–15)
GFR, ESTIMATED: >90 ML/MIN/1.73M2
GLUCOSE SERPL-MCNC: 126 MG/DL (ref 74–99)
HCT VFR BLD AUTO: 38.9 % (ref 37–54)
HGB BLD-MCNC: 13.1 G/DL (ref 12.5–16.5)
MCH RBC QN AUTO: 27.6 PG (ref 26–35)
MCHC RBC AUTO-ENTMCNC: 33.7 G/DL (ref 32–34.5)
MCV RBC AUTO: 81.9 FL (ref 80–99.9)
MICROORGANISM SPEC CULT: NO GROWTH
PLATELET # BLD AUTO: 183 K/UL (ref 130–450)
PMV BLD AUTO: 9.1 FL (ref 7–12)
POTASSIUM SERPL-SCNC: 3.9 MMOL/L (ref 3.5–5)
RBC # BLD AUTO: 4.75 M/UL (ref 3.8–5.8)
SERVICE CMNT-IMP: NORMAL
SODIUM SERPL-SCNC: 135 MMOL/L (ref 132–146)
SPECIMEN DESCRIPTION: NORMAL
WBC OTHER # BLD: 7.7 K/UL (ref 4.5–11.5)

## 2024-06-14 PROCEDURE — 80048 BASIC METABOLIC PNL TOTAL CA: CPT

## 2024-06-14 PROCEDURE — 2580000003 HC RX 258: Performed by: PHYSICAL MEDICINE & REHABILITATION

## 2024-06-14 PROCEDURE — 6370000000 HC RX 637 (ALT 250 FOR IP): Performed by: STUDENT IN AN ORGANIZED HEALTH CARE EDUCATION/TRAINING PROGRAM

## 2024-06-14 PROCEDURE — 6360000002 HC RX W HCPCS: Performed by: PHYSICAL MEDICINE & REHABILITATION

## 2024-06-14 PROCEDURE — 97129 THER IVNTJ 1ST 15 MIN: CPT

## 2024-06-14 PROCEDURE — 99233 SBSQ HOSP IP/OBS HIGH 50: CPT | Performed by: PHYSICAL MEDICINE & REHABILITATION

## 2024-06-14 PROCEDURE — 97530 THERAPEUTIC ACTIVITIES: CPT

## 2024-06-14 PROCEDURE — 97110 THERAPEUTIC EXERCISES: CPT

## 2024-06-14 PROCEDURE — 6370000000 HC RX 637 (ALT 250 FOR IP): Performed by: PHYSICAL MEDICINE & REHABILITATION

## 2024-06-14 PROCEDURE — 1280000000 HC REHAB R&B

## 2024-06-14 PROCEDURE — 97130 THER IVNTJ EA ADDL 15 MIN: CPT

## 2024-06-14 PROCEDURE — 70450 CT HEAD/BRAIN W/O DYE: CPT

## 2024-06-14 PROCEDURE — 6360000002 HC RX W HCPCS: Performed by: STUDENT IN AN ORGANIZED HEALTH CARE EDUCATION/TRAINING PROGRAM

## 2024-06-14 PROCEDURE — 85027 COMPLETE CBC AUTOMATED: CPT

## 2024-06-14 RX ADMIN — SENNOSIDES AND DOCUSATE SODIUM 2 TABLET: 50; 8.6 TABLET ORAL at 09:48

## 2024-06-14 RX ADMIN — WATER 2000 MG: 1 INJECTION INTRAMUSCULAR; INTRAVENOUS; SUBCUTANEOUS at 16:34

## 2024-06-14 RX ADMIN — ROSUVASTATIN CALCIUM 5 MG: 10 TABLET, FILM COATED ORAL at 21:38

## 2024-06-14 RX ADMIN — LEVETIRACETAM 1000 MG: 500 TABLET, FILM COATED ORAL at 09:48

## 2024-06-14 RX ADMIN — WATER 2000 MG: 1 INJECTION INTRAMUSCULAR; INTRAVENOUS; SUBCUTANEOUS at 04:17

## 2024-06-14 RX ADMIN — LEVETIRACETAM 1000 MG: 500 TABLET, FILM COATED ORAL at 21:38

## 2024-06-14 RX ADMIN — SODIUM CHLORIDE, PRESERVATIVE FREE 10 ML: 5 INJECTION INTRAVENOUS at 16:54

## 2024-06-14 RX ADMIN — PANTOPRAZOLE SODIUM 40 MG: 40 TABLET, DELAYED RELEASE ORAL at 07:00

## 2024-06-14 RX ADMIN — POLYETHYLENE GLYCOL 3350 17 GRAM ORAL POWDER PACKET 17 G: at 09:49

## 2024-06-14 RX ADMIN — SENNOSIDES AND DOCUSATE SODIUM 2 TABLET: 50; 8.6 TABLET ORAL at 21:38

## 2024-06-14 RX ADMIN — Medication 5 MG: at 21:38

## 2024-06-14 RX ADMIN — HEPARIN 300 UNITS: 100 SYRINGE at 04:18

## 2024-06-14 RX ADMIN — SODIUM CHLORIDE, PRESERVATIVE FREE 10 ML: 5 INJECTION INTRAVENOUS at 04:26

## 2024-06-14 RX ADMIN — BETHANECHOL CHLORIDE 10 MG: 5 TABLET ORAL at 09:48

## 2024-06-14 RX ADMIN — HEPARIN 300 UNITS: 100 SYRINGE at 09:49

## 2024-06-14 RX ADMIN — BETHANECHOL CHLORIDE 10 MG: 5 TABLET ORAL at 21:38

## 2024-06-14 RX ADMIN — SODIUM CHLORIDE, PRESERVATIVE FREE 10 ML: 5 INJECTION INTRAVENOUS at 04:18

## 2024-06-14 RX ADMIN — LACTULOSE 20 G: 20 SOLUTION ORAL at 13:10

## 2024-06-14 RX ADMIN — HEPARIN 300 UNITS: 100 SYRINGE at 21:42

## 2024-06-14 RX ADMIN — TAMSULOSIN HYDROCHLORIDE 0.4 MG: 0.4 CAPSULE ORAL at 09:48

## 2024-06-14 RX ADMIN — BISACODYL 10 MG: 10 SUPPOSITORY RECTAL at 18:12

## 2024-06-14 RX ADMIN — ERGOCALCIFEROL 50000 UNITS: 1.25 CAPSULE ORAL at 09:48

## 2024-06-14 RX ADMIN — BETHANECHOL CHLORIDE 10 MG: 5 TABLET ORAL at 13:09

## 2024-06-14 ASSESSMENT — PAIN SCALES - GENERAL
PAINLEVEL_OUTOF10: 0
PAINLEVEL_OUTOF10: 0

## 2024-06-14 NOTE — PROGRESS NOTES
Spoke with spouse via phone and notified her that Mckenzie approved another week.  Next update due 6/20/24.  Voiced understanding.

## 2024-06-14 NOTE — PROGRESS NOTES
recommendations.  Hospital course was complicated by seizure, weakness, hypotension.  Patient was admitted to [facility] ARU on 6/6/2024.     Patient has impairments in:  Demonstrating impaired cognition, impaired communication, impaired strength, impaired coordination, impaired ROM, impaired balance, impaired safety awareness, decreased endurance.    PLAN:  I. Rehabilitation - PT, OT, ST, Rehab Nursing    II. Medical management:  # Neuro - left frontal brain abscess x2     # S/p left craniotomy with abscess evacuation on 6/3/24 with Dr. Henderson  - MRI brain w/wo contrast on 6/3/24 showed interval surgical drainage of one of the two left frontal lobe abscesses; mild increase in size of other abscess; slight increase in vasogenic edema; 3mm left-to-right MLS; stable right meningioma  - Re-consult NSGY to follow on ARU for continuity of care, right flaccid UE/LE, new MRI results.   - Keppra 1000 mg BID  - IV antibiotics, per ID  - Completed decadron taper.  Can consider another course, if indicated.   - Spasticity: none present   - PRECAUTION: FALLS, seizure    - PT/OT  - Post-discharge, follow with PCP, NSGY, Neurology, ID  - 6/13: Repeat CTH to follow edema and abscesses, pending.    - Staples can come out 6/17.     # ileus  - Bowel rest.  increase diet.    - Continue Miralax BID, senokot-S BID, lactulose tid.    - Mobilization  - Hold opioids  - Appreciate general surgery recs  - Repeat KUB tomorrow.      # CVS - HLD  - Crestor 5 mg nightly  - Monitor daily and adjust as needed                # Pulm - no acute issues  # Endocrine - no acute issues     # Pain control   - Acetaminophen 650 mg q6H PRN for mild pain, oxycodone 5-10 mg q4PRN for moderate-severe pain (hold)     # DVT prophylaxis - SCDs      # GI/bowel/FEN -   - Diet: soft and bite-sized, thin liquids  - Bowel program: Polyethylene glycol BID, Senokot-S 2 tabs BID, MoM PRN  - GI prophylaxis: Protonix 40 mg daily  - Given high prevalence of

## 2024-06-14 NOTE — TELEPHONE ENCOUNTER
Informed wife that patient will have staples removed while admitted to acute rehab. She verbalized understanding and is aware of his upcoming post-op appts. She also states that he has no movement on his right side and is experiencing incontinence as well. She requested for me to document this in case it was a cause for concern.

## 2024-06-14 NOTE — PROGRESS NOTES
Physical Therapy  Daily Treatment Note   Evaluating Therapist: Mindy Canchola, PT, DPT [id number]  Supervising therapist : Elie Segovia PT   ROOM: 5509/5509-A  DIAGNOSIS: brain abscess   PRECAUTIONS: Falls, R hemiparesis, R UE sling, R inattention, flat affect, cognition    HPI: Pt is a 63 year old pt who presented to ER from home with complaints of right side weakness and seizure-like activity at home. NIH of 10 upon arrival. Per wife at bedside patient has been having neck and lower back pain and has been going to chiropractor.  He also was having some confusion and they thought it was a UTI and he went to his primary care physician and was prescribed Bactrim.  He went into his daily routine, went to work and returned home.  At around 2:00 when his wife came home found him in the floor, unable to move the right side, appeared that he comprehended but he was unable to speak.  He later said that he had a seizure-like activity sitting on the chair and a slide himself down to the floor, that lasted for about 5 minutes. Workup revealed two masses in the left frontal lobe with adjacent vasogenic edema. EEG positive for left fronto central seizures. Neurosurgery and neurology was consulted. On 06/03/2024 patient underwent a left craniotomy abscess evacuation by Dr. Henderson. ID consulted and placed patient on IV antibiotics. Updated MRI brain w/wo contrast on 6/3/24 showed interval surgical drainage of one of the two left frontal lobe abscesses; mild increase in size of other abscess; slight increase in vasogenic edema; 3mm left-to-right MLS; stable right meningioma.  NSGY reviewed MRI brain prior to admission to ARU with no additional recommendations.  Hospital course was complicated by seizure, weakness, hypotension.       Pt has PMHx of type 2 diabetes mellitus, HLD, BPH, PARTH, multiple orthopedic surgeries, right lower extremities DVT 2 years ago after he had spider bite, not on anticoagulation      Social:  Pt lives with  wife in a 2 story home with 3 step(s) to enter and 1 rail(s).    Prior to admission: Pt was independent and working full time.      Owns [employer].        Initial Evaluation  Date: 6/7/24 AM     PM    Short Term Goals Long Term Goals    Was pt agreeable to Eval/treatment? Yes  Yes yes       Does pt have pain? Denies pain  None  No c/o pain       Bed Mobility  Rolling: ModA  Supine to sit: ModA  Sit to supine: ModA  Scooting: ModA NT NTod A  SBA Independent   Transfers Sit to stand: MaxA  Stand to sit: MaxA  Stand pivot: ModA x2 with L limb advancement assistance no AD  Slide board: WC <> Mat table Mak     5xSTS: NT Sit to stand: mod A   Stand to sit:  max A      Sit to stand: ModA  Stand to sit: ModA  Slide board rom wc <> HB with mod A     Mak  5xSTS: TBD Supervision   5xSTS: TBD   Ambulation    15 feet x2 reps with L hallway rail and ModA assistance with R limb advancement (R KI, R DF ace wrap, sock over toe box)      10mWT: NT  6mWT: NT See comments   feet with AAD with Mak     10mWT: TBD  6mWT: TBD >300 feet with AAD with supervision      10mWT: TBD  6mWT: TBD   Walking 10 feet on uneven surface NT  NT NT 10 feet with AAD with Mak 10 feet with AAD with supervision    Wheel Chair Mobility NT NT NT       Car Transfers NT NT NT Mak Supervision    Stair negotiation: ascended and descended  NT  NT NT 4-8 steps with 1 rail with Mak 8-12 steps with 1 rail with supervision    Curb Step:   ascended and descended NT NT NT 4 inch step with AAD and Mak 4 inch step with AAD and supervision    Picking up object off the floor NT  NT NT Will  cone/shoe with Min assist Will  cone/shoe with supervision assist   BLE ROM WFL WNL         BLE Strength L LE: 5/5 all major muscle groups      R LE: 0/5 hip flexion, extension, knee flexion, extension, ankle DF, PF L LE: 5/5 all major muscle groups      R LE: 0/5 hip flexion, extension, knee flexion, extension, ankle DF, PF         Balance  Static sitting: SBA  Dynamic

## 2024-06-14 NOTE — TELEPHONE ENCOUNTER
Pt wife called stating he is still in acute rehab at [facility]. He is scheduled to have a 2 week staple removal on 6/17 with Jean. Please place order for staple removal for acute rehab to complete, listing specific date of removal. I will inform patients spouse of this change.

## 2024-06-14 NOTE — PROGRESS NOTES
Department of Internal Medicine  Infectious Diseases  Progress  Note      C/C :   Brain abscess s/p craniotomy and drainage  ( 6/3)     Denies fever or chills  Denies headache   Denies  nausea, vomiting   Reports weakness right side     Afebrile     Current Facility-Administered Medications   Medication Dose Route Frequency Provider Last Rate Last Admin    tamsulosin (FLOMAX) capsule 0.4 mg  0.4 mg Oral Daily Venkat Mcknightdict L, DO   0.4 mg at 06/14/24 0948    bethanechol (URECHOLINE) tablet 10 mg  10 mg Oral TID Kendra Mcknightct L, DO   10 mg at 06/14/24 1309    bisacodyl (DULCOLAX) suppository 10 mg  10 mg Rectal Daily GianfrRadha rucker C, DO   10 mg at 06/13/24 1221    lactulose (CHRONULAC) 10 GM/15ML solution 20 g  20 g Oral TID Kendra Mcknightct L, DO   20 g at 06/14/24 1310    sennosides-docusate sodium (SENOKOT-S) 8.6-50 MG tablet 2 tablet  2 tablet Oral BID Venkat Mcknightdict L, DO   2 tablet at 06/14/24 0948    polyethylene glycol (GLYCOLAX) packet 17 g  17 g Oral BID Kendra Mcknightct L, DO   17 g at 06/14/24 0949    levETIRAcetam (KEPPRA) tablet 1,000 mg  1,000 mg Oral BID Venkat Mcknightdict L, DO   1,000 mg at 06/14/24 0948    acetaminophen (TYLENOL) tablet 650 mg  650 mg Oral Q6H PRN Marek Mcknight L, DO        pantoprazole (PROTONIX) tablet 40 mg  40 mg Oral QAM AC Milanes Marino, Maria, MD   40 mg at 06/14/24 0700    rosuvastatin (CRESTOR) tablet 5 mg  5 mg Oral Nightly Milanes Marino, Maria, MD   5 mg at 06/13/24 2033    [Held by provider] oxyCODONE (ROXICODONE) immediate release tablet 5 mg  5 mg Oral Q4H PRN Milanes Marino, Maria, MD   5 mg at 06/09/24 2100    Or    [Held by provider] oxyCODONE HCl (OXY-IR) immediate release tablet 10 mg  10 mg Oral Q4H PRN Milanes Marino, Maria, MD   10 mg at 06/08/24 8364    heparin (PF) 100 UNIT/ML injection 300 Units  3 mL IntraVENous PRN Milanes Marino, Maria, MD        butalbital-acetaminophen-caffeine (FIORICET, ESGIC) per tablet 1 tablet  1 tablet Oral Q4H  (218 lb 4.1 oz)   Oxygen Saturation 96%   Body Mass Index 30.45 kg/m²     General Appearance:    Awake, alert , no acute distress.   Head:    Normocephalic, craniotomy wound healing ,staples in place    Eyes:    No pallor, no icterus,   Ears:    No obvious deformity or drainage.   Nose:   No nasal drainage   Throat:   Mucosa moist, no oral thrush   Neck:   Supple, no lymphadenopathy   Back:     no CVA tenderness   Lungs:     Clear to auscultation bilaterally, no wheeze    Heart:    Regular rate and rhythm, no murmur   Abdomen:     Soft, non-tender, bowel sounds present    Extremities:   +  edema right leg    Pulses:   Dorsalis pedis palpable    Skin:   no rashes      CBC with Differential:      Lab Results   Component Value Date/Time    WBC 7.7 06/14/2024 06:00 AM    RBC 4.75 06/14/2024 06:00 AM    HGB 13.1 06/14/2024 06:00 AM    HCT 38.9 06/14/2024 06:00 AM     06/14/2024 06:00 AM    MCV 81.9 06/14/2024 06:00 AM    MCH 27.6 06/14/2024 06:00 AM    MCHC 33.7 06/14/2024 06:00 AM    RDW 14.5 06/14/2024 06:00 AM    LYMPHOPCT 11 06/11/2024 04:40 AM    MONOPCT 9 06/11/2024 04:40 AM    EOSPCT 1 06/11/2024 04:40 AM    BASOPCT 0 06/11/2024 04:40 AM    MONOSABS 0.83 06/11/2024 04:40 AM    EOSABS 0.08 06/11/2024 04:40 AM    BASOSABS 0.03 06/11/2024 04:40 AM       CMP     Lab Results   Component Value Date/Time     06/14/2024 06:00 AM    K 3.9 06/14/2024 06:00 AM    CL 97 06/14/2024 06:00 AM    CO2 27 06/14/2024 06:00 AM    BUN 10 06/14/2024 06:00 AM    CREATININE 0.8 06/14/2024 06:00 AM    LABGLOM >90 06/14/2024 06:00 AM    GLUCOSE 126 06/14/2024 06:00 AM    CALCIUM 9.1 06/14/2024 06:00 AM    BILITOT 0.4 06/07/2024 05:08 AM    ALKPHOS 58 06/07/2024 05:08 AM    AST 14 06/07/2024 05:08 AM    ALT 29 06/07/2024 05:08 AM         Hepatic Function Panel:    Lab Results   Component Value Date/Time    ALKPHOS 58 06/07/2024 05:08 AM    ALT 29 06/07/2024 05:08 AM    AST 14 06/07/2024 05:08 AM    BILITOT 0.4 06/07/2024

## 2024-06-14 NOTE — PROGRESS NOTES
Speech Language Pathology  ACUTE REHABILITATION--DAILY PROGRESS NOTE            SWALLOWING:      Diet:  Dysphagia 3, Soft/advanced (Soft & Bite-sized) solids with thin liquids (IDDSI level 0)     Clear liquid diet d/c per MD and patient received soft and bite sized tray this date with thin liquids via cup. Patient oral stage exhibited min oral stasis after 1st swl that cleared w/ 2nd swl and/or liquid wash, fair bolus formation;manipulation, adequate mastication time, and fair AP tongue propulsion. Patient pharyngeal stage exhibited no overt clinical indicators of pen/aspiration.       LANGUAGE:    Not formally addressed, appropriate response times cont to be noted.      COGNITION:      Patient seen for skilled cognitive tx. Patient alert and oriented to all concepts. Patient completed STM/recall tasks assoc w/ topic of interest (basketball, patient is Celtics fan) Patient exhibited fair-good recall of current events related to [employer] finals. Patient completed 3 step sequencing tasks assoc w/ ADLs and IADLs with 90% acc w/ occ verbal cues required to increase attention to detail. Patient able to complete clock math/time mgmt task of min complexity w/ 85% acc w/ min verbal cues required to increase attention to detail.       SPEECH:    Functional, monotone inflection       Minute Tracking:    Individual minutes:     45 minutes  Concurrent minutes:    15 minutes  Co-treatment minutes:   0 minutes    Total minutes for 6/14/2024:   60  minutes        SAFETY:      Not addressed    EDUCATION:    Educated on how ST addresses memory and cognition.     OUTCOME:    Progress made towards goals. Will continue SP intervention as per previously established POC.    SP recommended after discharge:  TBD  Supervision recommended at discharge: TBD      FIMS SCORES                           Swallowing                          Current Status             4--Minimal Assistance               Short Term Goal         5--Supervision              or greater to improve airway protection and swallow function.  Pt will participate in ongoing mealtime assessment to provide diet modification and compensatory strategy implementation to minimize risk of aspiration associated with PO intake  Pt will complete PO trials of upgraded diet textures with SLP only to determine the least restrictive PO diet to maintain adequate nutrition/hydration with no more than 1 overt s/s of pen/asp.     Long Term Goals:               Pt will improve oropharyngeal swallow function to ensure airway protection during PO intake to maintain adequate nutrition/hydration and decrease signs/symptoms of aspiration to less than 1 x/day.

## 2024-06-14 NOTE — PROGRESS NOTES
Occupational Therapy  OCCUPATIONAL THERAPY DAILY NOTE    Date:2024  Patient Name: Conrado Leach  MRN: [id number]  : 1960  Room: [address] [place] [address]-A     Referring Practitioner: MD Juan Luis  Diagnosis: Brain mass L frontal lobe- 6/3/24- L crani abscess evacuation  Additional Pertinent Hx: hx R shoulder surgery, BPH, hx DVT, HTN, DM, HLD & seizures  Precautions: Fall Risk, R neglect, seizures, R hemiplegia & R UE edema & clear liquid diet  Discharge Recommendations: Home with 24 hour Sup/assist    Functional Assessment:   Date Status AE  Comments   Feeding 24  Min A     Grooming 24 Mod A           Oral Care 24  Max A     Bathing 24  Max A/Dep     UB Dressing 24  Max A Bed     LB Dressing 24  Dep BSC    Footwear 24 maxA bed    Toileting 24  Dep     Homemaking   TBD       Functional Transfers / Balance:   Date Status DME  Comments   Sit Balance 24  CGA/Min A   W/c Demo'd sitting balance up in w/c during leisure activity seated at high/low table with peers tolerating 5# wt on RUE for wt bearing thru extremity when crossing over midline with LUE to give poker chips and dice to peers both R/L sides of table.   Pt engaged in sit level  dyn core strength/trunk control exercises in am using 1.5 wand with BUE's.  Pt required assist with RUE/hand grasping with wand along with cues for trunk forward bending with wand to increase core strength along with trunk control for transfers, ADL's and mobility skills. Pt tolerated 3 reps of 10 ea with hands on assistance required for  patient safety.     Stand Balance 24  Mod A- 2nd person present  Blade cane   Gait belt  PM: Demo'd standing balance from w/c to upright during clothing  mgmt for sit to stand to readjust short pants with 2nd person assist for patient balance and RUE in sling.  Pt requires x2 assist for safety, balance and fall prevention due to heavy R lateral lean.    [] Tub  [] Shower   Transfer   TBD     Commode   Transfer  endurance for a 45-30 minute functional activity  Long Term Goal 9: Pt will tolerate PROM/AAROM/AROM RUE in all planes to facilitate functional movement & pt ability to functionally use his RUE  Long Term Goal 10: Pt demo G- safety, insight & reasoning with min vc's during all ADLs, transfers, mobility & IADLs.   Patient goals : \"Everything.\"  6/12/24-Pt POC & goals are updated on this date. Pt lives with his wife 2 dogs- farm house- to enter through back & has to walk through grass & 1 STEVEN 1HR & side entrance 4 STEVEN 1HR & 3 steps no HR & 2nd floor 7 steps no HR & 7 steps more no HR & only 1/2 bath on first floor. Currently recommending first floor setup. Pt admitted with a brain abscess & on antibiotics. Pt has an ileus & general surgery consulted & on a clear liquid diet. Pt tentative length of stay 6 weeks. Kim Irizarry OTR/L [id number]       [x] Continue with current OT Plan of care.  [] Prepare for Discharge     DISCHARGE RECOMMENDATIONS  Recommended DME:    Post Discharge Care:   []Home Independently  []Home with 24hr Care / Supervision []Home with Partial Supervision []Home with Home Health OT []Home with Out Pt OT []Other: ___   Comments:         Time in Time out Tx Time Breakdown  Variance:   First Session  1045am 1130am [x] Individual Tx- 45 mins  [] Concurrent Tx - Mins  [] Co-Tx -   [] Group Tx -   [] Time Missed -     Second Session 13:45pm 14:30pm [x] Individual Tx- 45 mins   [] Concurrent Tx -  [] Co-Tx -   [] Group Tx -   [] Time Missed -     Third Session    [] Individual Tx-   [] Concurrent Tx -  [] Co-Tx -   [] Group Tx -   [] Time Missed -         Total Tx Time   90 Mins     Kathleen J Saladino COTA/DAYO [id number]

## 2024-06-15 ENCOUNTER — APPOINTMENT (OUTPATIENT)
Dept: GENERAL RADIOLOGY | Age: 64
End: 2024-06-15
Attending: PHYSICAL MEDICINE & REHABILITATION
Payer: COMMERCIAL

## 2024-06-15 PROCEDURE — 6360000002 HC RX W HCPCS: Performed by: STUDENT IN AN ORGANIZED HEALTH CARE EDUCATION/TRAINING PROGRAM

## 2024-06-15 PROCEDURE — 97535 SELF CARE MNGMENT TRAINING: CPT

## 2024-06-15 PROCEDURE — 6370000000 HC RX 637 (ALT 250 FOR IP): Performed by: PHYSICAL MEDICINE & REHABILITATION

## 2024-06-15 PROCEDURE — 6370000000 HC RX 637 (ALT 250 FOR IP): Performed by: STUDENT IN AN ORGANIZED HEALTH CARE EDUCATION/TRAINING PROGRAM

## 2024-06-15 PROCEDURE — 99233 SBSQ HOSP IP/OBS HIGH 50: CPT | Performed by: PHYSICAL MEDICINE & REHABILITATION

## 2024-06-15 PROCEDURE — 92526 ORAL FUNCTION THERAPY: CPT

## 2024-06-15 PROCEDURE — 74018 RADEX ABDOMEN 1 VIEW: CPT

## 2024-06-15 PROCEDURE — 1280000000 HC REHAB R&B

## 2024-06-15 PROCEDURE — 6360000002 HC RX W HCPCS: Performed by: PHYSICAL MEDICINE & REHABILITATION

## 2024-06-15 PROCEDURE — 97110 THERAPEUTIC EXERCISES: CPT

## 2024-06-15 PROCEDURE — 2580000003 HC RX 258: Performed by: PHYSICAL MEDICINE & REHABILITATION

## 2024-06-15 RX ADMIN — BETHANECHOL CHLORIDE 10 MG: 5 TABLET ORAL at 20:11

## 2024-06-15 RX ADMIN — ROSUVASTATIN CALCIUM 5 MG: 10 TABLET, FILM COATED ORAL at 20:11

## 2024-06-15 RX ADMIN — LACTULOSE 20 G: 20 SOLUTION ORAL at 09:00

## 2024-06-15 RX ADMIN — Medication 1 CAPSULE: at 09:00

## 2024-06-15 RX ADMIN — POLYETHYLENE GLYCOL 3350 17 GRAM ORAL POWDER PACKET 17 G: at 09:00

## 2024-06-15 RX ADMIN — LACTULOSE 20 G: 20 SOLUTION ORAL at 14:41

## 2024-06-15 RX ADMIN — TAMSULOSIN HYDROCHLORIDE 0.4 MG: 0.4 CAPSULE ORAL at 09:00

## 2024-06-15 RX ADMIN — SENNOSIDES AND DOCUSATE SODIUM 2 TABLET: 50; 8.6 TABLET ORAL at 09:00

## 2024-06-15 RX ADMIN — BETHANECHOL CHLORIDE 10 MG: 5 TABLET ORAL at 14:41

## 2024-06-15 RX ADMIN — HEPARIN 300 UNITS: 100 SYRINGE at 20:13

## 2024-06-15 RX ADMIN — LEVETIRACETAM 1000 MG: 500 TABLET, FILM COATED ORAL at 20:11

## 2024-06-15 RX ADMIN — PANTOPRAZOLE SODIUM 40 MG: 40 TABLET, DELAYED RELEASE ORAL at 06:00

## 2024-06-15 RX ADMIN — WATER 2000 MG: 1 INJECTION INTRAMUSCULAR; INTRAVENOUS; SUBCUTANEOUS at 05:58

## 2024-06-15 RX ADMIN — WATER 2000 MG: 1 INJECTION INTRAMUSCULAR; INTRAVENOUS; SUBCUTANEOUS at 17:41

## 2024-06-15 RX ADMIN — SODIUM CHLORIDE, PRESERVATIVE FREE 10 ML: 5 INJECTION INTRAVENOUS at 17:48

## 2024-06-15 RX ADMIN — SENNOSIDES AND DOCUSATE SODIUM 2 TABLET: 50; 8.6 TABLET ORAL at 20:11

## 2024-06-15 RX ADMIN — Medication 5 MG: at 20:12

## 2024-06-15 RX ADMIN — LEVETIRACETAM 1000 MG: 500 TABLET, FILM COATED ORAL at 09:00

## 2024-06-15 RX ADMIN — SODIUM CHLORIDE, PRESERVATIVE FREE 10 ML: 5 INJECTION INTRAVENOUS at 06:26

## 2024-06-15 RX ADMIN — HEPARIN 300 UNITS: 100 SYRINGE at 09:05

## 2024-06-15 RX ADMIN — BETHANECHOL CHLORIDE 10 MG: 5 TABLET ORAL at 09:00

## 2024-06-15 ASSESSMENT — PAIN SCALES - GENERAL: PAINLEVEL_OUTOF10: 0

## 2024-06-15 NOTE — PROGRESS NOTES
PM&R Progress Note  Patient: Conrado Leach  Age/sex: 63 y.o. male  Medical Record #: [id number]  Location: 5509/5509-A  Admission date: 6/6/2024    CC: ARU follow up; brain abscess    S: Patient was seen and examined in his room today.  No acute events reported overnight.  Had large BM today and states it was more solid than prior.  Going to repeat KUB today to follow ileus closely.  No complaints currently.  Earlier, notified by RN that patient was having discomfort and leakage around arrington - order placed to remove for TOV.   Denies chest pain, sob, abd pain.  Tolerating solids.  All pertinent labs and imaging reviewed.       No change in PFSH since H&P done on 6/7/24 unless noted above.    Meds:  Scheduled  tamsulosin, 0.4 mg, Daily  bethanechol, 10 mg, TID  bisacodyl, 10 mg, Daily  lactulose, 20 g, TID  sennosides-docusate sodium, 2 tablet, BID  polyethylene glycol, 17 g, BID  levETIRAcetam, 1,000 mg, BID  pantoprazole, 40 mg, QAM AC  rosuvastatin, 5 mg, Nightly  heparin flush, 3 mL, 2 times per day  lactobacillus, 1 capsule, Daily  melatonin, 5 mg, Nightly  vitamin D, 50,000 Units, Weekly  cefTRIAXone (ROCEPHIN) IV, 2,000 mg, Q12H  sodium chloride flush, 5-40 mL, BID        PRN  acetaminophen, 650 mg, Q6H PRN  [Held by provider] oxyCODONE, 5 mg, Q4H PRN   Or  [Held by provider] oxyCODONE, 10 mg, Q4H PRN  heparin flush, 3 mL, PRN  butalbital-acetaminophen-caffeine, 1 tablet, Q4H PRN  ondansetron, 4 mg, Q8H PRN  calcium carbonate, 500 mg, TID PRN  magnesium hydroxide, 30 mL, Daily PRN        O:  Vitals:    06/13/24 2014 06/14/24 0930 06/14/24 2200 06/15/24 0857   BP:  120/73 134/63 (!) 154/70   Pulse:  68 62 70   Resp:  16 16 16   Temp:  97.2 °F (36.2 °C) 98.2 °F (36.8 °C) 97 °F (36.1 °C)   TempSrc:  Temporal Temporal Temporal   SpO2: 96% 96% 97% 96%   Weight:       Height:           GEN: NAD, conversational   HEENT: left crani, EOMI  RESP: CTAB, no R/R/W   CV: +S1 S2, RR   ABD: NT, ND, normal BS  : no arrington

## 2024-06-15 NOTE — PROGRESS NOTES
PROM 2 x 10 reps in all planes focusing on joint protection/AROM, edema management to increase independence with ADL tasks;     Pt engaged in L UE ex's with 3# dumb bell 3 x 10 reps in all planes focusing on strength/endurance to increase independence with transfers/mobility and ADL tasks;     Sensory / Neuromuscular Re-Education:      Cognitive Skills:follows 1 step commands/decreased STM   Status Comments   Problem   Solving Poor +/fair-    Memory Poor+/fair-    Sequencing Poor+/fair-    Safety Poor+/fair-      Visual Perception:  R neglect     Education:  Pt educated with safety, proper hand/trunk placement with sliding board method for toilet transfers to increase safety due to RUE/RLE weakness since difficulty noted with SPT method.  Pt education is ongoing.     [] Family teach completed on:    Pain Level: none reported.     Additional Notes:   6/14/24 Spoke with nurse in pm about ordering bariatric drop arm 3:1 commode for patient's room.     Patient has made fair  progress during treatment sessions toward set goals. Therapy emphasis to obtain goals:  Current Treatment Recommendations: Strengthening, Coordination training, ROM, Balance training, Self-Care / ADL, Functional mobility training, Safety education & training, Home management training, Endurance training, Patient/Caregiver education & training, Gait training, Neuromuscular re-education, Equipment evaluation, education, & procurement, Positioning   Goals  Long Term Goals  Time Frame for Long term goals : 6 weeks to address above problem areas  Long Term Goal 1: Pt demo  independent to eat all meals  Long Term Goal 2: Pt demo  Sup grooming seated  @ sink level & demo G- safety  Long Term Goal 3: Pt demo Min A to bathe @ shower level  & or sponge bathing both seated & standing & demo G- safety & insight  Long Term Goal 4: Pt demo Sup UE & Min A LE dress to don depends &  pants; Mod A to don socks & shoes & demo G- safety & insight  Long Term Goal 5: Pt  demo Min A commode trf using appropriate DME to ensure pt safety. Pt demo Min A toileting & demo G- safety & insight  Long Term Goal 6: Pt demo Min A tub trf  using appropriate DME to esnure pt safety & pt demo G- safety & insight  Long Term Goal 7: Pt demo Mod A light homemaking activity & demo G- safety & insight  Long Term Goal 8: Pt demo G- endurance for a 45-30 minute functional activity  Long Term Goal 9: Pt will tolerate PROM/AAROM/AROM RUE in all planes to facilitate functional movement & pt ability to functionally use his RUE  Long Term Goal 10: Pt demo G- safety, insight & reasoning with min vc's during all ADLs, transfers, mobility & IADLs.   Patient goals : \"Everything.\"  6/12/24-Pt POC & goals are updated on this date. Pt lives with his wife 2 dogs- farm house- to enter through back & has to walk through grass & 1 STEVEN 1HR & side entrance 4 STEVEN 1HR & 3 steps no HR & 2nd floor 7 steps no HR & 7 steps more no HR & only 1/2 bath on first floor. Currently recommending first floor setup. Pt admitted with a brain abscess & on antibiotics. Pt has an ileus & general surgery consulted & on a clear liquid diet. Pt tentative length of stay 6 weeks. Kim Iirzarry OTR/L [id number]       [x] Continue with current OT Plan of care.  [] Prepare for Discharge     DISCHARGE RECOMMENDATIONS  Recommended DME:    Post Discharge Care:   []Home Independently  []Home with 24hr Care / Supervision []Home with Partial Supervision []Home with Home Health OT []Home with Out Pt OT []Other: ___   Comments:         Time in Time out Tx Time Breakdown  Variance:   First Session   0920 0950 [x] Individual Tx- 30 mins  [] Concurrent Tx -    [] Co-Tx -   [] Group Tx -   [] Time Missed -     Second Session   [] Individual Tx-    [] Concurrent Tx -  [] Co-Tx -   [] Group Tx -   [] Time Missed -     Third Session    [] Individual Tx-   [] Concurrent Tx -  [] Co-Tx -   [] Group Tx -   [] Time Missed -         Total Tx Time   30 Mins     Anthony Pennington

## 2024-06-15 NOTE — PROGRESS NOTES
Speech Language Pathology  ACUTE REHABILITATION--DAILY PROGRESS NOTE            SWALLOWING:      Diet:  Dysphagia 3, Soft/advanced (Soft & Bite-sized) solids with thin liquids (IDDSI level 0)     Patient seen for skilled dysphagia tx for bfast meal this date.  Patient oral stage exhibited min oral stasis after 1st swl that cleared w/ 2nd swl and/or liquid wash, fair bolus formation;manipulation, adequate mastication time, and fair AP tongue propulsion. Patient pharyngeal stage exhibited no overt clinical indicators of pen/aspiration.  Patient requires occ cues to slow rate and decrease bolus size. Patient c/o pain with catheter. RN made aware x2.       LANGUAGE:    Not formally addressed, appropriate response times cont to be noted.      COGNITION:      Not targeted this session      SPEECH:    Functional, monotone inflection       Minute Tracking:    Individual minutes:     0 minutes  Concurrent minutes:    20 minutes  Co-treatment minutes:   0 minutes    Total minutes for 6/15/2024:   20  minutes        SAFETY:      Not addressed    EDUCATION:    Educated on how ST addresses memory and cognition.     OUTCOME:    Progress made towards goals. Will continue SP intervention as per previously established POC.    SP recommended after discharge:  TBD  Supervision recommended at discharge: TBD      FIMS SCORES                           Swallowing                          Current Status             4--Minimal Assistance               Short Term Goal         5--Supervision               Long Term Goal          6--Modified Independent                                      Receptive                          Current Status             4--Minimal Assistance               Short Term Goal         5--Supervision               Long Term Goal          6--Modified Independent              Expressive                          Current Status             7--Independent                         Short Term Goal         7--Independent          Goals:               Pt will improve oropharyngeal swallow function to ensure airway protection during PO intake to maintain adequate nutrition/hydration and decrease signs/symptoms of aspiration to less than 1 x/day.

## 2024-06-15 NOTE — PROGRESS NOTES
Call placed to Attending regarding pain and leakage at arrington catheter site. Orders to remove arrington and perform void trial, if patient fails void trial, replace arrington.

## 2024-06-16 LAB
MICROORGANISM SPEC CULT: NORMAL
MICROORGANISM/AGENT SPEC: NORMAL
SERVICE CMNT-IMP: NORMAL
SPECIMEN DESCRIPTION: NORMAL

## 2024-06-16 PROCEDURE — 2580000003 HC RX 258: Performed by: PHYSICAL MEDICINE & REHABILITATION

## 2024-06-16 PROCEDURE — 6360000002 HC RX W HCPCS: Performed by: PHYSICAL MEDICINE & REHABILITATION

## 2024-06-16 PROCEDURE — 6370000000 HC RX 637 (ALT 250 FOR IP): Performed by: PHYSICAL MEDICINE & REHABILITATION

## 2024-06-16 PROCEDURE — 1280000000 HC REHAB R&B

## 2024-06-16 PROCEDURE — 97530 THERAPEUTIC ACTIVITIES: CPT

## 2024-06-16 PROCEDURE — 6360000002 HC RX W HCPCS: Performed by: STUDENT IN AN ORGANIZED HEALTH CARE EDUCATION/TRAINING PROGRAM

## 2024-06-16 PROCEDURE — 6370000000 HC RX 637 (ALT 250 FOR IP): Performed by: STUDENT IN AN ORGANIZED HEALTH CARE EDUCATION/TRAINING PROGRAM

## 2024-06-16 RX ADMIN — HEPARIN 300 UNITS: 100 SYRINGE at 20:36

## 2024-06-16 RX ADMIN — SENNOSIDES AND DOCUSATE SODIUM 2 TABLET: 50; 8.6 TABLET ORAL at 10:07

## 2024-06-16 RX ADMIN — ROSUVASTATIN CALCIUM 5 MG: 10 TABLET, FILM COATED ORAL at 20:18

## 2024-06-16 RX ADMIN — SODIUM CHLORIDE, PRESERVATIVE FREE 10 ML: 5 INJECTION INTRAVENOUS at 02:54

## 2024-06-16 RX ADMIN — LACTULOSE 20 G: 20 SOLUTION ORAL at 10:07

## 2024-06-16 RX ADMIN — Medication 1 CAPSULE: at 10:07

## 2024-06-16 RX ADMIN — BETHANECHOL CHLORIDE 10 MG: 5 TABLET ORAL at 20:17

## 2024-06-16 RX ADMIN — WATER 2000 MG: 1 INJECTION INTRAMUSCULAR; INTRAVENOUS; SUBCUTANEOUS at 02:54

## 2024-06-16 RX ADMIN — TAMSULOSIN HYDROCHLORIDE 0.4 MG: 0.4 CAPSULE ORAL at 10:07

## 2024-06-16 RX ADMIN — LEVETIRACETAM 1000 MG: 500 TABLET, FILM COATED ORAL at 10:07

## 2024-06-16 RX ADMIN — POLYETHYLENE GLYCOL 3350 17 GRAM ORAL POWDER PACKET 17 G: at 10:07

## 2024-06-16 RX ADMIN — LACTULOSE 20 G: 20 SOLUTION ORAL at 20:17

## 2024-06-16 RX ADMIN — BETHANECHOL CHLORIDE 10 MG: 5 TABLET ORAL at 13:52

## 2024-06-16 RX ADMIN — SENNOSIDES AND DOCUSATE SODIUM 2 TABLET: 50; 8.6 TABLET ORAL at 20:18

## 2024-06-16 RX ADMIN — SODIUM CHLORIDE, PRESERVATIVE FREE 10 ML: 5 INJECTION INTRAVENOUS at 18:22

## 2024-06-16 RX ADMIN — Medication 5 MG: at 20:18

## 2024-06-16 RX ADMIN — POLYETHYLENE GLYCOL 3350 17 GRAM ORAL POWDER PACKET 17 G: at 20:24

## 2024-06-16 RX ADMIN — LEVETIRACETAM 1000 MG: 500 TABLET, FILM COATED ORAL at 20:18

## 2024-06-16 RX ADMIN — LACTULOSE 20 G: 20 SOLUTION ORAL at 13:53

## 2024-06-16 RX ADMIN — BETHANECHOL CHLORIDE 10 MG: 5 TABLET ORAL at 10:07

## 2024-06-16 RX ADMIN — WATER 2000 MG: 1 INJECTION INTRAMUSCULAR; INTRAVENOUS; SUBCUTANEOUS at 20:35

## 2024-06-16 RX ADMIN — PANTOPRAZOLE SODIUM 40 MG: 40 TABLET, DELAYED RELEASE ORAL at 05:59

## 2024-06-16 RX ADMIN — HEPARIN 300 UNITS: 100 SYRINGE at 10:07

## 2024-06-16 ASSESSMENT — PAIN SCALES - GENERAL
PAINLEVEL_OUTOF10: 0

## 2024-06-16 NOTE — PROGRESS NOTES
Physical Therapy  Daily Treatment Note     Supervising therapist : Elie Segovia PT, DPT [id number]  ROOM: [address] [place], [place] [address]  DIAGNOSIS: brain abscess   PRECAUTIONS: Falls, R hemiparesis, R UE sling, R inattention, flat affect, cognition    HPI: Pt is a 63 year old pt who presented to ER from home with complaints of right side weakness and seizure-like activity at home. NIH of 10 upon arrival. Per wife at bedside patient has been having neck and lower back pain and has been going to chiropractor.  He also was having some confusion and they thought it was a UTI and he went to his primary care physician and was prescribed Bactrim.  He went into his daily routine, went to work and returned home.  At around 2:00 when his wife came home found him in the floor, unable to move the right side, appeared that he comprehended but he was unable to speak.  He later said that he had a seizure-like activity sitting on the chair and a slide himself down to the floor, that lasted for about 5 minutes. Workup revealed two masses in the left frontal lobe with adjacent vasogenic edema. EEG positive for left fronto central seizures. Neurosurgery and neurology was consulted. On 06/03/2024 patient underwent a left craniotomy abscess evacuation by Dr. Henderson. ID consulted and placed patient on IV antibiotics. Updated MRI brain w/wo contrast on 6/3/24 showed interval surgical drainage of one of the two left frontal lobe abscesses; mild increase in size of other abscess; slight increase in vasogenic edema; 3mm left-to-right MLS; stable right meningioma.  NSGY reviewed MRI brain prior to admission to ARU with no additional recommendations.  Hospital course was complicated by seizure, weakness, hypotension.       Pt has PMHx of type 2 diabetes mellitus, HLD, BPH, PARTH, multiple orthopedic surgeries, right lower extremities DVT 2 years ago after he had spider bite, not on anticoagulation      Social:  Pt lives with wife in a 2 story home with 3 step(s) to  enter and 1 rail(s).    Prior to admission: Pt was independent and working full time.      Owns [employer].        Initial Evaluation  Date: 6/7/24 AM     PM    Short Term Goals Long Term Goals    Was pt agreeable to Eval/treatment? Yes  Yes        Does pt have pain? Denies pain  None         Bed Mobility  Rolling: ModA  Supine to sit: ModA  Sit to supine: ModA  Scooting: ModA NT  SBA Independent   Transfers Sit to stand: MaxA  Stand to sit: MaxA  Stand pivot: ModA x2 with L limb advancement assistance no AD  Slide board: WC <> Mat table Mak     5xSTS: NT    Sit>Stand ModA  Stand>Sit ModA  Stand-pivot MaxA with L hemicane  Mak  5xSTS: TBD Supervision   5xSTS: TBD   Ambulation    15 feet x2 reps with L hallway rail and ModA assistance with R limb advancement (R KI, R DF ace wrap, sock over toe box)      10mWT: NT  6mWT: NT 75 feet x 3 reps with L hemicane with ModA (R ankle DF ACE wrap)  150 feet with AAD with Mak     10mWT: TBD  6mWT: TBD >300 feet with AAD with supervision      10mWT: TBD  6mWT: TBD   Walking 10 feet on uneven surface NT  NT  10 feet with AAD with Mak 10 feet with AAD with supervision    Wheel Chair Mobility NT NT        Car Transfers NT NT  Mak Supervision    Stair negotiation: ascended and descended  NT  NT  4-8 steps with 1 rail with Mak 8-12 steps with 1 rail with supervision    Curb Step:   ascended and descended NT NT  4 inch step with AAD and Mak 4 inch step with AAD and supervision    Picking up object off the floor NT  NT  Will  cone/shoe with Min assist Will  cone/shoe with supervision assist   BLE ROM WFL WNL         BLE Strength L LE: 5/5 all major muscle groups      R LE: 0/5 hip flexion, extension, knee flexion, extension, ankle DF, PF L LE: 5/5 all major muscle groups      R LE: 0/5 hip flexion, extension, knee flexion, extension, 1/5 ankle DF, 2/5 ankle PF         Balance  Static sitting: SBA  Dynamic sitting: Mak     Static standing: Mak at hallway rail     Dynamic

## 2024-06-16 NOTE — PLAN OF CARE
Problem: Safety - Adult  Goal: Free from fall injury  Outcome: Progressing     Problem: Discharge Planning  Goal: Discharge to home or other facility with appropriate resources  Outcome: Progressing  Flowsheets (Taken 6/16/2024 1015)  Discharge to home or other facility with appropriate resources: Identify barriers to discharge with patient and caregiver     Problem: Skin/Tissue Integrity  Goal: Absence of new skin breakdown  Description: 1.  Monitor for areas of redness and/or skin breakdown  2.  Assess vascular access sites hourly  3.  Every 4-6 hours minimum:  Change oxygen saturation probe site  4.  Every 4-6 hours:  If on nasal continuous positive airway pressure, respiratory therapy assess nares and determine need for appliance change or resting period.  Outcome: Progressing     Problem: ABCDS Injury Assessment  Goal: Absence of physical injury  Outcome: Progressing     Problem: Pain  Goal: Verbalizes/displays adequate comfort level or baseline comfort level  Outcome: Progressing     Problem: Chronic Conditions and Co-morbidities  Goal: Patient's chronic conditions and co-morbidity symptoms are monitored and maintained or improved  Outcome: Progressing     Problem: Nutrition Deficit:  Goal: Optimize nutritional status  Outcome: Progressing

## 2024-06-16 NOTE — PLAN OF CARE
Problem: Safety - Adult  Goal: Free from fall injury  6/15/2024 2113 by Miracle Ko RN  Outcome: Progressing  6/15/2024 1106 by Donny Lopez RN  Outcome: Progressing     Problem: Discharge Planning  Goal: Discharge to home or other facility with appropriate resources  6/15/2024 2113 by Miracle Ko RN  Outcome: Progressing  6/15/2024 1106 by Donny Lopez RN  Outcome: Progressing     Problem: Skin/Tissue Integrity  Goal: Absence of new skin breakdown  Description: 1.  Monitor for areas of redness and/or skin breakdown  2.  Assess vascular access sites hourly  3.  Every 4-6 hours minimum:  Change oxygen saturation probe site  4.  Every 4-6 hours:  If on nasal continuous positive airway pressure, respiratory therapy assess nares and determine need for appliance change or resting period.  6/15/2024 2113 by Miracle Ko RN  Outcome: Progressing  6/15/2024 1106 by Donny Lopez RN  Outcome: Progressing     Problem: ABCDS Injury Assessment  Goal: Absence of physical injury  6/15/2024 2113 by Miracle Ko RN  Outcome: Progressing  6/15/2024 1106 by Donny Lopez RN  Outcome: Progressing     Problem: Pain  Goal: Verbalizes/displays adequate comfort level or baseline comfort level  6/15/2024 2113 by Miracle Ko RN  Outcome: Progressing  6/15/2024 1106 by Donny Lopez RN  Outcome: Progressing     Problem: Chronic Conditions and Co-morbidities  Goal: Patient's chronic conditions and co-morbidity symptoms are monitored and maintained or improved  6/15/2024 2113 by Miracle Ko RN  Outcome: Progressing  6/15/2024 1106 by Donny Lopez RN  Outcome: Progressing     Problem: Nutrition Deficit:  Goal: Optimize nutritional status  6/15/2024 2113 by Miracle Ko RN  Outcome: Progressing  6/15/2024 1106 by Donny Lopez RN  Outcome: Progressing

## 2024-06-17 ENCOUNTER — APPOINTMENT (OUTPATIENT)
Dept: CT IMAGING | Age: 64
End: 2024-06-17
Attending: PHYSICAL MEDICINE & REHABILITATION
Payer: COMMERCIAL

## 2024-06-17 ENCOUNTER — APPOINTMENT (OUTPATIENT)
Dept: GENERAL RADIOLOGY | Age: 64
End: 2024-06-17
Attending: PHYSICAL MEDICINE & REHABILITATION
Payer: COMMERCIAL

## 2024-06-17 PROCEDURE — 6360000002 HC RX W HCPCS: Performed by: PHYSICAL MEDICINE & REHABILITATION

## 2024-06-17 PROCEDURE — 74177 CT ABD & PELVIS W/CONTRAST: CPT

## 2024-06-17 PROCEDURE — 6360000004 HC RX CONTRAST MEDICATION: Performed by: RADIOLOGY

## 2024-06-17 PROCEDURE — 92526 ORAL FUNCTION THERAPY: CPT

## 2024-06-17 PROCEDURE — 97129 THER IVNTJ 1ST 15 MIN: CPT

## 2024-06-17 PROCEDURE — 2580000003 HC RX 258: Performed by: PHYSICAL MEDICINE & REHABILITATION

## 2024-06-17 PROCEDURE — 6370000000 HC RX 637 (ALT 250 FOR IP): Performed by: STUDENT IN AN ORGANIZED HEALTH CARE EDUCATION/TRAINING PROGRAM

## 2024-06-17 PROCEDURE — 71045 X-RAY EXAM CHEST 1 VIEW: CPT

## 2024-06-17 PROCEDURE — 97130 THER IVNTJ EA ADDL 15 MIN: CPT

## 2024-06-17 PROCEDURE — 6360000002 HC RX W HCPCS: Performed by: STUDENT IN AN ORGANIZED HEALTH CARE EDUCATION/TRAINING PROGRAM

## 2024-06-17 PROCEDURE — 97530 THERAPEUTIC ACTIVITIES: CPT

## 2024-06-17 PROCEDURE — 6370000000 HC RX 637 (ALT 250 FOR IP): Performed by: PHYSICAL MEDICINE & REHABILITATION

## 2024-06-17 PROCEDURE — 97110 THERAPEUTIC EXERCISES: CPT

## 2024-06-17 PROCEDURE — 99233 SBSQ HOSP IP/OBS HIGH 50: CPT | Performed by: PHYSICAL MEDICINE & REHABILITATION

## 2024-06-17 PROCEDURE — 51798 US URINE CAPACITY MEASURE: CPT

## 2024-06-17 PROCEDURE — 1280000000 HC REHAB R&B

## 2024-06-17 RX ORDER — TRAZODONE HYDROCHLORIDE 50 MG/1
50 TABLET ORAL NIGHTLY
Status: DISCONTINUED | OUTPATIENT
Start: 2024-06-17 | End: 2024-07-02

## 2024-06-17 RX ADMIN — WATER 2000 MG: 1 INJECTION INTRAMUSCULAR; INTRAVENOUS; SUBCUTANEOUS at 09:11

## 2024-06-17 RX ADMIN — TAMSULOSIN HYDROCHLORIDE 0.4 MG: 0.4 CAPSULE ORAL at 08:51

## 2024-06-17 RX ADMIN — LEVETIRACETAM 1000 MG: 500 TABLET, FILM COATED ORAL at 08:51

## 2024-06-17 RX ADMIN — LEVETIRACETAM 1000 MG: 500 TABLET, FILM COATED ORAL at 23:18

## 2024-06-17 RX ADMIN — PANTOPRAZOLE SODIUM 40 MG: 40 TABLET, DELAYED RELEASE ORAL at 05:19

## 2024-06-17 RX ADMIN — IOPAMIDOL 75 ML: 755 INJECTION, SOLUTION INTRAVENOUS at 20:18

## 2024-06-17 RX ADMIN — SENNOSIDES AND DOCUSATE SODIUM 2 TABLET: 50; 8.6 TABLET ORAL at 08:51

## 2024-06-17 RX ADMIN — ROSUVASTATIN CALCIUM 5 MG: 10 TABLET, FILM COATED ORAL at 23:18

## 2024-06-17 RX ADMIN — SODIUM CHLORIDE, PRESERVATIVE FREE 10 ML: 5 INJECTION INTRAVENOUS at 17:53

## 2024-06-17 RX ADMIN — HEPARIN 300 UNITS: 100 SYRINGE at 23:46

## 2024-06-17 RX ADMIN — SODIUM CHLORIDE, PRESERVATIVE FREE 10 ML: 5 INJECTION INTRAVENOUS at 05:19

## 2024-06-17 RX ADMIN — BETHANECHOL CHLORIDE 10 MG: 5 TABLET ORAL at 23:20

## 2024-06-17 RX ADMIN — HEPARIN 300 UNITS: 100 SYRINGE at 09:11

## 2024-06-17 RX ADMIN — SENNOSIDES AND DOCUSATE SODIUM 2 TABLET: 50; 8.6 TABLET ORAL at 23:20

## 2024-06-17 RX ADMIN — BETHANECHOL CHLORIDE 10 MG: 5 TABLET ORAL at 14:21

## 2024-06-17 RX ADMIN — TRAZODONE HYDROCHLORIDE 50 MG: 50 TABLET ORAL at 23:20

## 2024-06-17 RX ADMIN — POLYETHYLENE GLYCOL 3350 17 GRAM ORAL POWDER PACKET 17 G: at 23:21

## 2024-06-17 RX ADMIN — LACTULOSE 20 G: 20 SOLUTION ORAL at 08:50

## 2024-06-17 RX ADMIN — BETHANECHOL CHLORIDE 10 MG: 5 TABLET ORAL at 08:51

## 2024-06-17 RX ADMIN — IOPAMIDOL 18 ML: 755 INJECTION, SOLUTION INTRAVENOUS at 20:18

## 2024-06-17 RX ADMIN — POLYETHYLENE GLYCOL 3350 17 GRAM ORAL POWDER PACKET 17 G: at 08:50

## 2024-06-17 RX ADMIN — WATER 2000 MG: 1 INJECTION INTRAMUSCULAR; INTRAVENOUS; SUBCUTANEOUS at 23:24

## 2024-06-17 RX ADMIN — LACTULOSE 20 G: 20 SOLUTION ORAL at 14:21

## 2024-06-17 RX ADMIN — Medication 1 CAPSULE: at 08:51

## 2024-06-17 RX ADMIN — Medication 5 MG: at 23:20

## 2024-06-17 NOTE — PROGRESS NOTES
PM&R Progress Note  Patient: Conrado Leach  Age/sex: 63 y.o. male  Medical Record #: [id number]  Location: 5509/5509-A  Admission date: 6/6/2024    CC: ARU follow up; brain abscess    S: Patient was seen and examined in his room today.  No acute events reported over the weekend.  Patient has had several large watery bowel movements but continues to indorse discomfort in his abdomen.  Also, is not able to pass flatulence.  Will get general surgery recommendations again to avoid any bowel injury.  Denies chest pain, sob, abd pain.  He is not sleeping well.  All pertinent labs and imaging reviewed.       No change in PFSH since H&P done on 6/7/24 unless noted above.    Meds:  Scheduled  traZODone, 50 mg, Nightly  tamsulosin, 0.4 mg, Daily  bethanechol, 10 mg, TID  bisacodyl, 10 mg, Daily  lactulose, 20 g, TID  sennosides-docusate sodium, 2 tablet, BID  polyethylene glycol, 17 g, BID  levETIRAcetam, 1,000 mg, BID  pantoprazole, 40 mg, QAM AC  rosuvastatin, 5 mg, Nightly  heparin flush, 3 mL, 2 times per day  lactobacillus, 1 capsule, Daily  melatonin, 5 mg, Nightly  vitamin D, 50,000 Units, Weekly  cefTRIAXone (ROCEPHIN) IV, 2,000 mg, Q12H  sodium chloride flush, 5-40 mL, BID        PRN  acetaminophen, 650 mg, Q6H PRN  [Held by provider] oxyCODONE, 5 mg, Q4H PRN   Or  [Held by provider] oxyCODONE, 10 mg, Q4H PRN  heparin flush, 3 mL, PRN  butalbital-acetaminophen-caffeine, 1 tablet, Q4H PRN  ondansetron, 4 mg, Q8H PRN  calcium carbonate, 500 mg, TID PRN  magnesium hydroxide, 30 mL, Daily PRN        O:  Vitals:    06/15/24 1930 06/16/24 1000 06/16/24 2000 06/17/24 0845   BP: 137/63 (!) 140/76 137/63 (!) 145/80   Pulse: 62 72 60 66   Resp: 18 18 18 17   Temp: 97.3 °F (36.3 °C) 97.6 °F (36.4 °C) 98.3 °F (36.8 °C) 98.3 °F (36.8 °C)   TempSrc: Temporal Temporal Temporal Temporal   SpO2: 97% 97% 98% 99%   Weight:       Height:           GEN: NAD, conversational   HEENT: left crani, EOMI  RESP: CTAB, no R/R/W   CV: +S1 S2, RR

## 2024-06-17 NOTE — PROGRESS NOTES
Speech Language Pathology  ACUTE REHABILITATION--DAILY PROGRESS NOTE            SWALLOWING:      Diet:  Dysphagia 3, Soft/advanced (Soft & Bite-sized) solids with thin liquids (IDDSI level 0)     Patient seen for skilled dysphagia tx for lunch meal this date.  Patient oral stage exhibited min oral stasis after 1st swl that cleared w/ 2nd swl and/or liquid wash, fair bolus formation;manipulation, adequate mastication time, and fair AP tongue propulsion. Patient pharyngeal stage exhibited no overt clinical indicators of pen/aspiration.  Patient requires occ cues to slow rate and decrease bolus size.       LANGUAGE:    Not formally addressed, appropriate response times cont to be noted.      COGNITION:      Patient seen in therapy space for skilled cognitive tx for co-tx with OT. Patient alert and oriented to all concepts. Patient exhibited good recall of daily events thus far. Completed functional cognitive activity with focus on improving attention, visual scanning, spatial relations and problem solving skills. Patient was provided 2D patterns and asked to generate corresponding 3D designs. Patient benefited from consistent verbal v/c for minimally/moderately complex designs with lines. Patient required minimal consistent verbal v/cs as the complexity of the designs increased/no lines provided. Patient benefited from increased time to manipulate blocks to match design.       SPEECH:    Functional, patient noted to exhibit more emotion this date. Laughing and joking with SLP and OT this session. Regular inflection with speech noted this date.        Minute Tracking:    Individual minutes:     0 minutes  Concurrent minutes:    10 minutes  Co-treatment minutes:   45 minutes    Total minutes for 6/17/2024:   55  minutes        SAFETY:      Not addressed    EDUCATION:    Educated on how ST addresses memory and cognition.     OUTCOME:    Progress made towards goals. Will continue SP intervention as per previously

## 2024-06-17 NOTE — PROGRESS NOTES
GENERAL SURGERY  DAILY PROGRESS NOTE    Patient's Name/Date of Birth: Conrado Leach / 1960    Date: 2024     No chief complaint on file.       Subjective:    Patient states he still feels full and is having some abdominal distention.  Denies any abdominal pain.  States he is having bowel movements but describes them as liquid.  He is tolerating a diet and denies any nausea or emesis.       Objective:  Last 24Hrs  Temp  Av.3 °F (36.8 °C)  Min: 98.3 °F (36.8 °C)  Max: 98.3 °F (36.8 °C)  Resp  Av.5  Min: 17  Max: 18  Pulse  Av  Min: 60  Max: 66  Systolic (24hrs), Av , Min:137 , Max:145     Diastolic (24hrs), Av, Min:63, Max:80    SpO2  Av.5 %  Min: 98 %  Max: 99 %    I/O last 3 completed shifts:  In: 480 [P.O.:480]  Out: 750 [Urine:750]      General: In no acute distress, alert and oriented x4  Cardiovascular: Warm throughout, no edema  Respiratory: no respiratory distress, equal chest rise  Abdomen: soft, mildly distended, tympanic, no guarding or rebound tenderness,      CBC  No results for input(s): \"WBC\", \"RBC\", \"HGB\", \"HCT\", \"MCV\", \"MCH\", \"MCHC\", \"RDW\", \"PLT\", \"MPV\" in the last 72 hours.    CMP  No results for input(s): \"NA\", \"K\", \"CL\", \"CO2\", \"BUN\", \"CREATININE\", \"GLUCOSE\", \"CALCIUM\", \"PROT\", \"LABALBU\", \"BILITOT\", \"ALKPHOS\", \"AST\", \"ALT\" in the last 72 hours.      Assessment/Plan:    Patient Active Problem List   Diagnosis    Benign prostatic hyperplasia    Deep venous thrombosis of profunda femoris vein (HCC)    Mixed hyperlipidemia    Sleep apnea    Type 2 diabetes mellitus (HCC)    Brain mass    New onset seizure (HCC)    Brain abscess    Ileus (HCC)     63 year old male with a brain abscess s/p left crani 6/3, now in rehab with an abdominal distention    - CT w / PO contrast  - Further plans pending imaging  - OK for diet  - Cont bowel reg       Bernard Sue DO  General Surgery Resident, PGY-1    Electronically signed on 2024 at 2:56 PM

## 2024-06-17 NOTE — PROGRESS NOTES
Department of Internal Medicine  Infectious Diseases  Progress  Note      C/C :   Brain abscess s/p craniotomy and drainage  ( 6/3)     Denies fever or chills  Denies headache   Denies  nausea, vomiting   Reports weakness right side     Afebrile     Current Facility-Administered Medications   Medication Dose Route Frequency Provider Last Rate Last Admin    traZODone (DESYREL) tablet 50 mg  50 mg Oral Nightly Venkat Mcknightdict L, DO        tamsulosin (FLOMAX) capsule 0.4 mg  0.4 mg Oral Daily Venkat Mcknightdict L, DO   0.4 mg at 06/17/24 0851    bethanechol (URECHOLINE) tablet 10 mg  10 mg Oral TID Juan Luis Mitchells L, DO   10 mg at 06/17/24 0851    bisacodyl (DULCOLAX) suppository 10 mg  10 mg Rectal Daily GianRadha arreaga C, DO   10 mg at 06/14/24 1812    lactulose (CHRONULAC) 10 GM/15ML solution 20 g  20 g Oral TID Venkat Mcknightdict L, DO   20 g at 06/17/24 0850    sennosides-docusate sodium (SENOKOT-S) 8.6-50 MG tablet 2 tablet  2 tablet Oral BID Kendra Mcknightct L, DO   2 tablet at 06/17/24 0851    polyethylene glycol (GLYCOLAX) packet 17 g  17 g Oral BID Venkat Mcknightdict L, DO   17 g at 06/17/24 0850    levETIRAcetam (KEPPRA) tablet 1,000 mg  1,000 mg Oral BID Venkat Mcknightdict L, DO   1,000 mg at 06/17/24 0851    acetaminophen (TYLENOL) tablet 650 mg  650 mg Oral Q6H PRN Kendra Mcknihgtct L, DO        pantoprazole (PROTONIX) tablet 40 mg  40 mg Oral QAM AC Milanes Marino, Maria, MD   40 mg at 06/17/24 0519    rosuvastatin (CRESTOR) tablet 5 mg  5 mg Oral Nightly Milanes Marino, Maria, MD   5 mg at 06/16/24 2018    [Held by provider] oxyCODONE (ROXICODONE) immediate release tablet 5 mg  5 mg Oral Q4H PRN Milanes Marino, Maria, MD   5 mg at 06/09/24 2100    Or    [Held by provider] oxyCODONE HCl (OXY-IR) immediate release tablet 10 mg  10 mg Oral Q4H PRN Milanes Marino, Maria, MD   10 mg at 06/08/24 2468    heparin (PF) 100 UNIT/ML injection 300 Units  3 mL IntraVENous PRN Milanes Marino, Maria, MD         Temperature 98.3 °F (36.8 °C) (Temporal)   Respiration 17   Height 1.803 m (5' 10.98\")   Weight 99 kg (218 lb 4.1 oz)   Oxygen Saturation 99%   Body Mass Index 30.45 kg/m²     General Appearance:    Awake, alert , no acute distress.   Head:    Normocephalic, craniotomy wound healing ,staples in place    Eyes:    No pallor, no icterus,   Ears:    No obvious deformity or drainage.   Nose:   No nasal drainage   Throat:   Mucosa moist, no oral thrush   Neck:   Supple, no lymphadenopathy   Back:     no CVA tenderness   Lungs:     Clear to auscultation bilaterally, no wheeze    Heart:    Regular rate and rhythm, no murmur   Abdomen:     Soft, non-tender, bowel sounds present    Extremities:   +  edema right leg    Pulses:   Dorsalis pedis palpable    Skin:   no rashes      CBC with Differential:      Lab Results   Component Value Date/Time    WBC 7.7 06/14/2024 06:00 AM    RBC 4.75 06/14/2024 06:00 AM    HGB 13.1 06/14/2024 06:00 AM    HCT 38.9 06/14/2024 06:00 AM     06/14/2024 06:00 AM    MCV 81.9 06/14/2024 06:00 AM    MCH 27.6 06/14/2024 06:00 AM    MCHC 33.7 06/14/2024 06:00 AM    RDW 14.5 06/14/2024 06:00 AM    LYMPHOPCT 11 06/11/2024 04:40 AM    MONOPCT 9 06/11/2024 04:40 AM    EOSPCT 1 06/11/2024 04:40 AM    BASOPCT 0 06/11/2024 04:40 AM    MONOSABS 0.83 06/11/2024 04:40 AM    EOSABS 0.08 06/11/2024 04:40 AM    BASOSABS 0.03 06/11/2024 04:40 AM       CMP     Lab Results   Component Value Date/Time     06/14/2024 06:00 AM    K 3.9 06/14/2024 06:00 AM    CL 97 06/14/2024 06:00 AM    CO2 27 06/14/2024 06:00 AM    BUN 10 06/14/2024 06:00 AM    CREATININE 0.8 06/14/2024 06:00 AM    LABGLOM >90 06/14/2024 06:00 AM    GLUCOSE 126 06/14/2024 06:00 AM    CALCIUM 9.1 06/14/2024 06:00 AM    BILITOT 0.4 06/07/2024 05:08 AM    ALKPHOS 58 06/07/2024 05:08 AM    AST 14 06/07/2024 05:08 AM    ALT 29 06/07/2024 05:08 AM         Hepatic Function Panel:    Lab Results   Component Value Date/Time    ALKPHOS 58

## 2024-06-17 NOTE — PLAN OF CARE
Problem: Safety - Adult  Goal: Free from fall injury  6/16/2024 2217 by Kat Rhodes RN  Outcome: Progressing  6/16/2024 1409 by Fany Vora RN  Outcome: Progressing     Problem: Discharge Planning  Goal: Discharge to home or other facility with appropriate resources  6/16/2024 1409 by Fany Vora RN  Outcome: Progressing  Flowsheets (Taken 6/16/2024 1015)  Discharge to home or other facility with appropriate resources: Identify barriers to discharge with patient and caregiver     Problem: Skin/Tissue Integrity  Goal: Absence of new skin breakdown  Description: 1.  Monitor for areas of redness and/or skin breakdown  2.  Assess vascular access sites hourly  3.  Every 4-6 hours minimum:  Change oxygen saturation probe site  4.  Every 4-6 hours:  If on nasal continuous positive airway pressure, respiratory therapy assess nares and determine need for appliance change or resting period.  6/16/2024 2217 by Kat Rhodes RN  Outcome: Progressing  6/16/2024 1409 by Fany Vora RN  Outcome: Progressing     Problem: ABCDS Injury Assessment  Goal: Absence of physical injury  6/16/2024 2217 by Kat Rhodes RN  Outcome: Progressing  6/16/2024 1409 by Fany Vora RN  Outcome: Progressing     Problem: Pain  Goal: Verbalizes/displays adequate comfort level or baseline comfort level  6/16/2024 2217 by Kat Rhodes RN  Outcome: Progressing  6/16/2024 1409 by Fany Vora RN  Outcome: Progressing     Problem: Chronic Conditions and Co-morbidities  Goal: Patient's chronic conditions and co-morbidity symptoms are monitored and maintained or improved  6/16/2024 1409 by Fany Vora RN  Outcome: Progressing     Problem: Nutrition Deficit:  Goal: Optimize nutritional status  6/16/2024 1409 by Fany Vora RN  Outcome: Progressing

## 2024-06-17 NOTE — PROGRESS NOTES
Occupational Therapy  OCCUPATIONAL THERAPY DAILY NOTE    Date:2024  Patient Name: Conrado Leach  MRN: [id number]  : 1960  Room: [address] [place] [address]-A     Referring Practitioner: MD Juan Luis  Diagnosis: Brain mass L frontal lobe- 6/3/24- L crani abscess evacuation  Additional Pertinent Hx: hx R shoulder surgery, BPH, hx DVT, HTN, DM, HLD & seizures  Precautions: Fall Risk, R neglect, seizures, R hemiplegia & R UE edema & clear liquid diet  Discharge Recommendations: Home with 24 hour Sup/assist    Functional Assessment:   Date Status AE  Comments   Feeding 24  Min A     Grooming 24 Mod A           Oral Care 24  Max A     Bathing 24  Max A/Dep     UB Dressing 6/15/24  Max A      LB Dressing 6/15/24 MAX A      Footwear 24 maxA bed    Toileting 6/15/24 MAX A  BSC    Homemaking   TBD       Functional Transfers / Balance:   Date Status DME  Comments   Sit Balance 6/15/24 SBA     BSC    Stand Balance 6/15/24  MAX A  LBQC    [x] Tub  [] Shower   Transfer 24 MAX A  Blade cane, extended tub bench Pt completed requiring assist to descending to bench, bringing R LE in/out of tub v/c's for hand placement and technique    Commode   Transfer 24  MAX A  BSC, blade cane    Pt completed v/c's for hand placement and control during descent    Functional   Mobility 24  Max A Blade-cane  Pt requiring assist to advance R LE during ambulation and pivoting v/c's for safety    Other: bed rolling R<>L    Supine to sit         SPT          Sit <>stand       Gym mat <>W/C 24 Mod to  max  A      Mod A        Max A          Mod A       maxA Bed rails       Gym mat         Blade-cane  Side by side         blade-cane      Blade cane  .               Pt requiring assist to advance R LE           Pt complete v/c's for hand placement         Functional Exercises / Activity:  Pt completed R UE PROM 3 x 10 reps in all planes focusing on joint protection/AROM,

## 2024-06-17 NOTE — PLAN OF CARE
Problem: Safety - Adult  Goal: Free from fall injury  6/17/2024 1030 by Danyell Cormier LPN  Outcome: Progressing  6/16/2024 2217 by Kat Rhodes RN  Outcome: Progressing     Problem: Discharge Planning  Goal: Discharge to home or other facility with appropriate resources  Outcome: Progressing     Problem: Skin/Tissue Integrity  Goal: Absence of new skin breakdown  Description: 1.  Monitor for areas of redness and/or skin breakdown  2.  Assess vascular access sites hourly  3.  Every 4-6 hours minimum:  Change oxygen saturation probe site  4.  Every 4-6 hours:  If on nasal continuous positive airway pressure, respiratory therapy assess nares and determine need for appliance change or resting period.  6/17/2024 1030 by Danyell Cormier LPN  Outcome: Progressing  6/16/2024 2217 by Kat Rhodes RN  Outcome: Progressing     Problem: ABCDS Injury Assessment  Goal: Absence of physical injury  6/17/2024 1030 by Danyell Cormier LPN  Outcome: Progressing  Flowsheets (Taken 6/17/2024 1029)  Absence of Physical Injury: Implement safety measures based on patient assessment  6/16/2024 2217 by Kat Rhodes RN  Outcome: Progressing     Problem: Pain  Goal: Verbalizes/displays adequate comfort level or baseline comfort level  6/17/2024 1030 by Danyell Cormier LPN  Outcome: Progressing  6/16/2024 2217 by Kat Rhodes RN  Outcome: Progressing     Problem: Chronic Conditions and Co-morbidities  Goal: Patient's chronic conditions and co-morbidity symptoms are monitored and maintained or improved  Outcome: Progressing     Problem: Nutrition Deficit:  Goal: Optimize nutritional status  Outcome: Progressing

## 2024-06-17 NOTE — PROGRESS NOTES
Physical Therapy  Treatment     Supervising therapist : Elie Segovia PT, DPT [id number]  ROOM: [address] [place], [place] [address]  DIAGNOSIS: brain abscess   PRECAUTIONS: Falls, R hemiparesis, R UE sling, R inattention, flat affect, cognition    HPI: Pt is a 63 year old pt who presented to ER from home with complaints of right side weakness and seizure-like activity at home. NIH of 10 upon arrival. Per wife at bedside patient has been having neck and lower back pain and has been going to chiropractor.  He also was having some confusion and they thought it was a UTI and he went to his primary care physician and was prescribed Bactrim.  He went into his daily routine, went to work and returned home.  At around 2:00 when his wife came home found him in the floor, unable to move the right side, appeared that he comprehended but he was unable to speak.  He later said that he had a seizure-like activity sitting on the chair and a slide himself down to the floor, that lasted for about 5 minutes. Workup revealed two masses in the left frontal lobe with adjacent vasogenic edema. EEG positive for left fronto central seizures. Neurosurgery and neurology was consulted. On 06/03/2024 patient underwent a left craniotomy abscess evacuation by Dr. Henderson. ID consulted and placed patient on IV antibiotics. Updated MRI brain w/wo contrast on 6/3/24 showed interval surgical drainage of one of the two left frontal lobe abscesses; mild increase in size of other abscess; slight increase in vasogenic edema; 3mm left-to-right MLS; stable right meningioma.  NSGY reviewed MRI brain prior to admission to ARU with no additional recommendations.  Hospital course was complicated by seizure, weakness, hypotension.       Pt has PMHx of type 2 diabetes mellitus, HLD, BPH, PARTH, multiple orthopedic surgeries, right lower extremities DVT 2 years ago after he had spider bite, not on anticoagulation      Social:  Pt lives with wife in a 2 story home with 3 step(s) to enter and

## 2024-06-18 PROCEDURE — 97129 THER IVNTJ 1ST 15 MIN: CPT

## 2024-06-18 PROCEDURE — 92526 ORAL FUNCTION THERAPY: CPT

## 2024-06-18 PROCEDURE — 97110 THERAPEUTIC EXERCISES: CPT

## 2024-06-18 PROCEDURE — 97530 THERAPEUTIC ACTIVITIES: CPT

## 2024-06-18 PROCEDURE — 6360000002 HC RX W HCPCS: Performed by: PHYSICAL MEDICINE & REHABILITATION

## 2024-06-18 PROCEDURE — 6370000000 HC RX 637 (ALT 250 FOR IP): Performed by: PHYSICAL MEDICINE & REHABILITATION

## 2024-06-18 PROCEDURE — 6370000000 HC RX 637 (ALT 250 FOR IP): Performed by: STUDENT IN AN ORGANIZED HEALTH CARE EDUCATION/TRAINING PROGRAM

## 2024-06-18 PROCEDURE — 2580000003 HC RX 258: Performed by: PHYSICAL MEDICINE & REHABILITATION

## 2024-06-18 PROCEDURE — 97130 THER IVNTJ EA ADDL 15 MIN: CPT

## 2024-06-18 PROCEDURE — 36568 INSJ PICC <5 YR W/O IMAGING: CPT

## 2024-06-18 PROCEDURE — 99233 SBSQ HOSP IP/OBS HIGH 50: CPT | Performed by: PHYSICAL MEDICINE & REHABILITATION

## 2024-06-18 PROCEDURE — 06H033Z INSERTION OF INFUSION DEVICE INTO INFERIOR VENA CAVA, PERCUTANEOUS APPROACH: ICD-10-PCS | Performed by: PHYSICAL MEDICINE & REHABILITATION

## 2024-06-18 PROCEDURE — 1280000000 HC REHAB R&B

## 2024-06-18 PROCEDURE — 76937 US GUIDE VASCULAR ACCESS: CPT

## 2024-06-18 PROCEDURE — 6360000002 HC RX W HCPCS: Performed by: STUDENT IN AN ORGANIZED HEALTH CARE EDUCATION/TRAINING PROGRAM

## 2024-06-18 PROCEDURE — C1751 CATH, INF, PER/CENT/MIDLINE: HCPCS

## 2024-06-18 RX ADMIN — LEVETIRACETAM 1000 MG: 500 TABLET, FILM COATED ORAL at 20:39

## 2024-06-18 RX ADMIN — SENNOSIDES AND DOCUSATE SODIUM 2 TABLET: 50; 8.6 TABLET ORAL at 20:39

## 2024-06-18 RX ADMIN — SENNOSIDES AND DOCUSATE SODIUM 2 TABLET: 50; 8.6 TABLET ORAL at 08:30

## 2024-06-18 RX ADMIN — LACTULOSE 20 G: 20 SOLUTION ORAL at 08:30

## 2024-06-18 RX ADMIN — POLYETHYLENE GLYCOL 3350 17 GRAM ORAL POWDER PACKET 17 G: at 20:39

## 2024-06-18 RX ADMIN — HEPARIN 300 UNITS: 100 SYRINGE at 21:14

## 2024-06-18 RX ADMIN — WATER 2000 MG: 1 INJECTION INTRAMUSCULAR; INTRAVENOUS; SUBCUTANEOUS at 08:55

## 2024-06-18 RX ADMIN — LEVETIRACETAM 1000 MG: 500 TABLET, FILM COATED ORAL at 08:30

## 2024-06-18 RX ADMIN — HEPARIN 300 UNITS: 100 SYRINGE at 09:06

## 2024-06-18 RX ADMIN — Medication 5 MG: at 20:39

## 2024-06-18 RX ADMIN — POLYETHYLENE GLYCOL 3350 17 GRAM ORAL POWDER PACKET 17 G: at 08:30

## 2024-06-18 RX ADMIN — SODIUM CHLORIDE, PRESERVATIVE FREE 10 ML: 5 INJECTION INTRAVENOUS at 18:29

## 2024-06-18 RX ADMIN — TAMSULOSIN HYDROCHLORIDE 0.4 MG: 0.4 CAPSULE ORAL at 08:30

## 2024-06-18 RX ADMIN — WATER 2000 MG: 1 INJECTION INTRAMUSCULAR; INTRAVENOUS; SUBCUTANEOUS at 21:14

## 2024-06-18 RX ADMIN — BETHANECHOL CHLORIDE 10 MG: 5 TABLET ORAL at 08:30

## 2024-06-18 RX ADMIN — Medication 1 CAPSULE: at 08:30

## 2024-06-18 RX ADMIN — PANTOPRAZOLE SODIUM 40 MG: 40 TABLET, DELAYED RELEASE ORAL at 05:20

## 2024-06-18 RX ADMIN — TRAZODONE HYDROCHLORIDE 50 MG: 50 TABLET ORAL at 20:39

## 2024-06-18 RX ADMIN — SODIUM CHLORIDE, PRESERVATIVE FREE 10 ML: 5 INJECTION INTRAVENOUS at 05:20

## 2024-06-18 RX ADMIN — HEPARIN 300 UNITS: 100 SYRINGE at 08:55

## 2024-06-18 RX ADMIN — ROSUVASTATIN CALCIUM 5 MG: 10 TABLET, FILM COATED ORAL at 20:39

## 2024-06-18 ASSESSMENT — PAIN SCALES - GENERAL
PAINLEVEL_OUTOF10: 0
PAINLEVEL_OUTOF10: 0

## 2024-06-18 NOTE — PROGRESS NOTES
GENERAL SURGERY  DAILY PROGRESS NOTE    Patient's Name/Date of Birth: Conrado Leach / 1960    Date: 2024     No chief complaint on file.       Subjective:  Pt states that he is feeling much better this morning. Denies any abdominal pain, nausea, or vomiting. Reports having a BM overnight.    Objective:  Last 24Hrs  Temp  Av.8 °F (36.6 °C)  Min: 97.3 °F (36.3 °C)  Max: 98.3 °F (36.8 °C)  Resp  Av  Min: 17  Max: 17  Pulse  Av  Min: 64  Max: 66  Systolic (24hrs), Av , Min:125 , Max:145     Diastolic (24hrs), Av, Min:80, Max:81    SpO2  Av.5 %  Min: 98 %  Max: 99 %    I/O last 3 completed shifts:  In: 360 [P.O.:360]  Out: 650 [Urine:650]      General: In no acute distress, alert and oriented x4  Cardiovascular: RR and normotensive  Respiratory: No increased work of breathing on room air  Abdomen: soft, minimally distended, nontender, no guarding or rebound tenderness,      CBC  No results for input(s): \"WBC\", \"RBC\", \"HGB\", \"HCT\", \"MCV\", \"MCH\", \"MCHC\", \"RDW\", \"PLT\", \"MPV\" in the last 72 hours.    CMP  No results for input(s): \"NA\", \"K\", \"CL\", \"CO2\", \"BUN\", \"CREATININE\", \"GLUCOSE\", \"CALCIUM\", \"PROT\", \"LABALBU\", \"BILITOT\", \"ALKPHOS\", \"AST\", \"ALT\" in the last 72 hours.      Assessment/Plan:  63 year old male with a brain abscess s/p left crani 6/3, now in rehab with an abdominal distention, likely ileus    - Okay for diet as tolerated  - CT A/P with PO contrast showing contrast to the cecum, no obstruction, likely colonic ileus  - Monitor abdominal exam  - Bowel regimen  - Please call with any questions    Electronically signed by Edd Mcnair MD on 2024 at 5:30 AM

## 2024-06-18 NOTE — PROGRESS NOTES
PM&R Progress Note  Patient: Conrado Leach  Age/sex: 63 y.o. male  Medical Record #: [id number]  Location: 5509/5509-A  Admission date: 6/6/2024    CC: ARU follow up; brain abscess    S: Patient was seen and examined in his room today.  No acute events reported overnight.  No major issues this AM.  Has had several loose BMs and is feeling better.  Appreciate general surgery - CT abdomen showing ileus without obstruction.  Denies chest pain, sob, abd pain.  All pertinent labs and imaging reviewed.   Getting motor recovery in RUE/RLE.      No change in PFSH since H&P done on 6/7/24 unless noted above.    Meds:  Scheduled  traZODone, 50 mg, Nightly  tamsulosin, 0.4 mg, Daily  [Held by provider] bethanechol, 10 mg, TID  bisacodyl, 10 mg, Daily  [Held by provider] lactulose, 20 g, TID  sennosides-docusate sodium, 2 tablet, BID  polyethylene glycol, 17 g, BID  levETIRAcetam, 1,000 mg, BID  pantoprazole, 40 mg, QAM AC  rosuvastatin, 5 mg, Nightly  heparin flush, 3 mL, 2 times per day  lactobacillus, 1 capsule, Daily  melatonin, 5 mg, Nightly  vitamin D, 50,000 Units, Weekly  cefTRIAXone (ROCEPHIN) IV, 2,000 mg, Q12H  sodium chloride flush, 5-40 mL, BID        PRN  acetaminophen, 650 mg, Q6H PRN  [Held by provider] oxyCODONE, 5 mg, Q4H PRN   Or  [Held by provider] oxyCODONE, 10 mg, Q4H PRN  heparin flush, 3 mL, PRN  butalbital-acetaminophen-caffeine, 1 tablet, Q4H PRN  ondansetron, 4 mg, Q8H PRN  calcium carbonate, 500 mg, TID PRN  magnesium hydroxide, 30 mL, Daily PRN        O:  Vitals:    06/16/24 2000 06/17/24 0845 06/17/24 2206 06/18/24 0815   BP: 137/63 (!) 145/80 125/81 (!) 114/57   Pulse: 60 66 64 73   Resp: 18 17 17 16   Temp: 98.3 °F (36.8 °C) 98.3 °F (36.8 °C) 97.3 °F (36.3 °C) 98.2 °F (36.8 °C)   TempSrc: Temporal Temporal Temporal Temporal   SpO2: 98% 99% 98% 100%   Weight:       Height:           GEN: NAD, conversational   HEENT: left crani, EOMI  RESP: CTAB, no R/R/W   CV: +S1 S2, RR   ABD: NT, distended,  program: Polyethylene glycol BID, Senokot-S 2 tabs BID, MoM PRN  - GI prophylaxis: Protonix 40 mg daily  - Given high prevalence of Vitamin D deficiency in [place]: supplement with ergocalciferol 50K units/week x8 weeks.  - 6/10: KUB showing ileus     # Renal/urinary -   - Renal: No evidence of renal failure on last BMP on rehab admission  - Bladder: Volitional.  C/w Flomax 0.4 mg daily.  Hold bethanechol.        # ID - brain abscess  - ID for continuity of care  - Antibiotics: Per ID, Rocephin 2g q12H for 6-8 weeks.    - IV team aware we need new line.  Current was inadvertently pulled out after CT.      # Skin - incision  - Maintain skin integrity.    - Turns q2H.     # Psych -   - Trazodone 50 mg nightly for insomnia  - Melatonin 5 mg QHS for insomnia  - Patient declines depressed mood and declines treatment at this time.  He will let me know if he changes his mind.       # Disposition/social - likely discharge home, will discuss in team rounds.     # Code status: Full Code    # Rehab Discharge Goals: Modified independent with mobility and ADLs    Marek Mcknight DO, FAAPM&R  Physical Medicine and Rehabilitation   Brain Injury Medicine    Please note that the above documentation was prepared using voice recognition software.  Every attempt was made to ensure accuracy but there may be spelling, grammatical, and contextual errors.

## 2024-06-18 NOTE — PLAN OF CARE
Problem: Safety - Adult  Goal: Free from fall injury  6/18/2024 1014 by Danyell Cormier LPN  Outcome: Progressing  6/18/2024 1009 by Claudio Patrick RN  Outcome: Progressing  6/18/2024 0205 by Janet Arizmendi RN  Outcome: Progressing  6/18/2024 0205 by Janet Arizmendi RN  Outcome: Progressing     Problem: Discharge Planning  Goal: Discharge to home or other facility with appropriate resources  6/18/2024 1014 by Danyell Cormier LPN  Outcome: Progressing  6/18/2024 1009 by Claudio Patrick RN  Outcome: Progressing  6/18/2024 0205 by Janet Arizmendi RN  Outcome: Progressing  6/18/2024 0205 by Janet Arizmendi RN  Outcome: Progressing  Flowsheets (Taken 6/17/2024 2030)  Discharge to home or other facility with appropriate resources: Identify barriers to discharge with patient and caregiver     Problem: Skin/Tissue Integrity  Goal: Absence of new skin breakdown  Description: 1.  Monitor for areas of redness and/or skin breakdown  2.  Assess vascular access sites hourly  3.  Every 4-6 hours minimum:  Change oxygen saturation probe site  4.  Every 4-6 hours:  If on nasal continuous positive airway pressure, respiratory therapy assess nares and determine need for appliance change or resting period.  6/18/2024 1014 by Danyell Cormier LPN  Outcome: Progressing  6/18/2024 1009 by Claudio Patrick RN  Outcome: Progressing  6/18/2024 0205 by Janet Arizmendi RN  Outcome: Progressing  6/18/2024 0205 by Janet Arizmendi RN  Outcome: Progressing     Problem: ABCDS Injury Assessment  Goal: Absence of physical injury  6/18/2024 1014 by Danyell Cormier LPN  Outcome: Progressing  Flowsheets (Taken 6/18/2024 1014)  Absence of Physical Injury: Implement safety measures based on patient assessment  6/18/2024 1009 by Claudio Patrick RN  Outcome: Progressing  6/18/2024 0205 by Janet Arizmendi RN  Outcome: Progressing  6/18/2024 0205 by Janet Arizmendi RN  Outcome: Progressing     Problem: Pain  Goal: Verbalizes/displays adequate

## 2024-06-18 NOTE — PROGRESS NOTES
Chg single lumen picc Placement 6/18/2024    Product number: [id number]   Lot Number: [id number]      Ultrasound: yes   Right Brachial vein: attempted left side, but no appreciated veins for a picc line on left side                Upper Arm Circumference: (CM) 40cm    Size:(FR)/GUAGE 4.5cm/40cm    Exposed Length: (CM) 1cm    Internal Length: (CM) 39cm   Cut: (CM) 40cm   Vein Measurement: 0.58cm              Lidocaine Given: yes-given in picc kit  Cecily Pineda RN  6/18/2024  4:48 PM      beba

## 2024-06-18 NOTE — PROGRESS NOTES
Speech Language Pathology  ACUTE REHABILITATION--DAILY PROGRESS NOTE            SWALLOWING:      Diet:  Dysphagia 3, Soft/advanced (Soft & Bite-sized) solids with thin liquids (IDDSI level 0)     Patient seen for skilled dysphagia tx for lunch meal this date.  Patient oral stage exhibited min oral stasis after 1st swl that cleared w/ 2nd swl and/or liquid wash, fair bolus formation;manipulation, adequate mastication time, and fair AP tongue propulsion. Patient pharyngeal stage exhibited no overt clinical indicators of pen/aspiration.  Patient requires occ cues to slow rate and decrease bolus size.       LANGUAGE:    Not formally addressed, appropriate response times cont to be noted.      COGNITION:      Patient seen in therapy space for skilled cognitive tx for co-tx with OT. Patient alert and oriented to all concepts. Patient exhibited good recall of daily events thus far. Patient able to complete recall and complete specific color sequence during bean bag toss with OT with 90% acc w/ occ verbal cues required as sequences increased in complexity. Patient able to complete reasoning task in which patient had to organize concepts into specific orders. Patient completed this task with 90% acc w/ occ verbal cues required.       SPEECH:    Functional, patient noted to exhibit more emotion this date. Again, patient laughing and joking with SLP and OT this session. Regular inflection with speech continued to be noted this date.        Minute Tracking:    Individual minutes:     0 minutes  Concurrent minutes:    10 minutes  Co-treatment minutes:   45 minutes    Total minutes for 6/18/2024:   55  minutes        SAFETY:      Not addressed    EDUCATION:    Educated on how ST addresses memory and cognition.     OUTCOME:    Progress made towards goals. Will continue SP intervention as per previously established POC.    SP recommended after discharge:  TBD  Supervision recommended at discharge: TBD      FIMS SCORES                            Swallowing                          Current Status             4--Minimal Assistance               Short Term Goal         5--Supervision               Long Term Goal          6--Modified Independent                                      Receptive                          Current Status             4--Minimal Assistance               Short Term Goal         5--Supervision               Long Term Goal          6--Modified Independent              Expressive                          Current Status             7--Independent                         Short Term Goal         7--Independent                         Long Term Goal          7--Independent                                                                           Problem Solving                          Current Status             3--Moderate Assistance                        Short Term Goal         4--Minimal Assistance               Long Term Goal          5--Supervision                  Memory                          Current Status             2--Maximal Assistance                         Short Term Goal         3--Moderate Assistance                        Long Term Goal          4--Minimal Assistance      Social Interaction              Current Status             4--Minimal Assistance               Short Term Goal         5--Supervision               Long Term Goal          6--Modified Independent                 SPEECH THERAPY  PLAN OF CARE   The speech therapy  POC is established based on physician order, speech pathology diagnosis and results of clinical assessment         SHORT/LONG TERM GOALS  Pt will improve immediate, short term, recent memory during structured and unstructured tasks with 90% accuracy   Pt will improve problem solving/thought organization during structured and unstructured tasks with 90% accuracy   Pt will improve receptive and expressive language skills with adequate thought content, organization, and processing time

## 2024-06-18 NOTE — PROGRESS NOTES
Department of Internal Medicine  Infectious Diseases  Progress  Note      C/C :   Brain abscess s/p craniotomy and drainage  ( 6/3)     Denies fever or chills  Denies headache   Denies  nausea, vomiting   Reports weakness right side     Afebrile     Current Facility-Administered Medications   Medication Dose Route Frequency Provider Last Rate Last Admin    traZODone (DESYREL) tablet 50 mg  50 mg Oral Nightly Venkat Mcknightdict L, DO   50 mg at 06/17/24 2320    tamsulosin (FLOMAX) capsule 0.4 mg  0.4 mg Oral Daily Juan Luis Amherst L, DO   0.4 mg at 06/18/24 0830    bethanechol (URECHOLINE) tablet 10 mg  10 mg Oral TID Juan Luis Amherst L, DO   10 mg at 06/18/24 0830    bisacodyl (DULCOLAX) suppository 10 mg  10 mg Rectal Daily GianfrRadha rucker C, DO   10 mg at 06/14/24 1812    lactulose (CHRONULAC) 10 GM/15ML solution 20 g  20 g Oral TID Kendra Mcknightct L, DO   20 g at 06/18/24 0830    sennosides-docusate sodium (SENOKOT-S) 8.6-50 MG tablet 2 tablet  2 tablet Oral BID Kendra Mcknightct L, DO   2 tablet at 06/18/24 0830    polyethylene glycol (GLYCOLAX) packet 17 g  17 g Oral BID Marek Mcknight L, DO   17 g at 06/18/24 0830    levETIRAcetam (KEPPRA) tablet 1,000 mg  1,000 mg Oral BID Kendra Mcknightct L, DO   1,000 mg at 06/18/24 0830    acetaminophen (TYLENOL) tablet 650 mg  650 mg Oral Q6H PRN Venkat Mcknightdict L, DO        pantoprazole (PROTONIX) tablet 40 mg  40 mg Oral QAM AC Milanes Marino, Maria, MD   40 mg at 06/18/24 0520    rosuvastatin (CRESTOR) tablet 5 mg  5 mg Oral Nightly Milanes Marino, Maria, MD   5 mg at 06/17/24 231    [Held by provider] oxyCODONE (ROXICODONE) immediate release tablet 5 mg  5 mg Oral Q4H PRN Milanes Marino, Maria, MD   5 mg at 06/09/24 2100    Or    [Held by provider] oxyCODONE HCl (OXY-IR) immediate release tablet 10 mg  10 mg Oral Q4H PRN Milanes Marino, Maria, MD   10 mg at 06/08/24 2214    heparin (PF) 100 UNIT/ML injection 300 Units  3 mL IntraVENous PRN Milanes Marino,

## 2024-06-18 NOTE — PROGRESS NOTES
Patient returned from CT scan this nurse was made aware that his PICC line was caught during transfer upon visual investigation it does not appear in place. Call out to Dr. Mcknight. NO CXR.

## 2024-06-18 NOTE — PROGRESS NOTES
Physical Therapy  Treatment     Supervising therapist : Elie Segovia PT, DPT [id number]  ROOM: [address] [place], [place] [address]  DIAGNOSIS: brain abscess   PRECAUTIONS: Falls, R hemiparesis, R UE sling, R inattention, flat affect, cognition    HPI: Pt is a 63 year old pt who presented to ER from home with complaints of right side weakness and seizure-like activity at home. NIH of 10 upon arrival. Per wife at bedside patient has been having neck and lower back pain and has been going to chiropractor.  He also was having some confusion and they thought it was a UTI and he went to his primary care physician and was prescribed Bactrim.  He went into his daily routine, went to work and returned home.  At around 2:00 when his wife came home found him in the floor, unable to move the right side, appeared that he comprehended but he was unable to speak.  He later said that he had a seizure-like activity sitting on the chair and a slide himself down to the floor, that lasted for about 5 minutes. Workup revealed two masses in the left frontal lobe with adjacent vasogenic edema. EEG positive for left fronto central seizures. Neurosurgery and neurology was consulted. On 06/03/2024 patient underwent a left craniotomy abscess evacuation by Dr. Henderson. ID consulted and placed patient on IV antibiotics. Updated MRI brain w/wo contrast on 6/3/24 showed interval surgical drainage of one of the two left frontal lobe abscesses; mild increase in size of other abscess; slight increase in vasogenic edema; 3mm left-to-right MLS; stable right meningioma.  NSGY reviewed MRI brain prior to admission to ARU with no additional recommendations.  Hospital course was complicated by seizure, weakness, hypotension.       Pt has PMHx of type 2 diabetes mellitus, HLD, BPH, PARTH, multiple orthopedic surgeries, right lower extremities DVT 2 years ago after he had spider bite, not on anticoagulation      Social:  Pt lives with wife in a 2 story home with 3 step(s) to enter and

## 2024-06-18 NOTE — PLAN OF CARE
Problem: Safety - Adult  Goal: Free from fall injury  6/18/2024 0205 by Janet Arizmendi RN  Outcome: Progressing  6/18/2024 0205 by Janet Arizmendi RN  Outcome: Progressing     Problem: Discharge Planning  Goal: Discharge to home or other facility with appropriate resources  6/18/2024 0205 by Janet Arizmendi RN  Outcome: Progressing  6/18/2024 0205 by Janet Arizmendi RN  Outcome: Progressing  Flowsheets (Taken 6/17/2024 2030)  Discharge to home or other facility with appropriate resources: Identify barriers to discharge with patient and caregiver     Problem: Skin/Tissue Integrity  Goal: Absence of new skin breakdown  Description: 1.  Monitor for areas of redness and/or skin breakdown  2.  Assess vascular access sites hourly  3.  Every 4-6 hours minimum:  Change oxygen saturation probe site  4.  Every 4-6 hours:  If on nasal continuous positive airway pressure, respiratory therapy assess nares and determine need for appliance change or resting period.  6/18/2024 0205 by Janet Arizmendi RN  Outcome: Progressing  6/18/2024 0205 by Janet Arizmendi RN  Outcome: Progressing     Problem: ABCDS Injury Assessment  Goal: Absence of physical injury  6/18/2024 0205 by Janet Arizmendi RN  Outcome: Progressing  6/18/2024 0205 by Janet Arizmendi RN  Outcome: Progressing     Problem: Pain  Goal: Verbalizes/displays adequate comfort level or baseline comfort level  6/18/2024 0205 by Janet Arizmendi RN  Outcome: Progressing  6/18/2024 0205 by Janet Arizmendi RN  Outcome: Progressing     Problem: Chronic Conditions and Co-morbidities  Goal: Patient's chronic conditions and co-morbidity symptoms are monitored and maintained or improved  6/18/2024 0205 by Janet Arizmendi RN  Outcome: Progressing  6/18/2024 0205 by Janet Arizmendi RN  Outcome: Progressing  Flowsheets (Taken 6/17/2024 2030)  Care Plan - Patient's Chronic Conditions and Co-Morbidity Symptoms are Monitored and Maintained or Improved: Monitor and assess

## 2024-06-18 NOTE — PROGRESS NOTES
Occupational Therapy  OCCUPATIONAL THERAPY DAILY NOTE    Date:2024  Patient Name: Conrado Leach  MRN: [id number]  : 1960  Room: [address] [place] [address]A     Referring Practitioner: MD Juan Luis  Diagnosis: Brain mass L frontal lobe- 6/3/24- L crani abscess evacuation  Additional Pertinent Hx: hx R shoulder surgery, BPH, hx DVT, HTN, DM, HLD & seizures  Precautions: Fall Risk, R neglect, seizures, R hemiplegia & R UE edema & clear liquid diet  Discharge Recommendations: Home with 24 hour Sup/assist    Functional Assessment:   Date Status AE  Comments   Feeding 24  Min A     Grooming 24 Mod A           Oral Care 24  Max A     Bathing 24  Max A/Dep     UB Dressing 6/15/24  Max A      LB Dressing 24 MAX A   PM: To doff shorts and octaviano octaviano clean ones seated in w/c needing assist.    Footwear 24 maxA bed    Toileting 6/15/24 MAX A  BSC    Homemaking   TBD       Functional Transfers / Balance:   Date Status DME  Comments   Sit Balance 24 S/SBA  W/c Demo'd sitting balance both am/pm up in w/c during therapeutic exercises to increase trunk control/core strengthening for functional tasks & ADL's.   Stand Balance 24  Min/mod A LBQC  High/low table  PM: Pt demo'd standing balance at table during RUE wt bearing exercises using LUE on table edge with assist for balance and safety. 2nd person present for patient safety.    [x] Tub  [] Shower   Transfer 24 MAX A  Blade cane, extended tub bench    Commode   Transfer 24  MAX A  BSC, blade cane       Functional   Mobility 24  Max A Blade-cane     Other: bed rolling R<>L    Sit to supine         SPT          Sit <>stand       Gym mat <>W/C 24 Mod to  max  A      Min A        Mod A x2          Min a      maxA Bed rails               Blade-cane  Side by side         High/low table     Blade cane  .       PM: Pt needed assist with BLE mgmt up into bed along with RUE stabilized by  A to bathe @ shower level  & or sponge bathing both seated & standing & demo G- safety & insight  Long Term Goal 4: Pt demo Sup UE & Min A LE dress to don depends &  pants; Mod A to don socks & shoes & demo G- safety & insight  Long Term Goal 5: Pt demo Min A commode trf using appropriate DME to ensure pt safety. Pt demo Min A toileting & demo G- safety & insight  Long Term Goal 6: Pt demo Min A tub trf  using appropriate DME to esnure pt safety & pt demo G- safety & insight  Long Term Goal 7: Pt demo Mod A light homemaking activity & demo G- safety & insight  Long Term Goal 8: Pt demo G- endurance for a 45-30 minute functional activity  Long Term Goal 9: Pt will tolerate PROM/AAROM/AROM RUE in all planes to facilitate functional movement & pt ability to functionally use his RUE  Long Term Goal 10: Pt demo G- safety, insight & reasoning with min vc's during all ADLs, transfers, mobility & IADLs.   Patient goals : \"Everything.\"  6/12/24-Pt POC & goals are updated on this date. Pt lives with his wife 2 dogs- farm house- to enter through back & has to walk through grass & 1 STEVEN 1HR & side entrance 4 STEVEN 1HR & 3 steps no HR & 2nd floor 7 steps no HR & 7 steps more no HR & only 1/2 bath on first floor. Currently recommending first floor setup. Pt admitted with a brain abscess & on antibiotics. Pt has an ileus & general surgery consulted & on a clear liquid diet. Pt tentative length of stay 6 weeks. Kim Irizarry OTR/L [id number]       [x] Continue with current OT Plan of care.  [] Prepare for Discharge     DISCHARGE RECOMMENDATIONS  Recommended DME:    Post Discharge Care:   []Home Independently  []Home with 24hr Care / Supervision []Home with Partial Supervision []Home with Home Health OT []Home with Out Pt OT []Other: ___   Comments:         Time in Time out Tx Time Breakdown  Variance:   First Session  1000 1045 [x] Individual Tx- 45 mins  [] Concurrent Tx -    [] Co-Tx -   [] Group Tx -   [] Time Missed -     Second

## 2024-06-18 NOTE — PLAN OF CARE
Problem: Safety - Adult  Goal: Free from fall injury  6/18/2024 1009 by Claudio Patrick RN  Outcome: Progressing  6/18/2024 0205 by Janet Arizmendi RN  Outcome: Progressing  6/18/2024 0205 by Janet Arizmendi RN  Outcome: Progressing     Problem: Discharge Planning  Goal: Discharge to home or other facility with appropriate resources  6/18/2024 1009 by Claudio Patrick RN  Outcome: Progressing  6/18/2024 0205 by Janet Arizmendi RN  Outcome: Progressing  6/18/2024 0205 by Janet Arizmendi RN  Outcome: Progressing  Flowsheets (Taken 6/17/2024 2030)  Discharge to home or other facility with appropriate resources: Identify barriers to discharge with patient and caregiver     Problem: Skin/Tissue Integrity  Goal: Absence of new skin breakdown  Description: 1.  Monitor for areas of redness and/or skin breakdown  2.  Assess vascular access sites hourly  3.  Every 4-6 hours minimum:  Change oxygen saturation probe site  4.  Every 4-6 hours:  If on nasal continuous positive airway pressure, respiratory therapy assess nares and determine need for appliance change or resting period.  6/18/2024 1009 by Claudio Patrick RN  Outcome: Progressing  6/18/2024 0205 by Janet Arizmendi RN  Outcome: Progressing  6/18/2024 0205 by Janet Arizmendi RN  Outcome: Progressing     Problem: ABCDS Injury Assessment  Goal: Absence of physical injury  6/18/2024 1009 by Claudio Patrick RN  Outcome: Progressing  6/18/2024 0205 by Janet Arizmendi RN  Outcome: Progressing  6/18/2024 0205 by Janet Arizmendi RN  Outcome: Progressing     Problem: Pain  Goal: Verbalizes/displays adequate comfort level or baseline comfort level  6/18/2024 1009 by Claudio Patrick RN  Outcome: Progressing  6/18/2024 0205 by Janet Arizmendi RN  Outcome: Progressing  6/18/2024 0205 by Janet Arizmendi RN  Outcome: Progressing     Problem: Chronic Conditions and Co-morbidities  Goal: Patient's chronic conditions and co-morbidity symptoms are monitored and maintained or

## 2024-06-18 NOTE — PROGRESS NOTES
Physical Therapy  Weekly Note     Supervising therapist : Elie Segovia PT, DPT [id number]  ROOM: [address] [place], [place] [address]  DIAGNOSIS: brain abscess   PRECAUTIONS: Falls, R hemiparesis, R UE sling, R inattention, flat affect, cognition    HPI: Pt is a 63 year old pt who presented to ER from home with complaints of right side weakness and seizure-like activity at home. NIH of 10 upon arrival. Per wife at bedside patient has been having neck and lower back pain and has been going to chiropractor.  He also was having some confusion and they thought it was a UTI and he went to his primary care physician and was prescribed Bactrim.  He went into his daily routine, went to work and returned home.  At around 2:00 when his wife came home found him in the floor, unable to move the right side, appeared that he comprehended but he was unable to speak.  He later said that he had a seizure-like activity sitting on the chair and a slide himself down to the floor, that lasted for about 5 minutes. Workup revealed two masses in the left frontal lobe with adjacent vasogenic edema. EEG positive for left fronto central seizures. Neurosurgery and neurology was consulted. On 06/03/2024 patient underwent a left craniotomy abscess evacuation by Dr. Henderson. ID consulted and placed patient on IV antibiotics. Updated MRI brain w/wo contrast on 6/3/24 showed interval surgical drainage of one of the two left frontal lobe abscesses; mild increase in size of other abscess; slight increase in vasogenic edema; 3mm left-to-right MLS; stable right meningioma.  NSGY reviewed MRI brain prior to admission to ARU with no additional recommendations.  Hospital course was complicated by seizure, weakness, hypotension.       Pt has PMHx of type 2 diabetes mellitus, HLD, BPH, PARTH, multiple orthopedic surgeries, right lower extremities DVT 2 years ago after he had spider bite, not on anticoagulation      Social:  Pt lives with wife in a 2 story home with 3 step(s) to enter

## 2024-06-18 NOTE — PLAN OF CARE
Problem: Safety - Adult  Goal: Free from fall injury  Outcome: Progressing     Problem: Discharge Planning  Goal: Discharge to home or other facility with appropriate resources  Outcome: Progressing  Flowsheets (Taken 6/17/2024 2030)  Discharge to home or other facility with appropriate resources: Identify barriers to discharge with patient and caregiver     Problem: Skin/Tissue Integrity  Goal: Absence of new skin breakdown  Description: 1.  Monitor for areas of redness and/or skin breakdown  2.  Assess vascular access sites hourly  3.  Every 4-6 hours minimum:  Change oxygen saturation probe site  4.  Every 4-6 hours:  If on nasal continuous positive airway pressure, respiratory therapy assess nares and determine need for appliance change or resting period.  Outcome: Progressing     Problem: ABCDS Injury Assessment  Goal: Absence of physical injury  Outcome: Progressing     Problem: Pain  Goal: Verbalizes/displays adequate comfort level or baseline comfort level  Outcome: Progressing     Problem: Chronic Conditions and Co-morbidities  Goal: Patient's chronic conditions and co-morbidity symptoms are monitored and maintained or improved  Outcome: Progressing  Flowsheets (Taken 6/17/2024 2030)  Care Plan - Patient's Chronic Conditions and Co-Morbidity Symptoms are Monitored and Maintained or Improved: Monitor and assess patient's chronic conditions and comorbid symptoms for stability, deterioration, or improvement     Problem: Nutrition Deficit:  Goal: Optimize nutritional status  Outcome: Progressing     Problem: Safety - Adult  Goal: Free from fall injury  Outcome: Progressing     Problem: Discharge Planning  Goal: Discharge to home or other facility with appropriate resources  Outcome: Progressing  Flowsheets (Taken 6/17/2024 2030)  Discharge to home or other facility with appropriate resources: Identify barriers to discharge with patient and caregiver     Problem: Skin/Tissue Integrity  Goal: Absence of new  skin breakdown  Description: 1.  Monitor for areas of redness and/or skin breakdown  2.  Assess vascular access sites hourly  3.  Every 4-6 hours minimum:  Change oxygen saturation probe site  4.  Every 4-6 hours:  If on nasal continuous positive airway pressure, respiratory therapy assess nares and determine need for appliance change or resting period.  Outcome: Progressing     Problem: ABCDS Injury Assessment  Goal: Absence of physical injury  Outcome: Progressing     Problem: Pain  Goal: Verbalizes/displays adequate comfort level or baseline comfort level  Outcome: Progressing     Problem: Chronic Conditions and Co-morbidities  Goal: Patient's chronic conditions and co-morbidity symptoms are monitored and maintained or improved  Outcome: Progressing  Flowsheets (Taken 6/17/2024 2030)  Care Plan - Patient's Chronic Conditions and Co-Morbidity Symptoms are Monitored and Maintained or Improved: Monitor and assess patient's chronic conditions and comorbid symptoms for stability, deterioration, or improvement     Problem: Nutrition Deficit:  Goal: Optimize nutritional status  Outcome: Progressing     Problem: Safety - Adult  Goal: Free from fall injury  Outcome: Progressing     Problem: Discharge Planning  Goal: Discharge to home or other facility with appropriate resources  Outcome: Progressing  Flowsheets (Taken 6/17/2024 2030)  Discharge to home or other facility with appropriate resources: Identify barriers to discharge with patient and caregiver     Problem: Skin/Tissue Integrity  Goal: Absence of new skin breakdown  Description: 1.  Monitor for areas of redness and/or skin breakdown  2.  Assess vascular access sites hourly  3.  Every 4-6 hours minimum:  Change oxygen saturation probe site  4.  Every 4-6 hours:  If on nasal continuous positive airway pressure, respiratory therapy assess nares and determine need for appliance change or resting period.  Outcome: Progressing     Problem: ABCDS Injury

## 2024-06-19 PROCEDURE — 6370000000 HC RX 637 (ALT 250 FOR IP): Performed by: STUDENT IN AN ORGANIZED HEALTH CARE EDUCATION/TRAINING PROGRAM

## 2024-06-19 PROCEDURE — 97110 THERAPEUTIC EXERCISES: CPT

## 2024-06-19 PROCEDURE — 97129 THER IVNTJ 1ST 15 MIN: CPT | Performed by: SPEECH-LANGUAGE PATHOLOGIST

## 2024-06-19 PROCEDURE — 97530 THERAPEUTIC ACTIVITIES: CPT

## 2024-06-19 PROCEDURE — 1280000000 HC REHAB R&B

## 2024-06-19 PROCEDURE — 6360000002 HC RX W HCPCS: Performed by: STUDENT IN AN ORGANIZED HEALTH CARE EDUCATION/TRAINING PROGRAM

## 2024-06-19 PROCEDURE — 99233 SBSQ HOSP IP/OBS HIGH 50: CPT | Performed by: PHYSICAL MEDICINE & REHABILITATION

## 2024-06-19 PROCEDURE — 97130 THER IVNTJ EA ADDL 15 MIN: CPT | Performed by: SPEECH-LANGUAGE PATHOLOGIST

## 2024-06-19 PROCEDURE — 2580000003 HC RX 258: Performed by: PHYSICAL MEDICINE & REHABILITATION

## 2024-06-19 PROCEDURE — 92526 ORAL FUNCTION THERAPY: CPT | Performed by: SPEECH-LANGUAGE PATHOLOGIST

## 2024-06-19 PROCEDURE — 6370000000 HC RX 637 (ALT 250 FOR IP): Performed by: PHYSICAL MEDICINE & REHABILITATION

## 2024-06-19 PROCEDURE — 6360000002 HC RX W HCPCS: Performed by: PHYSICAL MEDICINE & REHABILITATION

## 2024-06-19 RX ADMIN — TAMSULOSIN HYDROCHLORIDE 0.4 MG: 0.4 CAPSULE ORAL at 07:59

## 2024-06-19 RX ADMIN — LEVETIRACETAM 1000 MG: 500 TABLET, FILM COATED ORAL at 20:37

## 2024-06-19 RX ADMIN — Medication 5 MG: at 20:37

## 2024-06-19 RX ADMIN — HEPARIN 300 UNITS: 100 SYRINGE at 23:28

## 2024-06-19 RX ADMIN — WATER 2000 MG: 1 INJECTION INTRAMUSCULAR; INTRAVENOUS; SUBCUTANEOUS at 23:16

## 2024-06-19 RX ADMIN — HEPARIN 300 UNITS: 100 SYRINGE at 07:59

## 2024-06-19 RX ADMIN — POLYETHYLENE GLYCOL 3350 17 GRAM ORAL POWDER PACKET 17 G: at 07:59

## 2024-06-19 RX ADMIN — SENNOSIDES AND DOCUSATE SODIUM 2 TABLET: 50; 8.6 TABLET ORAL at 20:37

## 2024-06-19 RX ADMIN — SODIUM CHLORIDE, PRESERVATIVE FREE 10 ML: 5 INJECTION INTRAVENOUS at 23:27

## 2024-06-19 RX ADMIN — TRAZODONE HYDROCHLORIDE 50 MG: 50 TABLET ORAL at 20:38

## 2024-06-19 RX ADMIN — SODIUM CHLORIDE, PRESERVATIVE FREE 10 ML: 5 INJECTION INTRAVENOUS at 05:18

## 2024-06-19 RX ADMIN — SENNOSIDES AND DOCUSATE SODIUM 2 TABLET: 50; 8.6 TABLET ORAL at 07:59

## 2024-06-19 RX ADMIN — LEVETIRACETAM 1000 MG: 500 TABLET, FILM COATED ORAL at 07:59

## 2024-06-19 RX ADMIN — POLYETHYLENE GLYCOL 3350 17 GRAM ORAL POWDER PACKET 17 G: at 20:37

## 2024-06-19 RX ADMIN — WATER 2000 MG: 1 INJECTION INTRAMUSCULAR; INTRAVENOUS; SUBCUTANEOUS at 08:00

## 2024-06-19 RX ADMIN — SODIUM CHLORIDE, PRESERVATIVE FREE 10 ML: 5 INJECTION INTRAVENOUS at 16:04

## 2024-06-19 RX ADMIN — ROSUVASTATIN CALCIUM 5 MG: 10 TABLET, FILM COATED ORAL at 20:37

## 2024-06-19 RX ADMIN — Medication 1 CAPSULE: at 07:59

## 2024-06-19 RX ADMIN — PANTOPRAZOLE SODIUM 40 MG: 40 TABLET, DELAYED RELEASE ORAL at 05:18

## 2024-06-19 ASSESSMENT — PAIN SCALES - GENERAL
PAINLEVEL_OUTOF10: 0
PAINLEVEL_OUTOF10: 0

## 2024-06-19 NOTE — PROGRESS NOTES
Occupational Therapy  OCCUPATIONAL THERAPY DAILY NOTE    Date:2024  Patient Name: Conrado Leach  MRN: [id number]  : 1960  Room: [address] [place] [address]A     Referring Practitioner: MD Juan Luis  Diagnosis: Brain mass L frontal lobe- 6/3/24- L crani abscess evacuation  Additional Pertinent Hx: hx R shoulder surgery, BPH, hx DVT, HTN, DM, HLD & seizures  Precautions: Fall Risk, R neglect, seizures, R hemiplegia & R UE edema & clear liquid diet  Discharge Recommendations: Home with 24 hour Sup/assist    Functional Assessment:   Date Status AE  Comments   Feeding 24  Min A     Grooming 24 Mod A           Oral Care 24  Max A     Bathing 24  Max A/Dep     UB Dressing 6/15/24  Max A      LB Dressing 24 MAX A      Footwear 24 maxA bed    Toileting 6/15/24 MAX A  BSC    Homemaking   TBD       Functional Transfers / Balance:   Date Status DME  Comments   Sit Balance 24 S/SBA  W/c    Stand Balance 24  Min/mod A LBQC  High/low table     [x] Tub  [] Shower   Transfer 24 MAX A  Blade cane, extended tub bench    Commode   Transfer 24  MAX A  BSC, blade cane       Functional   Mobility 24  Max A Blade-cane     Other: bed rolling R<>L    Sit to supine         SPT          Sit <>stand       Gym mat <>W/C 24 Mod to  max  A      Min A        Mod A x2          Min a      maxA Bed rails               Blade-cane  Side by side         High/low table     Blade cane  .                     Functional Exercises / Activity:  AM: Pt seen this AM in therapy gym. Pt completed of arm bike seated upright in w/c for ~3mins x3 with rest breaks. RUE supported with ace wrap to maintain grasp, with occasional support at elbow needed by therapist during exercise. Task focusing on increasing of activity tolerance/endurance, strength and ROM of BUE's. PROM and retrograde massage compelted to RUE/hand/digits, to focus on increasing of ROM, prevention of  contractures and increase of blood flow and decrease of edema to R hand/digits.    PM: Pt completed medicine management task seated at tabletop. Pt requiring assistance to open/close of medicine containers. Pt given of medicine list, with pt able to state of 4 medications on list needed. Pt able to correctly find medicine bottles. Pt reading of pill bottles and placing of pills into pill organizer based off of specifications on pill bottle. Pt using of LUE to complete task. Pt requiring of cueing to correctly place pills into night/day and amount of pills needed for twice a day. Overall, pt completed task fairly well. Task focusing on increasing of cognitive skills, attention to detail, processing skills, visual scanning skills, following of 1/2 step directions and FMC/in hand manipulation of LUE.      Sensory / Neuromuscular Re-Education:      Cognitive Skills:follows 1 step commands/decreased STM   Status Comments   Problem   Solving Poor +/fair-    Memory Poor+/fair-    Sequencing Poor+/fair-    Safety Poor+/fair-      Visual Perception:  R neglect     Education:  Pt educated on importance of ROM and elevating of RUE when able to assist with decreasing of edema    [] Family teach completed on:    Pain Level: none reported.     Additional Notes:   6/14/24 Spoke with nurse in pm about ordering bariatric drop arm 3:1 commode for patient's room.     Patient has made fair  progress during treatment sessions toward set goals. Therapy emphasis to obtain goals:  Current Treatment Recommendations: Strengthening, Coordination training, ROM, Balance training, Self-Care / ADL, Functional mobility training, Safety education & training, Home management training, Endurance training, Patient/Caregiver education & training, Gait training, Neuromuscular re-education, Equipment evaluation, education, & procurement, Positioning   Goals  Long Term Goals  Time Frame for Long term goals : 6 weeks to address above problem areas  Long

## 2024-06-19 NOTE — PLAN OF CARE
Problem: Safety - Adult  Goal: Free from fall injury  6/18/2024 2244 by Janet Arizmendi RN  Outcome: Progressing  6/18/2024 1014 by Danyell Cormier LPN  Outcome: Progressing  6/18/2024 1009 by Claudio Patrick RN  Outcome: Progressing     Problem: Discharge Planning  Goal: Discharge to home or other facility with appropriate resources  6/18/2024 2244 by Janet Arizmendi RN  Outcome: Progressing  Flowsheets (Taken 6/18/2024 2015)  Discharge to home or other facility with appropriate resources:   Identify barriers to discharge with patient and caregiver   Arrange for needed discharge resources and transportation as appropriate  6/18/2024 1014 by Danyell Cormier LPN  Outcome: Progressing  6/18/2024 1009 by Claudio Patrick RN  Outcome: Progressing     Problem: Skin/Tissue Integrity  Goal: Absence of new skin breakdown  Description: 1.  Monitor for areas of redness and/or skin breakdown  2.  Assess vascular access sites hourly  3.  Every 4-6 hours minimum:  Change oxygen saturation probe site  4.  Every 4-6 hours:  If on nasal continuous positive airway pressure, respiratory therapy assess nares and determine need for appliance change or resting period.  6/18/2024 2244 by Janet Arizmendi RN  Outcome: Progressing  6/18/2024 1014 by Danyell Cormier LPN  Outcome: Progressing  6/18/2024 1009 by Claudio Patrick RN  Outcome: Progressing     Problem: ABCDS Injury Assessment  Goal: Absence of physical injury  6/18/2024 2244 by Janet Arizmendi RN  Outcome: Progressing  6/18/2024 1014 by Danyell Cormier LPN  Outcome: Progressing  Flowsheets (Taken 6/18/2024 1014)  Absence of Physical Injury: Implement safety measures based on patient assessment  6/18/2024 1009 by Claudio Patrick RN  Outcome: Progressing     Problem: Pain  Goal: Verbalizes/displays adequate comfort level or baseline comfort level  6/18/2024 2244 by Janet Arizmendi RN  Outcome: Progressing  6/18/2024 1014 by Danyell Cormier LPN  Outcome:  Progressing  6/18/2024 1009 by Claudio Patrick RN  Outcome: Progressing     Problem: Chronic Conditions and Co-morbidities  Goal: Patient's chronic conditions and co-morbidity symptoms are monitored and maintained or improved  6/18/2024 2244 by Janet Arizmendi RN  Outcome: Progressing  Flowsheets (Taken 6/18/2024 2015)  Care Plan - Patient's Chronic Conditions and Co-Morbidity Symptoms are Monitored and Maintained or Improved:   Monitor and assess patient's chronic conditions and comorbid symptoms for stability, deterioration, or improvement   Collaborate with multidisciplinary team to address chronic and comorbid conditions and prevent exacerbation or deterioration  6/18/2024 1014 by Danyell Cormier LPN  Outcome: Progressing  6/18/2024 1009 by Claudio Patrick RN  Outcome: Progressing     Problem: Nutrition Deficit:  Goal: Optimize nutritional status  6/18/2024 2244 by Janet Arizmendi RN  Outcome: Progressing  6/18/2024 1014 by Danyell Cormier LPN  Outcome: Progressing  6/18/2024 1009 by Claudio Patrick RN  Outcome: Progressing

## 2024-06-19 NOTE — PROGRESS NOTES
Comprehensive Nutrition Assessment    Type and Reason for Visit:  Reassess    Nutrition Recommendations/Plan:   Continue current diet  Modify ONS; continue emmy BID and start ensure HP BID to optimize wound healing and further promote oral intake  Will continue to monitor     Malnutrition Assessment:  Malnutrition Status:  At risk for malnutrition (Comment) (06/13/24 0900)    Context:  Acute Illness     Findings of the 6 clinical characteristics of malnutrition:  Energy Intake:  Mild decrease in energy intake (Comment)  Weight Loss:  Unable to assess (d/t lack of wt hx per EMR)     Body Fat Loss:  No significant body fat loss     Muscle Mass Loss:  No significant muscle mass loss    Fluid Accumulation:  No significant fluid accumulation     Strength:  Not Performed    Nutrition Assessment:    pt adm for therapy 2/2 brain abscess/AMS s/p crani/abscess evac 6/3; PMhx of DVT,DM; repeat KUB shows ileus- pt c/o abd discomfort & bloating, CT shows likely ileus, w/o obstruction; +BM this adm; pt tolerated diet advancement- will modify ONS to further promote oral intake; will continue to monitor.    Nutrition Related Findings:    A&Ox4; active BS; +1 edema; -I/O; abd distention/bloating Wound Type: Surgical Incision       Current Nutrition Intake & Therapies:    Average Meal Intake: 51-75%  Average Supplements Intake: Unable to assess (d/t lack of intakes recorded per EMR)  ADULT ORAL NUTRITION SUPPLEMENT; Breakfast, Dinner; Wound Healing Oral Supplement  ADULT DIET; Dysphagia - Soft and Bite Sized  ADULT ORAL NUTRITION SUPPLEMENT; Breakfast, Lunch; Low Calorie/High Protein Oral Supplement    Anthropometric Measures:  Height: 180.3 cm (5' 10.98\")  Ideal Body Weight (IBW): 172 lbs (78 kg)       Current Body Weight: 98.4 kg (216 lb 14.9 oz) (6/4-BS), 126.1 % IBW. Weight Source: Bed Scale  Current BMI (kg/m2): 30.3  Usual Body Weight:  (UTO d/t lack of wt hx per EMR)     Weight Adjustment For: No Adjustment

## 2024-06-19 NOTE — PROGRESS NOTES
Department of Internal Medicine  Infectious Diseases  Progress  Note      C/C :   Brain abscess s/p craniotomy and drainage  ( 6/3)     Denies fever or chills  Denies headache   Afebrile     Current Facility-Administered Medications   Medication Dose Route Frequency Provider Last Rate Last Admin    traZODone (DESYREL) tablet 50 mg  50 mg Oral Nightly Kendra Mcknightct L, DO   50 mg at 06/18/24 2039    tamsulosin (FLOMAX) capsule 0.4 mg  0.4 mg Oral Daily Marek Mcknight L, DO   0.4 mg at 06/19/24 0759    [Held by provider] bethanechol (URECHOLINE) tablet 10 mg  10 mg Oral TID Kendra Mcknightct L, DO   10 mg at 06/18/24 0830    bisacodyl (DULCOLAX) suppository 10 mg  10 mg Rectal Daily Gianfrate Giigor C, DO   10 mg at 06/14/24 1812    [Held by provider] lactulose (CHRONULAC) 10 GM/15ML solution 20 g  20 g Oral TID Kendra Mcknightct L, DO   20 g at 06/18/24 0830    sennosides-docusate sodium (SENOKOT-S) 8.6-50 MG tablet 2 tablet  2 tablet Oral BID Kendra Mcknightct L, DO   2 tablet at 06/19/24 0759    polyethylene glycol (GLYCOLAX) packet 17 g  17 g Oral BID Kendra Mcknightct L, DO   17 g at 06/19/24 0759    levETIRAcetam (KEPPRA) tablet 1,000 mg  1,000 mg Oral BID Kendra Mcknightct L, DO   1,000 mg at 06/19/24 0759    acetaminophen (TYLENOL) tablet 650 mg  650 mg Oral Q6H PRN Venkat Mcknightdict L, DO        pantoprazole (PROTONIX) tablet 40 mg  40 mg Oral QAM AC Milanes Marino, Maria, MD   40 mg at 06/19/24 0518    rosuvastatin (CRESTOR) tablet 5 mg  5 mg Oral Nightly Milanes Marino, Maria, MD   5 mg at 06/18/24 2039    [Held by provider] oxyCODONE (ROXICODONE) immediate release tablet 5 mg  5 mg Oral Q4H PRN Milanes Marino, Maria, MD   5 mg at 06/09/24 2100    Or    [Held by provider] oxyCODONE HCl (OXY-IR) immediate release tablet 10 mg  10 mg Oral Q4H PRN Milanes Marino, Maria, MD   10 mg at 06/08/24 2214    heparin (PF) 100 UNIT/ML injection 300 Units  3 mL IntraVENous PRN Milanes Marino, Maria, MD   300 Units at

## 2024-06-19 NOTE — PROGRESS NOTES
Speech Language Pathology  ACUTE REHABILITATION--DAILY PROGRESS NOTE            SWALLOWING:      Diet:  Dysphagia 3, Soft/advanced (Soft & Bite-sized) solids with thin liquids (IDDSI level 0)     Patient seen for skilled dysphagia tx for lunch meal this date. Tolerated meal well. Slow rate of intake, small/ single bites without noticeable residue observed. No overt s/s of pen/asp.       LANGUAGE:    Not formally addressed, appropriate response times cont to be noted.      COGNITION:      Patient seen in therapy space for skilled cognitive tx for co-tx with OT. Patient alert and oriented to all concepts. Patient answered 7/9 questions accurately regarding medications with scripts and pill bottles. Patient had difficulty identifying how many pills were in the bottle initially, but recalled where to find the information and accurately answered in subsequent trial.     Patient completed a medication management task with fair outcome, addressing organization, recall and orientation. Patient reported he was only taking 1 over-the-counter medicine previously. Given a list of 4 medications, patient identified the correct bottles from a field of 6. After adding one medicine to the organizer, he was observed to begin dumping them out, but was prompted to continue with the list. Patient had difficulty with divided attention, requiring prompting to add dosages for morning / evening. Patient recognized this was difficult but reported \"it should not be.\" He was encouraged and educated on purpose of task and need for continued practice.        SPEECH:    Functional. While  patient has exhibited more emotion in previous sessions, flat affect this date.       Minute Tracking:    Individual minutes:     0 minutes  Concurrent minutes:    15 minutes  Co-treatment minutes:   45 minutes    Total minutes for 6/19/2024:   60  minutes        SAFETY:      Patient shared spouse will be home at discharge to assist with medicine management.      EDUCATION:    Educated on how ST addresses memory and cognition.     OUTCOME:    Progress made towards goals. Will continue SP intervention as per previously established POC.    SP recommended after discharge:  TBD  Supervision recommended at discharge: TBD      FIMS SCORES                           Swallowing                          Current Status             4--Minimal Assistance               Short Term Goal         5--Supervision               Long Term Goal          6--Modified Independent                                      Receptive                          Current Status             4--Minimal Assistance               Short Term Goal         5--Supervision               Long Term Goal          6--Modified Independent              Expressive                          Current Status             7--Independent                         Short Term Goal         7--Independent                         Long Term Goal          7--Independent                                                                           Problem Solving                          Current Status             3--Moderate Assistance                        Short Term Goal         4--Minimal Assistance               Long Term Goal          5--Supervision                  Memory                          Current Status             2--Maximal Assistance                         Short Term Goal         3--Moderate Assistance                        Long Term Goal          4--Minimal Assistance      Social Interaction              Current Status             4--Minimal Assistance               Short Term Goal         5--Supervision               Long Term Goal          6--Modified Independent                 SPEECH THERAPY  PLAN OF CARE   The speech therapy  POC is established based on physician order, speech pathology diagnosis and results of clinical assessment         SHORT/LONG TERM GOALS  Pt will improve immediate, short term, recent memory during

## 2024-06-19 NOTE — PATIENT CARE CONFERENCE
[facility]  INPATIENT ACUTE REHABILITATION  TEAM CONFERENCE NOTE/PATIENT PLAN OF CARE    The physician was present and led this team conference    Date: 2024  Admission date: 2024  Patient Name: Conrado Leach        MRN: [id number]    : 1960  (63 y.o.)  Gender: male   Rehab diagnosis/surgery with date:  Left frontal lobe brain abscess  Craniotomy with abscess evacuation 6/3/24 per Dr. Henderson  Impairment Group Code:  2.1      MEDICAL/FUNCTIONAL HISTORY/STATUS:  colonic ilius ongoing, surgery has seen for this, urinary retention  resolved, showing some motor recovery on right side  PICC line to right arm changed on 24    Consultations/Labs/X-rays: Infectious disease continues to follow for antibiotic management      MEDICATION UPDATE:  remains on IV Rocephin every 12 hours      NURSING :    Bowel:   Always Continent  [x]   Occasionally incontinent  []   Frequently incontinent  []   Always incontinent  []   Not occurred  []     Bladder:   Always Continent  [x]    Incontinent less than daily[]   Incontinent  daily []   Always incontinent  []   No urine output    []   Indwelling catheter []       Toilet Hygiene:   Current level : max assist  Short term Toilet hygiene goal: mod assist.  Long term toilet hygiene goal:  min assist    Skin integrity: left scalp incision open to air, healing  Pain: none    NUTRITION    Diet  soft bite sized  Liquid consistency   thin    SOCIAL INFORMATION:  Lives with: spouse  Prior community services:  none  Home Architecture:  old [employer], back entry is 1 step and 1 rail, 3 steps into main living area, no rail, to 2nd floor is 7+7 with 1/2 bath first floor, full bed and bath 2nd floor   Prior Level of function:  independent, was working  DME:  none    FAMILY / PATIENT EDUCATION:  will have first floor setup, safety and self care are ongoing     PHYSICAL THERAPY    Bed mobility:   Current level: mod assist-min assist  Short term bed mobility goal: standby

## 2024-06-19 NOTE — PROGRESS NOTES
Occupational Therapy  OCCUPATIONAL THERAPY DAILY NOTE    Date:2024  Patient Name: Conrado Leach  MRN: [id number]  : 1960  Room: [address] [place] [address]-A     Referring Practitioner: MD Juan Luis  Diagnosis: Brain mass L frontal lobe- 6/3/24- L crani abscess evacuation  Additional Pertinent Hx: hx R shoulder surgery, BPH, hx DVT, HTN, DM, HLD & seizures  Precautions: Fall Risk, R neglect, seizures, R hemiplegia & R UE edema & soft & bite sized diet  Discharge Recommendations: Home with 24 hour Sup/assist    Functional Assessment:   Date Status AE  Comments   Feeding 24  Min A     Grooming 24 Mod A           Oral Care 24  Max A     Bathing 24  Max A/Dep     UB Dressing 6/15/24  Max A      LB Dressing 24 MAX A   PM: To doff shorts and octaviano octaviano clean ones seated in w/c needing assist.    Footwear 24 maxA bed    Toileting 6/15/24 MAX A  BSC    Homemaking   TBD       Functional Transfers / Balance:   Date Status DME  Comments   Sit Balance 24 S/SBA  W/c Demo'd sitting balance both am/pm up in w/c during therapeutic exercises to increase trunk control/core strengthening for functional tasks & ADL's.   Stand Balance 24  Min/mod A LBQC  High/low table  PM: Pt demo'd standing balance at table during RUE wt bearing exercises using LUE on table edge with assist for balance and safety. 2nd person present for patient safety.    [x] Tub  [] Shower   Transfer 24 MAX A  Blade cane, extended tub bench    Commode   Transfer 24  MAX A  BSC, blade cane       Functional   Mobility 24  Max A Blade-cane     Other: bed rolling R<>L    Sit to supine         SPT          Sit <>stand       Gym mat <>W/C 24 Mod to  max  A      Min A        Mod A x2          Min a      maxA Bed rails               Blade-cane  Side by side         High/low table     Blade cane  .       PM: Pt needed assist with BLE mgmt up into bed along with RUE stabilized  pants; Mod A to don socks & shoes & demo G- safety & insight  Long Term Goal 5: Pt demo Min A commode trf using appropriate DME to ensure pt safety. Pt demo Min A toileting & demo G- safety & insight  Long Term Goal 6: Pt demo Min A tub trf  using appropriate DME to esnure pt safety & pt demo G- safety & insight  Long Term Goal 7: Pt demo Mod A light homemaking activity & demo G- safety & insight  Long Term Goal 8: Pt demo G- endurance for a 45-30 minute functional activity  Long Term Goal 9: Pt will tolerate PROM/AAROM/AROM RUE in all planes to facilitate functional movement & pt ability to functionally use his RUE  Long Term Goal 10: Pt demo G- safety, insight & reasoning with min vc's during all ADLs, transfers, mobility & IADLs.   Patient goals : \"Everything.\"  6/12/24-Pt POC & goals are updated on this date. Pt lives with his wife 2 dogs- farm house- to enter through back & has to walk through grass & 1 STEVEN 1HR & side entrance 4 STEVEN 1HR & 3 steps no HR & 2nd floor 7 steps no HR & 7 steps more no HR & only 1/2 bath on first floor. Currently recommending first floor setup. Pt admitted with a brain abscess & on antibiotics. Pt has an ileus & general surgery consulted & on a clear liquid diet. Pt tentative length of stay 6 weeks. Kim Irizarry OTR/L [id number]  6/19/24-Pt POC & goals are updated on this date. Pt wife does work but wants to take him home & can provide a 1st floor s/u. Pt tentative length of stay 5 weeks Kim Irizarry OTR/L [id number]       [x] Continue with current OT Plan of care.  [] Prepare for Discharge     DISCHARGE RECOMMENDATIONS  Recommended DME:    Post Discharge Care:   []Home Independently  []Home with 24hr Care / Supervision []Home with Partial Supervision []Home with Home Health OT []Home with Out Pt OT []Other: ___   Comments:         Time in Time out Tx Time Breakdown  Variance:   First Session  1000 1045 [] Individual Tx-  mins  [] Concurrent Tx -    [] Co-Tx -   [] Group Tx -   [] Time Missed -

## 2024-06-19 NOTE — PROGRESS NOTES
Physical Therapy  Treatment     Supervising therapist : Elie Segovia PT, DPT [id number]  ROOM: [address] [place], [place] [address]  DIAGNOSIS: brain abscess   PRECAUTIONS: Falls, R hemiparesis, R UE sling, R inattention, flat affect, cognition    HPI: Pt is a 63 year old pt who presented to ER from home with complaints of right side weakness and seizure-like activity at home. NIH of 10 upon arrival. Per wife at bedside patient has been having neck and lower back pain and has been going to chiropractor.  He also was having some confusion and they thought it was a UTI and he went to his primary care physician and was prescribed Bactrim.  He went into his daily routine, went to work and returned home.  At around 2:00 when his wife came home found him in the floor, unable to move the right side, appeared that he comprehended but he was unable to speak.  He later said that he had a seizure-like activity sitting on the chair and a slide himself down to the floor, that lasted for about 5 minutes. Workup revealed two masses in the left frontal lobe with adjacent vasogenic edema. EEG positive for left fronto central seizures. Neurosurgery and neurology was consulted. On 06/03/2024 patient underwent a left craniotomy abscess evacuation by Dr. Henderson. ID consulted and placed patient on IV antibiotics. Updated MRI brain w/wo contrast on 6/3/24 showed interval surgical drainage of one of the two left frontal lobe abscesses; mild increase in size of other abscess; slight increase in vasogenic edema; 3mm left-to-right MLS; stable right meningioma.  NSGY reviewed MRI brain prior to admission to ARU with no additional recommendations.  Hospital course was complicated by seizure, weakness, hypotension.       Pt has PMHx of type 2 diabetes mellitus, HLD, BPH, PARTH, multiple orthopedic surgeries, right lower extremities DVT 2 years ago after he had spider bite, not on anticoagulation      Social:  Pt lives with wife in a 2 story home with 3 step(s) to enter and

## 2024-06-19 NOTE — PROGRESS NOTES
PM&R Progress Note  Patient: Conrado Leach  Age/sex: 63 y.o. male  Medical Record #: [id number]  Location: 5509/5509-A  Admission date: 6/6/2024    CC: ARU follow up; brain abscess    S: Patient was seen and examined in his room today.  No acute events reported overnight.  No major issues this AM.  Having BMs more consistently, per patient.  Appreciate general surgery - CT abdomen showing ileus without obstruction.  Denies chest pain, sob, abd pain.  All pertinent labs and imaging reviewed.   Getting motor recovery in RUE/RLE.  Had to order PICC line to be replaced yesterday as it was pulled on inadvertently.      No change in PFSH since H&P done on 6/7/24 unless noted above.    Meds:  Scheduled  traZODone, 50 mg, Nightly  tamsulosin, 0.4 mg, Daily  [Held by provider] bethanechol, 10 mg, TID  bisacodyl, 10 mg, Daily  [Held by provider] lactulose, 20 g, TID  sennosides-docusate sodium, 2 tablet, BID  polyethylene glycol, 17 g, BID  levETIRAcetam, 1,000 mg, BID  pantoprazole, 40 mg, QAM AC  rosuvastatin, 5 mg, Nightly  heparin flush, 3 mL, 2 times per day  lactobacillus, 1 capsule, Daily  melatonin, 5 mg, Nightly  vitamin D, 50,000 Units, Weekly  cefTRIAXone (ROCEPHIN) IV, 2,000 mg, Q12H  sodium chloride flush, 5-40 mL, BID        PRN  acetaminophen, 650 mg, Q6H PRN  [Held by provider] oxyCODONE, 5 mg, Q4H PRN   Or  [Held by provider] oxyCODONE, 10 mg, Q4H PRN  heparin flush, 3 mL, PRN  butalbital-acetaminophen-caffeine, 1 tablet, Q4H PRN  ondansetron, 4 mg, Q8H PRN  calcium carbonate, 500 mg, TID PRN  magnesium hydroxide, 30 mL, Daily PRN        O:  Vitals:    06/17/24 2206 06/18/24 0815 06/18/24 2030 06/19/24 0745   BP: 125/81 (!) 114/57 129/71 116/62   Pulse: 64 73 68 67   Resp: 17 16 18 18   Temp: 97.3 °F (36.3 °C) 98.2 °F (36.8 °C) 97.3 °F (36.3 °C) 97.5 °F (36.4 °C)   TempSrc: Temporal Temporal Temporal Temporal   SpO2: 98% 100% 93% 96%   Weight:       Height:           GEN: NAD, conversational   HEENT: left

## 2024-06-19 NOTE — PLAN OF CARE
Problem: Safety - Adult  Goal: Free from fall injury  6/19/2024 0917 by Claudio Patrick RN  Outcome: Progressing  6/18/2024 2244 by Janet Arizmendi RN  Outcome: Progressing     Problem: Discharge Planning  Goal: Discharge to home or other facility with appropriate resources  6/19/2024 0917 by Claudio Patrick RN  Outcome: Progressing  6/18/2024 2244 by Janet Arizmendi RN  Outcome: Progressing  Flowsheets (Taken 6/18/2024 2015)  Discharge to home or other facility with appropriate resources:   Identify barriers to discharge with patient and caregiver   Arrange for needed discharge resources and transportation as appropriate     Problem: Skin/Tissue Integrity  Goal: Absence of new skin breakdown  Description: 1.  Monitor for areas of redness and/or skin breakdown  2.  Assess vascular access sites hourly  3.  Every 4-6 hours minimum:  Change oxygen saturation probe site  4.  Every 4-6 hours:  If on nasal continuous positive airway pressure, respiratory therapy assess nares and determine need for appliance change or resting period.  6/19/2024 0917 by Claudio Patrick RN  Outcome: Progressing  6/18/2024 2244 by Janet Arizmendi RN  Outcome: Progressing     Problem: ABCDS Injury Assessment  Goal: Absence of physical injury  6/19/2024 0917 by Claudio Patrick RN  Outcome: Progressing  6/18/2024 2244 by Janet Arizmendi RN  Outcome: Progressing     Problem: Pain  Goal: Verbalizes/displays adequate comfort level or baseline comfort level  6/19/2024 0917 by Claudio Patrick RN  Outcome: Progressing  6/18/2024 2244 by Janet Arizmendi RN  Outcome: Progressing     Problem: Chronic Conditions and Co-morbidities  Goal: Patient's chronic conditions and co-morbidity symptoms are monitored and maintained or improved  6/19/2024 0917 by Claudio Patrick RN  Outcome: Progressing  6/18/2024 2244 by Janet Arizmendi RN  Outcome: Progressing  Flowsheets (Taken 6/18/2024 2015)  Care Plan - Patient's Chronic Conditions and Co-Morbidity Symptoms are

## 2024-06-20 ENCOUNTER — APPOINTMENT (OUTPATIENT)
Dept: ULTRASOUND IMAGING | Age: 64
End: 2024-06-20
Attending: PHYSICAL MEDICINE & REHABILITATION
Payer: COMMERCIAL

## 2024-06-20 PROCEDURE — 97110 THERAPEUTIC EXERCISES: CPT

## 2024-06-20 PROCEDURE — 93971 EXTREMITY STUDY: CPT

## 2024-06-20 PROCEDURE — 6370000000 HC RX 637 (ALT 250 FOR IP): Performed by: PHYSICAL MEDICINE & REHABILITATION

## 2024-06-20 PROCEDURE — 6370000000 HC RX 637 (ALT 250 FOR IP): Performed by: STUDENT IN AN ORGANIZED HEALTH CARE EDUCATION/TRAINING PROGRAM

## 2024-06-20 PROCEDURE — 97129 THER IVNTJ 1ST 15 MIN: CPT | Performed by: SPEECH-LANGUAGE PATHOLOGIST

## 2024-06-20 PROCEDURE — 6360000002 HC RX W HCPCS: Performed by: PHYSICAL MEDICINE & REHABILITATION

## 2024-06-20 PROCEDURE — 97535 SELF CARE MNGMENT TRAINING: CPT

## 2024-06-20 PROCEDURE — 97530 THERAPEUTIC ACTIVITIES: CPT

## 2024-06-20 PROCEDURE — 97130 THER IVNTJ EA ADDL 15 MIN: CPT | Performed by: SPEECH-LANGUAGE PATHOLOGIST

## 2024-06-20 PROCEDURE — 2580000003 HC RX 258: Performed by: PHYSICAL MEDICINE & REHABILITATION

## 2024-06-20 PROCEDURE — 92526 ORAL FUNCTION THERAPY: CPT | Performed by: SPEECH-LANGUAGE PATHOLOGIST

## 2024-06-20 PROCEDURE — 99233 SBSQ HOSP IP/OBS HIGH 50: CPT | Performed by: PHYSICAL MEDICINE & REHABILITATION

## 2024-06-20 PROCEDURE — 6360000002 HC RX W HCPCS: Performed by: STUDENT IN AN ORGANIZED HEALTH CARE EDUCATION/TRAINING PROGRAM

## 2024-06-20 PROCEDURE — 1280000000 HC REHAB R&B

## 2024-06-20 RX ADMIN — POLYETHYLENE GLYCOL 3350 17 GRAM ORAL POWDER PACKET 17 G: at 21:34

## 2024-06-20 RX ADMIN — APIXABAN 10 MG: 5 TABLET, FILM COATED ORAL at 21:34

## 2024-06-20 RX ADMIN — ROSUVASTATIN CALCIUM 5 MG: 10 TABLET, FILM COATED ORAL at 21:34

## 2024-06-20 RX ADMIN — SODIUM CHLORIDE, PRESERVATIVE FREE 10 ML: 5 INJECTION INTRAVENOUS at 15:19

## 2024-06-20 RX ADMIN — SENNOSIDES AND DOCUSATE SODIUM 2 TABLET: 50; 8.6 TABLET ORAL at 21:34

## 2024-06-20 RX ADMIN — LEVETIRACETAM 1000 MG: 500 TABLET, FILM COATED ORAL at 12:02

## 2024-06-20 RX ADMIN — HEPARIN 300 UNITS: 100 SYRINGE at 12:01

## 2024-06-20 RX ADMIN — Medication 5 MG: at 21:34

## 2024-06-20 RX ADMIN — TRAZODONE HYDROCHLORIDE 50 MG: 50 TABLET ORAL at 21:34

## 2024-06-20 RX ADMIN — HEPARIN 300 UNITS: 100 SYRINGE at 21:33

## 2024-06-20 RX ADMIN — SENNOSIDES AND DOCUSATE SODIUM 2 TABLET: 50; 8.6 TABLET ORAL at 12:01

## 2024-06-20 RX ADMIN — WATER 2000 MG: 1 INJECTION INTRAMUSCULAR; INTRAVENOUS; SUBCUTANEOUS at 21:33

## 2024-06-20 RX ADMIN — Medication 1 CAPSULE: at 12:02

## 2024-06-20 RX ADMIN — TAMSULOSIN HYDROCHLORIDE 0.4 MG: 0.4 CAPSULE ORAL at 12:02

## 2024-06-20 RX ADMIN — WATER 2000 MG: 1 INJECTION INTRAMUSCULAR; INTRAVENOUS; SUBCUTANEOUS at 12:02

## 2024-06-20 RX ADMIN — LEVETIRACETAM 1000 MG: 500 TABLET, FILM COATED ORAL at 21:34

## 2024-06-20 RX ADMIN — PANTOPRAZOLE SODIUM 40 MG: 40 TABLET, DELAYED RELEASE ORAL at 06:22

## 2024-06-20 ASSESSMENT — PAIN SCALES - WONG BAKER: WONGBAKER_NUMERICALRESPONSE: NO HURT

## 2024-06-20 ASSESSMENT — PAIN SCALES - GENERAL
PAINLEVEL_OUTOF10: 0
PAINLEVEL_OUTOF10: 0

## 2024-06-20 NOTE — PROGRESS NOTES
Physical Therapy  Treatment     Supervising therapist : Elie Segovia PT, DPT [id number]  ROOM: [address] [place], [place] [address]  DIAGNOSIS: brain abscess   PRECAUTIONS: Falls, R hemiparesis, R UE sling, R inattention, flat affect, cognition  Diet: Dysphagia 3, Soft/advanced (Soft & Bite-sized) solids with thin liquids (IDDSI level 0)   HPI: Pt is a 63 year old pt who presented to ER from home with complaints of right side weakness and seizure-like activity at home. NIH of 10 upon arrival. Per wife at bedside patient has been having neck and lower back pain and has been going to chiropractor.  He also was having some confusion and they thought it was a UTI and he went to his primary care physician and was prescribed Bactrim.  He went into his daily routine, went to work and returned home.  At around 2:00 when his wife came home found him in the floor, unable to move the right side, appeared that he comprehended but he was unable to speak.  He later said that he had a seizure-like activity sitting on the chair and a slide himself down to the floor, that lasted for about 5 minutes. Workup revealed two masses in the left frontal lobe with adjacent vasogenic edema. EEG positive for left fronto central seizures. Neurosurgery and neurology was consulted. On 06/03/2024 patient underwent a left craniotomy abscess evacuation by Dr. Henderson. ID consulted and placed patient on IV antibiotics. Updated MRI brain w/wo contrast on 6/3/24 showed interval surgical drainage of one of the two left frontal lobe abscesses; mild increase in size of other abscess; slight increase in vasogenic edema; 3mm left-to-right MLS; stable right meningioma.  NSGY reviewed MRI brain prior to admission to ARU with no additional recommendations.  Hospital course was complicated by seizure, weakness, hypotension.       Pt has PMHx of type 2 diabetes mellitus, HLD, BPH, PARTH, multiple orthopedic surgeries, right lower extremities DVT 2 years ago after he had spider bite, not on  multidirectional ambulation for postural stability training. Physical assist continues for sufficient left lateral lean for self advancement of the R limb ~75% of all distances. Intermittent anteropulsion is corrected with Mod VC, but improvements of self righting by patient. Carry over of sequencing recommendations with turns and stand pivots has improved reducing need of continuous vocal cuing by therapist. While seated, patient was able to reveal hamstring return with knee flexion with a reduction of friction with the sock donned to shoe. He further completed a stand pivot transfer and was again able to make small advancements of his R limb when backing up to the chair, which is evidence of further neurologic return to the R hemibody.     AM  Time in: 1045  Time out: 1430    PM  Time in: 1300  Time out: 1345      Pt is making good progress toward established Physical Therapy goals.  Continue with physical therapy current plan of care.      Mar Toth, PT, DPT  [id number]

## 2024-06-20 NOTE — PROGRESS NOTES
Department of Internal Medicine  Infectious Diseases  Progress  Note      C/C :   Brain abscess s/p craniotomy and drainage  ( 6/3)     Denies fever or chills  Denies headache   Afebrile     Current Facility-Administered Medications   Medication Dose Route Frequency Provider Last Rate Last Admin    traZODone (DESYREL) tablet 50 mg  50 mg Oral Nightly Kendra Mcknightct L, DO   50 mg at 06/19/24 2038    tamsulosin (FLOMAX) capsule 0.4 mg  0.4 mg Oral Daily Marek Mcknight L, DO   0.4 mg at 06/19/24 0759    [Held by provider] bethanechol (URECHOLINE) tablet 10 mg  10 mg Oral TID Kendra Mcknightct L, DO   10 mg at 06/18/24 0830    bisacodyl (DULCOLAX) suppository 10 mg  10 mg Rectal Daily Gianfrate Giigor C, DO   10 mg at 06/14/24 1812    [Held by provider] lactulose (CHRONULAC) 10 GM/15ML solution 20 g  20 g Oral TID Kendra Mcknightct L, DO   20 g at 06/18/24 0830    sennosides-docusate sodium (SENOKOT-S) 8.6-50 MG tablet 2 tablet  2 tablet Oral BID Kendra Mcknightct L, DO   2 tablet at 06/19/24 2037    polyethylene glycol (GLYCOLAX) packet 17 g  17 g Oral BID Kendra Mcknightct L, DO   17 g at 06/19/24 2037    levETIRAcetam (KEPPRA) tablet 1,000 mg  1,000 mg Oral BID Kendra Mcknightct L, DO   1,000 mg at 06/19/24 2037    acetaminophen (TYLENOL) tablet 650 mg  650 mg Oral Q6H PRN Venkat Mcknightdict L, DO        pantoprazole (PROTONIX) tablet 40 mg  40 mg Oral QAM AC Milanes Marino, Maria, MD   40 mg at 06/20/24 0622    rosuvastatin (CRESTOR) tablet 5 mg  5 mg Oral Nightly Milanes Marino, Maria, MD   5 mg at 06/19/24 2037    [Held by provider] oxyCODONE (ROXICODONE) immediate release tablet 5 mg  5 mg Oral Q4H PRN Milanes Marino, Maria, MD   5 mg at 06/09/24 2100    Or    [Held by provider] oxyCODONE HCl (OXY-IR) immediate release tablet 10 mg  10 mg Oral Q4H PRN Milanes Marino, Maria, MD   10 mg at 06/08/24 2214    heparin (PF) 100 UNIT/ML injection 300 Units  3 mL IntraVENous PRN Milanes Marino, Maria, MD   300 Units at   Pulse 62   Temperature 97.5 °F (36.4 °C) (Temporal)   Respiration 18   Height 1.803 m (5' 10.98\")   Weight 99 kg (218 lb 4.1 oz)   Oxygen Saturation 94%   Body Mass Index 30.45 kg/m²     General Appearance:    Awake, alert , no acute distress.   Head:    Normocephalic, craniotomy wound healing ,staples remoced     Eyes:    No pallor, no icterus,   Ears:    No obvious deformity or drainage.   Nose:   No nasal drainage   Throat:   Mucosa moist, no oral thrush   Neck:   Supple, no lymphadenopathy   Back:     no CVA tenderness   Lungs:     Clear to auscultation bilaterally, no wheeze    Heart:    Regular rate and rhythm, no murmur   Abdomen:     Soft, non-tender, bowel sounds present    Extremities:   ++  edema right leg , right arm    Pulses:   Dorsalis pedis palpable    Skin:   no rashes      CBC with Differential:      Lab Results   Component Value Date/Time    WBC 7.7 06/14/2024 06:00 AM    RBC 4.75 06/14/2024 06:00 AM    HGB 13.1 06/14/2024 06:00 AM    HCT 38.9 06/14/2024 06:00 AM     06/14/2024 06:00 AM    MCV 81.9 06/14/2024 06:00 AM    MCH 27.6 06/14/2024 06:00 AM    MCHC 33.7 06/14/2024 06:00 AM    RDW 14.5 06/14/2024 06:00 AM    LYMPHOPCT 11 06/11/2024 04:40 AM    MONOPCT 9 06/11/2024 04:40 AM    EOSPCT 1 06/11/2024 04:40 AM    BASOPCT 0 06/11/2024 04:40 AM    MONOSABS 0.83 06/11/2024 04:40 AM    EOSABS 0.08 06/11/2024 04:40 AM    BASOSABS 0.03 06/11/2024 04:40 AM       CMP     Lab Results   Component Value Date/Time     06/14/2024 06:00 AM    K 3.9 06/14/2024 06:00 AM    CL 97 06/14/2024 06:00 AM    CO2 27 06/14/2024 06:00 AM    BUN 10 06/14/2024 06:00 AM    CREATININE 0.8 06/14/2024 06:00 AM    LABGLOM >90 06/14/2024 06:00 AM    GLUCOSE 126 06/14/2024 06:00 AM    CALCIUM 9.1 06/14/2024 06:00 AM    BILITOT 0.4 06/07/2024 05:08 AM    ALKPHOS 58 06/07/2024 05:08 AM    AST 14 06/07/2024 05:08 AM    ALT 29 06/07/2024 05:08 AM         Hepatic Function Panel:    Lab Results   Component Value

## 2024-06-20 NOTE — CARE COORDINATION
Team:  Team meeting yesterday.  Sw met with patient yesterday and spoke with wife on phone today.  Reviewed treatment plan. Motor return to right LE noted.   Discussed LOS estimate of 5 weeks and projected goals and discharge needs.  Patient is disappointed regarding the projected LOS.   Present function is at  mod to max assist.  Goals are to supervision/min assist.  Recovery impacted by dense hemiplegia  and memory and problem solving impairment.  .    D/C Plan:  Plan is home.  Wife works full time.  .  Stairs are a barrier and may need to provide a first floor set up for patient.  Wife is willing to do that.  There is no 24 hour care  for a long term as wife will need to return to work .  Can plan to be off when he first comes home.  SW will continue to provide support to patient and wife re planning.  MARSHALL addressed patient benefits from employer as well as Social Security and wife has good understanding and is following  with the employer and plans to apply to SSDI.     After Care and DME:  TBD    FT:  to be scheduled

## 2024-06-20 NOTE — PROGRESS NOTES
Speech Language Pathology  ACUTE REHABILITATION--DAILY PROGRESS NOTE            SWALLOWING:      Diet:  Dysphagia 3, Soft/advanced (Soft & Bite-sized) solids with thin liquids (IDDSI level 0)     Patient seen for skilled dysphagia tx for lunch meal this date. Tolerated meal well, however limited intake. Slow rate of intake, small/ single bites without noticeable residue observed. No overt s/s of pen/asp. Required assistance with set-up.       LANGUAGE:    Not formally addressed, appropriate response times cont to be noted.      COGNITION:      Patient seen in therapy space for skilled cognitive tx for co-tx with OT. Patient alert and oriented to all concepts. Patient accurately identified safety concerns given a picture in 9/12 situations given minimal wait time. Patient demonstrated appropriate attention to dual task, holding card in weaker hand with OT support while answering questions in noisy environment. Patient had good recall to OT instruction for supporting hand and followed multi-step directions without need for repetition or simplification.     SPEECH:    Functional. While patient has exhibited more emotion in previous sessions, flat affect this date. However, patient was more engaged in spontaneous conversation with elaborated responses.       Minute Tracking:    Individual minutes:     0 minutes  Concurrent minutes:    15 minutes  Co-treatment minutes:   45 minutes    Total minutes for 6/20/2024:   60  minutes        SAFETY:      Per chart review, patient shared spouse will be home at discharge to assist with medicine management.     EDUCATION:    Educated on how ST addresses memory and cognition.     OUTCOME:    Progress made towards goals. Will continue SP intervention as per previously established POC.    SP recommended after discharge:  TBD  Supervision recommended at discharge: TBD      FIMS SCORES                           Swallowing                          Current Status             4--Minimal  compensatory swallowing strategies on 90% of opportunities or greater to improve airway protection and swallow function.  Pt will participate in ongoing mealtime assessment to provide diet modification and compensatory strategy implementation to minimize risk of aspiration associated with PO intake  Pt will complete PO trials of upgraded diet textures with SLP only to determine the least restrictive PO diet to maintain adequate nutrition/hydration with no more than 1 overt s/s of pen/asp.     Long Term Goals:               Pt will improve oropharyngeal swallow function to ensure airway protection during PO intake to maintain adequate nutrition/hydration and decrease signs/symptoms of aspiration to less than 1 x/day.

## 2024-06-20 NOTE — PROGRESS NOTES
Occupational Therapy  OCCUPATIONAL THERAPY DAILY NOTE    Date:2024  Patient Name: Conrado Leach  MRN: [id number]  : 1960  Room: [address] [place] [address]A     Referring Practitioner: MD Juan Luis  Diagnosis: Brain mass L frontal lobe- 6/3/24- L crani abscess evacuation  Additional Pertinent Hx: hx R shoulder surgery, BPH, hx DVT, HTN, DM, HLD & seizures  Precautions: Fall Risk, R neglect, seizures, R hemiplegia & R UE edema & clear liquid diet  Discharge Recommendations: Home with 24 hour Sup/assist    Functional Assessment:   Date Status AE  Comments   Feeding 24  Min A     Grooming 24 CGA/Min A W/c Pt washed face, hands and attempted to apply deodorant using one handed skills while seated at sink.   Educated pt using longer length bottle of deodorant in order to reach underneath arm pits.  Pt needed assist to reposition RUE up on sink.          Oral Care 24 CGA/min A W/c Pt required assist to open, apply paste on toothbrush due to limited RUE ROM.  Pt educated using adaptive technique with one handed skills while seated at sink.    Bathing 24  UB-Mak  LB-Mod A  Sponge  Pt used LUE to wash RUE/hand, chest, stomach with one cue for sequencing seated at sink up in w/c. Pt needed assist to wash LUE and repositioning RUE to reach arm pit. Pt educated using mirror for visual input.   Pt completed LB bathing washing B thighs down to shins needing cues to increase follow thru and R side awareness.  Pt needed assist for juan miguel care/buttocks while standing using grab bar for balance. 2nd person present for patient safety.        UB Dressing 24  Mod/Max A  W/c Pt educated using ekaterina technique to doff gown and octaviano t- shirt seated  in w/c.  Pt required assist to thread garment on RUE and pull garment down over back and both sides.     LB Dressing 24 MAX A  seated To doff and octaviano shorts while seated needing assist while seated in w/c.  Pt stood for clothing mgmt using grab bar in bathroom needing assist  has an ileus & general surgery consulted & on a clear liquid diet. Pt tentative length of stay 6 weeks. Kim Juan C OTR/L [id number]       [x] Continue with current OT Plan of care.  [] Prepare for Discharge     DISCHARGE RECOMMENDATIONS  Recommended DME:    Post Discharge Care:   []Home Independently  []Home with 24hr Care / Supervision []Home with Partial Supervision []Home with Home Health OT []Home with Out Pt OT []Other: ___   Comments:         Time in Time out Tx Time Breakdown  Variance:   First Session  8:15am 9:15am [x] Individual Tx- 60mins  [] Concurrent Tx- mins    [] Co-Tx -   [] Group Tx -   [] Time Missed -     Second Session 10:00am 10:45am [x] Individual Tx- 45mins    [] Concurrent Tx -  [] Co-Tx -   [] Group Tx -   [] Time Missed -     Third Session    [] Individual Tx-   [] Concurrent Tx -  [] Co-Tx -   [] Group Tx -   [] Time Missed -         Total Tx Time: 105mins   Kathleen J Saladino ROWELL/L   [id number]

## 2024-06-20 NOTE — PROGRESS NOTES
PM&R Progress Note  Patient: Conrado Leach  Age/sex: 63 y.o. male  Medical Record #: [id number]  Location: 5509/5509-A  Admission date: 6/6/2024    CC: ARU follow up; brain abscess    S: Patient was seen and examined in his room today.  No acute events reported overnight.  No major issues this AM.  Having BMs more consistently, per patient.  Appreciate general surgery - CT abdomen showing ileus without obstruction.  Denies chest pain, sob, abd pain.  All pertinent labs and imaging reviewed.   Getting motor recovery in RUE/RLE.  Patient not pleased with tentative discharge plan - per SW, wife not able to take home sooner, considering FT next week.     No change in PFSH since H&P done on 6/7/24 unless noted above.    Meds:  Scheduled  traZODone, 50 mg, Nightly  tamsulosin, 0.4 mg, Daily  [Held by provider] bethanechol, 10 mg, TID  bisacodyl, 10 mg, Daily  [Held by provider] lactulose, 20 g, TID  sennosides-docusate sodium, 2 tablet, BID  polyethylene glycol, 17 g, BID  levETIRAcetam, 1,000 mg, BID  pantoprazole, 40 mg, QAM AC  rosuvastatin, 5 mg, Nightly  heparin flush, 3 mL, 2 times per day  lactobacillus, 1 capsule, Daily  melatonin, 5 mg, Nightly  vitamin D, 50,000 Units, Weekly  cefTRIAXone (ROCEPHIN) IV, 2,000 mg, Q12H  sodium chloride flush, 5-40 mL, BID        PRN  acetaminophen, 650 mg, Q6H PRN  [Held by provider] oxyCODONE, 5 mg, Q4H PRN   Or  [Held by provider] oxyCODONE, 10 mg, Q4H PRN  heparin flush, 3 mL, PRN  butalbital-acetaminophen-caffeine, 1 tablet, Q4H PRN  ondansetron, 4 mg, Q8H PRN  calcium carbonate, 500 mg, TID PRN  magnesium hydroxide, 30 mL, Daily PRN        O:  Vitals:    06/18/24 2030 06/19/24 0745 06/19/24 2030 06/20/24 1145   BP: 129/71 116/62 (!) 143/66 123/72   Pulse: 68 67 62 67   Resp: 18 18 18 18   Temp: 97.3 °F (36.3 °C) 97.5 °F (36.4 °C) 97.5 °F (36.4 °C) 97.2 °F (36.2 °C)   TempSrc: Temporal Temporal Temporal Temporal   SpO2: 93% 96% 94% 100%   Weight:       Height:           GEN:

## 2024-06-21 LAB
ANION GAP SERPL CALCULATED.3IONS-SCNC: 9 MMOL/L (ref 7–16)
BASOPHILS # BLD: 0.04 K/UL (ref 0–0.2)
BASOPHILS NFR BLD: 1 % (ref 0–2)
BUN SERPL-MCNC: 10 MG/DL (ref 6–23)
CALCIUM SERPL-MCNC: 9.3 MG/DL (ref 8.6–10.2)
CHLORIDE SERPL-SCNC: 105 MMOL/L (ref 98–107)
CO2 SERPL-SCNC: 27 MMOL/L (ref 22–29)
CREAT SERPL-MCNC: 0.9 MG/DL (ref 0.7–1.2)
EOSINOPHIL # BLD: 0.3 K/UL (ref 0.05–0.5)
EOSINOPHILS RELATIVE PERCENT: 7 % (ref 0–6)
ERYTHROCYTE [DISTWIDTH] IN BLOOD BY AUTOMATED COUNT: 14.9 % (ref 11.5–15)
GFR, ESTIMATED: >90 ML/MIN/1.73M2
GLUCOSE SERPL-MCNC: 104 MG/DL (ref 74–99)
HCT VFR BLD AUTO: 35.9 % (ref 37–54)
HGB BLD-MCNC: 11.6 G/DL (ref 12.5–16.5)
IMM GRANULOCYTES # BLD AUTO: <0.03 K/UL (ref 0–0.58)
IMM GRANULOCYTES NFR BLD: 0 % (ref 0–5)
LYMPHOCYTES NFR BLD: 1.06 K/UL (ref 1.5–4)
LYMPHOCYTES RELATIVE PERCENT: 25 % (ref 20–42)
MCH RBC QN AUTO: 26.8 PG (ref 26–35)
MCHC RBC AUTO-ENTMCNC: 32.3 G/DL (ref 32–34.5)
MCV RBC AUTO: 82.9 FL (ref 80–99.9)
MONOCYTES NFR BLD: 0.39 K/UL (ref 0.1–0.95)
MONOCYTES NFR BLD: 9 % (ref 2–12)
NEUTROPHILS NFR BLD: 57 % (ref 43–80)
NEUTS SEG NFR BLD: 2.41 K/UL (ref 1.8–7.3)
PLATELET # BLD AUTO: 179 K/UL (ref 130–450)
PMV BLD AUTO: 8.9 FL (ref 7–12)
POTASSIUM SERPL-SCNC: 4 MMOL/L (ref 3.5–5)
RBC # BLD AUTO: 4.33 M/UL (ref 3.8–5.8)
SODIUM SERPL-SCNC: 141 MMOL/L (ref 132–146)
WBC OTHER # BLD: 4.2 K/UL (ref 4.5–11.5)

## 2024-06-21 PROCEDURE — 6370000000 HC RX 637 (ALT 250 FOR IP): Performed by: STUDENT IN AN ORGANIZED HEALTH CARE EDUCATION/TRAINING PROGRAM

## 2024-06-21 PROCEDURE — 97530 THERAPEUTIC ACTIVITIES: CPT

## 2024-06-21 PROCEDURE — 97130 THER IVNTJ EA ADDL 15 MIN: CPT

## 2024-06-21 PROCEDURE — 80048 BASIC METABOLIC PNL TOTAL CA: CPT

## 2024-06-21 PROCEDURE — 6360000002 HC RX W HCPCS: Performed by: STUDENT IN AN ORGANIZED HEALTH CARE EDUCATION/TRAINING PROGRAM

## 2024-06-21 PROCEDURE — 2580000003 HC RX 258: Performed by: PHYSICAL MEDICINE & REHABILITATION

## 2024-06-21 PROCEDURE — 97129 THER IVNTJ 1ST 15 MIN: CPT

## 2024-06-21 PROCEDURE — 1280000000 HC REHAB R&B

## 2024-06-21 PROCEDURE — 6360000002 HC RX W HCPCS: Performed by: PHYSICAL MEDICINE & REHABILITATION

## 2024-06-21 PROCEDURE — 6370000000 HC RX 637 (ALT 250 FOR IP): Performed by: PHYSICAL MEDICINE & REHABILITATION

## 2024-06-21 PROCEDURE — 85025 COMPLETE CBC W/AUTO DIFF WBC: CPT

## 2024-06-21 PROCEDURE — 97110 THERAPEUTIC EXERCISES: CPT

## 2024-06-21 RX ADMIN — ROSUVASTATIN CALCIUM 5 MG: 10 TABLET, FILM COATED ORAL at 21:25

## 2024-06-21 RX ADMIN — SENNOSIDES AND DOCUSATE SODIUM 2 TABLET: 50; 8.6 TABLET ORAL at 21:25

## 2024-06-21 RX ADMIN — APIXABAN 10 MG: 5 TABLET, FILM COATED ORAL at 10:08

## 2024-06-21 RX ADMIN — POLYETHYLENE GLYCOL 3350 17 GRAM ORAL POWDER PACKET 17 G: at 21:24

## 2024-06-21 RX ADMIN — WATER 2000 MG: 1 INJECTION INTRAMUSCULAR; INTRAVENOUS; SUBCUTANEOUS at 10:14

## 2024-06-21 RX ADMIN — SODIUM CHLORIDE, PRESERVATIVE FREE 10 ML: 5 INJECTION INTRAVENOUS at 21:24

## 2024-06-21 RX ADMIN — SODIUM CHLORIDE, PRESERVATIVE FREE 10 ML: 5 INJECTION INTRAVENOUS at 10:11

## 2024-06-21 RX ADMIN — SENNOSIDES AND DOCUSATE SODIUM 2 TABLET: 50; 8.6 TABLET ORAL at 10:08

## 2024-06-21 RX ADMIN — HEPARIN 300 UNITS: 100 SYRINGE at 21:24

## 2024-06-21 RX ADMIN — WATER 2000 MG: 1 INJECTION INTRAMUSCULAR; INTRAVENOUS; SUBCUTANEOUS at 21:24

## 2024-06-21 RX ADMIN — Medication 1 CAPSULE: at 10:08

## 2024-06-21 RX ADMIN — POLYETHYLENE GLYCOL 3350 17 GRAM ORAL POWDER PACKET 17 G: at 10:09

## 2024-06-21 RX ADMIN — HEPARIN 300 UNITS: 100 SYRINGE at 10:11

## 2024-06-21 RX ADMIN — TAMSULOSIN HYDROCHLORIDE 0.4 MG: 0.4 CAPSULE ORAL at 10:09

## 2024-06-21 RX ADMIN — ERGOCALCIFEROL 50000 UNITS: 1.25 CAPSULE ORAL at 10:08

## 2024-06-21 RX ADMIN — APIXABAN 10 MG: 5 TABLET, FILM COATED ORAL at 21:25

## 2024-06-21 RX ADMIN — LEVETIRACETAM 1000 MG: 500 TABLET, FILM COATED ORAL at 10:08

## 2024-06-21 RX ADMIN — Medication 5 MG: at 21:25

## 2024-06-21 RX ADMIN — PANTOPRAZOLE SODIUM 40 MG: 40 TABLET, DELAYED RELEASE ORAL at 05:19

## 2024-06-21 RX ADMIN — LEVETIRACETAM 1000 MG: 500 TABLET, FILM COATED ORAL at 21:25

## 2024-06-21 RX ADMIN — TRAZODONE HYDROCHLORIDE 50 MG: 50 TABLET ORAL at 21:25

## 2024-06-21 ASSESSMENT — PAIN SCALES - GENERAL
PAINLEVEL_OUTOF10: 0
PAINLEVEL_OUTOF10: 0

## 2024-06-21 NOTE — PLAN OF CARE
Problem: Safety - Adult  Goal: Free from fall injury  6/20/2024 2218 by Miracle Ko RN  Outcome: Progressing  6/20/2024 1118 by Tae Arce RN  Outcome: Progressing     Problem: Discharge Planning  Goal: Discharge to home or other facility with appropriate resources  6/20/2024 2218 by Miracle Ko RN  Outcome: Progressing  6/20/2024 1118 by Tae Arce RN  Outcome: Progressing     Problem: Skin/Tissue Integrity  Goal: Absence of new skin breakdown  Description: 1.  Monitor for areas of redness and/or skin breakdown  2.  Assess vascular access sites hourly  3.  Every 4-6 hours minimum:  Change oxygen saturation probe site  4.  Every 4-6 hours:  If on nasal continuous positive airway pressure, respiratory therapy assess nares and determine need for appliance change or resting period.  6/20/2024 2218 by Miracle Ko RN  Outcome: Progressing  6/20/2024 1118 by Tae Arce RN  Outcome: Progressing     Problem: ABCDS Injury Assessment  Goal: Absence of physical injury  6/20/2024 2218 by Miracle Ko RN  Outcome: Progressing  6/20/2024 1118 by Tae Arce RN  Outcome: Progressing     Problem: Pain  Goal: Verbalizes/displays adequate comfort level or baseline comfort level  6/20/2024 2218 by Miracle Ko RN  Outcome: Progressing  6/20/2024 1118 by Tae Arce RN  Outcome: Progressing     Problem: Chronic Conditions and Co-morbidities  Goal: Patient's chronic conditions and co-morbidity symptoms are monitored and maintained or improved  6/20/2024 2218 by Miracle Ko RN  Outcome: Progressing  6/20/2024 1118 by Tae Arce RN  Outcome: Progressing     Problem: Nutrition Deficit:  Goal: Optimize nutritional status  6/20/2024 2218 by Miracle Ko RN  Outcome: Progressing  6/20/2024 1118 by Tae Arce RN  Outcome: Progressing

## 2024-06-21 NOTE — PROGRESS NOTES
Speech Language Pathology  ACUTE REHABILITATION--DAILY PROGRESS NOTE            SWALLOWING:      Diet:  Dysphagia 3, Soft/advanced (Soft & Bite-sized) solids with thin liquids (IDDSI level 0)     Patient declined meal analysis this date due to daughter being present and patient wanting to eat in his room. Discussed strategies with patient's daughter who is an RN.       LANGUAGE:    Not formally addressed, appropriate response times cont to be noted.      COGNITION:      Patient seen in therapy space for skilled cognitive tx. Patient engaged in various tasks utilizing the Constant Therapy susie. Patient given memory task where he was asked to recreate a pattern from memory. Patient completed the task with 65% accuracy and benefited from moderate repetitions. Patient completed calendar task where he was given a calendar and asked to answer questions regarding time and/or events on that calendar. The patient benefited from moderate cues to increase accuracy to 70%. Lastly, the patient completed a divergent naming task where he was given various categories and asked to name members of the categories. He completed the task with fair+ outcome. He benefited from increased cues as the categories became more abstract.     SPEECH:    Not addressed this date.       Minute Tracking:    Individual minutes:     45 minutes  Concurrent minutes:    0 minutes  Co-treatment minutes:   0 minutes    Total minutes for 6/21/2024:   45  minutes        SAFETY:      Per chart review, patient shared spouse will be home at discharge to assist with medicine management.     EDUCATION:    Educated on how ST addresses memory and cognition.     OUTCOME:    Progress made towards goals. Will continue SP intervention as per previously established POC.    SP recommended after discharge:  TBD  Supervision recommended at discharge: TBD      FIMS SCORES                           Swallowing                          Current Status             4--Minimal

## 2024-06-21 NOTE — PROGRESS NOTES
Progress Note  Date:2024       Room:55095509-  Patient Name:Conrado Leach     YOB: 1960     Age:63 y.o.        Subjective    Subjective:  Symptoms:  Stable.  He reports weakness.    Diet:  Adequate intake.    Activity level: Impaired due to weakness.    Pain:  He reports no pain.       Review of Systems   Neurological:  Positive for weakness.     Objective         Vitals Last 24 Hours:  TEMPERATURE:  Temp  Av.3 °F (36.3 °C)  Min: 97.2 °F (36.2 °C)  Max: 97.3 °F (36.3 °C)  RESPIRATIONS RANGE: Resp  Av  Min: 18  Max: 18  PULSE OXIMETRY RANGE: SpO2  Av.5 %  Min: 93 %  Max: 100 %  PULSE RANGE: Pulse  Av.5  Min: 54  Max: 67  BLOOD PRESSURE RANGE: Systolic (24hrs), Av , Min:115 , Max:123   ; Diastolic (24hrs), Av, Min:63, Max:72    I/O (24Hr):    Intake/Output Summary (Last 24 hours) at 2024 0837  Last data filed at 2024 0639  Gross per 24 hour   Intake 930 ml   Output 1625 ml   Net -695 ml     Objective:  General Appearance:  In no acute distress.    Vital signs: (most recent): Blood pressure 115/63, pulse 54, temperature 97.3 °F (36.3 °C), temperature source Temporal, resp. rate 18, height 1.803 m (5' 10.98\"), weight 99 kg (218 lb 4.1 oz), SpO2 93 %.  Vital signs are normal.    Output: Producing urine and producing stool.    Lungs:  Normal effort and normal respiratory rate.  Breath sounds clear to auscultation.    Heart: Normal rate.  Regular rhythm.  S1 normal and S2 normal.    Abdomen: Abdomen is soft.  Hyperactive bowel sounds.   There is no abdominal tenderness.     Extremities: Normal range of motion.    Neurological: Patient is alert.  (2/5 right side).      Labs/Imaging/Diagnostics    Labs:  CBC:  Recent Labs     24  0515   WBC 4.2*   RBC 4.33   HGB 11.6*   HCT 35.9*   MCV 82.9   RDW 14.9        CHEMISTRIES:  Recent Labs     24  0515      K 4.0      CO2 27   BUN 10   CREATININE 0.9   GLUCOSE 104*     PT/INR:No results for  pain  Seems to be stable and recovering in terms of the ileus  He still has significant right-sided weakness and therapy is working on that).       Electronically signed by Lucas Jasso MD on 6/21/24 at 8:37 AM EDT

## 2024-06-21 NOTE — PROGRESS NOTES
Physical Therapy  Treatment     Supervising therapist : Mar Toth, PT, DPT [id number]  ROOM: [address] [place], [place] [address]  DIAGNOSIS: brain abscess   PRECAUTIONS: Falls, R hemiparesis, R UE sling, R inattention, flat affect, cognition  Diet: Dysphagia 3, Soft/advanced (Soft & Bite-sized) solids with thin liquids (IDDSI level 0)     HPI: Pt is a 63 year old pt who presented to ER from home with complaints of right side weakness and seizure-like activity at home. NIH of 10 upon arrival. Per wife at bedside patient has been having neck and lower back pain and has been going to chiropractor.  He also was having some confusion and they thought it was a UTI and he went to his primary care physician and was prescribed Bactrim.  He went into his daily routine, went to work and returned home.  At around 2:00 when his wife came home found him in the floor, unable to move the right side, appeared that he comprehended but he was unable to speak.  He later said that he had a seizure-like activity sitting on the chair and a slide himself down to the floor, that lasted for about 5 minutes. Workup revealed two masses in the left frontal lobe with adjacent vasogenic edema. EEG positive for left fronto central seizures. Neurosurgery and neurology was consulted. On 06/03/2024 patient underwent a left craniotomy abscess evacuation by Dr. Henderson. ID consulted and placed patient on IV antibiotics. Updated MRI brain w/wo contrast on 6/3/24 showed interval surgical drainage of one of the two left frontal lobe abscesses; mild increase in size of other abscess; slight increase in vasogenic edema; 3mm left-to-right MLS; stable right meningioma.  NSGY reviewed MRI brain prior to admission to ARU with no additional recommendations.  Hospital course was complicated by seizure, weakness, hypotension.       Pt has PMHx of type 2 diabetes mellitus, HLD, BPH, PARTH, multiple orthopedic surgeries, right lower extremities DVT 2 years ago after he had spider bite,

## 2024-06-21 NOTE — PROGRESS NOTES
Occupational Therapy  OCCUPATIONAL THERAPY DAILY NOTE    Date:2024  Patient Name: Conrado Leach  MRN: [id number]  : 1960  Room: [address] [place] [address]-A     Referring Practitioner: MD Juan Luis  Diagnosis: Brain mass L frontal lobe- 6/3/24- L crani abscess evacuation  Additional Pertinent Hx: hx R shoulder surgery, BPH, hx DVT, HTN, DM, HLD & seizures  Precautions: Fall Risk, R neglect, seizures, R hemiplegia & R UE edema & Soft & Bite size                        *Positive for DVT in RUE as of 24     *R ankle/foot swollen as of 24  .  Discharge Recommendations: Home with 24 hour Sup/assist    Functional Assessment:   Date Status AE  Comments   Feeding 24  Min A     Grooming 24 CGA/Min A W/c          Oral Care 24 CGA/min A W/c    Bathing 24  UB-Mak  LB-Mod A  Sponge     UB Dressing 24  Mod/Max A  W/c    LB Dressing 24 MAX A  seated    Footwear 24 maxA W/c    Toileting 24 Max A  BSC Pt required assist to complete posterior personal hygiene and clothing management this date.   Homemaking 24 Min A (laundry)  Simple laundry folding task completed seated at table with 1# wrist weight donned on L UE to increase strength/endurance for increased independence with functional tasks.      Functional Transfers / Balance:   Date Status DME  Comments   Sit Balance 24 S/SBA  W/c Seated on commode and up in w/c during therapeutic tasks.      PM: RLE elevated on foot rest & stool with sitting balance in w/c during tx session due to R foot and ankle swollen.  Pt did not report any pain due to numbness in RLE.    Stand Balance 24  Mod/Min A LBQC  High/low table   Grab bar  Demo'd during functional transfer and ADL tasks.      Deferred standing balance tasks in pm session due to patient having swelling in R foot/ankle for patient safety and joint protection.     [x] Tub  [] Shower   Transfer 24 MAX A  Blade cane, extended tub bench    Commode   Transfer 24  Max A   celina Min A tub trf  using appropriate DME to esnure pt safety & pt demo G- safety & insight  Long Term Goal 7: Pt demo Mod A light homemaking activity & demo G- safety & insight  Long Term Goal 8: Pt celina G- endurance for a 45-30 minute functional activity  Long Term Goal 9: Pt will tolerate PROM/AAROM/AROM RUE in all planes to facilitate functional movement & pt ability to functionally use his RUE  Long Term Goal 10: Pt demo G- safety, insight & reasoning with min vc's during all ADLs, transfers, mobility & IADLs.   Patient goals : \"Everything.\"  6/12/24-Pt POC & goals are updated on this date. Pt lives with his wife 2 dogs- farm house- to enter through back & has to walk through grass & 1 STEVEN 1HR & side entrance 4 STEVEN 1HR & 3 steps no HR & 2nd floor 7 steps no HR & 7 steps more no HR & only 1/2 bath on first floor. Currently recommending first floor setup. Pt admitted with a brain abscess & on antibiotics. Pt has an ileus & general surgery consulted & on a clear liquid diet. Pt tentative length of stay 6 weeks. Kim Irizarry OTR/L [id number]       [x] Continue with current OT Plan of care.  [] Prepare for Discharge     DISCHARGE RECOMMENDATIONS  Recommended DME:    Post Discharge Care:   []Home Independently  []Home with 24hr Care / Supervision []Home with Partial Supervision []Home with Home Health OT []Home with Out Pt OT []Other: ___   Comments:         Time in Time out Tx Time Breakdown  Variance:   First Session  0915 1000 [x] Individual Tx- 45 Mins  [] Concurrent Tx-   [] Co-Tx -   [] Group Tx -   [] Time Missed -     Second Session 14:40pm 15:25pm [x] Individual Tx- 45mins  [] Concurrent Tx -  [] Co-Tx -   [] Group Tx -   [] Time Missed -     Third Session    [] Individual Tx-   [] Concurrent Tx -  [] Co-Tx -   [] Group Tx -   [] Time Missed -         Total Tx Time: 90 Mins     Rani Adame ROWELL/L [id number]  Kathleen J Saladino ROWELL/L [id number]

## 2024-06-22 PROCEDURE — 1280000000 HC REHAB R&B

## 2024-06-22 PROCEDURE — 6370000000 HC RX 637 (ALT 250 FOR IP): Performed by: STUDENT IN AN ORGANIZED HEALTH CARE EDUCATION/TRAINING PROGRAM

## 2024-06-22 PROCEDURE — 6360000002 HC RX W HCPCS: Performed by: STUDENT IN AN ORGANIZED HEALTH CARE EDUCATION/TRAINING PROGRAM

## 2024-06-22 PROCEDURE — 6370000000 HC RX 637 (ALT 250 FOR IP): Performed by: PHYSICAL MEDICINE & REHABILITATION

## 2024-06-22 PROCEDURE — 2580000003 HC RX 258: Performed by: PHYSICAL MEDICINE & REHABILITATION

## 2024-06-22 PROCEDURE — 97530 THERAPEUTIC ACTIVITIES: CPT

## 2024-06-22 PROCEDURE — 99232 SBSQ HOSP IP/OBS MODERATE 35: CPT | Performed by: PHYSICAL MEDICINE & REHABILITATION

## 2024-06-22 PROCEDURE — 6360000002 HC RX W HCPCS: Performed by: PHYSICAL MEDICINE & REHABILITATION

## 2024-06-22 PROCEDURE — 92526 ORAL FUNCTION THERAPY: CPT

## 2024-06-22 RX ADMIN — PANTOPRAZOLE SODIUM 40 MG: 40 TABLET, DELAYED RELEASE ORAL at 06:01

## 2024-06-22 RX ADMIN — TRAZODONE HYDROCHLORIDE 50 MG: 50 TABLET ORAL at 21:38

## 2024-06-22 RX ADMIN — WATER 2000 MG: 1 INJECTION INTRAMUSCULAR; INTRAVENOUS; SUBCUTANEOUS at 09:10

## 2024-06-22 RX ADMIN — HEPARIN 300 UNITS: 100 SYRINGE at 21:38

## 2024-06-22 RX ADMIN — TAMSULOSIN HYDROCHLORIDE 0.4 MG: 0.4 CAPSULE ORAL at 09:50

## 2024-06-22 RX ADMIN — SENNOSIDES AND DOCUSATE SODIUM 2 TABLET: 50; 8.6 TABLET ORAL at 09:49

## 2024-06-22 RX ADMIN — Medication 5 MG: at 21:38

## 2024-06-22 RX ADMIN — SENNOSIDES AND DOCUSATE SODIUM 2 TABLET: 50; 8.6 TABLET ORAL at 21:39

## 2024-06-22 RX ADMIN — ROSUVASTATIN CALCIUM 5 MG: 10 TABLET, FILM COATED ORAL at 21:38

## 2024-06-22 RX ADMIN — SODIUM CHLORIDE, PRESERVATIVE FREE 10 ML: 5 INJECTION INTRAVENOUS at 09:10

## 2024-06-22 RX ADMIN — POLYETHYLENE GLYCOL 3350 17 GRAM ORAL POWDER PACKET 17 G: at 21:37

## 2024-06-22 RX ADMIN — POLYETHYLENE GLYCOL 3350 17 GRAM ORAL POWDER PACKET 17 G: at 09:49

## 2024-06-22 RX ADMIN — LEVETIRACETAM 1000 MG: 500 TABLET, FILM COATED ORAL at 09:49

## 2024-06-22 RX ADMIN — LEVETIRACETAM 1000 MG: 500 TABLET, FILM COATED ORAL at 21:37

## 2024-06-22 RX ADMIN — APIXABAN 10 MG: 5 TABLET, FILM COATED ORAL at 09:50

## 2024-06-22 RX ADMIN — SODIUM CHLORIDE, PRESERVATIVE FREE 10 ML: 5 INJECTION INTRAVENOUS at 21:39

## 2024-06-22 RX ADMIN — WATER 2000 MG: 1 INJECTION INTRAMUSCULAR; INTRAVENOUS; SUBCUTANEOUS at 21:38

## 2024-06-22 RX ADMIN — APIXABAN 10 MG: 5 TABLET, FILM COATED ORAL at 21:37

## 2024-06-22 RX ADMIN — HEPARIN 300 UNITS: 100 SYRINGE at 09:10

## 2024-06-22 RX ADMIN — Medication 1 CAPSULE: at 09:49

## 2024-06-22 NOTE — PROGRESS NOTES
Speech Language Pathology  ACUTE REHABILITATION--DAILY PROGRESS NOTE            SWALLOWING:      Diet:  Dysphagia 3, Soft/advanced (Soft & Bite-sized) solids with thin liquids (IDDSI level 0)     Patient seen for ongoing dysphagia tx during breakfast meal. Patient demonstrated rapid intake, and required v/c to decrease rate. Patient demonstrated slight oral residual which he was able to clear. No overt s/s of aspiration were noted with soft and bite size solids or thin liquids.       LANGUAGE:    Not formally addressed, appropriate response times cont to be noted.      COGNITION:      Not addressed this date.     SPEECH:    Not addressed this date.       Minute Tracking:    Individual minutes:     0 minutes  Concurrent minutes:   30 minutes  Co-treatment minutes:   0 minutes    Total minutes for 6/22/2024: 30  minutes        SAFETY:      Per chart review, patient shared spouse will be home at discharge to assist with medicine management.     EDUCATION:    Educated on how ST addresses memory and cognition.     OUTCOME:    Progress made towards goals. Will continue SP intervention as per previously established POC.    SP recommended after discharge:  TBD  Supervision recommended at discharge: TBD      FIMS SCORES                           Swallowing                          Current Status             4--Minimal Assistance               Short Term Goal         5--Supervision               Long Term Goal          6--Modified Independent                                      Receptive                          Current Status             4--Minimal Assistance               Short Term Goal         5--Supervision               Long Term Goal          6--Modified Independent              Expressive                          Current Status             7--Independent                         Short Term Goal         7--Independent                         Long Term Goal          7--Independent                                                                            Problem Solving                          Current Status             4--Minimal Assistance                       Short Term Goal          5--Supervision              Long Term Goal          5--Supervision                  Memory                          Current Status               3--Moderate Assistance                     Short Term Goal            4--Minimal Assistance                    Long Term Goal            5--Supervision     Social Interaction              Current Status             4--Minimal Assistance               Short Term Goal         5--Supervision               Long Term Goal          6--Modified Independent                 SPEECH THERAPY  PLAN OF CARE   The speech therapy  POC is established based on physician order, speech pathology diagnosis and results of clinical assessment         SHORT/LONG TERM GOALS  Pt will improve immediate, short term, recent memory during structured and unstructured tasks with 90% accuracy   Pt will improve problem solving/thought organization during structured and unstructured tasks with 90% accuracy   Pt will improve receptive and expressive language skills with adequate thought content, organization, and processing time to facilitate improved communication with minimal.       Short Term Goals:  Pt will implement identified compensatory swallowing strategies on 90% of opportunities or greater to improve airway protection and swallow function.  Pt will participate in ongoing mealtime assessment to provide diet modification and compensatory strategy implementation to minimize risk of aspiration associated with PO intake  Pt will complete PO trials of upgraded diet textures with SLP only to determine the least restrictive PO diet to maintain adequate nutrition/hydration with no more than 1 overt s/s of pen/asp.     Long Term Goals:               Pt will improve oropharyngeal swallow function to ensure airway protection during PO intake

## 2024-06-22 NOTE — PROGRESS NOTES
Physical Therapy  Treatment     Supervising therapist : Mar Toth, PT, DPT [id number]  ROOM: [address] [place], [place] [address]  DIAGNOSIS: brain abscess   PRECAUTIONS: Falls, R hemiparesis, R UE sling, R inattention, flat affect, cognition  Diet: Dysphagia 3, Soft/advanced (Soft & Bite-sized) solids with thin liquids (IDDSI level 0)     HPI: Pt is a 63 year old pt who presented to ER from home with complaints of right side weakness and seizure-like activity at home. NIH of 10 upon arrival. Per wife at bedside patient has been having neck and lower back pain and has been going to chiropractor.  He also was having some confusion and they thought it was a UTI and he went to his primary care physician and was prescribed Bactrim.  He went into his daily routine, went to work and returned home.  At around 2:00 when his wife came home found him in the floor, unable to move the right side, appeared that he comprehended but he was unable to speak.  He later said that he had a seizure-like activity sitting on the chair and a slide himself down to the floor, that lasted for about 5 minutes. Workup revealed two masses in the left frontal lobe with adjacent vasogenic edema. EEG positive for left fronto central seizures. Neurosurgery and neurology was consulted. On 06/03/2024 patient underwent a left craniotomy abscess evacuation by Dr. Henderson. ID consulted and placed patient on IV antibiotics. Updated MRI brain w/wo contrast on 6/3/24 showed interval surgical drainage of one of the two left frontal lobe abscesses; mild increase in size of other abscess; slight increase in vasogenic edema; 3mm left-to-right MLS; stable right meningioma.  NSGY reviewed MRI brain prior to admission to ARU with no additional recommendations.  Hospital course was complicated by seizure, weakness, hypotension.       Pt has PMHx of type 2 diabetes mellitus, HLD, BPH, PARTH, multiple orthopedic surgeries, right lower extremities DVT 2 years ago after he had spider bite,

## 2024-06-22 NOTE — PLAN OF CARE
Problem: Safety - Adult  Goal: Free from fall injury  6/22/2024 0955 by Claudio Patrick RN  Outcome: Progressing  6/21/2024 2151 by Miracle Ko RN  Outcome: Progressing     Problem: Discharge Planning  Goal: Discharge to home or other facility with appropriate resources  6/22/2024 0955 by Claudio Patrick RN  Outcome: Progressing  6/21/2024 2151 by Miracle Ko RN  Outcome: Progressing     Problem: Skin/Tissue Integrity  Goal: Absence of new skin breakdown  Description: 1.  Monitor for areas of redness and/or skin breakdown  2.  Assess vascular access sites hourly  3.  Every 4-6 hours minimum:  Change oxygen saturation probe site  4.  Every 4-6 hours:  If on nasal continuous positive airway pressure, respiratory therapy assess nares and determine need for appliance change or resting period.  6/22/2024 0955 by Claudio Patrick RN  Outcome: Progressing  6/21/2024 2151 by Miracle Ko RN  Outcome: Progressing     Problem: ABCDS Injury Assessment  Goal: Absence of physical injury  6/22/2024 0955 by Claudio Patrick RN  Outcome: Progressing  6/21/2024 2151 by Miracle Ko RN  Outcome: Progressing     Problem: Pain  Goal: Verbalizes/displays adequate comfort level or baseline comfort level  6/22/2024 0955 by Claudio Patrick RN  Outcome: Progressing  6/21/2024 2151 by Miracle Ko RN  Outcome: Progressing     Problem: Chronic Conditions and Co-morbidities  Goal: Patient's chronic conditions and co-morbidity symptoms are monitored and maintained or improved  6/22/2024 0955 by Claudio Patrick RN  Outcome: Progressing  6/21/2024 2151 by Miracle Ko RN  Outcome: Progressing     Problem: Nutrition Deficit:  Goal: Optimize nutritional status  6/22/2024 0955 by Claudio Patrick RN  Outcome: Progressing  6/21/2024 2151 by Miracle Ko RN  Outcome: Progressing

## 2024-06-22 NOTE — PLAN OF CARE
Problem: Safety - Adult  Goal: Free from fall injury  6/21/2024 2151 by Miracle Ko RN  Outcome: Progressing  6/21/2024 1043 by Tae Arce RN  Outcome: Progressing     Problem: Discharge Planning  Goal: Discharge to home or other facility with appropriate resources  6/21/2024 2151 by Miracle Ko RN  Outcome: Progressing  6/21/2024 1043 by Tae Arce RN  Outcome: Progressing     Problem: Skin/Tissue Integrity  Goal: Absence of new skin breakdown  Description: 1.  Monitor for areas of redness and/or skin breakdown  2.  Assess vascular access sites hourly  3.  Every 4-6 hours minimum:  Change oxygen saturation probe site  4.  Every 4-6 hours:  If on nasal continuous positive airway pressure, respiratory therapy assess nares and determine need for appliance change or resting period.  6/21/2024 2151 by Miracle Ko RN  Outcome: Progressing  6/21/2024 1043 by Tae Arce RN  Outcome: Progressing     Problem: ABCDS Injury Assessment  Goal: Absence of physical injury  6/21/2024 2151 by Miracle Ko RN  Outcome: Progressing  6/21/2024 1043 by Tae Arce RN  Outcome: Progressing     Problem: Pain  Goal: Verbalizes/displays adequate comfort level or baseline comfort level  6/21/2024 2151 by Miracle Ko RN  Outcome: Progressing  6/21/2024 1043 by Tae Arce RN  Outcome: Progressing     Problem: Chronic Conditions and Co-morbidities  Goal: Patient's chronic conditions and co-morbidity symptoms are monitored and maintained or improved  6/21/2024 2151 by Miracle Ko RN  Outcome: Progressing  6/21/2024 1043 by Tae Arce RN  Outcome: Progressing     Problem: Nutrition Deficit:  Goal: Optimize nutritional status  6/21/2024 2151 by Miracle Ko RN  Outcome: Progressing  6/21/2024 1043 by Tae Arce RN  Outcome: Progressing

## 2024-06-22 NOTE — PROGRESS NOTES
and ModA assistance with R limb advancement (R KI, R DF ace wrap, sock over toe box)      10mWT: NT  6mWT:  feet x 3 with L hemicane with Mod A      WC follow      (R ankle DF ACE wrap) 100 feet with L hemicane with Mod A      WC follow      (R ankle DF ACE wrap) 150 feet with AAD with Mak     10mWT: TBD  6mWT: TBD >300 feet with AAD with supervision      10mWT: TBD  6mWT: TBD   Walking 10 feet on uneven surface NT  NT NT 10 feet with AAD with Mak 10 feet with AAD with supervision    Wheel Chair Mobility NT NT NT       Car Transfers NT NT NT Mak Supervision    Stair negotiation: ascended and descended  NT  NT  6 steps with L HR, Mod A      BW descending  4-8 steps with 1 rail with Mak 8-12 steps with 1 rail with supervision    Curb Step:   ascended and descended NT NT NT  4 inch step with AAD and Mak 4 inch step with AAD and supervision    Picking up object off the floor NT  NT NT Will  cone/shoe with Min assist Will  cone/shoe with supervision assist   Balance  Static sitting: SBA  Dynamic sitting: Mak     Static standing: Mak at hallway rail     Dynamic standing: ModA at hallway rail      BBS: NT  FGA: NT Static sitting: SBA  Dynamic sitting: Mak     Static standing: NT  NT      BBS: TBD               Assessment/Plan:     Conrado Leach is a 63 y.o. male admitted to inpatient rehabilitation for Brain abscess.        -Continue Acute rehab program. Encourage ambulation.   -Continue rocephin per ID  -Recovering ileus, +BMs.         Electronically signed by Elsa Voss MD on 6/22/2024 at 1:16 PM

## 2024-06-23 PROCEDURE — 6370000000 HC RX 637 (ALT 250 FOR IP): Performed by: PHYSICAL MEDICINE & REHABILITATION

## 2024-06-23 PROCEDURE — 6360000002 HC RX W HCPCS: Performed by: PHYSICAL MEDICINE & REHABILITATION

## 2024-06-23 PROCEDURE — 97110 THERAPEUTIC EXERCISES: CPT

## 2024-06-23 PROCEDURE — 6360000002 HC RX W HCPCS: Performed by: STUDENT IN AN ORGANIZED HEALTH CARE EDUCATION/TRAINING PROGRAM

## 2024-06-23 PROCEDURE — 2580000003 HC RX 258: Performed by: PHYSICAL MEDICINE & REHABILITATION

## 2024-06-23 PROCEDURE — 1280000000 HC REHAB R&B

## 2024-06-23 PROCEDURE — 6370000000 HC RX 637 (ALT 250 FOR IP): Performed by: STUDENT IN AN ORGANIZED HEALTH CARE EDUCATION/TRAINING PROGRAM

## 2024-06-23 RX ADMIN — HEPARIN 300 UNITS: 100 SYRINGE at 21:33

## 2024-06-23 RX ADMIN — ROSUVASTATIN CALCIUM 5 MG: 10 TABLET, FILM COATED ORAL at 21:33

## 2024-06-23 RX ADMIN — LEVETIRACETAM 1000 MG: 500 TABLET, FILM COATED ORAL at 21:33

## 2024-06-23 RX ADMIN — POLYETHYLENE GLYCOL 3350 17 GRAM ORAL POWDER PACKET 17 G: at 09:22

## 2024-06-23 RX ADMIN — SENNOSIDES AND DOCUSATE SODIUM 2 TABLET: 50; 8.6 TABLET ORAL at 09:22

## 2024-06-23 RX ADMIN — Medication 1 CAPSULE: at 09:21

## 2024-06-23 RX ADMIN — SODIUM CHLORIDE, PRESERVATIVE FREE 10 ML: 5 INJECTION INTRAVENOUS at 21:35

## 2024-06-23 RX ADMIN — HEPARIN 300 UNITS: 100 SYRINGE at 09:22

## 2024-06-23 RX ADMIN — POLYETHYLENE GLYCOL 3350 17 GRAM ORAL POWDER PACKET 17 G: at 21:54

## 2024-06-23 RX ADMIN — WATER 2000 MG: 1 INJECTION INTRAMUSCULAR; INTRAVENOUS; SUBCUTANEOUS at 21:32

## 2024-06-23 RX ADMIN — Medication 5 MG: at 21:40

## 2024-06-23 RX ADMIN — TRAZODONE HYDROCHLORIDE 50 MG: 50 TABLET ORAL at 21:33

## 2024-06-23 RX ADMIN — SENNOSIDES AND DOCUSATE SODIUM 2 TABLET: 50; 8.6 TABLET ORAL at 21:35

## 2024-06-23 RX ADMIN — APIXABAN 10 MG: 5 TABLET, FILM COATED ORAL at 21:34

## 2024-06-23 RX ADMIN — SODIUM CHLORIDE, PRESERVATIVE FREE 10 ML: 5 INJECTION INTRAVENOUS at 09:22

## 2024-06-23 RX ADMIN — TAMSULOSIN HYDROCHLORIDE 0.4 MG: 0.4 CAPSULE ORAL at 09:21

## 2024-06-23 RX ADMIN — LEVETIRACETAM 1000 MG: 500 TABLET, FILM COATED ORAL at 09:22

## 2024-06-23 RX ADMIN — PANTOPRAZOLE SODIUM 40 MG: 40 TABLET, DELAYED RELEASE ORAL at 06:18

## 2024-06-23 RX ADMIN — APIXABAN 10 MG: 5 TABLET, FILM COATED ORAL at 09:22

## 2024-06-23 RX ADMIN — WATER 2000 MG: 1 INJECTION INTRAMUSCULAR; INTRAVENOUS; SUBCUTANEOUS at 09:27

## 2024-06-23 NOTE — PLAN OF CARE
Problem: Safety - Adult  Goal: Free from fall injury  6/22/2024 2206 by Miracle Ko RN  Outcome: Progressing  6/22/2024 0955 by Claudio Patrick RN  Outcome: Progressing     Problem: Discharge Planning  Goal: Discharge to home or other facility with appropriate resources  6/22/2024 2206 by Miracle Ko RN  Outcome: Progressing  6/22/2024 0955 by Claudio Patrick RN  Outcome: Progressing     Problem: Skin/Tissue Integrity  Goal: Absence of new skin breakdown  Description: 1.  Monitor for areas of redness and/or skin breakdown  2.  Assess vascular access sites hourly  3.  Every 4-6 hours minimum:  Change oxygen saturation probe site  4.  Every 4-6 hours:  If on nasal continuous positive airway pressure, respiratory therapy assess nares and determine need for appliance change or resting period.  6/22/2024 2206 by Miracle Ko RN  Outcome: Progressing  6/22/2024 0955 by Claudio Patrick RN  Outcome: Progressing     Problem: ABCDS Injury Assessment  Goal: Absence of physical injury  6/22/2024 2206 by Miracle Ko RN  Outcome: Progressing  6/22/2024 0955 by Claudio Patrick RN  Outcome: Progressing     Problem: Pain  Goal: Verbalizes/displays adequate comfort level or baseline comfort level  6/22/2024 2206 by Miracle Ko RN  Outcome: Progressing  6/22/2024 0955 by Claudio Patrick RN  Outcome: Progressing     Problem: Chronic Conditions and Co-morbidities  Goal: Patient's chronic conditions and co-morbidity symptoms are monitored and maintained or improved  6/22/2024 2206 by Miracle Ko RN  Outcome: Progressing  6/22/2024 0955 by Claudio Patrick RN  Outcome: Progressing     Problem: Nutrition Deficit:  Goal: Optimize nutritional status  6/22/2024 2206 by Miracle Ko RN  Outcome: Progressing  6/22/2024 0955 by Claudio Patrick RN  Outcome: Progressing

## 2024-06-23 NOTE — PLAN OF CARE
Problem: Safety - Adult  Goal: Free from fall injury  6/23/2024 1046 by Kimberli Jacques RN  Outcome: Progressing  Flowsheets (Taken 6/23/2024 1040)  Free From Fall Injury: Instruct family/caregiver on patient safety  6/22/2024 2206 by Miracle Ko RN  Outcome: Progressing     Problem: Discharge Planning  Goal: Discharge to home or other facility with appropriate resources  6/23/2024 1046 by Kimberli Jacques RN  Outcome: Progressing  6/22/2024 2206 by Miracle Ko RN  Outcome: Progressing     Problem: Skin/Tissue Integrity  Goal: Absence of new skin breakdown  Description: 1.  Monitor for areas of redness and/or skin breakdown  2.  Assess vascular access sites hourly  3.  Every 4-6 hours minimum:  Change oxygen saturation probe site  4.  Every 4-6 hours:  If on nasal continuous positive airway pressure, respiratory therapy assess nares and determine need for appliance change or resting period.  6/22/2024 2206 by Miracle Ko RN  Outcome: Progressing     Problem: ABCDS Injury Assessment  Goal: Absence of physical injury  Recent Flowsheet Documentation  Taken 6/23/2024 1040 by Kimberli Jacques RN  Absence of Physical Injury: Implement safety measures based on patient assessment  6/22/2024 2206 by Miracle Ko RN  Outcome: Progressing     Problem: Pain  Goal: Verbalizes/displays adequate comfort level or baseline comfort level  6/23/2024 1046 by Kimberli Jacques RN  Outcome: Progressing  6/22/2024 2206 by Miracle Ko RN  Outcome: Progressing     Problem: Chronic Conditions and Co-morbidities  Goal: Patient's chronic conditions and co-morbidity symptoms are monitored and maintained or improved  6/22/2024 2206 by Miracle Ko RN  Outcome: Progressing     Problem: Nutrition Deficit:  Goal: Optimize nutritional status  6/22/2024 2206 by Miracle Ko RN  Outcome: Progressing

## 2024-06-23 NOTE — PROGRESS NOTES
Occupational Therapy  OCCUPATIONAL THERAPY DAILY NOTE    Date:2024  Patient Name: Conrado Leach  MRN: [id number]  : 1960  Room: [address] [place] [address]A     Referring Practitioner: MD Juan Luis  Diagnosis: Brain mass L frontal lobe- 6/3/24- L crani abscess evacuation  Additional Pertinent Hx: hx R shoulder surgery, BPH, hx DVT, HTN, DM, HLD & seizures  Precautions: Fall Risk, R neglect, seizures, R hemiplegia & R UE edema & Soft & Bite size                        *Positive for DVT in RUE as of 24     *R ankle/foot swollen as of 24  .  Discharge Recommendations: Home with 24 hour Sup/assist    Functional Assessment:   Date Status AE  Comments   Feeding 24  Min A     Grooming 24 CGA/Min A W/c          Oral Care 24 CGA/min A W/c    Bathing 24  UB-Mak  LB-Mod A  Sponge     UB Dressing 24  Mod/Max A  W/c    LB Dressing 24 MAX A  seated    Footwear 24 maxA W/c To doff slip on shoes and doff gripper socks since this therapist removed R shoe due to R foot swollen.    Toileting 24 Max A  BSC    Homemaking 24 Min A (laundry)       Functional Transfers / Balance:   Date Status DME  Comments   Sit Balance 24 S/SBA  W/c Demo;d sitting balance up in w/c during therapeutic exercises.    Stand Balance 24  Mod/Min A LBQC  High/low table   Grab bar  24 Deferred standing, sit to stand transfers or mobility due to R foot very swelled up in pt's shoe on arrival for OT session.      [x] Tub  [] Shower   Transfer 24 MAX A  Blade cane, extended tub bench \"   Commode   Transfer 24  Max A  BSC- Swap out    blade cane       Functional   Mobility 24  Max A Blade-cane     Other: bed rolling R<>L    Sit to supine         SPT          Sit <>stand       Gym mat <>W/C 24 Mod to  max  A      Min A        Mod A x2          Min A      maxA Bed rails               Blade-cane  Side by side         High/low table  demo G- safety & insight  Long Term Goal 7: Pt demo Mod A light homemaking activity & demo G- safety & insight  Long Term Goal 8: Pt demo G- endurance for a 45-30 minute functional activity  Long Term Goal 9: Pt will tolerate PROM/AAROM/AROM RUE in all planes to facilitate functional movement & pt ability to functionally use his RUE  Long Term Goal 10: Pt demo G- safety, insight & reasoning with min vc's during all ADLs, transfers, mobility & IADLs.   Patient goals : \"Everything.\"  6/12/24-Pt POC & goals are updated on this date. Pt lives with his wife 2 dogs- farm house- to enter through back & has to walk through grass & 1 STEVEN 1HR & side entrance 4 STEVEN 1HR & 3 steps no HR & 2nd floor 7 steps no HR & 7 steps more no HR & only 1/2 bath on first floor. Currently recommending first floor setup. Pt admitted with a brain abscess & on antibiotics. Pt has an ileus & general surgery consulted & on a clear liquid diet. Pt tentative length of stay 6 weeks. Kim Irizarry OTR/L [id number]       [x] Continue with current OT Plan of care.  [] Prepare for Discharge     DISCHARGE RECOMMENDATIONS  Recommended DME:    Post Discharge Care:   []Home Independently  []Home with 24hr Care / Supervision []Home with Partial Supervision []Home with Home Health OT []Home with Out Pt OT []Other: ___   Comments:         Time in Time out Tx Time Breakdown  Variance:   First Session  11:20am 12:20pm [x] Individual Tx- 60 Mins  [] Concurrent Tx-   [] Co-Tx -   [] Group Tx -   [] Time Missed -     Second Session   [] Individual Tx- mins  [] Concurrent Tx -  [] Co-Tx -   [] Group Tx -   [] Time Missed -     Third Session    [] Individual Tx-   [] Concurrent Tx -  [] Co-Tx -   [] Group Tx -   [] Time Missed -         Total Tx Time: 60 Mins     Kathleen J Saladino COTA/DAYO [id number]

## 2024-06-23 NOTE — PROGRESS NOTES
°F (36.4 °C) (Temporal)   Respiration 16   Height 1.803 m (5' 10.98\")   Weight 99 kg (218 lb 4.1 oz)   Oxygen Saturation 99%   Body Mass Index 30.45 kg/m²     General Appearance:    Awake, alert , no acute distress.   Head:    Normocephalic, wound healing    Eyes:    No pallor, no icterus,   Ears:    No obvious deformity or drainage.   Nose:   No nasal drainage   Throat:   Mucosa moist, no oral thrush   Neck:   Supple, no lymphadenopathy   Back:     no CVA tenderness   Lungs:     Clear to auscultation bilaterally, no wheeze    Heart:    Regular rate and rhythm, no murmur   Abdomen:     Soft, non-tender, bowel sounds present    Extremities:   +  edema right leg , right arm    Pulses:   Dorsalis pedis palpable    Skin:   no rashes      CBC with Differential:      Lab Results   Component Value Date/Time    WBC 4.2 06/21/2024 05:15 AM    RBC 4.33 06/21/2024 05:15 AM    HGB 11.6 06/21/2024 05:15 AM    HCT 35.9 06/21/2024 05:15 AM     06/21/2024 05:15 AM    MCV 82.9 06/21/2024 05:15 AM    MCH 26.8 06/21/2024 05:15 AM    MCHC 32.3 06/21/2024 05:15 AM    RDW 14.9 06/21/2024 05:15 AM    LYMPHOPCT 25 06/21/2024 05:15 AM    MONOPCT 9 06/21/2024 05:15 AM    EOSPCT 7 06/21/2024 05:15 AM    BASOPCT 1 06/21/2024 05:15 AM    MONOSABS 0.39 06/21/2024 05:15 AM    EOSABS 0.30 06/21/2024 05:15 AM    BASOSABS 0.04 06/21/2024 05:15 AM       CMP     Lab Results   Component Value Date/Time     06/21/2024 05:15 AM    K 4.0 06/21/2024 05:15 AM     06/21/2024 05:15 AM    CO2 27 06/21/2024 05:15 AM    BUN 10 06/21/2024 05:15 AM    CREATININE 0.9 06/21/2024 05:15 AM    LABGLOM >90 06/21/2024 05:15 AM    GLUCOSE 104 06/21/2024 05:15 AM    CALCIUM 9.3 06/21/2024 05:15 AM    BILITOT 0.4 06/07/2024 05:08 AM    ALKPHOS 58 06/07/2024 05:08 AM    AST 14 06/07/2024 05:08 AM    ALT 29 06/07/2024 05:08 AM       Radiology :    CT scan of head -    1. Prominent vasogenic edema in the left frontal lobe related to the left   frontal

## 2024-06-24 LAB
ANION GAP SERPL CALCULATED.3IONS-SCNC: 10 MMOL/L (ref 7–16)
BASOPHILS # BLD: 0.04 K/UL (ref 0–0.2)
BASOPHILS NFR BLD: 1 % (ref 0–2)
BUN SERPL-MCNC: 9 MG/DL (ref 6–23)
CALCIUM SERPL-MCNC: 9.6 MG/DL (ref 8.6–10.2)
CHLORIDE SERPL-SCNC: 104 MMOL/L (ref 98–107)
CO2 SERPL-SCNC: 27 MMOL/L (ref 22–29)
CREAT SERPL-MCNC: 0.8 MG/DL (ref 0.7–1.2)
EOSINOPHIL # BLD: 0.25 K/UL (ref 0.05–0.5)
EOSINOPHILS RELATIVE PERCENT: 6 % (ref 0–6)
ERYTHROCYTE [DISTWIDTH] IN BLOOD BY AUTOMATED COUNT: 15.1 % (ref 11.5–15)
GFR, ESTIMATED: >90 ML/MIN/1.73M2
GLUCOSE SERPL-MCNC: 114 MG/DL (ref 74–99)
HCT VFR BLD AUTO: 38.6 % (ref 37–54)
HGB BLD-MCNC: 12.7 G/DL (ref 12.5–16.5)
IMM GRANULOCYTES # BLD AUTO: <0.03 K/UL (ref 0–0.58)
IMM GRANULOCYTES NFR BLD: 0 % (ref 0–5)
LYMPHOCYTES NFR BLD: 0.95 K/UL (ref 1.5–4)
LYMPHOCYTES RELATIVE PERCENT: 21 % (ref 20–42)
MCH RBC QN AUTO: 27.6 PG (ref 26–35)
MCHC RBC AUTO-ENTMCNC: 32.9 G/DL (ref 32–34.5)
MCV RBC AUTO: 83.9 FL (ref 80–99.9)
MONOCYTES NFR BLD: 0.49 K/UL (ref 0.1–0.95)
MONOCYTES NFR BLD: 11 % (ref 2–12)
NEUTROPHILS NFR BLD: 62 % (ref 43–80)
NEUTS SEG NFR BLD: 2.81 K/UL (ref 1.8–7.3)
PLATELET # BLD AUTO: 216 K/UL (ref 130–450)
PMV BLD AUTO: 8.8 FL (ref 7–12)
POTASSIUM SERPL-SCNC: 4.2 MMOL/L (ref 3.5–5)
RBC # BLD AUTO: 4.6 M/UL (ref 3.8–5.8)
SODIUM SERPL-SCNC: 141 MMOL/L (ref 132–146)
WBC OTHER # BLD: 4.6 K/UL (ref 4.5–11.5)

## 2024-06-24 PROCEDURE — 97530 THERAPEUTIC ACTIVITIES: CPT

## 2024-06-24 PROCEDURE — 97130 THER IVNTJ EA ADDL 15 MIN: CPT | Performed by: SPEECH-LANGUAGE PATHOLOGIST

## 2024-06-24 PROCEDURE — 1280000000 HC REHAB R&B

## 2024-06-24 PROCEDURE — 6370000000 HC RX 637 (ALT 250 FOR IP): Performed by: PHYSICAL MEDICINE & REHABILITATION

## 2024-06-24 PROCEDURE — 80048 BASIC METABOLIC PNL TOTAL CA: CPT

## 2024-06-24 PROCEDURE — 6360000002 HC RX W HCPCS: Performed by: PHYSICAL MEDICINE & REHABILITATION

## 2024-06-24 PROCEDURE — 6360000002 HC RX W HCPCS: Performed by: STUDENT IN AN ORGANIZED HEALTH CARE EDUCATION/TRAINING PROGRAM

## 2024-06-24 PROCEDURE — 97129 THER IVNTJ 1ST 15 MIN: CPT | Performed by: SPEECH-LANGUAGE PATHOLOGIST

## 2024-06-24 PROCEDURE — 2580000003 HC RX 258: Performed by: PHYSICAL MEDICINE & REHABILITATION

## 2024-06-24 PROCEDURE — 85025 COMPLETE CBC W/AUTO DIFF WBC: CPT

## 2024-06-24 PROCEDURE — 92526 ORAL FUNCTION THERAPY: CPT | Performed by: SPEECH-LANGUAGE PATHOLOGIST

## 2024-06-24 PROCEDURE — 6370000000 HC RX 637 (ALT 250 FOR IP): Performed by: STUDENT IN AN ORGANIZED HEALTH CARE EDUCATION/TRAINING PROGRAM

## 2024-06-24 RX ORDER — FUROSEMIDE 20 MG/1
20 TABLET ORAL DAILY
Status: COMPLETED | OUTPATIENT
Start: 2024-06-24 | End: 2024-06-26

## 2024-06-24 RX ADMIN — HEPARIN 300 UNITS: 100 SYRINGE at 09:37

## 2024-06-24 RX ADMIN — FUROSEMIDE 20 MG: 20 TABLET ORAL at 09:37

## 2024-06-24 RX ADMIN — WATER 2000 MG: 1 INJECTION INTRAMUSCULAR; INTRAVENOUS; SUBCUTANEOUS at 20:46

## 2024-06-24 RX ADMIN — HEPARIN 300 UNITS: 100 SYRINGE at 20:43

## 2024-06-24 RX ADMIN — Medication 5 MG: at 20:40

## 2024-06-24 RX ADMIN — SENNOSIDES AND DOCUSATE SODIUM 2 TABLET: 50; 8.6 TABLET ORAL at 08:01

## 2024-06-24 RX ADMIN — TRAZODONE HYDROCHLORIDE 50 MG: 50 TABLET ORAL at 20:41

## 2024-06-24 RX ADMIN — APIXABAN 10 MG: 5 TABLET, FILM COATED ORAL at 20:41

## 2024-06-24 RX ADMIN — POLYETHYLENE GLYCOL 3350 17 GRAM ORAL POWDER PACKET 17 G: at 20:41

## 2024-06-24 RX ADMIN — APIXABAN 10 MG: 5 TABLET, FILM COATED ORAL at 08:01

## 2024-06-24 RX ADMIN — SODIUM CHLORIDE, PRESERVATIVE FREE 10 ML: 5 INJECTION INTRAVENOUS at 20:51

## 2024-06-24 RX ADMIN — POLYETHYLENE GLYCOL 3350 17 GRAM ORAL POWDER PACKET 17 G: at 08:00

## 2024-06-24 RX ADMIN — TAMSULOSIN HYDROCHLORIDE 0.4 MG: 0.4 CAPSULE ORAL at 08:01

## 2024-06-24 RX ADMIN — SENNOSIDES AND DOCUSATE SODIUM 2 TABLET: 50; 8.6 TABLET ORAL at 20:39

## 2024-06-24 RX ADMIN — LEVETIRACETAM 1000 MG: 500 TABLET, FILM COATED ORAL at 20:40

## 2024-06-24 RX ADMIN — SODIUM CHLORIDE, PRESERVATIVE FREE 10 ML: 5 INJECTION INTRAVENOUS at 09:37

## 2024-06-24 RX ADMIN — LEVETIRACETAM 1000 MG: 500 TABLET, FILM COATED ORAL at 08:01

## 2024-06-24 RX ADMIN — Medication 1 CAPSULE: at 08:01

## 2024-06-24 RX ADMIN — PANTOPRAZOLE SODIUM 40 MG: 40 TABLET, DELAYED RELEASE ORAL at 06:31

## 2024-06-24 RX ADMIN — WATER 2000 MG: 1 INJECTION INTRAMUSCULAR; INTRAVENOUS; SUBCUTANEOUS at 09:37

## 2024-06-24 RX ADMIN — ROSUVASTATIN CALCIUM 5 MG: 10 TABLET, FILM COATED ORAL at 20:42

## 2024-06-24 ASSESSMENT — PAIN SCALES - WONG BAKER: WONGBAKER_NUMERICALRESPONSE: NO HURT

## 2024-06-24 NOTE — PROGRESS NOTES
Progress Note  Date:2024       Room:55095509  Patient Name:Conrado Leach     YOB: 1960     Age:63 y.o.        Subjective    Subjective:  Symptoms:  Stable.  He reports weakness.    Diet:  Adequate intake.    Activity level: Impaired due to weakness.    Pain:  He reports no pain.       Review of Systems   Neurological:  Positive for weakness.     Objective         Vitals Last 24 Hours:  TEMPERATURE:  Temp  Av.6 °F (36.4 °C)  Min: 97.5 °F (36.4 °C)  Max: 97.6 °F (36.4 °C)  RESPIRATIONS RANGE: Resp  Av  Min: 16  Max: 16  PULSE OXIMETRY RANGE: SpO2  Av.7 %  Min: 96 %  Max: 99 %  PULSE RANGE: Pulse  Av.3  Min: 58  Max: 73  BLOOD PRESSURE RANGE: Systolic (24hrs), Av , Min:120 , Max:131   ; Diastolic (24hrs), Av, Min:61, Max:71    I/O (24Hr):    Intake/Output Summary (Last 24 hours) at 2024 0828  Last data filed at 2024 0500  Gross per 24 hour   Intake --   Output 1020 ml   Net -1020 ml     Objective:  General Appearance:  In no acute distress.    Vital signs: (most recent): Blood pressure 131/61, pulse 68, temperature 97.5 °F (36.4 °C), temperature source Temporal, resp. rate 16, height 1.803 m (5' 10.98\"), weight 99 kg (218 lb 4.1 oz), SpO2 96 %.  Vital signs are normal.    Output: Producing urine and producing stool.    Lungs:  Normal effort and normal respiratory rate.  Breath sounds clear to auscultation.    Heart: Normal rate.  Regular rhythm.  S1 normal.    Abdomen: Abdomen is soft.  Bowel sounds are normal.   There is no abdominal tenderness.     Extremities: Normal range of motion.  There is dependent edema.    Neurological: Patient is alert.  (4/5 strength).      Labs/Imaging/Diagnostics    Labs:  CBC:  Recent Labs     24  0730   WBC 4.6   RBC 4.60   HGB 12.7   HCT 38.6   MCV 83.9   RDW 15.1*        CHEMISTRIES:  Recent Labs     06/24/24  0730      K 4.2      CO2 27   BUN 9   CREATININE 0.8   GLUCOSE 114*     PT/INR:No results

## 2024-06-24 NOTE — PLAN OF CARE
Problem: Safety - Adult  Goal: Free from fall injury  6/24/2024 1024 by Kimberli Jacques RN  Outcome: Progressing  Flowsheets (Taken 6/24/2024 1021)  Free From Fall Injury: Instruct family/caregiver on patient safety  6/23/2024 2304 by Liddle, Staci, RN  Outcome: Progressing     Problem: Discharge Planning  Goal: Discharge to home or other facility with appropriate resources  6/24/2024 1024 by Kimberli Jacques RN  Outcome: Progressing  6/23/2024 2304 by Liddle, Staci, RN  Outcome: Progressing     Problem: Skin/Tissue Integrity  Goal: Absence of new skin breakdown  Description: 1.  Monitor for areas of redness and/or skin breakdown  2.  Assess vascular access sites hourly  3.  Every 4-6 hours minimum:  Change oxygen saturation probe site  4.  Every 4-6 hours:  If on nasal continuous positive airway pressure, respiratory therapy assess nares and determine need for appliance change or resting period.  6/23/2024 2304 by Liddle, Staci, RN  Outcome: Progressing     Problem: ABCDS Injury Assessment  Goal: Absence of physical injury  Recent Flowsheet Documentation  Taken 6/24/2024 1021 by Kimberli Jacques RN  Absence of Physical Injury: Implement safety measures based on patient assessment  6/23/2024 2304 by Liddle, Staci, RN  Outcome: Progressing  Flowsheets (Taken 6/23/2024 1040 by Kimberli Jacques RN)  Absence of Physical Injury: Implement safety measures based on patient assessment     Problem: Pain  Goal: Verbalizes/displays adequate comfort level or baseline comfort level  6/24/2024 1024 by Kimberli Jacques RN  Outcome: Progressing  6/23/2024 2304 by Liddle, Staci, RN  Outcome: Progressing     Problem: Chronic Conditions and Co-morbidities  Goal: Patient's chronic conditions and co-morbidity symptoms are monitored and maintained or improved  6/23/2024 2304 by Liddle, Staci, RN  Outcome: Progressing     Problem: Nutrition Deficit:  Goal: Optimize nutritional status  6/23/2024 2304 by Liddle, Staci, RN  Outcome:

## 2024-06-24 NOTE — PROGRESS NOTES
Physical Therapy  Treatment     Supervising therapist : Mar Toth, PT, DPT [id number]  ROOM: [address] [place], [place] [address]  DIAGNOSIS: brain abscess   PRECAUTIONS: Falls, R hemiparesis, R UE sling, R inattention, flat affect, cognition  Diet: Dysphagia 3, Soft/advanced (Soft & Bite-sized) solids with thin liquids (IDDSI level 0)     HPI: Pt is a 63 year old pt who presented to ER from home with complaints of right side weakness and seizure-like activity at home. NIH of 10 upon arrival. Per wife at bedside patient has been having neck and lower back pain and has been going to chiropractor.  He also was having some confusion and they thought it was a UTI and he went to his primary care physician and was prescribed Bactrim.  He went into his daily routine, went to work and returned home.  At around 2:00 when his wife came home found him in the floor, unable to move the right side, appeared that he comprehended but he was unable to speak.  He later said that he had a seizure-like activity sitting on the chair and a slide himself down to the floor, that lasted for about 5 minutes. Workup revealed two masses in the left frontal lobe with adjacent vasogenic edema. EEG positive for left fronto central seizures. Neurosurgery and neurology was consulted. On 06/03/2024 patient underwent a left craniotomy abscess evacuation by Dr. Henderson. ID consulted and placed patient on IV antibiotics. Updated MRI brain w/wo contrast on 6/3/24 showed interval surgical drainage of one of the two left frontal lobe abscesses; mild increase in size of other abscess; slight increase in vasogenic edema; 3mm left-to-right MLS; stable right meningioma.  NSGY reviewed MRI brain prior to admission to ARU with no additional recommendations.  Hospital course was complicated by seizure, weakness, hypotension.       Pt has PMHx of type 2 diabetes mellitus, HLD, BPH, PARTH, multiple orthopedic surgeries, right lower extremities DVT 2 years ago after he had spider bite,

## 2024-06-24 NOTE — PROGRESS NOTES
Department of Internal Medicine  Infectious Diseases  Progress  Note      C/C :   Brain abscess s/p craniotomy and drainage  ( 6/3)     Denies fever or chills  Denies headache   Improving movement right side ( hand / leg )   Afebrile     Current Facility-Administered Medications   Medication Dose Route Frequency Provider Last Rate Last Admin    furosemide (LASIX) tablet 20 mg  20 mg Oral Daily Lucas Jasso MD   20 mg at 06/24/24 0937    apixaban (ELIQUIS) tablet 10 mg  10 mg Oral BID Belyusrak [place] L, DO   10 mg at 06/24/24 0801    Followed by    [START ON 6/27/2024] apixaban (ELIQUIS) tablet 5 mg  5 mg Oral BID Belcikehinde [place] L, DO        traZODone (DESYREL) tablet 50 mg  50 mg Oral Nightly Belcik [place] L, DO   50 mg at 06/23/24 2133    tamsulosin (FLOMAX) capsule 0.4 mg  0.4 mg Oral Daily Juan Luis [place] L, DO   0.4 mg at 06/24/24 0801    [Held by provider] bethanechol (URECHOLINE) tablet 10 mg  10 mg Oral TID Juan Luis [place] L, DO   10 mg at 06/18/24 0830    bisacodyl (DULCOLAX) suppository 10 mg  10 mg Rectal Daily GianfrRadha rucker C, DO   10 mg at 06/14/24 1812    [Held by provider] lactulose (CHRONULAC) 10 GM/15ML solution 20 g  20 g Oral TID Juan Luis [place] L, DO   20 g at 06/18/24 0830    sennosides-docusate sodium (SENOKOT-S) 8.6-50 MG tablet 2 tablet  2 tablet Oral BID Belcik [place] L, DO   2 tablet at 06/24/24 0801    polyethylene glycol (GLYCOLAX) packet 17 g  17 g Oral BID Belyusrak [place] L, DO   17 g at 06/24/24 0800    levETIRAcetam (KEPPRA) tablet 1,000 mg  1,000 mg Oral BID Belcik, [place] L, DO   1,000 mg at 06/24/24 0801    acetaminophen (TYLENOL) tablet 650 mg  650 mg Oral Q6H PRN Marek Mcknight DO        pantoprazole (PROTONIX) tablet 40 mg  40 mg Oral QAM AC Milanes Marino, Maria, MD   40 mg at 06/24/24 0631    rosuvastatin (CRESTOR) tablet 5 mg  5 mg Oral Nightly Milanes Marino, Maria, MD   5 mg at 06/23/24 2133    heparin (PF) 100 UNIT/ML injection 300 Units  3

## 2024-06-24 NOTE — PROGRESS NOTES
Occupational Therapy  OCCUPATIONAL THERAPY DAILY NOTE    Date:2024  Patient Name: Conrado Leach  MRN: [id number]  : 1960  Room: [address] [place] [address]A     Referring Practitioner: MD Juan Luis  Diagnosis: Brain mass L frontal lobe- 6/3/24- L crani abscess evacuation  Additional Pertinent Hx: hx R shoulder surgery, BPH, hx DVT, HTN, DM, HLD & seizures  Precautions: Fall Risk, R neglect, seizures, R hemiplegia & R UE edema & Soft & Bite size                        *Positive for DVT in RUE as of 24     *R ankle/foot swollen as of 24  .  Discharge Recommendations: Home with 24 hour Sup/assist    Functional Assessment:   Date Status AE  Comments   Feeding 24  Min A     Grooming 24 CGA/Min A W/c          Oral Care 24 CGA/min A W/c    Bathing 24  UB-Mak  LB-Mod A  Sponge     UB Dressing 24  Mod/Max A  W/c    LB Dressing 24 MAX A  seated    Footwear 24 maxA W/c    Toileting 24 Max A  BSC    Homemaking 24 Min A (laundry)       Functional Transfers / Balance:   Date Status DME  Comments   Sit Balance 24 S/SBA  W/c    Stand Balance 24  Mod/Min A LBQC  High/low table   Grab bar  Pt stood during transfers and at tabletop, with slight LOB while standing at tabletop with R lateral lean, assistance to recover and maintain standing balance   [x] Tub  [] Shower   Transfer 24 modA Blade cane, extended tub bench Extended tub bench transfer, assistance to move RLE in/out of tub and complete STS transfer from bench   Commode   Transfer 24  modA BSC- Swap out    blade cane    STS from raised commode   Functional   Mobility 24 modA Blade-cane  Pt ambulated short household distances between transfers with use of blade-cane, unsteady gait with diffculty advancing of LLE. Per pt, does better when leg is wrapped with ace wrap from physical therapist   Other: bed rolling R<>L    Sit to supine         SPT          Sit <>stand       Gym mat <>W/C  with Right hand grasp & release ROM exercises     [] Family teach completed on:    Pain Level: none reported however R foot swollen. .     Additional Notes:   6/14/24 Spoke with nurse in pm about ordering bariatric drop arm 3:1 commode for patient's room.   6/20/24 Spoke with nurse Tae about constant RUE edema still present  and possible test to check DVT issues since patient has hx in his diagnosis.   6/21/24- Pt positive for DVT in RUE upper arm/shoulder area.     Patient has made fair  progress during treatment sessions toward set goals. Therapy emphasis to obtain goals:  Current Treatment Recommendations: Strengthening, Coordination training, ROM, Balance training, Self-Care / ADL, Functional mobility training, Safety education & training, Home management training, Endurance training, Patient/Caregiver education & training, Gait training, Neuromuscular re-education, Equipment evaluation, education, & procurement, Positioning   Goals  Long Term Goals  Time Frame for Long term goals : 6 weeks to address above problem areas  Long Term Goal 1: Pt demo  independent to eat all meals  Long Term Goal 2: Pt demo  Sup grooming seated  @ sink level & demo G- safety  Long Term Goal 3: Pt demo Min A to bathe @ shower level  & or sponge bathing both seated & standing & demo G- safety & insight  Long Term Goal 4: Pt demo Sup UE & Min A LE dress to don depends &  pants; Mod A to don socks & shoes & demo G- safety & insight  Long Term Goal 5: Pt demo Min A commode trf using appropriate DME to ensure pt safety. Pt demo Min A toileting & demo G- safety & insight  Long Term Goal 6: Pt demo Min A tub trf  using appropriate DME to esnure pt safety & pt demo G- safety & insight  Long Term Goal 7: Pt demo Mod A light homemaking activity & demo G- safety & insight  Long Term Goal 8: Pt demo G- endurance for a 45-30 minute functional activity  Long Term Goal 9: Pt will tolerate PROM/AAROM/AROM RUE in all planes to facilitate

## 2024-06-24 NOTE — PLAN OF CARE
Problem: Safety - Adult  Goal: Free from fall injury  6/23/2024 2304 by Liddle, Staci, RN  Outcome: Progressing  6/23/2024 1046 by Kimberli Jacques RN  Outcome: Progressing  Flowsheets (Taken 6/23/2024 1040)  Free From Fall Injury: Instruct family/caregiver on patient safety     Problem: Discharge Planning  Goal: Discharge to home or other facility with appropriate resources  6/23/2024 2304 by Liddle, Staci, RN  Outcome: Progressing  6/23/2024 1046 by Kimberli Jacques RN  Outcome: Progressing     Problem: Skin/Tissue Integrity  Goal: Absence of new skin breakdown  Description: 1.  Monitor for areas of redness and/or skin breakdown  2.  Assess vascular access sites hourly  3.  Every 4-6 hours minimum:  Change oxygen saturation probe site  4.  Every 4-6 hours:  If on nasal continuous positive airway pressure, respiratory therapy assess nares and determine need for appliance change or resting period.  Outcome: Progressing     Problem: ABCDS Injury Assessment  Goal: Absence of physical injury  Outcome: Progressing  Flowsheets (Taken 6/23/2024 1040 by Kimberli Jacques RN)  Absence of Physical Injury: Implement safety measures based on patient assessment     Problem: Pain  Goal: Verbalizes/displays adequate comfort level or baseline comfort level  6/23/2024 2304 by Liddle, Staci, RN  Outcome: Progressing  6/23/2024 1046 by Kimberli Jacques RN  Outcome: Progressing     Problem: Chronic Conditions and Co-morbidities  Goal: Patient's chronic conditions and co-morbidity symptoms are monitored and maintained or improved  Outcome: Progressing     Problem: Nutrition Deficit:  Goal: Optimize nutritional status  Outcome: Progressing

## 2024-06-24 NOTE — PROGRESS NOTES
Physical Therapy  Weekly Note     Supervising therapist : Elie Segovia PT, DPT [id number]  ROOM: [address] [place], [place] [address]  DIAGNOSIS: brain abscess   PRECAUTIONS: Falls, R hemiparesis, R UE sling, R inattention, flat affect, cognition    HPI: Pt is a 63 year old pt who presented to ER from home with complaints of right side weakness and seizure-like activity at home. NIH of 10 upon arrival. Per wife at bedside patient has been having neck and lower back pain and has been going to chiropractor.  He also was having some confusion and they thought it was a UTI and he went to his primary care physician and was prescribed Bactrim.  He went into his daily routine, went to work and returned home.  At around 2:00 when his wife came home found him in the floor, unable to move the right side, appeared that he comprehended but he was unable to speak.  He later said that he had a seizure-like activity sitting on the chair and a slide himself down to the floor, that lasted for about 5 minutes. Workup revealed two masses in the left frontal lobe with adjacent vasogenic edema. EEG positive for left fronto central seizures. Neurosurgery and neurology was consulted. On 06/03/2024 patient underwent a left craniotomy abscess evacuation by Dr. Henderson. ID consulted and placed patient on IV antibiotics. Updated MRI brain w/wo contrast on 6/3/24 showed interval surgical drainage of one of the two left frontal lobe abscesses; mild increase in size of other abscess; slight increase in vasogenic edema; 3mm left-to-right MLS; stable right meningioma.  NSGY reviewed MRI brain prior to admission to ARU with no additional recommendations.  Hospital course was complicated by seizure, weakness, hypotension.       Pt has PMHx of type 2 diabetes mellitus, HLD, BPH, PARTH, multiple orthopedic surgeries, right lower extremities DVT 2 years ago after he had spider bite, not on anticoagulation      Social:  Pt lives with wife in a 2 story home with 3 step(s) to enter

## 2024-06-24 NOTE — PROGRESS NOTES
Speech Language Pathology  ACUTE REHABILITATION--DAILY PROGRESS NOTE            SWALLOWING:      Diet:  Dysphagia 3, Soft/advanced (Soft & Bite-sized) solids with thin liquids (IDDSI level 0)     Patient seen for skilled dysphagia tx for lunch meal this date. Provided continued reminders for slow rate of intake, small/ single bites. No noticeable residue observed. No overt s/s of pen/asp.      LANGUAGE:    Not formally addressed, appropriate response times cont to be noted.    COGNITION:      Pt seen in room for skilled cognitive tx. Given a team schedule and a calendar pt correctly identified 9/10 overlaps in schedule. Pt independently identified the need to arrive to events early. Given a budget, patient accurately chose a package based on games previously selected to attend. Pt required moderate prompting to complete related money management task with calculator to orient to the proper organization and related buttons. Throughout task, pt demonstrated fair+ recall of the budget and related costs.     Pt completed various tasks with a baseball roster. Pt was able to selectively attend to relevant details given a verbal prompt with fair+ outcome. Pt began to alphabetize names,  but indicated there were too many names. Names were then crossed off when sorted correctly, but pt continued to need moderate verbal prompting. Pt was observed to skip over the middle letters, however, if there were two players whose last name started with the same letter, he sorted them accurately, indicating insight.     SPEECH:    Pt engaged in spontaneous conversation with SLP with longer responses and more inflection in voice pattern.      Minute Tracking:    Individual minutes:      0 minutes  Concurrent minutes:    60 minutes  Co-treatment minutes:   0 minutes    Total minutes for 6/24/2024: 60 minutes        SAFETY:      Per chart review, patient shared spouse will be home at discharge to assist with medicine management.  unstructured tasks with 90% accuracy   Pt will improve problem solving/thought organization during structured and unstructured tasks with 90% accuracy   Pt will improve receptive and expressive language skills with adequate thought content, organization, and processing time to facilitate improved communication with minimal.       Short Term Goals:  Pt will implement identified compensatory swallowing strategies on 90% of opportunities or greater to improve airway protection and swallow function.  Pt will participate in ongoing mealtime assessment to provide diet modification and compensatory strategy implementation to minimize risk of aspiration associated with PO intake  Pt will complete PO trials of upgraded diet textures with SLP only to determine the least restrictive PO diet to maintain adequate nutrition/hydration with no more than 1 overt s/s of pen/asp.     Long Term Goals:               Pt will improve oropharyngeal swallow function to ensure airway protection during PO intake to maintain adequate nutrition/hydration and decrease signs/symptoms of aspiration to less than 1 x/day.

## 2024-06-25 PROCEDURE — 97129 THER IVNTJ 1ST 15 MIN: CPT | Performed by: SPEECH-LANGUAGE PATHOLOGIST

## 2024-06-25 PROCEDURE — 92526 ORAL FUNCTION THERAPY: CPT | Performed by: SPEECH-LANGUAGE PATHOLOGIST

## 2024-06-25 PROCEDURE — 97110 THERAPEUTIC EXERCISES: CPT

## 2024-06-25 PROCEDURE — 97530 THERAPEUTIC ACTIVITIES: CPT

## 2024-06-25 PROCEDURE — 6370000000 HC RX 637 (ALT 250 FOR IP): Performed by: PHYSICAL MEDICINE & REHABILITATION

## 2024-06-25 PROCEDURE — 1280000000 HC REHAB R&B

## 2024-06-25 PROCEDURE — 6360000002 HC RX W HCPCS: Performed by: STUDENT IN AN ORGANIZED HEALTH CARE EDUCATION/TRAINING PROGRAM

## 2024-06-25 PROCEDURE — 6360000002 HC RX W HCPCS: Performed by: PHYSICAL MEDICINE & REHABILITATION

## 2024-06-25 PROCEDURE — 6370000000 HC RX 637 (ALT 250 FOR IP): Performed by: STUDENT IN AN ORGANIZED HEALTH CARE EDUCATION/TRAINING PROGRAM

## 2024-06-25 PROCEDURE — 97130 THER IVNTJ EA ADDL 15 MIN: CPT | Performed by: SPEECH-LANGUAGE PATHOLOGIST

## 2024-06-25 PROCEDURE — 2580000003 HC RX 258: Performed by: PHYSICAL MEDICINE & REHABILITATION

## 2024-06-25 RX ADMIN — ACETAMINOPHEN 650 MG: 325 TABLET ORAL at 22:24

## 2024-06-25 RX ADMIN — ROSUVASTATIN CALCIUM 5 MG: 10 TABLET, FILM COATED ORAL at 22:07

## 2024-06-25 RX ADMIN — HEPARIN 300 UNITS: 100 SYRINGE at 22:09

## 2024-06-25 RX ADMIN — SODIUM CHLORIDE, PRESERVATIVE FREE 10 ML: 5 INJECTION INTRAVENOUS at 07:58

## 2024-06-25 RX ADMIN — LEVETIRACETAM 1000 MG: 500 TABLET, FILM COATED ORAL at 07:57

## 2024-06-25 RX ADMIN — SENNOSIDES AND DOCUSATE SODIUM 2 TABLET: 50; 8.6 TABLET ORAL at 22:07

## 2024-06-25 RX ADMIN — LEVETIRACETAM 1000 MG: 500 TABLET, FILM COATED ORAL at 22:07

## 2024-06-25 RX ADMIN — SODIUM CHLORIDE, PRESERVATIVE FREE 10 ML: 5 INJECTION INTRAVENOUS at 22:08

## 2024-06-25 RX ADMIN — Medication 5 MG: at 22:08

## 2024-06-25 RX ADMIN — HEPARIN 300 UNITS: 100 SYRINGE at 07:58

## 2024-06-25 RX ADMIN — WATER 2000 MG: 1 INJECTION INTRAMUSCULAR; INTRAVENOUS; SUBCUTANEOUS at 22:41

## 2024-06-25 RX ADMIN — PANTOPRAZOLE SODIUM 40 MG: 40 TABLET, DELAYED RELEASE ORAL at 04:41

## 2024-06-25 RX ADMIN — FUROSEMIDE 20 MG: 20 TABLET ORAL at 07:58

## 2024-06-25 RX ADMIN — APIXABAN 10 MG: 5 TABLET, FILM COATED ORAL at 07:58

## 2024-06-25 RX ADMIN — POLYETHYLENE GLYCOL 3350 17 GRAM ORAL POWDER PACKET 17 G: at 07:57

## 2024-06-25 RX ADMIN — Medication 1 CAPSULE: at 07:57

## 2024-06-25 RX ADMIN — TAMSULOSIN HYDROCHLORIDE 0.4 MG: 0.4 CAPSULE ORAL at 07:57

## 2024-06-25 RX ADMIN — APIXABAN 10 MG: 5 TABLET, FILM COATED ORAL at 22:07

## 2024-06-25 RX ADMIN — WATER 2000 MG: 1 INJECTION INTRAMUSCULAR; INTRAVENOUS; SUBCUTANEOUS at 08:13

## 2024-06-25 RX ADMIN — POLYETHYLENE GLYCOL 3350 17 GRAM ORAL POWDER PACKET 17 G: at 22:07

## 2024-06-25 RX ADMIN — TRAZODONE HYDROCHLORIDE 50 MG: 50 TABLET ORAL at 22:08

## 2024-06-25 RX ADMIN — SENNOSIDES AND DOCUSATE SODIUM 2 TABLET: 50; 8.6 TABLET ORAL at 07:57

## 2024-06-25 ASSESSMENT — PAIN DESCRIPTION - DESCRIPTORS: DESCRIPTORS: ACHING

## 2024-06-25 ASSESSMENT — PAIN DESCRIPTION - LOCATION: LOCATION: ARM

## 2024-06-25 ASSESSMENT — PAIN SCALES - GENERAL
PAINLEVEL_OUTOF10: 5
PAINLEVEL_OUTOF10: 3

## 2024-06-25 ASSESSMENT — PAIN DESCRIPTION - ORIENTATION: ORIENTATION: RIGHT

## 2024-06-25 NOTE — PROGRESS NOTES
Speech Language Pathology  ACUTE REHABILITATION--DAILY PROGRESS NOTE            SWALLOWING:      Diet:  Dysphagia 3, Soft/advanced (Soft & Bite-sized) solids with thin liquids (IDDSI level 0)     Seen for ongoing dysphagia management. Pt continues to require assist for tray prep/set up however indep able to feed self. Continues with increased rate of intake however eventually does clear bolus without overt s/s of aspiration. If use of strategies continues/ maintains indep, will look to DC dysphagia goals.     LANGUAGE:    Not formally addressed, appropriate response times cont to be noted. Pt presented with some errors while reading, however self-corrected with a delay. Pt was wearing his glasses.     COGNITION:      Pt seen in room for skilled cognitive tx. Pt completed a medicine management task. Since pt is unable to write d/t weakness in dominant right arm, pt instructed SLP. Pt correctly identified correct time for 6 different medications, however was unable to generate proper notation for 2 pills at same time. Pt was not oriented to SLP sabotage. Pt indicated into current medications, and continued to indicate wife will assist at home. Pt was encouraged to complete as much as possible independently with support.     Pt completed a temporal awareness activity with fair outcome. Pt benefited from simplification wait time and task breakdown. Pt required moderate prompting for tasks requiring a math component, particularly division.     SPEECH:    Pt continued to engage in spontaneous conversation with SLP with longer responses and more inflection in voice pattern.      Minute Tracking:    Individual minutes:      45 minutes  Concurrent minutes:     15 minutes  Co-treatment minutes:    0 minutes    Total minutes for 6/25/2024: 45 minutes        SAFETY:      Per chart review, patient shared spouse will be home at discharge to assist with medicine management.     EDUCATION:    Educated on how ST addresses memory and  unstructured tasks with 90% accuracy   Pt will improve receptive and expressive language skills with adequate thought content, organization, and processing time to facilitate improved communication with minimal.       Short Term Goals:  Pt will implement identified compensatory swallowing strategies on 90% of opportunities or greater to improve airway protection and swallow function.  Pt will participate in ongoing mealtime assessment to provide diet modification and compensatory strategy implementation to minimize risk of aspiration associated with PO intake  Pt will complete PO trials of upgraded diet textures with SLP only to determine the least restrictive PO diet to maintain adequate nutrition/hydration with no more than 1 overt s/s of pen/asp.     Long Term Goals:               Pt will improve oropharyngeal swallow function to ensure airway protection during PO intake to maintain adequate nutrition/hydration and decrease signs/symptoms of aspiration to less than 1 x/day.

## 2024-06-25 NOTE — PROGRESS NOTES
not on anticoagulation      Social:  Pt lives with wife in a 2 story home with 3 step(s) to enter and 1 rail(s).    Prior to admission: Pt was independent and working full time.      Owns [employer].        Initial Evaluation  Date: 6/7/24 AM     PM    Short Term Goals Long Term Goals    Was pt agreeable to Eval/treatment? Yes  Yes Yes       Does pt have pain? Denies pain  None  None        Bed Mobility  Rolling: ModA  Supine to sit: ModA  Sit to supine: ModA  Scooting: ModA Rolling: SBA  Supine to sit: SBA  Sit to supine: SBA  Scooting: SBA NT SBA Independent   Transfers Sit to stand: MaxA  Stand to sit: MaxA  Stand pivot: ModA x2 with L limb advancement assistance no AD  Slide board: WC <> Mat table Mak     5xSTS: NT  Sit to stand:   Min A  Stand to sit: Min A  Stand pivot: Mod A with L hemicane Sit to stand: Min A  Stand to sit: Min A  Stand pivot: Mod A with L hemicane Mak  5xSTS: TBD Supervision   5xSTS: TBD   Ambulation    15 feet x2 reps with L hallway rail and ModA assistance with R limb advancement (R KI, R DF ace wrap, sock over toe box)      10mWT: 0 m/s  6mWT: 0 m 125 feet with L hemicane with Mod A        (R ankle DF ACE wrap) 100 feet x 2 with L hemicane with Mod A     (R ankle DF ACE wrap)    10mWT: 0.13 m/s  6mWT: 50 m  (06/24/24) 150 feet with AAD with Mak     10mWT: TBD  6mWT: TBD >300 feet with AAD with supervision      10mWT: TBD  6mWT: TBD   Walking 10 feet on uneven surface NT  NT NT 10 feet with AAD with Mak 10 feet with AAD with supervision    Wheel Chair Mobility NT NT NT       Car Transfers NT Mod A  NT Mak Supervision    Stair negotiation: ascended and descended  NT  8 steps with L HR, Min A ascending, Mod A FW descending  NT 4-8 steps with 1 rail with Mak 8-12 steps with 1 rail with supervision    Curb Step:   ascended and descended NT NT NT  4 inch step with AAD and Mak 4 inch step with AAD and supervision    Picking up object off the floor NT  NT NT Will  cone/shoe with Min assist

## 2024-06-25 NOTE — PROGRESS NOTES
Progress Note  Date:2024       Room:55095509-  Patient Name:Conrado Leach     YOB: 1960     Age:63 y.o.        Subjective    Subjective:  Symptoms:  Stable.  He reports weakness.    Diet:  Adequate intake.    Activity level: Impaired due to weakness.    Pain:  He reports no pain.       Review of Systems   Neurological:  Positive for weakness.     Objective         Vitals Last 24 Hours:  TEMPERATURE:  Temp  Av.2 °F (36.2 °C)  Min: 97 °F (36.1 °C)  Max: 97.3 °F (36.3 °C)  RESPIRATIONS RANGE: Resp  Av  Min: 16  Max: 16  PULSE OXIMETRY RANGE: SpO2  Av.5 %  Min: 97 %  Max: 98 %  PULSE RANGE: Pulse  Av.5  Min: 50  Max: 73  BLOOD PRESSURE RANGE: Systolic (24hrs), Av , Min:116 , Max:129   ; Diastolic (24hrs), Av, Min:57, Max:84    I/O (24Hr):    Intake/Output Summary (Last 24 hours) at 2024 0801  Last data filed at 2024 2043  Gross per 24 hour   Intake 243 ml   Output 800 ml   Net -557 ml     Objective:  General Appearance:  In no acute distress.    Vital signs: (most recent): Blood pressure (!) 116/57, pulse 73, temperature 97.3 °F (36.3 °C), temperature source Temporal, resp. rate 16, height 1.803 m (5' 10.98\"), weight 99 kg (218 lb 4.1 oz), SpO2 98 %.  Vital signs are normal.    Output: Producing urine and producing stool.    Lungs:  Normal effort and normal respiratory rate.  Breath sounds clear to auscultation.    Heart: Normal rate.  Regular rhythm.  S1 normal and S2 normal.    Abdomen: Abdomen is soft.  Bowel sounds are normal.   There is no abdominal tenderness.     Extremities: Normal range of motion.    Neurological: Patient is alert.  (Right hemiparesis  Weaker distally).      Labs/Imaging/Diagnostics    Labs:  CBC:  Recent Labs     24  0730   WBC 4.6   RBC 4.60   HGB 12.7   HCT 38.6   MCV 83.9   RDW 15.1*        CHEMISTRIES:  Recent Labs     06/24/24  0730      K 4.2      CO2 27   BUN 9   CREATININE 0.8   GLUCOSE 114*

## 2024-06-25 NOTE — PLAN OF CARE
Problem: Safety - Adult  Goal: Free from fall injury  6/24/2024 2157 by Janet Arizmendi RN  Outcome: Progressing  6/24/2024 1024 by Kimberli Jacques RN  Outcome: Progressing  Flowsheets (Taken 6/24/2024 1021)  Free From Fall Injury: Instruct family/caregiver on patient safety     Problem: Discharge Planning  Goal: Discharge to home or other facility with appropriate resources  6/24/2024 2157 by Janet Arizmendi RN  Outcome: Progressing  Flowsheets (Taken 6/24/2024 1945)  Discharge to home or other facility with appropriate resources: Identify barriers to discharge with patient and caregiver  6/24/2024 1024 by Kimberli Jacques RN  Outcome: Progressing     Problem: Skin/Tissue Integrity  Goal: Absence of new skin breakdown  Description: 1.  Monitor for areas of redness and/or skin breakdown  2.  Assess vascular access sites hourly  3.  Every 4-6 hours minimum:  Change oxygen saturation probe site  4.  Every 4-6 hours:  If on nasal continuous positive airway pressure, respiratory therapy assess nares and determine need for appliance change or resting period.  Outcome: Progressing     Problem: ABCDS Injury Assessment  Goal: Absence of physical injury  Outcome: Progressing  Flowsheets (Taken 6/24/2024 1021 by Kimberli Jacques RN)  Absence of Physical Injury: Implement safety measures based on patient assessment     Problem: Pain  Goal: Verbalizes/displays adequate comfort level or baseline comfort level  6/24/2024 2157 by Janet Arizmendi RN  Outcome: Progressing  6/24/2024 1024 by Kimberli Jacques RN  Outcome: Progressing     Problem: Chronic Conditions and Co-morbidities  Goal: Patient's chronic conditions and co-morbidity symptoms are monitored and maintained or improved  Outcome: Progressing  Flowsheets (Taken 6/24/2024 1945)  Care Plan - Patient's Chronic Conditions and Co-Morbidity Symptoms are Monitored and Maintained or Improved: Monitor and assess patient's chronic conditions and comorbid symptoms for

## 2024-06-25 NOTE — PROGRESS NOTES
Occupational Therapy  OCCUPATIONAL THERAPY DAILY NOTE    Date:2024  Patient Name: Conrado Leach  MRN: [id number]  : 1960  Room: [address] [place] [address]A     Referring Practitioner: MD Juan Luis  Diagnosis: Brain mass L frontal lobe- 6/3/24- L crani abscess evacuation  Additional Pertinent Hx: hx R shoulder surgery, BPH, hx DVT, HTN, DM, HLD & seizures  Precautions: Fall Risk, R neglect, seizures, R hemiplegia & R UE edema & Soft & Bite size                        *Positive for DVT in RUE as of 24     *R ankle/foot swollen as of 24  .  Discharge Recommendations: Home with 24 hour Sup/assist    Functional Assessment:   Date Status AE  Comments   Feeding 24  Min A     Grooming 24 CGA/Min A W/c          Oral Care 24 CGA/min A W/c    Bathing 24  UB-Mak  LB-Mod A  Sponge     UB Dressing 24  Mod/Max A  W/c    LB Dressing 24 MAX A  seated    Footwear 24 maxA W/c    Toileting 24 Max A  BSC    Homemaking 24 Min A (laundry)       Functional Transfers / Balance:   Date Status DME  Comments   Sit Balance 24 S/SBA  W/c  Recliner  Demo'd sitting balance up in w/c during therapeutic tasks and in room recliner    Stand Balance 24  Mod/Min A LBQC  High/low table   Grab bar  AM: Demo'd standing balance during SPT from recliner to w/c transfer.    [x] Tub  [] Shower   Transfer 24 modA Blade cane, extended tub bench    Commode   Transfer 24  modA BSC- Swap out    blade cane       Functional   Mobility 24 modA Blade-cane     Other: bed rolling R<>L    Sit to supine         SPT          Sit <>stand       Gym mat <>W/C 24 Mod to  max  A      Min A        Min A x2          min/modA      maxA Bed rails                            Blade cane                AM: SPT from recliner to w/c with x2 assist with patient trying to move RLE over using sliding method with shoe.           AM: Pt needed cues for LUE hand

## 2024-06-25 NOTE — PROGRESS NOTES
Department of Internal Medicine  Infectious Diseases  Progress  Note      C/C :   Brain abscess s/p craniotomy and drainage  ( 6/3)     Denies fever or chills  Denies headache   Head wound healed     Improving movement right side ( hand / leg )   Afebrile     Current Facility-Administered Medications   Medication Dose Route Frequency Provider Last Rate Last Admin    furosemide (LASIX) tablet 20 mg  20 mg Oral Daily Lucas Jasso MD   20 mg at 06/25/24 0758    apixaban (ELIQUIS) tablet 10 mg  10 mg Oral BID Westk [place] L, DO   10 mg at 06/25/24 0758    Followed by    [START ON 6/27/2024] apixaban (ELIQUIS) tablet 5 mg  5 mg Oral BID Belcik [place] L, DO        traZODone (DESYREL) tablet 50 mg  50 mg Oral Nightly Belcik [place] L, DO   50 mg at 06/24/24 2041    tamsulosin (FLOMAX) capsule 0.4 mg  0.4 mg Oral Daily Juan Luis [place] L, DO   0.4 mg at 06/25/24 0757    [Held by provider] bethanechol (URECHOLINE) tablet 10 mg  10 mg Oral TID Beloscar [place] L, DO   10 mg at 06/18/24 0830    bisacodyl (DULCOLAX) suppository 10 mg  10 mg Rectal Daily GianRadha arreaga C, DO   10 mg at 06/14/24 1812    [Held by provider] lactulose (CHRONULAC) 10 GM/15ML solution 20 g  20 g Oral TID Westk [place] L, DO   20 g at 06/18/24 0830    sennosides-docusate sodium (SENOKOT-S) 8.6-50 MG tablet 2 tablet  2 tablet Oral BID Belcik, [place] L, DO   2 tablet at 06/25/24 0757    polyethylene glycol (GLYCOLAX) packet 17 g  17 g Oral BID Belcik, [place] L, DO   17 g at 06/25/24 0757    levETIRAcetam (KEPPRA) tablet 1,000 mg  1,000 mg Oral BID Belcik, [place] L, DO   1,000 mg at 06/25/24 0757    acetaminophen (TYLENOL) tablet 650 mg  650 mg Oral Q6H PRN Marek Mcknight DO        pantoprazole (PROTONIX) tablet 40 mg  40 mg Oral QAM AC Milanes Marino, Maria, MD   40 mg at 06/25/24 0441    rosuvastatin (CRESTOR) tablet 5 mg  5 mg Oral Nightly Milanes Marino, Maria, MD   5 mg at 06/24/24 2042    heparin (PF) 100 UNIT/ML

## 2024-06-25 NOTE — PROGRESS NOTES
Comprehensive Nutrition Assessment    Type and Reason for Visit:  Reassess    Nutrition Recommendations/Plan:   Continue Diet.    Will Continue Current ONS and monitor.       Malnutrition Assessment:  Malnutrition Status:  At risk for malnutrition (Comment) (06/13/24 0900)    Context:  Acute Illness     Findings of the 6 clinical characteristics of malnutrition:  Energy Intake:  Mild decrease in energy intake (Comment)  Weight Loss:  Unable to assess (d/t lack of wt hx per EMR)     Body Fat Loss:  No significant body fat loss     Muscle Mass Loss:  No significant muscle mass loss    Fluid Accumulation:  No significant fluid accumulation     Strength:  Not Performed    Nutrition Assessment:    pt adm for therapy 2/2 brain abscess/AMS s/p crani/abscess evac 6/3; PMhx of DVT,DM; Noted previous pt c/o abd discomfort/bloating and noted previous suspected Ileus however likely now resolved w/ multiple BM's and tolerating PO diet thus far w/ adequate intake; Will continue current ONS and monitor.    Nutrition Related Findings:    A&O, dentition WNL, Abd/BS WDL, +2 edema, -I/O's, hyperglycemia Wound Type: Surgical Incision       Current Nutrition Intake & Therapies:    Average Meal Intake: %  Average Supplements Intake: Unable to assess  ADULT ORAL NUTRITION SUPPLEMENT; Breakfast, Dinner; Wound Healing Oral Supplement  ADULT DIET; Dysphagia - Soft and Bite Sized  ADULT ORAL NUTRITION SUPPLEMENT; Breakfast, Lunch; Low Calorie/High Protein Oral Supplement    Anthropometric Measures:  Height: 180.3 cm (5' 10.98\")  Ideal Body Weight (IBW): 172 lbs (78 kg)       Current Body Weight: 98.9 kg (218 lb) (bed 6/6), 126.1 % IBW. Weight Source: Bed Scale  Current BMI (kg/m2): 30.4  Usual Body Weight:  (UTO d/t lack of wt hx per EMR)     Weight Adjustment For: No Adjustment                 BMI Categories: Obese Class 1 (BMI 30.0-34.9)    Estimated Daily Nutrient Needs:  Energy Requirements Based On: Formula  Weight Used for

## 2024-06-26 PROCEDURE — 1280000000 HC REHAB R&B

## 2024-06-26 PROCEDURE — 97130 THER IVNTJ EA ADDL 15 MIN: CPT | Performed by: SPEECH-LANGUAGE PATHOLOGIST

## 2024-06-26 PROCEDURE — 6360000002 HC RX W HCPCS: Performed by: PHYSICAL MEDICINE & REHABILITATION

## 2024-06-26 PROCEDURE — 97530 THERAPEUTIC ACTIVITIES: CPT

## 2024-06-26 PROCEDURE — 97129 THER IVNTJ 1ST 15 MIN: CPT | Performed by: SPEECH-LANGUAGE PATHOLOGIST

## 2024-06-26 PROCEDURE — 6370000000 HC RX 637 (ALT 250 FOR IP): Performed by: STUDENT IN AN ORGANIZED HEALTH CARE EDUCATION/TRAINING PROGRAM

## 2024-06-26 PROCEDURE — 97112 NEUROMUSCULAR REEDUCATION: CPT

## 2024-06-26 PROCEDURE — 6370000000 HC RX 637 (ALT 250 FOR IP): Performed by: PHYSICAL MEDICINE & REHABILITATION

## 2024-06-26 PROCEDURE — 6360000002 HC RX W HCPCS: Performed by: STUDENT IN AN ORGANIZED HEALTH CARE EDUCATION/TRAINING PROGRAM

## 2024-06-26 PROCEDURE — 2580000003 HC RX 258: Performed by: PHYSICAL MEDICINE & REHABILITATION

## 2024-06-26 RX ADMIN — WATER 2000 MG: 1 INJECTION INTRAMUSCULAR; INTRAVENOUS; SUBCUTANEOUS at 21:38

## 2024-06-26 RX ADMIN — ACETAMINOPHEN 650 MG: 325 TABLET ORAL at 21:39

## 2024-06-26 RX ADMIN — HEPARIN 300 UNITS: 100 SYRINGE at 21:38

## 2024-06-26 RX ADMIN — WATER 2000 MG: 1 INJECTION INTRAMUSCULAR; INTRAVENOUS; SUBCUTANEOUS at 10:29

## 2024-06-26 RX ADMIN — SENNOSIDES AND DOCUSATE SODIUM 2 TABLET: 50; 8.6 TABLET ORAL at 21:39

## 2024-06-26 RX ADMIN — APIXABAN 10 MG: 5 TABLET, FILM COATED ORAL at 07:52

## 2024-06-26 RX ADMIN — ROSUVASTATIN CALCIUM 5 MG: 10 TABLET, FILM COATED ORAL at 21:39

## 2024-06-26 RX ADMIN — PANTOPRAZOLE SODIUM 40 MG: 40 TABLET, DELAYED RELEASE ORAL at 07:04

## 2024-06-26 RX ADMIN — Medication 1 CAPSULE: at 07:52

## 2024-06-26 RX ADMIN — Medication 5 MG: at 21:38

## 2024-06-26 RX ADMIN — SENNOSIDES AND DOCUSATE SODIUM 2 TABLET: 50; 8.6 TABLET ORAL at 07:52

## 2024-06-26 RX ADMIN — POLYETHYLENE GLYCOL 3350 17 GRAM ORAL POWDER PACKET 17 G: at 21:38

## 2024-06-26 RX ADMIN — LEVETIRACETAM 1000 MG: 500 TABLET, FILM COATED ORAL at 21:39

## 2024-06-26 RX ADMIN — POLYETHYLENE GLYCOL 3350 17 GRAM ORAL POWDER PACKET 17 G: at 07:52

## 2024-06-26 RX ADMIN — FUROSEMIDE 20 MG: 20 TABLET ORAL at 07:52

## 2024-06-26 RX ADMIN — LEVETIRACETAM 1000 MG: 500 TABLET, FILM COATED ORAL at 07:52

## 2024-06-26 RX ADMIN — SODIUM CHLORIDE, PRESERVATIVE FREE 10 ML: 5 INJECTION INTRAVENOUS at 21:39

## 2024-06-26 RX ADMIN — TRAZODONE HYDROCHLORIDE 50 MG: 50 TABLET ORAL at 21:38

## 2024-06-26 RX ADMIN — HEPARIN 300 UNITS: 100 SYRINGE at 10:29

## 2024-06-26 RX ADMIN — APIXABAN 10 MG: 5 TABLET, FILM COATED ORAL at 21:38

## 2024-06-26 RX ADMIN — TAMSULOSIN HYDROCHLORIDE 0.4 MG: 0.4 CAPSULE ORAL at 07:52

## 2024-06-26 RX ADMIN — SODIUM CHLORIDE, PRESERVATIVE FREE 10 ML: 5 INJECTION INTRAVENOUS at 10:30

## 2024-06-26 ASSESSMENT — PAIN SCALES - GENERAL
PAINLEVEL_OUTOF10: 2
PAINLEVEL_OUTOF10: 0

## 2024-06-26 ASSESSMENT — PAIN SCALES - WONG BAKER: WONGBAKER_NUMERICALRESPONSE: HURTS A LITTLE BIT

## 2024-06-26 NOTE — PROGRESS NOTES
stability. Due to improvements in postural stability, and desired efficiency with device management on pivots, a LBQC was successfully introduced and deemed the patient's primary AD at this time. Education was provided on persisting deficits, which led to the LOS recommendations in team. Patient verbalized understanding, but also stated that his mental health is significantly challenged with the reduced therapy and care over the weekends. PT provided support and reinforced reasoning behind LOS recommendations, but that re-assessment will be made in a week with a scheduled family teach. SW notified PT and patient that family is agreeable to a pending DC next Friday, due to patient's strong desires.     He will continue to benefit from HIGT methodologies in upcoming sessions for optimal neurologic return.       AM  Time in: 1045  Time out: 1430    PM  Time in: 1300  Time out: 1345      Pt is making good progress toward established Physical Therapy goals.  Continue with physical therapy current plan of care.      Mar Toth, PT, DPT  [id number]

## 2024-06-26 NOTE — PROGRESS NOTES
Speech Language Pathology  ACUTE REHABILITATION--DAILY PROGRESS NOTE            SWALLOWING:      Diet:  Dysphagia 3, Soft/advanced (Soft & Bite-sized) solids with thin liquids (IDDSI level 0)     Not addressed this session.    LANGUAGE:    Not formally addressed, appropriate response times cont to be noted.      COGNITION:      Pt seen in room for skilled cognitive tx as a co-treat with OT. Pt identified 5 related words to a given category related to home / tool management from a field of 8 with 90% accuracy. Pt recalled task instructions and accurately explained concepts and tools. Pt was attentive to task and appeared to be self-motivated to complete.     Pt identified related responsibilities to pet care as he has two dogs at home with fair recall. Ppt indicated spouse assists with pet care as well. Pt required minimal verbal prompting to generate alternative solutions to common problems, often only listing 1. Pt continued to require moderate prompting for money management tasks to identify needed equation and with set-up.     SPEECH:    Pt continued to engage in spontaneous conversation with SLP with longer responses and more inflection in voice pattern.      Minute Tracking:    Individual minutes:      0 minutes  Concurrent minutes:     0 minutes  Co-treatment minutes:    45 minutes    Total minutes for 6/26/2024: 45 minutes        SAFETY:      Per chart review, patient shared spouse will be home at discharge to assist with medicine management.     EDUCATION:    Educated on how ST addresses memory and cognition.     OUTCOME:    Progress made towards goals. Will continue SP intervention as per previously established POC.    SP recommended after discharge:  TBD  Supervision recommended at discharge: TBD      FIMS SCORES                           Swallowing                          Current Status          5--Supervision            Short Term Goal         5--Supervision               Long Term Goal

## 2024-06-26 NOTE — PROGRESS NOTES
Department of Internal Medicine  Infectious Diseases  Progress  Note      C/C :   Brain abscess s/p craniotomy and drainage  ( 6/3)     Denies fever or chills  Denies headache     Afebrile     Current Facility-Administered Medications   Medication Dose Route Frequency Provider Last Rate Last Admin    apixaban (ELIQUIS) tablet 10 mg  10 mg Oral BID Belcik, [place] L, DO   10 mg at 06/26/24 0752    Followed by    [START ON 6/27/2024] apixaban (ELIQUIS) tablet 5 mg  5 mg Oral BID Belyusrak [place] L, DO        traZODone (DESYREL) tablet 50 mg  50 mg Oral Nightly Belcik, [place] L, DO   50 mg at 06/25/24 2208    tamsulosin (FLOMAX) capsule 0.4 mg  0.4 mg Oral Daily Westk [place] L, DO   0.4 mg at 06/26/24 0752    [Held by provider] bethanechol (URECHOLINE) tablet 10 mg  10 mg Oral TID Juan Luis [place] L, DO   10 mg at 06/18/24 0830    bisacodyl (DULCOLAX) suppository 10 mg  10 mg Rectal Daily Gianfrate, Gianmarino C, DO   10 mg at 06/14/24 1812    [Held by provider] lactulose (CHRONULAC) 10 GM/15ML solution 20 g  20 g Oral TID Juan Luis [place] L, DO   20 g at 06/18/24 0830    sennosides-docusate sodium (SENOKOT-S) 8.6-50 MG tablet 2 tablet  2 tablet Oral BID Venkat Mcknightdict L, DO   2 tablet at 06/26/24 0752    polyethylene glycol (GLYCOLAX) packet 17 g  17 g Oral BID Belcik [place] L, DO   17 g at 06/26/24 0752    levETIRAcetam (KEPPRA) tablet 1,000 mg  1,000 mg Oral BID Westk, [place] L, DO   1,000 mg at 06/26/24 0752    acetaminophen (TYLENOL) tablet 650 mg  650 mg Oral Q6H PRN Venkat Mcknightdict L, DO   650 mg at 06/25/24 2224    pantoprazole (PROTONIX) tablet 40 mg  40 mg Oral QAM AC Milanes Burden, Jeanette, MD   40 mg at 06/26/24 0704    rosuvastatin (CRESTOR) tablet 5 mg  5 mg Oral Nightly Milanes Marino, Maria, MD   5 mg at 06/25/24 2207    heparin (PF) 100 UNIT/ML injection 300 Units  3 mL IntraVENous PRN Milanes Marino, Maria, MD   300 Units at 06/18/24 0855    butalbital-acetaminophen-caffeine (FIORICET,  ESGIC) per tablet 1 tablet  1 tablet Oral Q4H PRN Milanes Marino, Maria, MD   1 tablet at 06/06/24 1845    heparin (PF) 100 UNIT/ML injection 300 Units  3 mL IntraVENous 2 times per day Milanes Marino, Maria, MD   300 Units at 06/25/24 2209    lactobacillus (CULTURELLE) capsule 1 capsule  1 capsule Oral Daily Belcik, [place] L, DO   1 capsule at 06/26/24 0752    melatonin disintegrating tablet 5 mg  5 mg Oral Nightly Belcik [place] L, DO   5 mg at 06/25/24 2208    ondansetron (ZOFRAN-ODT) disintegrating tablet 4 mg  4 mg Oral Q8H PRN Venkat Mcknightdict L, DO        calcium carbonate (TUMS) chewable tablet 500 mg  500 mg Oral TID PRN Westk, [place] L, DO        magnesium hydroxide (MILK OF MAGNESIA) 400 MG/5ML suspension 30 mL  30 mL Oral Daily PRN Kendra Mcknightct L, DO   30 mL at 06/09/24 0912    vitamin D (ERGOCALCIFEROL) capsule 50,000 Units  50,000 Units Oral Weekly Belcik [place] L, DO   50,000 Units at 06/21/24 1008    cefTRIAXone (ROCEPHIN) 2,000 mg in sterile water 20 mL IV syringe  2,000 mg IntraVENous Q12H Belcik [place] L, DO   2,000 mg at 06/25/24 2241    sodium chloride flush 0.9 % injection 5-40 mL  5-40 mL IntraVENous BID Venkat Mcknightdict L, DO   10 mL at 06/25/24 2208           REVIEW OF SYSTEMS:    CONSTITUTIONAL:  Denies fever, chill or rigors.  HEENT: Denies headache   RESPIRATORY: denies cough, shortness of breath, sputum expectoration.  CARDIOVASCULAR:  Denies palpitation or chest pain   GASTROINTESTINAL:  Denies abdomen pain, diarrhea or constipation,, nausea or vomiting.  GENITOURINARY:  Denies burning urination or frequency of urination  INTEGUMENT: denies wound , rash  HEMATOLOGIC/LYMPHATIC:  Denies lymph node swelling, gum bleeding or easy bruising.  MUSCULOSKELETAL:  Denies leg pain , joint pain , joint swelling  NEUROLOGICAL: weakness right side       PHYSICAL EXAM:      Vitals:     Blood Pressure (Abnormal) 104/55   Pulse 61   Temperature 97.2 °F (36.2 °C) (Temporal)

## 2024-06-26 NOTE — CARE COORDINATION
Care Coordination:  Team meeting this date.  Met with patient and discussed rehab progress and discharge planning.  Spoke to wife Radha on phone.  Patient continues to make gains but verbalizes frustration regarding being in the hospital.  Remains anxious to go home and states he is discussing plans with his family.  Per Radha, they would like to plan for discharge end of next week.  See plans below.   Function improved all areas to min mod levels.        D/C Plan :   Home with family and home health care  and DME    [facility]  IV antibiotics, PT OT RN  at home.  Referral called to [facility] for pricing.  Offered choice for home care.  [facility] not in network to insurance.  Referral to [place] and to [facility].  Will follow for acceptance.     DME  Family are obtaining from a private source, Hospital bed, ramp, wheelchair, bedside commode.  They are arranging for first floor set up and plan to sponge bath. Advanced to quad cane this week.  May need to obtain for discharge.      Family teach  Daughter Angel has been trained but has had an accident.  Arranging for other daughter and wife to be instructed prior to discharge.  Will follow

## 2024-06-26 NOTE — PROGRESS NOTES
during sit to stand transfer with assist for balance.                   Assessment:    Condition: In stable condition.  Improving.   (Brain abscess).     Plan:   Encourage ambulation.   (Patient continues on IV antibiotics  He is moving the right side a little better  Participating well in therapy  I will review his progress with the therapists in team today).       Electronically signed by Lucas Jasso MD on 6/26/24 at 8:07 AM EDT

## 2024-06-26 NOTE — PATIENT CARE CONFERENCE
[facility]  INPATIENT ACUTE REHABILITATION  TEAM CONFERENCE NOTE/PATIENT PLAN OF CARE    The physician was present and led this team conference    Date: 2024  Admission date: 2024  Patient Name: Conrado Leach        MRN: [id number]    : 1960  (63 y.o.)  Gender: male   Rehab diagnosis/surgery with date:  Left frontal lobe brain abscess  Craniotomy with abscess evacuation 6/3/24 per Dr. Henderson  Impairment Group Code:  2.1      MEDICAL/FUNCTIONAL HISTORY/STATUS:  made good progress over past week, colonic ileus has resolved, no further abdominal discomfort  new DVT in right upper extremity-started on Eliquis 24    Consultations/Labs/X-rays: infectious disease continues to follow for IV antibiotic management, will need at least 6 weeks from 6/3/24  FINDINGS:  There is echogenic, occlusive thrombus involving the right axillary vein,  basilic vein and brachial vein.  There is nonocclusive thrombus seen within  the right subclavian vein.     IMPRESSION:  Positive evidence of DVT in the right upper extremity.       MEDICATION UPDATE:  IV Rocephin every 12 hours per ID via right arm PICC  Eliquis as above      NURSING :    Bowel:   Always Continent  [x]   Occasionally incontinent  []   Frequently incontinent  []   Always incontinent  []   Not occurred  []     Bladder:   Always Continent  [x]    Incontinent less than daily[]   Incontinent  daily []   Always incontinent  []   No urine output    []   Indwelling catheter []       Toilet Hygiene:   Current level : max assist.  Short term Toilet hygiene goal: min assist.  Long term toilet hygiene goal:  min assist    Skin integrity: abdominal rash, left scalp incision healing  Pain: none    NUTRITION    Diet  soft bite sized  Liquid consistency   thin    SOCIAL INFORMATION:  Lives with: spouse  Prior community services:  none  Home Architecture:  old farmhouse, back entry is 1 step and 1 rail, 3 steps into main living area, no rail, to 2nd floor

## 2024-06-26 NOTE — PROGRESS NOTES
Occupational Therapy  OCCUPATIONAL THERAPY DAILY NOTE    Date:2024  Patient Name: Conrado Leach  MRN: [id number]  : 1960  Room: [address] [place] [address]-A     Referring Practitioner: MD Juan Luis  Diagnosis: Brain mass L frontal lobe- 6/3/24- L crani abscess evacuation  Additional Pertinent Hx: hx R shoulder surgery, BPH, hx DVT, HTN, DM, HLD & seizures  Precautions: Fall Risk, R neglect, seizures, R hemiplegia & R UE edema & Soft & Bite size                        *Positive for DVT in RUE as of 24     *R ankle/foot swollen as of 24  .Discharge Recommendations: Home with 24 hour Sup/assist    Functional Assessment:   Date Status AE  Comments   Feeding 24  Min A     Grooming 24 CGA/Min A W/c          Oral Care 24 CGA/min A W/c    Bathing 24  UB-Mak  LB-Mod A  Sponge     UB Dressing 24  Mod/Max A  W/c    LB Dressing 24 MAX A  seated    Footwear 24 maxA W/c    Toileting 24 Max A  BSC    Homemaking 24 Min A (laundry)       Functional Transfers / Balance:   Date Status DME  Comments   Sit Balance 24 S/SBA   Demo'd during therapeutic activities   Stand Balance 24  Min A LBQC   Demo'd during transfers   [x] Tub  [] Shower   Transfer 24 modA Blade cane, extended tub bench    Commode   Transfer 24  modA BSC- Swap out    blade cane       Functional   Mobility 24 Mod/Min A LBQC Short distance with min cues for sequencing and safety and AM and PM   Other: bed rolling R<>L    Sit to supine         SPT      Sit <>stand       Gym mat <>W/C 24 Mod to  max  A      Min A        Mod A      Min A      maxA Bed rails                            Blade cane                For safety      Min cues for hand placement/safety in AM and PM.     Functional Exercises / Activity:  -Wrist extension against gravity utilizing foam elevator completed 2x10 reps to increase strength and endurance in R wrist.   -Therapeutic  recommending first floor setup. Pt admitted with a brain abscess & on antibiotics. Pt has an ileus & general surgery consulted & on a clear liquid diet. Pt tentative length of stay 6 weeks. Kim Irizarry OTR/L [id number]   6/26/24-Pt POC & goals are updated on this date. Family can provide first floor setup. Pt wife works but will take time off @ discharge. Pt tentative length of stay 3 weeks Kim Irizarry OTR/L [id number]    [x] Continue with current OT Plan of care.  [] Prepare for Discharge     DISCHARGE RECOMMENDATIONS  Recommended DME:    Post Discharge Care:   []Home Independently  []Home with 24hr Care / Supervision []Home with Partial Supervision []Home with Home Health OT []Home with Out Pt OT []Other: ___   Comments:         Time in Time out Tx Time Breakdown  Variance:   First Session  1000 1045 [] Individual Tx-   [x] Concurrent Tx- 45 Mins  [] Co-Tx -   [] Group Tx -   [] Time Missed -     Second Session 1430 1515 [x] Individual Tx- 45 Mins  [] Concurrent Tx -  [] Co-Tx -   [] Group Tx -   [] Time Missed -     Third Session    [] Individual Tx-   [] Concurrent Tx -  [] Co-Tx -   [] Group Tx -   [] Time Missed -         Total Tx Time: 90 Mins     Rani ROWELL/L [id number]

## 2024-06-26 NOTE — PLAN OF CARE
Problem: Safety - Adult  Goal: Free from fall injury  Outcome: Progressing  Flowsheets (Taken 6/25/2024 1041 by Kimberli Jacques, RN)  Free From Fall Injury: Instruct family/caregiver on patient safety     Problem: Discharge Planning  Goal: Discharge to home or other facility with appropriate resources  Outcome: Progressing     Problem: Skin/Tissue Integrity  Goal: Absence of new skin breakdown  Description: 1.  Monitor for areas of redness and/or skin breakdown  2.  Assess vascular access sites hourly  3.  Every 4-6 hours minimum:  Change oxygen saturation probe site  4.  Every 4-6 hours:  If on nasal continuous positive airway pressure, respiratory therapy assess nares and determine need for appliance change or resting period.  Outcome: Progressing     Problem: ABCDS Injury Assessment  Goal: Absence of physical injury  Outcome: Progressing  Flowsheets (Taken 6/25/2024 1041 by Kimberli Jacques, RN)  Absence of Physical Injury: Implement safety measures based on patient assessment     Problem: Pain  Goal: Verbalizes/displays adequate comfort level or baseline comfort level  Outcome: Progressing  Flowsheets (Taken 6/25/2024 1915)  Verbalizes/displays adequate comfort level or baseline comfort level: Encourage patient to monitor pain and request assistance     Problem: Chronic Conditions and Co-morbidities  Goal: Patient's chronic conditions and co-morbidity symptoms are monitored and maintained or improved  Outcome: Progressing     Problem: Nutrition Deficit:  Goal: Optimize nutritional status  Outcome: Progressing  Flowsheets (Taken 6/25/2024 7876 by Wilber Rose, RD, LD)  Nutrient intake appropriate for improving, restoring, or maintaining nutritional needs: Assess nutritional status and recommend course of action

## 2024-06-26 NOTE — PLAN OF CARE
Problem: Safety - Adult  Goal: Free from fall injury  Outcome: Progressing     Problem: Discharge Planning  Goal: Discharge to home or other facility with appropriate resources  Outcome: Progressing     Problem: Skin/Tissue Integrity  Goal: Absence of new skin breakdown  Description: 1.  Monitor for areas of redness and/or skin breakdown  2.  Assess vascular access sites hourly  3.  Every 4-6 hours minimum:  Change oxygen saturation probe site  4.  Every 4-6 hours:  If on nasal continuous positive airway pressure, respiratory therapy assess nares and determine need for appliance change or resting period.  Outcome: Progressing     Problem: ABCDS Injury Assessment  Goal: Absence of physical injury  Outcome: Progressing     Problem: Pain  Goal: Verbalizes/displays adequate comfort level or baseline comfort level  Outcome: Progressing     Problem: Chronic Conditions and Co-morbidities  Goal: Patient's chronic conditions and co-morbidity symptoms are monitored and maintained or improved  Outcome: Progressing     Problem: Nutrition Deficit:  Goal: Optimize nutritional status  Outcome: Progressing

## 2024-06-27 PROCEDURE — 2580000003 HC RX 258: Performed by: PHYSICAL MEDICINE & REHABILITATION

## 2024-06-27 PROCEDURE — 6370000000 HC RX 637 (ALT 250 FOR IP): Performed by: STUDENT IN AN ORGANIZED HEALTH CARE EDUCATION/TRAINING PROGRAM

## 2024-06-27 PROCEDURE — 97530 THERAPEUTIC ACTIVITIES: CPT

## 2024-06-27 PROCEDURE — 92526 ORAL FUNCTION THERAPY: CPT

## 2024-06-27 PROCEDURE — 1280000000 HC REHAB R&B

## 2024-06-27 PROCEDURE — 6360000002 HC RX W HCPCS: Performed by: STUDENT IN AN ORGANIZED HEALTH CARE EDUCATION/TRAINING PROGRAM

## 2024-06-27 PROCEDURE — 97535 SELF CARE MNGMENT TRAINING: CPT

## 2024-06-27 PROCEDURE — 97130 THER IVNTJ EA ADDL 15 MIN: CPT

## 2024-06-27 PROCEDURE — 6360000002 HC RX W HCPCS: Performed by: PHYSICAL MEDICINE & REHABILITATION

## 2024-06-27 PROCEDURE — 6370000000 HC RX 637 (ALT 250 FOR IP): Performed by: PHYSICAL MEDICINE & REHABILITATION

## 2024-06-27 PROCEDURE — 97129 THER IVNTJ 1ST 15 MIN: CPT

## 2024-06-27 RX ADMIN — ACETAMINOPHEN 650 MG: 325 TABLET ORAL at 21:38

## 2024-06-27 RX ADMIN — SENNOSIDES AND DOCUSATE SODIUM 2 TABLET: 50; 8.6 TABLET ORAL at 08:52

## 2024-06-27 RX ADMIN — POLYETHYLENE GLYCOL 3350 17 GRAM ORAL POWDER PACKET 17 G: at 08:52

## 2024-06-27 RX ADMIN — POLYETHYLENE GLYCOL 3350 17 GRAM ORAL POWDER PACKET 17 G: at 21:37

## 2024-06-27 RX ADMIN — APIXABAN 10 MG: 5 TABLET, FILM COATED ORAL at 08:52

## 2024-06-27 RX ADMIN — ROSUVASTATIN CALCIUM 5 MG: 10 TABLET, FILM COATED ORAL at 21:38

## 2024-06-27 RX ADMIN — WATER 2000 MG: 1 INJECTION INTRAMUSCULAR; INTRAVENOUS; SUBCUTANEOUS at 21:37

## 2024-06-27 RX ADMIN — LEVETIRACETAM 1000 MG: 500 TABLET, FILM COATED ORAL at 21:38

## 2024-06-27 RX ADMIN — HEPARIN 300 UNITS: 100 SYRINGE at 09:16

## 2024-06-27 RX ADMIN — TAMSULOSIN HYDROCHLORIDE 0.4 MG: 0.4 CAPSULE ORAL at 08:52

## 2024-06-27 RX ADMIN — LEVETIRACETAM 1000 MG: 500 TABLET, FILM COATED ORAL at 08:52

## 2024-06-27 RX ADMIN — SENNOSIDES AND DOCUSATE SODIUM 2 TABLET: 50; 8.6 TABLET ORAL at 21:38

## 2024-06-27 RX ADMIN — Medication 1 CAPSULE: at 08:52

## 2024-06-27 RX ADMIN — SODIUM CHLORIDE, PRESERVATIVE FREE 10 ML: 5 INJECTION INTRAVENOUS at 09:16

## 2024-06-27 RX ADMIN — Medication 5 MG: at 21:38

## 2024-06-27 RX ADMIN — SODIUM CHLORIDE, PRESERVATIVE FREE 10 ML: 5 INJECTION INTRAVENOUS at 21:37

## 2024-06-27 RX ADMIN — WATER 2000 MG: 1 INJECTION INTRAMUSCULAR; INTRAVENOUS; SUBCUTANEOUS at 09:16

## 2024-06-27 RX ADMIN — PANTOPRAZOLE SODIUM 40 MG: 40 TABLET, DELAYED RELEASE ORAL at 05:55

## 2024-06-27 RX ADMIN — HEPARIN 300 UNITS: 100 SYRINGE at 21:37

## 2024-06-27 RX ADMIN — TRAZODONE HYDROCHLORIDE 50 MG: 50 TABLET ORAL at 21:38

## 2024-06-27 RX ADMIN — APIXABAN 5 MG: 5 TABLET, FILM COATED ORAL at 21:38

## 2024-06-27 ASSESSMENT — PAIN SCALES - GENERAL
PAINLEVEL_OUTOF10: 4
PAINLEVEL_OUTOF10: 2
PAINLEVEL_OUTOF10: 0

## 2024-06-27 ASSESSMENT — PAIN SCALES - WONG BAKER: WONGBAKER_NUMERICALRESPONSE: HURTS A LITTLE BIT

## 2024-06-27 ASSESSMENT — PAIN DESCRIPTION - DESCRIPTORS: DESCRIPTORS: ACHING

## 2024-06-27 ASSESSMENT — PAIN DESCRIPTION - LOCATION: LOCATION: HEAD

## 2024-06-27 NOTE — PROGRESS NOTES
Department of Internal Medicine  Infectious Diseases  Progress  Note      C/C :   Brain abscess s/p craniotomy and drainage  ( 6/3)     Denies fever or chills  Denies headache     Afebrile     Current Facility-Administered Medications   Medication Dose Route Frequency Provider Last Rate Last Admin    apixaban (ELIQUIS) tablet 5 mg  5 mg Oral BID Belcik, [place] L, DO        traZODone (DESYREL) tablet 50 mg  50 mg Oral Nightly Beloscar [place] L, DO   50 mg at 06/26/24 2138    tamsulosin (FLOMAX) capsule 0.4 mg  0.4 mg Oral Daily Juan Luis [place] L, DO   0.4 mg at 06/27/24 0852    [Held by provider] bethanechol (URECHOLINE) tablet 10 mg  10 mg Oral TID Juan Luis [place] L, DO   10 mg at 06/18/24 0830    bisacodyl (DULCOLAX) suppository 10 mg  10 mg Rectal Daily GianfrRadha rucker C, DO   10 mg at 06/14/24 1812    [Held by provider] lactulose (CHRONULAC) 10 GM/15ML solution 20 g  20 g Oral TID Juan Luis [place] L, DO   20 g at 06/18/24 0830    sennosides-docusate sodium (SENOKOT-S) 8.6-50 MG tablet 2 tablet  2 tablet Oral BID Beloscar, [place] L, DO   2 tablet at 06/27/24 0852    polyethylene glycol (GLYCOLAX) packet 17 g  17 g Oral BID Venkat Mcknightdict L, DO   17 g at 06/27/24 0852    levETIRAcetam (KEPPRA) tablet 1,000 mg  1,000 mg Oral BID Juan Luis [place] L, DO   1,000 mg at 06/27/24 0852    acetaminophen (TYLENOL) tablet 650 mg  650 mg Oral Q6H PRN Venkat Mcknightdict L, DO   650 mg at 06/26/24 2139    pantoprazole (PROTONIX) tablet 40 mg  40 mg Oral QAM AC Milanes Marino, Maria, MD   40 mg at 06/27/24 0555    rosuvastatin (CRESTOR) tablet 5 mg  5 mg Oral Nightly Milanes Marino, Maria, MD   5 mg at 06/26/24 2139    heparin (PF) 100 UNIT/ML injection 300 Units  3 mL IntraVENous PRN Milanes Marino, Maria, MD   300 Units at 06/18/24 0855    butalbital-acetaminophen-caffeine (FIORICET, ESGIC) per tablet 1 tablet  1 tablet Oral Q4H PRN Milanes Marino, Maria, MD   1 tablet at 06/06/24 1845    heparin (PF) 100 UNIT/ML

## 2024-06-27 NOTE — PLAN OF CARE
Problem: Safety - Adult  Goal: Free from fall injury  6/27/2024 1043 by Claudio Patrick RN  Outcome: Progressing  6/26/2024 2249 by Miracle Ko RN  Outcome: Progressing     Problem: Discharge Planning  Goal: Discharge to home or other facility with appropriate resources  6/27/2024 1043 by Claudio Patirck RN  Outcome: Progressing  6/26/2024 2249 by Miracle Ko RN  Outcome: Progressing     Problem: Skin/Tissue Integrity  Goal: Absence of new skin breakdown  Description: 1.  Monitor for areas of redness and/or skin breakdown  2.  Assess vascular access sites hourly  3.  Every 4-6 hours minimum:  Change oxygen saturation probe site  4.  Every 4-6 hours:  If on nasal continuous positive airway pressure, respiratory therapy assess nares and determine need for appliance change or resting period.  6/27/2024 1043 by Claudio Patrick RN  Outcome: Progressing  6/26/2024 2249 by Miracle Ko RN  Outcome: Progressing     Problem: ABCDS Injury Assessment  Goal: Absence of physical injury  6/27/2024 1043 by Claudio Patrick RN  Outcome: Progressing  6/26/2024 2249 by Miracle Ko RN  Outcome: Progressing     Problem: Pain  Goal: Verbalizes/displays adequate comfort level or baseline comfort level  6/27/2024 1043 by Claudio Patrick RN  Outcome: Progressing  6/26/2024 2249 by Miracle Ko RN  Outcome: Progressing     Problem: Chronic Conditions and Co-morbidities  Goal: Patient's chronic conditions and co-morbidity symptoms are monitored and maintained or improved  6/27/2024 1043 by Claudio Patrick RN  Outcome: Progressing  6/26/2024 2249 by Miracle Ko RN  Outcome: Progressing     Problem: Nutrition Deficit:  Goal: Optimize nutritional status  6/27/2024 1043 by Claudio Patrick RN  Outcome: Progressing  6/26/2024 2249 by Miracle Ko RN  Outcome: Progressing

## 2024-06-27 NOTE — PROGRESS NOTES
Speech Language Pathology  ACUTE REHABILITATION--DAILY PROGRESS NOTE            SWALLOWING:      Diet:  Dysphagia 3, Soft/advanced (Soft & Bite-sized) solids with thin liquids (IDDSI level 0)     Patient seen in dining room for skilled dysphagia tx. Patient received a soft and bite sized tray and thin liquids via cup. Patient oral stage exhibited min oral stasis after 1st swl that cleared w/ 2nd swl and/or liquid wash, fair bolus formation;manipulation, adequate mastication time, and fair AP tongue propulsion. Patient pharyngeal stage exhibited no overt clinical indicators of pen/aspiration. Patient prefers soft and bite sized solids at this time. All dysphagia goals met. Patient no longer requires supervision during meals.     LANGUAGE:    Not formally addressed, appropriate response times cont to be noted.      COGNITION:      Patient seen in room for skilled cognitive tx. Patient alert and oriented to all concepts. Patient completed reading everyday things task assoc w/ lists, labels, ads, calendars. Patient completed with 80% acc w/ min verbal cues required to increase attention to detail. Patient completed recall tasks which included listening to various voicemail messages and recalling specific information with 90% acc w/ repetition of information required.     SPEECH:    Pt continued to engage in spontaneous conversation with SLP with longer responses and more inflection in voice pattern.      Minute Tracking:    Individual minutes:      0 minutes  Concurrent minutes:     0 minutes  Co-treatment minutes:    45 minutes    Total minutes for 6/27/2024: 45 minutes        SAFETY:      Per chart review, patient shared spouse will be home at discharge to assist with medicine management.     EDUCATION:    Educated on how ST addresses memory and cognition.     OUTCOME:    Progress made towards goals. Will continue SP intervention as per previously established POC.    SP recommended after discharge:  TBD  Supervision

## 2024-06-27 NOTE — PROGRESS NOTES
results for input(s): \"PROTIME\", \"INR\" in the last 72 hours.  APTT:No results for input(s): \"APTT\" in the last 72 hours.  LIVER PROFILE:No results for input(s): \"AST\", \"ALT\", \"BILIDIR\", \"BILITOT\", \"ALKPHOS\" in the last 72 hours.    Imaging Last 24 Hours:  No results found.  Assessment//Plan           Hospital Problems             Last Modified POA    * (Principal) Brain abscess 6/6/2024 Yes    Ileus (HCC) 6/11/2024 Yes       Initial Evaluation  Date: 6/7/24 AM     PM    Short Term Goals Long Term Goals    Was pt agreeable to Eval/treatment? Yes  Yes Yes       Does pt have pain? Denies pain  None  None        Bed Mobility  Rolling: ModA  Supine to sit: ModA  Sit to supine: ModA  Scooting: ModA NT  NT SBA Independent   Transfers Sit to stand: MaxA  Stand to sit: MaxA  Stand pivot: ModA x2 with L limb advancement assistance no AD  Slide board: WC <> Mat table Mak     5xSTS: NT  Sit to stand:   Min A  Stand to sit: Min A  Stand pivot: Mod <> Min  A with L hemicane Sit to stand: Min A  Stand to sit: Min A  Stand pivot: Min A with L LBQC Mak  5xSTS: TBD Supervision   5xSTS: TBD   Ambulation    15 feet x2 reps with L hallway rail and ModA assistance with R limb advancement (R KI, R DF ace wrap, sock over toe box)      10mWT: 0 m/s  6mWT: 0 m 150 feet with L hemicane with Min A         (R ankle DF ACE wrap) 150 feet x 2 with L hemicane with Min A      (R ankle DF ACE wrap)     10mWT: 0.13 m/s  6mWT: 50 m  (06/24/24) 150 feet with AAD with Mak     10mWT: TBD  6mWT: TBD >300 feet with AAD with supervision      10mWT: TBD  6mWT: TBD   Walking 10 feet on uneven surface NT  NT NT  10 feet with AAD with Mak 10 feet with AAD with supervision    Wheel Chair Mobility NT NT NT        Car Transfers NT NT NT  Mak Supervision    Stair negotiation: ascended and descended  NT  NT  NT  4-8 steps with 1 rail with Mak 8-12 steps with 1 rail with supervision    Curb Step:   ascended and descended NT NT NT  4 inch step with AAD and Mak

## 2024-06-27 NOTE — PROGRESS NOTES
Occupational Therapy  OCCUPATIONAL THERAPY DAILY NOTE    Date:2024  Patient Name: Conrado Leach  MRN: [id number]  : 1960  Room: [address] [place] [address]A     Referring Practitioner: MD Juan Luis  Diagnosis: Brain mass L frontal lobe- 6/3/24- L crani abscess evacuation  Additional Pertinent Hx: hx R shoulder surgery, BPH, hx DVT, HTN, DM, HLD & seizures  Precautions: Fall Risk, R neglect, seizures, R hemiplegia & R UE edema & Soft & Bite size                        *Positive for DVT in RUE as of 24     *R ankle/foot swollen as of 24  .Discharge Recommendations: Home with 24 hour Sup/assist    Functional Assessment:   Date Status AE  Comments   Feeding 24 S/Setup  Assist required to open packages/containers on meal tray and setup tray.   Grooming 24 Min A W/c Completed seated at sink. Pt required assist to apply deodorant d/t limited functional reach.          Oral Care 24 S/Setup W/c Assist required to open toothpaste and apply to toothbrush. Min cues for sequencing and adaptive techniques provided.   Bathing 24  UB-Mak  LB-Mod A  Sponge  Sponge bath completed seated/standing at sink with assist required to wash L UE, feet and to for balance/safety while standing to wash. Min cues for safety and adaptive techniques provided.    UB Dressing 24 Mod A  To don/doff pull over shirt. Min cues for ekaterina techniques provided. Assist required to initiate threading R UE into sleeve and to manage shirt over head.    LB Dressing 24 Max/Mod A seated To don/doff shorts. Assist required to thread R LE, initiate L LE and to manage clothing over hips on R side. Min cues for adaptive/ekaterina techniques provided.   Footwear 24 Max A W/c To doff B socks and don B slip on shoes. Will introduce LB AE to increase independence.    Toileting 24 Max A  BSC    Homemaking 24 Min A (laundry)       Functional Transfers / Balance:   Date Status DME  Comments   Sit Balance 24 S/SBA   Demo'd during  reasoning with min vc's during all ADLs, transfers, mobility & IADLs.   Patient goals : \"Everything.\"  6/12/24-Pt POC & goals are updated on this date. Pt lives with his wife 2 dogs- farm house- to enter through back & has to walk through grass & 1 STEVEN 1HR & side entrance 4 STEVEN 1HR & 3 steps no HR & 2nd floor 7 steps no HR & 7 steps more no HR & only 1/2 bath on first floor. Currently recommending first floor setup. Pt admitted with a brain abscess & on antibiotics. Pt has an ileus & general surgery consulted & on a clear liquid diet. Pt tentative length of stay 6 weeks. Kim Irizarry OTR/L [id number]   6/26/24-Pt POC & goals are updated on this date. Family can provide first floor setup. Pt wife works but will take time off @ discharge. Pt tentative length of stay 3 weeks Kim Irizarry OTR/L [id number]    [x] Continue with current OT Plan of care.  [] Prepare for Discharge     DISCHARGE RECOMMENDATIONS  Recommended DME:    Post Discharge Care:   []Home Independently  []Home with 24hr Care / Supervision []Home with Partial Supervision []Home with Home Health OT []Home with Out Pt OT []Other: ___   Comments:         Time in Time out Tx Time Breakdown  Variance:   First Session  0735 0815 [x] Individual Tx- 40 Mins  [] Concurrent Tx-  [] Co-Tx -   [] Group Tx -   [] Time Missed -     Second Session 1000 1045 [] Individual Tx-   [x] Concurrent Tx -45 Mins  [] Co-Tx -   [] Group Tx -   [] Time Missed -     Third Session    [] Individual Tx-   [] Concurrent Tx -  [] Co-Tx -   [] Group Tx -   [] Time Missed -         Total Tx Time: 85 Mins     Rani Adame ROWELL/L [id number]

## 2024-06-27 NOTE — PROGRESS NOTES
Physical Therapy  Treatment     Supervising therapist : Mar Toth, PT, DPT [id number]  ROOM: [address] [place], [place] [address]  DIAGNOSIS: brain abscess   PRECAUTIONS: Falls, R hemiparesis, R UE sling, R inattention, flat affect, cognition  Diet: Dysphagia 3, Soft/advanced (Soft & Bite-sized) solids with thin liquids (IDDSI level 0)     HPI: Pt is a 63 year old pt who presented to ER from home with complaints of right side weakness and seizure-like activity at home. NIH of 10 upon arrival. Per wife at bedside patient has been having neck and lower back pain and has been going to chiropractor.  He also was having some confusion and they thought it was a UTI and he went to his primary care physician and was prescribed Bactrim.  He went into his daily routine, went to work and returned home.  At around 2:00 when his wife came home found him in the floor, unable to move the right side, appeared that he comprehended but he was unable to speak.  He later said that he had a seizure-like activity sitting on the chair and a slide himself down to the floor, that lasted for about 5 minutes. Workup revealed two masses in the left frontal lobe with adjacent vasogenic edema. EEG positive for left fronto central seizures. Neurosurgery and neurology was consulted. On 06/03/2024 patient underwent a left craniotomy abscess evacuation by Dr. Henderson. ID consulted and placed patient on IV antibiotics. Updated MRI brain w/wo contrast on 6/3/24 showed interval surgical drainage of one of the two left frontal lobe abscesses; mild increase in size of other abscess; slight increase in vasogenic edema; 3mm left-to-right MLS; stable right meningioma.  NSGY reviewed MRI brain prior to admission to ARU with no additional recommendations.  Hospital course was complicated by seizure, weakness, hypotension.       Pt has PMHx of type 2 diabetes mellitus, HLD, BPH, PARTH, multiple orthopedic surgeries, right lower extremities DVT 2 years ago after he had spider bite,

## 2024-06-27 NOTE — CARE COORDINATION
Discharge planning update:  Spoke to wife  to schedule family education.  She is unable to take any time from work at all.  She will plan to spend the day of discharge in instruction.  .  She will then be on vacation as patient returns home.  Update this date from Justin  at [facility].  They have not been able to place the case for home care therapy and IV.  He states that [facility]   ( [facility] [phone])  an infusion pharmacy with nursing available is reviewing the case and he is hopeful they will accept the case for the home IV and then we can proceed with an option for in home therapy only.   He will provide update by end of day tomorrow.  Per Calin at [facility] patient has no co pay for the medication.  Will need to update him if we have to place the case with [facility].  Patient and wife updated re this plan. .

## 2024-06-27 NOTE — CARE COORDINATION
Discharge planning update :  [facility],  [facility] unable to accept case as either due to service area or network status.    MARSHALL contacted  Kay  the [facility] partner for post acute care.  Spoke to Justin VALENZUELA   [phone]   # [phone]  ext [phone]  Opened case with case number [id number].  Faxed requested demographics and clinicals to [phone].  Will follow

## 2024-06-27 NOTE — PLAN OF CARE
Problem: Safety - Adult  Goal: Free from fall injury  6/26/2024 2249 by Miracle Ko RN  Outcome: Progressing  6/26/2024 1454 by Claudio Patrick RN  Outcome: Progressing     Problem: Discharge Planning  Goal: Discharge to home or other facility with appropriate resources  6/26/2024 2249 by Miracle Ko RN  Outcome: Progressing  6/26/2024 1454 by Claudio Patrick RN  Outcome: Progressing     Problem: Skin/Tissue Integrity  Goal: Absence of new skin breakdown  Description: 1.  Monitor for areas of redness and/or skin breakdown  2.  Assess vascular access sites hourly  3.  Every 4-6 hours minimum:  Change oxygen saturation probe site  4.  Every 4-6 hours:  If on nasal continuous positive airway pressure, respiratory therapy assess nares and determine need for appliance change or resting period.  6/26/2024 2249 by Miracle Ko RN  Outcome: Progressing  6/26/2024 1454 by Claudio Patrick RN  Outcome: Progressing     Problem: ABCDS Injury Assessment  Goal: Absence of physical injury  6/26/2024 2249 by Miracle Ko RN  Outcome: Progressing  6/26/2024 1454 by Claudio Patrick RN  Outcome: Progressing     Problem: Pain  Goal: Verbalizes/displays adequate comfort level or baseline comfort level  6/26/2024 2249 by Miracle Ko RN  Outcome: Progressing  6/26/2024 1454 by Claudio Patrick RN  Outcome: Progressing     Problem: Chronic Conditions and Co-morbidities  Goal: Patient's chronic conditions and co-morbidity symptoms are monitored and maintained or improved  6/26/2024 2249 by Miracle Ko RN  Outcome: Progressing  6/26/2024 1454 by Claudio Patrick RN  Outcome: Progressing     Problem: Nutrition Deficit:  Goal: Optimize nutritional status  6/26/2024 2249 by Miracle Ko RN  Outcome: Progressing  6/26/2024 1454 by Claudio Patrick RN  Outcome: Progressing

## 2024-06-28 LAB
ANION GAP SERPL CALCULATED.3IONS-SCNC: 11 MMOL/L (ref 7–16)
BASOPHILS # BLD: 0.04 K/UL (ref 0–0.2)
BASOPHILS NFR BLD: 1 % (ref 0–2)
BUN SERPL-MCNC: 12 MG/DL (ref 6–23)
CALCIUM SERPL-MCNC: 9.8 MG/DL (ref 8.6–10.2)
CHLORIDE SERPL-SCNC: 103 MMOL/L (ref 98–107)
CO2 SERPL-SCNC: 28 MMOL/L (ref 22–29)
CREAT SERPL-MCNC: 0.9 MG/DL (ref 0.7–1.2)
EOSINOPHIL # BLD: 0.25 K/UL (ref 0.05–0.5)
EOSINOPHILS RELATIVE PERCENT: 6 % (ref 0–6)
ERYTHROCYTE [DISTWIDTH] IN BLOOD BY AUTOMATED COUNT: 15.4 % (ref 11.5–15)
ERYTHROCYTE [SEDIMENTATION RATE] IN BLOOD BY WESTERGREN METHOD: 9 MM/HR (ref 0–15)
GFR, ESTIMATED: >90 ML/MIN/1.73M2
GLUCOSE SERPL-MCNC: 109 MG/DL (ref 74–99)
HCT VFR BLD AUTO: 40.5 % (ref 37–54)
HGB BLD-MCNC: 13.3 G/DL (ref 12.5–16.5)
IMM GRANULOCYTES # BLD AUTO: <0.03 K/UL (ref 0–0.58)
IMM GRANULOCYTES NFR BLD: 1 % (ref 0–5)
LYMPHOCYTES NFR BLD: 1.21 K/UL (ref 1.5–4)
LYMPHOCYTES RELATIVE PERCENT: 30 % (ref 20–42)
MCH RBC QN AUTO: 27.4 PG (ref 26–35)
MCHC RBC AUTO-ENTMCNC: 32.8 G/DL (ref 32–34.5)
MCV RBC AUTO: 83.5 FL (ref 80–99.9)
MONOCYTES NFR BLD: 0.39 K/UL (ref 0.1–0.95)
MONOCYTES NFR BLD: 10 % (ref 2–12)
NEUTROPHILS NFR BLD: 53 % (ref 43–80)
NEUTS SEG NFR BLD: 2.19 K/UL (ref 1.8–7.3)
PLATELET # BLD AUTO: 225 K/UL (ref 130–450)
PMV BLD AUTO: 9 FL (ref 7–12)
POTASSIUM SERPL-SCNC: 4.4 MMOL/L (ref 3.5–5)
RBC # BLD AUTO: 4.85 M/UL (ref 3.8–5.8)
SODIUM SERPL-SCNC: 142 MMOL/L (ref 132–146)
WBC OTHER # BLD: 4.1 K/UL (ref 4.5–11.5)

## 2024-06-28 PROCEDURE — 97130 THER IVNTJ EA ADDL 15 MIN: CPT | Performed by: SPEECH-LANGUAGE PATHOLOGIST

## 2024-06-28 PROCEDURE — 1280000000 HC REHAB R&B

## 2024-06-28 PROCEDURE — 2580000003 HC RX 258: Performed by: PHYSICAL MEDICINE & REHABILITATION

## 2024-06-28 PROCEDURE — 85652 RBC SED RATE AUTOMATED: CPT

## 2024-06-28 PROCEDURE — 6370000000 HC RX 637 (ALT 250 FOR IP): Performed by: PHYSICAL MEDICINE & REHABILITATION

## 2024-06-28 PROCEDURE — 6370000000 HC RX 637 (ALT 250 FOR IP): Performed by: STUDENT IN AN ORGANIZED HEALTH CARE EDUCATION/TRAINING PROGRAM

## 2024-06-28 PROCEDURE — 80048 BASIC METABOLIC PNL TOTAL CA: CPT

## 2024-06-28 PROCEDURE — 6360000002 HC RX W HCPCS: Performed by: STUDENT IN AN ORGANIZED HEALTH CARE EDUCATION/TRAINING PROGRAM

## 2024-06-28 PROCEDURE — 97129 THER IVNTJ 1ST 15 MIN: CPT | Performed by: SPEECH-LANGUAGE PATHOLOGIST

## 2024-06-28 PROCEDURE — 97110 THERAPEUTIC EXERCISES: CPT

## 2024-06-28 PROCEDURE — 6360000002 HC RX W HCPCS: Performed by: PHYSICAL MEDICINE & REHABILITATION

## 2024-06-28 PROCEDURE — 97530 THERAPEUTIC ACTIVITIES: CPT

## 2024-06-28 PROCEDURE — 99232 SBSQ HOSP IP/OBS MODERATE 35: CPT | Performed by: PHYSICAL MEDICINE & REHABILITATION

## 2024-06-28 PROCEDURE — 85025 COMPLETE CBC W/AUTO DIFF WBC: CPT

## 2024-06-28 RX ADMIN — SENNOSIDES AND DOCUSATE SODIUM 2 TABLET: 50; 8.6 TABLET ORAL at 08:53

## 2024-06-28 RX ADMIN — SENNOSIDES AND DOCUSATE SODIUM 2 TABLET: 50; 8.6 TABLET ORAL at 20:29

## 2024-06-28 RX ADMIN — HEPARIN 300 UNITS: 100 SYRINGE at 09:46

## 2024-06-28 RX ADMIN — TRAZODONE HYDROCHLORIDE 50 MG: 50 TABLET ORAL at 20:29

## 2024-06-28 RX ADMIN — SODIUM CHLORIDE, PRESERVATIVE FREE 10 ML: 5 INJECTION INTRAVENOUS at 20:28

## 2024-06-28 RX ADMIN — Medication 1 CAPSULE: at 08:53

## 2024-06-28 RX ADMIN — ERGOCALCIFEROL 50000 UNITS: 1.25 CAPSULE ORAL at 09:00

## 2024-06-28 RX ADMIN — HEPARIN 300 UNITS: 100 SYRINGE at 20:28

## 2024-06-28 RX ADMIN — TAMSULOSIN HYDROCHLORIDE 0.4 MG: 0.4 CAPSULE ORAL at 08:53

## 2024-06-28 RX ADMIN — SODIUM CHLORIDE, PRESERVATIVE FREE 10 ML: 5 INJECTION INTRAVENOUS at 09:46

## 2024-06-28 RX ADMIN — ROSUVASTATIN CALCIUM 5 MG: 10 TABLET, FILM COATED ORAL at 20:29

## 2024-06-28 RX ADMIN — POLYETHYLENE GLYCOL 3350 17 GRAM ORAL POWDER PACKET 17 G: at 08:53

## 2024-06-28 RX ADMIN — LEVETIRACETAM 1000 MG: 500 TABLET, FILM COATED ORAL at 20:29

## 2024-06-28 RX ADMIN — WATER 2000 MG: 1 INJECTION INTRAMUSCULAR; INTRAVENOUS; SUBCUTANEOUS at 20:28

## 2024-06-28 RX ADMIN — POLYETHYLENE GLYCOL 3350 17 GRAM ORAL POWDER PACKET 17 G: at 20:28

## 2024-06-28 RX ADMIN — Medication 5 MG: at 20:29

## 2024-06-28 RX ADMIN — APIXABAN 5 MG: 5 TABLET, FILM COATED ORAL at 08:53

## 2024-06-28 RX ADMIN — APIXABAN 5 MG: 5 TABLET, FILM COATED ORAL at 20:29

## 2024-06-28 RX ADMIN — ACETAMINOPHEN 650 MG: 325 TABLET ORAL at 20:29

## 2024-06-28 RX ADMIN — WATER 2000 MG: 1 INJECTION INTRAMUSCULAR; INTRAVENOUS; SUBCUTANEOUS at 09:46

## 2024-06-28 RX ADMIN — PANTOPRAZOLE SODIUM 40 MG: 40 TABLET, DELAYED RELEASE ORAL at 05:40

## 2024-06-28 RX ADMIN — LEVETIRACETAM 1000 MG: 500 TABLET, FILM COATED ORAL at 08:53

## 2024-06-28 ASSESSMENT — PAIN SCALES - GENERAL
PAINLEVEL_OUTOF10: 2
PAINLEVEL_OUTOF10: 4

## 2024-06-28 ASSESSMENT — PAIN DESCRIPTION - DESCRIPTORS: DESCRIPTORS: ACHING;DISCOMFORT

## 2024-06-28 ASSESSMENT — PAIN DESCRIPTION - LOCATION: LOCATION: GENERALIZED

## 2024-06-28 ASSESSMENT — PAIN SCALES - WONG BAKER: WONGBAKER_NUMERICALRESPONSE: HURTS A LITTLE BIT

## 2024-06-28 NOTE — PROGRESS NOTES
Speech Language Pathology  ACUTE REHABILITATION--DAILY PROGRESS NOTE            SWALLOWING:      Diet:  Dysphagia 3, Soft/advanced (Soft & Bite-sized) solids with thin liquids (IDDSI level 0)     As per note 6/27:  Patient prefers soft and bite sized solids at this time. All dysphagia goals met. Patient no longer requires supervision during meals.     LANGUAGE:    Not formally addressed, appropriate response times cont to be noted.      COGNITION:      Patient seen in room for skilled cognitive tx as a co-treat with OT. Patient alert and oriented to all concepts. Patient completed a reasoning task, ranking 4-5 items according to a given attribute with 80% accuracy independently. Patient was able to correct given minimal verbal cues.     Patient completed a calendar reading task with 90% accuracy given minimal verbal cues to selectively attend to only relevant details.     All tasks required dual-attention as patient was using weakened right-arm to make selections and complete motor sequence for response.      SPEECH:    Pt continues to engage in spontaneous conversation with SLP with longer responses and more inflection in voice pattern.      Minute Tracking:    Individual minutes:      0 minutes  Concurrent minutes:     0 minutes  Co-treatment minutes:    45 minutes    Total minutes for 6/28/2024: 45 minutes        SAFETY:      Per chart review, patient shared spouse will be home at discharge to assist with medicine management.     EDUCATION:    Educated on how ST addresses memory and cognition.     OUTCOME:    Progress made towards goals. Will continue SP intervention as per previously established POC.    SP recommended after discharge:  TBD  Supervision recommended at discharge: TBD      FIMS SCORES                           Swallowing                          Current Status          5--Supervision            Short Term Goal         5--Supervision               Long Term Goal          6--Modified Independent

## 2024-06-28 NOTE — CARE COORDINATION
Discharge planning update:  Sw received call from Brittany a nurse at [facility].  She states they can provide the medications and supplies but do not have an [place] licensed nurse to follow.  This is not what Shahid communicated.  Asked her to clarify with them and will await response form Justin at [facility].  [place] made additional phone calls .  The following agencies report to be [place] with [facility] and unable to take case Jen[facility], [place] [facility], [facility], [facility].

## 2024-06-28 NOTE — PROGRESS NOTES
Physical Therapy  Treatment     Supervising therapist : Mar Toth, PT, DPT [id number]  ROOM: [address] [place], [place] [address]  DIAGNOSIS: brain abscess   PRECAUTIONS: Falls, R hemiparesis, R UE sling, R inattention, flat affect, cognition  Diet: Dysphagia 3, Soft/advanced (Soft & Bite-sized) solids with thin liquids (IDDSI level 0)     HPI: Pt is a 63 year old pt who presented to ER from home with complaints of right side weakness and seizure-like activity at home. NIH of 10 upon arrival. Per wife at bedside patient has been having neck and lower back pain and has been going to chiropractor.  He also was having some confusion and they thought it was a UTI and he went to his primary care physician and was prescribed Bactrim.  He went into his daily routine, went to work and returned home.  At around 2:00 when his wife came home found him in the floor, unable to move the right side, appeared that he comprehended but he was unable to speak.  He later said that he had a seizure-like activity sitting on the chair and a slide himself down to the floor, that lasted for about 5 minutes. Workup revealed two masses in the left frontal lobe with adjacent vasogenic edema. EEG positive for left fronto central seizures. Neurosurgery and neurology was consulted. On 06/03/2024 patient underwent a left craniotomy abscess evacuation by Dr. Henderson. ID consulted and placed patient on IV antibiotics. Updated MRI brain w/wo contrast on 6/3/24 showed interval surgical drainage of one of the two left frontal lobe abscesses; mild increase in size of other abscess; slight increase in vasogenic edema; 3mm left-to-right MLS; stable right meningioma.  NSGY reviewed MRI brain prior to admission to ARU with no additional recommendations.  Hospital course was complicated by seizure, weakness, hypotension.       Pt has PMHx of type 2 diabetes mellitus, HLD, BPH, PARTH, multiple orthopedic surgeries, right lower extremities DVT 2 years ago after he had spider bite,

## 2024-06-28 NOTE — PROGRESS NOTES
[facility]  Progress Note    Subjective:   Covering for Dr. Juan Luis Leach is a 63 y.o. male admitted to inpatient rehabilitation for Brain abscess.      No acute events overnight. No cp, sob, n/v. No pain reported. VSS. No new complaints.     The patient's medical records have been reviewed.    Scheduled Meds:apixaban, 5 mg, BID  traZODone, 50 mg, Nightly  tamsulosin, 0.4 mg, Daily  [Held by provider] bethanechol, 10 mg, TID  bisacodyl, 10 mg, Daily  [Held by provider] lactulose, 20 g, TID  sennosides-docusate sodium, 2 tablet, BID  polyethylene glycol, 17 g, BID  levETIRAcetam, 1,000 mg, BID  pantoprazole, 40 mg, QAM AC  rosuvastatin, 5 mg, Nightly  heparin flush, 3 mL, 2 times per day  lactobacillus, 1 capsule, Daily  melatonin, 5 mg, Nightly  vitamin D, 50,000 Units, Weekly  cefTRIAXone (ROCEPHIN) IV, 2,000 mg, Q12H  sodium chloride flush, 5-40 mL, BID      Continuous Infusions:  PRN Meds:acetaminophen, 650 mg, Q6H PRN  heparin flush, 3 mL, PRN  butalbital-acetaminophen-caffeine, 1 tablet, Q4H PRN  ondansetron, 4 mg, Q8H PRN  calcium carbonate, 500 mg, TID PRN  magnesium hydroxide, 30 mL, Daily PRN         Objective:      Vitals:    06/26/24 1915 06/27/24 0845 06/27/24 1945 06/28/24 0845   BP: (!) 112/55 (!) 127/58 (!) 116/58    Pulse: 59 69 58 66   Resp: 18 18 18 18   Temp: 98.8 °F (37.1 °C) 97.5 °F (36.4 °C) 98 °F (36.7 °C) 97.8 °F (36.6 °C)   TempSrc: Temporal Temporal Temporal Temporal   SpO2: 96% 98% 97% 97%   Weight:       Height:         General appearance: alert, NAD  Head: Normocephalic, without obvious abnormality, atraumatic  Eyes: conjunctivae/corneas clear. PERRL, EOM's intact.   Neck: supple, symmetrical, trachea midline  Lungs: clear to auscultation bilaterally  Chest wall: no tenderness  Heart: regular rate and rhythm, S1, S2 normal  Abdomen: soft, non-tender, mildly hyperactive bowel sounds  Musculoskeletal: Range of motion normal  Psych:  Appropriate mood and affect   Neurologic: Awake, alert, right hemiparesis. No new neurological changes.       Laboratory:    Lab Results   Component Value Date    WBC 4.1 (L) 06/28/2024    HGB 13.3 06/28/2024    HCT 40.5 06/28/2024    MCV 83.5 06/28/2024     06/28/2024     Lab Results   Component Value Date/Time     06/28/2024 05:47 AM    K 4.4 06/28/2024 05:47 AM     06/28/2024 05:47 AM    CO2 28 06/28/2024 05:47 AM    BUN 12 06/28/2024 05:47 AM    CREATININE 0.9 06/28/2024 05:47 AM    GLUCOSE 109 06/28/2024 05:47 AM    CALCIUM 9.8 06/28/2024 05:47 AM    LABGLOM >90 06/28/2024 05:47 AM      Lab Results   Component Value Date    ALT 29 06/07/2024    AST 14 06/07/2024    ALKPHOS 58 06/07/2024    BILITOT 0.4 06/07/2024       Lab Results   Component Value Date/Time    COLORU Yellow 06/12/2024 06:15 PM    NITRU NEGATIVE 06/12/2024 06:15 PM    GLUCOSEU NEGATIVE 06/12/2024 06:15 PM    KETUA NEGATIVE 06/12/2024 06:15 PM    UROBILINOGEN 0.2 06/12/2024 06:15 PM    BILIRUBINUR NEGATIVE 06/12/2024 06:15 PM       Functional Status:       Initial Evaluation  Date: 6/7/24 AM     PM    Short Term Goals Long Term Goals    Was pt agreeable to Eval/treatment? Yes  Yes Yes       Does pt have pain? Denies pain  None  None        Bed Mobility  Rolling: ModA  Supine to sit: ModA  Sit to supine: ModA  Scooting: ModA NT Rolling: SBA  Supine to sit:   Min A  Sit to supine:   Min A  Scooting: ModA  SBA Independent   Transfers Sit to stand: MaxA  Stand to sit: MaxA  Stand pivot: ModA x2 with L limb advancement assistance no AD  Slide board: WC <> Mat table Mak     5xSTS: NT  Sit to stand:   Min A  Stand to sit: Min A  Stand pivot: Mod A with L hemicane, R limb advancement by PT   Sit to stand: Min A  Stand to sit: Min A  Stand pivot: Mod A with L michael, R limb advancement by PT   Mak  5xSTS: TBD Supervision   5xSTS: TBD   Ambulation    15 feet x2 reps with L hallway rail and Yandel assistance with R limb advancement (R

## 2024-06-28 NOTE — PROGRESS NOTES
Occupational Therapy  OCCUPATIONAL THERAPY DAILY NOTE    Date:2024  Patient Name: Conrado Leach  MRN: [id number]  : 1960  Room: [address] [place] [address]A     Referring Practitioner: MD Juan Luis  Diagnosis: Brain mass L frontal lobe- 6/3/24- L crani abscess evacuation  Additional Pertinent Hx: hx R shoulder surgery, BPH, hx DVT, HTN, DM, HLD & seizures  Precautions: Fall Risk, R neglect, seizures, R hemiplegia & R UE edema & Soft & Bite size                        *Positive for DVT in RUE as of 24     *R ankle/foot swollen as of 24  .Discharge Recommendations: Home with 24 hour Sup/assist    Functional Assessment:   Date Status AE  Comments   Feeding 24 S/Setup     Grooming 24 Min A W/c          Oral Care 24 S/Setup W/c    Bathing 24  UB-Mak  LB-Mod A  Sponge     UB Dressing 24 Mod A     LB Dressing 24 Max/Mod A seated    Footwear 24 Max A W/c    Toileting 24 Max A  BSC    Homemaking 24 Min A (laundry)       Functional Transfers / Balance:   Date Status DME  Comments   Sit Balance 24 S/SBA      Stand Balance 24  Min A LBQC      [x] Tub  [] Shower   Transfer 24 modA Blade cane, extended tub bench    Commode   Transfer 24  modA BSC- Swap out    blade cane       Functional   Mobility 24 Mod/Min A LBQC    Other: bed rolling R<>L    Supine>Sit        SPT      Sit <>Stand       W/c to bedside recliner  24 Mod to  max  A      Min A        Mod/Min A      Min A      Min A Bed rails               LBQC       LBQC      LBQC                     PM: Pt completed sit to stand from w/c to quad cane with assist for safety and balance.     PM: Pt used LBQC from w/c over to recliner in room needing assist for balance to increase safety.             Functional Exercises / Activity:   AM: RUE AAROM exercises for shoulder, elbow and hand & wrist during communication I Pad/table task using R hand/index

## 2024-06-28 NOTE — CARE COORDINATION
Discharge planning Update:   [place] from [place] [facility]  [phone].  She has located an agency with an [place] nurse that will provide the infusion serivce and care.  [facility] .  They do infusion only.  Their number and contact Julienne Olivarez  [phone].  SW will coordinate with both closer to discharge next week and follow with  Justin  at [facility] to see if we can locate an agency willing to take the case for therapy services.  Continue to follow  Will need final orders and timing coordination for discharge.

## 2024-06-28 NOTE — PLAN OF CARE
Problem: Safety - Adult  Goal: Free from fall injury  6/27/2024 2251 by Miracle Ko RN  Outcome: Progressing  6/27/2024 1043 by Claudio Patrick RN  Outcome: Progressing     Problem: Discharge Planning  Goal: Discharge to home or other facility with appropriate resources  6/27/2024 2251 by Miracle Ko RN  Outcome: Progressing  6/27/2024 1043 by Claudio Patrick RN  Outcome: Progressing     Problem: Skin/Tissue Integrity  Goal: Absence of new skin breakdown  Description: 1.  Monitor for areas of redness and/or skin breakdown  2.  Assess vascular access sites hourly  3.  Every 4-6 hours minimum:  Change oxygen saturation probe site  4.  Every 4-6 hours:  If on nasal continuous positive airway pressure, respiratory therapy assess nares and determine need for appliance change or resting period.  6/27/2024 2251 by Miracle Ko RN  Outcome: Progressing  6/27/2024 1043 by Claudio Patrick RN  Outcome: Progressing     Problem: ABCDS Injury Assessment  Goal: Absence of physical injury  6/27/2024 2251 by Miracle Ko RN  Outcome: Progressing  6/27/2024 1043 by Claudio Patrick RN  Outcome: Progressing     Problem: Pain  Goal: Verbalizes/displays adequate comfort level or baseline comfort level  6/27/2024 2251 by Miracle Ko RN  Outcome: Progressing  6/27/2024 1043 by Claudio Patrick RN  Outcome: Progressing     Problem: Chronic Conditions and Co-morbidities  Goal: Patient's chronic conditions and co-morbidity symptoms are monitored and maintained or improved  6/27/2024 2251 by Miracle Ko RN  Outcome: Progressing  6/27/2024 1043 by Claudio Patrick RN  Outcome: Progressing     Problem: Nutrition Deficit:  Goal: Optimize nutritional status  6/27/2024 2251 by Miracle Ko RN  Outcome: Progressing  6/27/2024 1043 by Claudio Patrick RN  Outcome: Progressing

## 2024-06-29 PROCEDURE — 6370000000 HC RX 637 (ALT 250 FOR IP): Performed by: STUDENT IN AN ORGANIZED HEALTH CARE EDUCATION/TRAINING PROGRAM

## 2024-06-29 PROCEDURE — 6360000002 HC RX W HCPCS: Performed by: STUDENT IN AN ORGANIZED HEALTH CARE EDUCATION/TRAINING PROGRAM

## 2024-06-29 PROCEDURE — 97130 THER IVNTJ EA ADDL 15 MIN: CPT

## 2024-06-29 PROCEDURE — 97129 THER IVNTJ 1ST 15 MIN: CPT

## 2024-06-29 PROCEDURE — 99231 SBSQ HOSP IP/OBS SF/LOW 25: CPT | Performed by: STUDENT IN AN ORGANIZED HEALTH CARE EDUCATION/TRAINING PROGRAM

## 2024-06-29 PROCEDURE — 97530 THERAPEUTIC ACTIVITIES: CPT

## 2024-06-29 PROCEDURE — 2580000003 HC RX 258: Performed by: PHYSICAL MEDICINE & REHABILITATION

## 2024-06-29 PROCEDURE — 6360000002 HC RX W HCPCS: Performed by: PHYSICAL MEDICINE & REHABILITATION

## 2024-06-29 PROCEDURE — 1280000000 HC REHAB R&B

## 2024-06-29 PROCEDURE — 6370000000 HC RX 637 (ALT 250 FOR IP): Performed by: PHYSICAL MEDICINE & REHABILITATION

## 2024-06-29 RX ORDER — BISACODYL 10 MG
10 SUPPOSITORY, RECTAL RECTAL DAILY PRN
Status: DISCONTINUED | OUTPATIENT
Start: 2024-06-29 | End: 2024-07-05 | Stop reason: HOSPADM

## 2024-06-29 RX ADMIN — WATER 2000 MG: 1 INJECTION INTRAMUSCULAR; INTRAVENOUS; SUBCUTANEOUS at 22:12

## 2024-06-29 RX ADMIN — POLYETHYLENE GLYCOL 3350 17 GRAM ORAL POWDER PACKET 17 G: at 09:33

## 2024-06-29 RX ADMIN — SODIUM CHLORIDE, PRESERVATIVE FREE 10 ML: 5 INJECTION INTRAVENOUS at 09:33

## 2024-06-29 RX ADMIN — APIXABAN 5 MG: 5 TABLET, FILM COATED ORAL at 09:33

## 2024-06-29 RX ADMIN — APIXABAN 5 MG: 5 TABLET, FILM COATED ORAL at 22:12

## 2024-06-29 RX ADMIN — HEPARIN 300 UNITS: 100 SYRINGE at 22:12

## 2024-06-29 RX ADMIN — TAMSULOSIN HYDROCHLORIDE 0.4 MG: 0.4 CAPSULE ORAL at 09:33

## 2024-06-29 RX ADMIN — LEVETIRACETAM 1000 MG: 500 TABLET, FILM COATED ORAL at 09:33

## 2024-06-29 RX ADMIN — Medication 5 MG: at 19:00

## 2024-06-29 RX ADMIN — Medication 1 CAPSULE: at 09:33

## 2024-06-29 RX ADMIN — PANTOPRAZOLE SODIUM 40 MG: 40 TABLET, DELAYED RELEASE ORAL at 06:27

## 2024-06-29 RX ADMIN — SENNOSIDES AND DOCUSATE SODIUM 2 TABLET: 50; 8.6 TABLET ORAL at 09:33

## 2024-06-29 RX ADMIN — SODIUM CHLORIDE, PRESERVATIVE FREE 10 ML: 5 INJECTION INTRAVENOUS at 22:12

## 2024-06-29 RX ADMIN — TRAZODONE HYDROCHLORIDE 50 MG: 50 TABLET ORAL at 19:00

## 2024-06-29 RX ADMIN — POLYETHYLENE GLYCOL 3350 17 GRAM ORAL POWDER PACKET 17 G: at 22:27

## 2024-06-29 RX ADMIN — LEVETIRACETAM 1000 MG: 500 TABLET, FILM COATED ORAL at 22:12

## 2024-06-29 RX ADMIN — ROSUVASTATIN CALCIUM 5 MG: 10 TABLET, FILM COATED ORAL at 22:12

## 2024-06-29 RX ADMIN — SENNOSIDES AND DOCUSATE SODIUM 2 TABLET: 50; 8.6 TABLET ORAL at 22:12

## 2024-06-29 RX ADMIN — WATER 2000 MG: 1 INJECTION INTRAMUSCULAR; INTRAVENOUS; SUBCUTANEOUS at 09:34

## 2024-06-29 RX ADMIN — HEPARIN 300 UNITS: 100 SYRINGE at 09:33

## 2024-06-29 ASSESSMENT — PAIN SCALES - GENERAL: PAINLEVEL_OUTOF10: 0

## 2024-06-29 NOTE — PLAN OF CARE
Problem: Safety - Adult  Goal: Free from fall injury  6/29/2024 1027 by Fany Vora RN  Outcome: Progressing  6/29/2024 0003 by Miracle Ko RN  Outcome: Progressing     Problem: Discharge Planning  Goal: Discharge to home or other facility with appropriate resources  6/29/2024 1027 by Fany Vora RN  Outcome: Progressing  Flowsheets (Taken 6/29/2024 0830)  Discharge to home or other facility with appropriate resources: Identify barriers to discharge with patient and caregiver  6/29/2024 0003 by Miracle Ko RN  Outcome: Progressing     Problem: Skin/Tissue Integrity  Goal: Absence of new skin breakdown  Description: 1.  Monitor for areas of redness and/or skin breakdown  2.  Assess vascular access sites hourly  3.  Every 4-6 hours minimum:  Change oxygen saturation probe site  4.  Every 4-6 hours:  If on nasal continuous positive airway pressure, respiratory therapy assess nares and determine need for appliance change or resting period.  6/29/2024 1027 by Fany Vora RN  Outcome: Progressing  6/29/2024 0003 by Miracle Ko RN  Outcome: Progressing     Problem: ABCDS Injury Assessment  Goal: Absence of physical injury  6/29/2024 1027 by Fany Vora RN  Outcome: Progressing  6/29/2024 0003 by Miracle Ko RN  Outcome: Progressing     Problem: Pain  Goal: Verbalizes/displays adequate comfort level or baseline comfort level  6/29/2024 1027 by Fany Vora RN  Outcome: Progressing  6/29/2024 0003 by Miracle Ko RN  Outcome: Progressing     Problem: Chronic Conditions and Co-morbidities  Goal: Patient's chronic conditions and co-morbidity symptoms are monitored and maintained or improved  6/29/2024 1027 by Fany Vora RN  Outcome: Progressing  6/29/2024 0003 by Miracle oK RN  Outcome: Progressing     Problem: Nutrition Deficit:  Goal: Optimize nutritional status  6/29/2024 1027 by Fany Vora RN  Outcome: Progressing  6/29/2024 0003 by Maikel

## 2024-06-29 NOTE — PROGRESS NOTES
[facility]  Progress Note    Subjective:   Weekend coverage     Conrado Leach is a 63 y.o. male admitted to inpatient rehabilitation for Brain abscess.      No acute events overnight. No cp, sob, n/v. No pain reported. VSS. No new complaints. Has right hemiparesis. States he is walking without a cane at this time.    The patient's medical records have been reviewed.    Scheduled Meds:apixaban, 5 mg, BID  traZODone, 50 mg, Nightly  tamsulosin, 0.4 mg, Daily  [Held by provider] bethanechol, 10 mg, TID  bisacodyl, 10 mg, Daily  [Held by provider] lactulose, 20 g, TID  sennosides-docusate sodium, 2 tablet, BID  polyethylene glycol, 17 g, BID  levETIRAcetam, 1,000 mg, BID  pantoprazole, 40 mg, QAM AC  rosuvastatin, 5 mg, Nightly  heparin flush, 3 mL, 2 times per day  lactobacillus, 1 capsule, Daily  melatonin, 5 mg, Nightly  vitamin D, 50,000 Units, Weekly  cefTRIAXone (ROCEPHIN) IV, 2,000 mg, Q12H  sodium chloride flush, 5-40 mL, BID      Continuous Infusions:  PRN Meds:acetaminophen, 650 mg, Q6H PRN  heparin flush, 3 mL, PRN  butalbital-acetaminophen-caffeine, 1 tablet, Q4H PRN  ondansetron, 4 mg, Q8H PRN  calcium carbonate, 500 mg, TID PRN  magnesium hydroxide, 30 mL, Daily PRN         Objective:      Vitals:    06/27/24 1945 06/28/24 0845 06/28/24 2020 06/29/24 0930   BP: (!) 116/58  124/68 113/64   Pulse: 58 66 56 70   Resp: 18 18 18 18   Temp: 98 °F (36.7 °C) 97.8 °F (36.6 °C) 97.9 °F (36.6 °C) 97.8 °F (36.6 °C)   TempSrc: Temporal Temporal Temporal Temporal   SpO2: 97% 97% 95% 96%   Weight:       Height:         General appearance: alert, NAD  Head: Normocephalic, without obvious abnormality, atraumatic  Eyes: conjunctivae/corneas clear. PERRL, EOM's intact.   Neck: supple, symmetrical, trachea midline  Lungs: clear to auscultation bilaterally  Chest wall: no tenderness  Heart: regular rate and rhythm, S1, S2 normal  Abdomen: soft, non-tender, mildly hyperactive bowel  sounds  Musculoskeletal: Range of motion normal  Psych: Appropriate mood and affect   Neurologic: Awake, alert, right hemiparesis. No new neurological changes.       Laboratory:    Lab Results   Component Value Date    WBC 4.1 (L) 06/28/2024    HGB 13.3 06/28/2024    HCT 40.5 06/28/2024    MCV 83.5 06/28/2024     06/28/2024     Lab Results   Component Value Date/Time     06/28/2024 05:47 AM    K 4.4 06/28/2024 05:47 AM     06/28/2024 05:47 AM    CO2 28 06/28/2024 05:47 AM    BUN 12 06/28/2024 05:47 AM    CREATININE 0.9 06/28/2024 05:47 AM    GLUCOSE 109 06/28/2024 05:47 AM    CALCIUM 9.8 06/28/2024 05:47 AM    LABGLOM >90 06/28/2024 05:47 AM      Lab Results   Component Value Date    ALT 29 06/07/2024    AST 14 06/07/2024    ALKPHOS 58 06/07/2024    BILITOT 0.4 06/07/2024       Lab Results   Component Value Date/Time    COLORU Yellow 06/12/2024 06:15 PM    NITRU NEGATIVE 06/12/2024 06:15 PM    GLUCOSEU NEGATIVE 06/12/2024 06:15 PM    KETUA NEGATIVE 06/12/2024 06:15 PM    UROBILINOGEN 0.2 06/12/2024 06:15 PM    BILIRUBINUR NEGATIVE 06/12/2024 06:15 PM       Functional Status:   Mobility:  Gait:  200'x2 reps with L SBQC with CGA/Hanh  Transfers:  CGA for sit to stand, Hanh for stand to sit  Bed Mobility:  modA    ADL's: S/setup for feeding and oral care, Hanh UB dressing, modA for LB dressing    Cognition:  Hanh              Assessment/Plan:     Conrado Leach is a 63 y.o. male admitted to inpatient rehabilitation for Brain abscess.        -Continue Acute rehab program. Encourage ambulation. Progressing with therapies  -Continue IV Rocephin per ID  -no changes to plan of care        Mercedes De Luna MD  Board Certified Physical Medicine and Rehabilitation

## 2024-06-29 NOTE — PROGRESS NOTES
7--Independent                         Short Term Goal         7--Independent                         Long Term Goal          7--Independent                                                                           Problem Solving                          Current Status            4--Minimal Assistance                       Short Term Goal         5--Supervision              Long Term Goal          5--Supervision                  Memory                          Current Status        4--Minimal Assistance                  Short Term Goal      4--Minimal Assistance                    Long Term Goal      5--Supervision     Social Interaction              Current Status            4--Minimal Assistance               Short Term Goal         5--Supervision               Long Term Goal          5--Supervision                  SPEECH THERAPY  PLAN OF CARE   The speech therapy  POC is established based on physician order, speech pathology diagnosis and results of clinical assessment         SHORT/LONG TERM GOALS  Pt will improve immediate, short term, recent memory during structured and unstructured tasks with 90% accuracy   Pt will improve problem solving/thought organization during structured and unstructured tasks with 90% accuracy   Pt will improve receptive and expressive language skills with adequate thought content, organization, and processing time to facilitate improved communication with minimal.       Short Term Goals:  Pt will implement identified compensatory swallowing strategies on 90% of opportunities or greater to improve airway protection and swallow function.  Pt will participate in ongoing mealtime assessment to provide diet modification and compensatory strategy implementation to minimize risk of aspiration associated with PO intake  Pt will complete PO trials of upgraded diet textures with SLP only to determine the least restrictive PO diet to maintain adequate nutrition/hydration with no more than  1 overt s/s of pen/asp.     Long Term Goals:               Pt will improve oropharyngeal swallow function to ensure airway protection during PO intake to maintain adequate nutrition/hydration and decrease signs/symptoms of aspiration to less than 1 x/day.

## 2024-06-29 NOTE — PLAN OF CARE
Problem: Safety - Adult  Goal: Free from fall injury  6/29/2024 0003 by Miracle Ko RN  Outcome: Progressing  6/28/2024 1351 by Claudio Patrick RN  Outcome: Progressing     Problem: Discharge Planning  Goal: Discharge to home or other facility with appropriate resources  6/29/2024 0003 by Miracle Ko RN  Outcome: Progressing  6/28/2024 1351 by Claudio Patrick RN  Outcome: Progressing     Problem: Skin/Tissue Integrity  Goal: Absence of new skin breakdown  Description: 1.  Monitor for areas of redness and/or skin breakdown  2.  Assess vascular access sites hourly  3.  Every 4-6 hours minimum:  Change oxygen saturation probe site  4.  Every 4-6 hours:  If on nasal continuous positive airway pressure, respiratory therapy assess nares and determine need for appliance change or resting period.  6/29/2024 0003 by Miracle Ko RN  Outcome: Progressing  6/28/2024 1351 by Claudio Patrick RN  Outcome: Progressing     Problem: ABCDS Injury Assessment  Goal: Absence of physical injury  6/29/2024 0003 by Miracle Ko RN  Outcome: Progressing  6/28/2024 1351 by Claudio Patrick RN  Outcome: Progressing     Problem: Pain  Goal: Verbalizes/displays adequate comfort level or baseline comfort level  6/29/2024 0003 by Miracle Ko RN  Outcome: Progressing  6/28/2024 1351 by Claudio Patrick RN  Outcome: Progressing     Problem: Chronic Conditions and Co-morbidities  Goal: Patient's chronic conditions and co-morbidity symptoms are monitored and maintained or improved  6/29/2024 0003 by Miracle Ko RN  Outcome: Progressing  6/28/2024 1351 by Claudio Patrick RN  Outcome: Progressing     Problem: Nutrition Deficit:  Goal: Optimize nutritional status  6/29/2024 0003 by Miracle Ko RN  Outcome: Progressing  6/28/2024 1351 by Claudio Patrick RN  Outcome: Progressing

## 2024-06-30 PROCEDURE — 6360000002 HC RX W HCPCS: Performed by: PHYSICAL MEDICINE & REHABILITATION

## 2024-06-30 PROCEDURE — 97110 THERAPEUTIC EXERCISES: CPT

## 2024-06-30 PROCEDURE — 6370000000 HC RX 637 (ALT 250 FOR IP): Performed by: PHYSICAL MEDICINE & REHABILITATION

## 2024-06-30 PROCEDURE — 2580000003 HC RX 258: Performed by: PHYSICAL MEDICINE & REHABILITATION

## 2024-06-30 PROCEDURE — 6370000000 HC RX 637 (ALT 250 FOR IP): Performed by: STUDENT IN AN ORGANIZED HEALTH CARE EDUCATION/TRAINING PROGRAM

## 2024-06-30 PROCEDURE — 6360000002 HC RX W HCPCS: Performed by: STUDENT IN AN ORGANIZED HEALTH CARE EDUCATION/TRAINING PROGRAM

## 2024-06-30 PROCEDURE — 97535 SELF CARE MNGMENT TRAINING: CPT

## 2024-06-30 PROCEDURE — 97530 THERAPEUTIC ACTIVITIES: CPT

## 2024-06-30 PROCEDURE — 1280000000 HC REHAB R&B

## 2024-06-30 RX ORDER — TRAZODONE HYDROCHLORIDE 50 MG/1
25 TABLET ORAL NIGHTLY PRN
Status: DISCONTINUED | OUTPATIENT
Start: 2024-06-30 | End: 2024-07-02

## 2024-06-30 RX ORDER — TRAZODONE HYDROCHLORIDE 50 MG/1
25 TABLET ORAL NIGHTLY PRN
Status: DISCONTINUED | OUTPATIENT
Start: 2024-06-30 | End: 2024-06-30

## 2024-06-30 RX ADMIN — Medication 5 MG: at 21:24

## 2024-06-30 RX ADMIN — TRAZODONE HYDROCHLORIDE 25 MG: 50 TABLET ORAL at 21:25

## 2024-06-30 RX ADMIN — ACETAMINOPHEN 650 MG: 325 TABLET ORAL at 21:30

## 2024-06-30 RX ADMIN — POLYETHYLENE GLYCOL 3350 17 GRAM ORAL POWDER PACKET 17 G: at 21:25

## 2024-06-30 RX ADMIN — SODIUM CHLORIDE, PRESERVATIVE FREE 10 ML: 5 INJECTION INTRAVENOUS at 09:49

## 2024-06-30 RX ADMIN — APIXABAN 5 MG: 5 TABLET, FILM COATED ORAL at 21:24

## 2024-06-30 RX ADMIN — HEPARIN 300 UNITS: 100 SYRINGE at 09:49

## 2024-06-30 RX ADMIN — LEVETIRACETAM 1000 MG: 500 TABLET, FILM COATED ORAL at 21:24

## 2024-06-30 RX ADMIN — TRAZODONE HYDROCHLORIDE 50 MG: 50 TABLET ORAL at 18:17

## 2024-06-30 RX ADMIN — SODIUM CHLORIDE, PRESERVATIVE FREE 10 ML: 5 INJECTION INTRAVENOUS at 21:25

## 2024-06-30 RX ADMIN — WATER 2000 MG: 1 INJECTION INTRAMUSCULAR; INTRAVENOUS; SUBCUTANEOUS at 21:25

## 2024-06-30 RX ADMIN — PANTOPRAZOLE SODIUM 40 MG: 40 TABLET, DELAYED RELEASE ORAL at 06:50

## 2024-06-30 RX ADMIN — LEVETIRACETAM 1000 MG: 500 TABLET, FILM COATED ORAL at 09:18

## 2024-06-30 RX ADMIN — APIXABAN 5 MG: 5 TABLET, FILM COATED ORAL at 09:18

## 2024-06-30 RX ADMIN — SENNOSIDES AND DOCUSATE SODIUM 2 TABLET: 50; 8.6 TABLET ORAL at 21:30

## 2024-06-30 RX ADMIN — Medication 1 CAPSULE: at 09:18

## 2024-06-30 RX ADMIN — HEPARIN 300 UNITS: 100 SYRINGE at 21:25

## 2024-06-30 RX ADMIN — WATER 2000 MG: 1 INJECTION INTRAMUSCULAR; INTRAVENOUS; SUBCUTANEOUS at 09:49

## 2024-06-30 RX ADMIN — TAMSULOSIN HYDROCHLORIDE 0.4 MG: 0.4 CAPSULE ORAL at 09:19

## 2024-06-30 RX ADMIN — SENNOSIDES AND DOCUSATE SODIUM 2 TABLET: 50; 8.6 TABLET ORAL at 09:18

## 2024-06-30 RX ADMIN — ROSUVASTATIN CALCIUM 5 MG: 10 TABLET, FILM COATED ORAL at 21:24

## 2024-06-30 ASSESSMENT — PAIN SCALES - GENERAL: PAINLEVEL_OUTOF10: 0

## 2024-06-30 NOTE — PROGRESS NOTES
Occupational Therapy  OCCUPATIONAL THERAPY DAILY NOTE    Date:2024  Patient Name: Conrado Leach  MRN: [id number]  : 1960  Room: [address] [place] [address]A     Referring Practitioner: MD Juan Luis  Diagnosis: Brain mass L frontal lobe- 6/3/24- L crani abscess evacuation  Additional Pertinent Hx: hx R shoulder surgery, BPH, hx DVT, HTN, DM, HLD & seizures  Precautions: Fall Risk, R neglect, seizures, R hemiplegia & R UE edema & Soft & Bite size                        *Positive for DVT in RUE as of 24     *R ankle/foot swollen as of 24  .Discharge Recommendations: Home with 24 hour Sup/assist    Functional Assessment:   Date Status AE  Comments   Feeding 24 S/Setup     Grooming 24 Min A W/c          Oral Care 24 S/Setup W/c    Bathing 24  UB-Mak  LB-Mod A  Sponge     UB Dressing 24 Mod A     LB Dressing 24 Max/Mod A seated    Footwear 24 Mod/Max A W/c Pt able to assist donning Left shoe in after starting shoe over toes to slide foot & heel. Max A to octaviano R shoe due to limited sliding of foot and difficulty picking up foot.     Toileting 24 Max A  BSC    Homemaking 24 Min A (laundry) W/c Pt needed  assist with RUE/hand to slide over slide washcloth and assist with LUE/hand in folding. Pt able to pinch  washcloth with R thumb & index finger to fold in half with assist then presses down cloth to flatten out with cues and assist for wt bearing thru R hand/forearm to promote muscular input. Pt completed x5 ea needing time to complete task and Wife present to observe patient's session this am.      Functional Transfers / Balance:   Date Status DME  Comments   Sit Balance 24 S/SBA   Demo'd up in w/c during therapeutic tasks    Stand Balance 24  Min A LBQC      [x] Tub  [] Shower   Transfer 24 modA Blade cane, extended tub bench    Commode   Transfer 24  modA BSC- Swap out    blade cane       Functional   Mobility 6/27/24 Mod/Min A LBQC    Other: bed rolling  R<>L    Supine>Sit        SPT      Sit <>Stand       W/c to bedside recliner  6/11/24 6/27/24 6/27/24 6/28/24 6/28/24 Mod to  max  A      Min A        Mod/Min A      Min A      Min A Bed rails               LBQC       LBQC      LBQC                                  Functional Exercises / Activity:  RUE AAROM using furniture slider up/down incline wedge along with circles assisting with LUE wearing 1# wt for strengthening with therapist assisting with  R elbow/forearm to increase follow thru. Pt completed 3 reps of 10 ea. Additional 2-3 reps of 10ea for RUE AROM exercises with slider on incline wedge wearing 1# wt for same exercises mentioned using no assist with LUE needing therapist assist. Pt completed RUE table top bean bag sliding, picking up and grasping & releasing into bucket on table wearing 1# on RUE for strengthening needing assist and cues for follow thru to improve RUE ROM for shoulder, scapula, elbow, wrist and hand. Pt completed  coin  off washcloth to increase  R hand FMC/ROM and dexterity  skills with assist and cues with for  RUE/hand  to target coins needing time to pinch &  coins and to place into jar.  RUE/R hand shaking ROM exercises needing cues and assist for RUE shoulder graded abduction off table along with elbow flexion/extension and open/closing R hand to  therapist's hand. Pt completed 2-3 reps of 5 ea.Pt needed time to complete x 1 reps of 15-20 coins.     Wife attended OT session per request to see pt's tx session with his RUE/hand and educated with RUE HEP training to promote ROM/strength for active movement.            Sensory / Neuromuscular Re-Education:  .     Cognitive Skills:follows 1 step commands/decreased STM   Status Comments   Problem   Solving fair Demo'd during therapeutic tasks.    Memory fair \"   Sequencing fair \"   Safety fair \"     Visual Perception:  R neglect     Education:  Pt /wife educated  RUE ROM exercises for elbow, hand

## 2024-06-30 NOTE — PLAN OF CARE
Problem: Safety - Adult  Goal: Free from fall injury  6/30/2024 1309 by Clarita Rangel RN  Outcome: Progressing  6/29/2024 2337 by Cristian Beach RN  Outcome: Progressing     Problem: Discharge Planning  Goal: Discharge to home or other facility with appropriate resources  6/30/2024 1309 by Clarita Rangel RN  Outcome: Progressing  6/29/2024 2337 by Cristian Beach RN  Outcome: Progressing     Problem: Skin/Tissue Integrity  Goal: Absence of new skin breakdown  Description: 1.  Monitor for areas of redness and/or skin breakdown  2.  Assess vascular access sites hourly  3.  Every 4-6 hours minimum:  Change oxygen saturation probe site  4.  Every 4-6 hours:  If on nasal continuous positive airway pressure, respiratory therapy assess nares and determine need for appliance change or resting period.  6/30/2024 1309 by Clarita Rangel RN  Outcome: Progressing  6/29/2024 2337 by Cristian Beach RN  Outcome: Progressing     Problem: ABCDS Injury Assessment  Goal: Absence of physical injury  6/29/2024 2337 by Cristian Beach RN  Outcome: Progressing     Problem: Pain  Goal: Verbalizes/displays adequate comfort level or baseline comfort level  6/29/2024 2337 by Cristian Beach RN  Outcome: Progressing     Problem: Chronic Conditions and Co-morbidities  Goal: Patient's chronic conditions and co-morbidity symptoms are monitored and maintained or improved  6/29/2024 2337 by Cristian Beach RN  Outcome: Progressing     Problem: Nutrition Deficit:  Goal: Optimize nutritional status  6/29/2024 2337 by Cristian Beach RN  Outcome: Progressing

## 2024-06-30 NOTE — PROGRESS NOTES
Physical Therapy  Treatment     Supervising therapist : Mar Toth, PT, DPT [id number]  ROOM: [address] [place], [place] [address]  DIAGNOSIS: brain abscess   PRECAUTIONS: Falls, R hemiparesis, R UE sling, R inattention, flat affect, cognition  Diet: Dysphagia 3, Soft/advanced (Soft & Bite-sized) solids with thin liquids (IDDSI level 0)     HPI: Pt is a 63 year old pt who presented to ER from home with complaints of right side weakness and seizure-like activity at home. NIH of 10 upon arrival. Per wife at bedside patient has been having neck and lower back pain and has been going to chiropractor.  He also was having some confusion and they thought it was a UTI and he went to his primary care physician and was prescribed Bactrim.  He went into his daily routine, went to work and returned home.  At around 2:00 when his wife came home found him in the floor, unable to move the right side, appeared that he comprehended but he was unable to speak.  He later said that he had a seizure-like activity sitting on the chair and a slide himself down to the floor, that lasted for about 5 minutes. Workup revealed two masses in the left frontal lobe with adjacent vasogenic edema. EEG positive for left fronto central seizures. Neurosurgery and neurology was consulted. On 06/03/2024 patient underwent a left craniotomy abscess evacuation by Dr. Henderson. ID consulted and placed patient on IV antibiotics. Updated MRI brain w/wo contrast on 6/3/24 showed interval surgical drainage of one of the two left frontal lobe abscesses; mild increase in size of other abscess; slight increase in vasogenic edema; 3mm left-to-right MLS; stable right meningioma.  NSGY reviewed MRI brain prior to admission to ARU with no additional recommendations.  Hospital course was complicated by seizure, weakness, hypotension.       Pt has PMHx of type 2 diabetes mellitus, HLD, BPH, PARTH, multiple orthopedic surgeries, right lower extremities DVT 2 years ago after he had spider bite,

## 2024-07-01 ENCOUNTER — TELEPHONE (OUTPATIENT)
Dept: NEUROSURGERY | Age: 64
End: 2024-07-01

## 2024-07-01 ENCOUNTER — APPOINTMENT (OUTPATIENT)
Dept: CT IMAGING | Age: 64
End: 2024-07-01
Attending: INTERNAL MEDICINE
Payer: COMMERCIAL

## 2024-07-01 PROCEDURE — 6360000002 HC RX W HCPCS: Performed by: PHYSICAL MEDICINE & REHABILITATION

## 2024-07-01 PROCEDURE — 6370000000 HC RX 637 (ALT 250 FOR IP): Performed by: PHYSICAL MEDICINE & REHABILITATION

## 2024-07-01 PROCEDURE — 2580000003 HC RX 258: Performed by: PHYSICAL MEDICINE & REHABILITATION

## 2024-07-01 PROCEDURE — 70450 CT HEAD/BRAIN W/O DYE: CPT

## 2024-07-01 PROCEDURE — 1280000000 HC REHAB R&B

## 2024-07-01 PROCEDURE — 97129 THER IVNTJ 1ST 15 MIN: CPT

## 2024-07-01 PROCEDURE — 6360000002 HC RX W HCPCS: Performed by: STUDENT IN AN ORGANIZED HEALTH CARE EDUCATION/TRAINING PROGRAM

## 2024-07-01 PROCEDURE — 97130 THER IVNTJ EA ADDL 15 MIN: CPT

## 2024-07-01 PROCEDURE — 97530 THERAPEUTIC ACTIVITIES: CPT

## 2024-07-01 PROCEDURE — 6370000000 HC RX 637 (ALT 250 FOR IP): Performed by: STUDENT IN AN ORGANIZED HEALTH CARE EDUCATION/TRAINING PROGRAM

## 2024-07-01 RX ADMIN — APIXABAN 5 MG: 5 TABLET, FILM COATED ORAL at 20:17

## 2024-07-01 RX ADMIN — LEVETIRACETAM 1000 MG: 500 TABLET, FILM COATED ORAL at 09:21

## 2024-07-01 RX ADMIN — HEPARIN 300 UNITS: 100 SYRINGE at 09:21

## 2024-07-01 RX ADMIN — ROSUVASTATIN CALCIUM 5 MG: 10 TABLET, FILM COATED ORAL at 20:18

## 2024-07-01 RX ADMIN — Medication 1 CAPSULE: at 09:21

## 2024-07-01 RX ADMIN — SODIUM CHLORIDE, PRESERVATIVE FREE 10 ML: 5 INJECTION INTRAVENOUS at 09:22

## 2024-07-01 RX ADMIN — SENNOSIDES AND DOCUSATE SODIUM 2 TABLET: 50; 8.6 TABLET ORAL at 09:21

## 2024-07-01 RX ADMIN — Medication 5 MG: at 20:18

## 2024-07-01 RX ADMIN — WATER 2000 MG: 1 INJECTION INTRAMUSCULAR; INTRAVENOUS; SUBCUTANEOUS at 20:20

## 2024-07-01 RX ADMIN — SENNOSIDES AND DOCUSATE SODIUM 2 TABLET: 50; 8.6 TABLET ORAL at 20:17

## 2024-07-01 RX ADMIN — TRAZODONE HYDROCHLORIDE 50 MG: 50 TABLET ORAL at 20:17

## 2024-07-01 RX ADMIN — WATER 2000 MG: 1 INJECTION INTRAMUSCULAR; INTRAVENOUS; SUBCUTANEOUS at 09:21

## 2024-07-01 RX ADMIN — SODIUM CHLORIDE, PRESERVATIVE FREE 10 ML: 5 INJECTION INTRAVENOUS at 20:21

## 2024-07-01 RX ADMIN — ACETAMINOPHEN 650 MG: 325 TABLET ORAL at 20:33

## 2024-07-01 RX ADMIN — TAMSULOSIN HYDROCHLORIDE 0.4 MG: 0.4 CAPSULE ORAL at 09:21

## 2024-07-01 RX ADMIN — HEPARIN 300 UNITS: 100 SYRINGE at 20:21

## 2024-07-01 RX ADMIN — TRAZODONE HYDROCHLORIDE 25 MG: 50 TABLET ORAL at 23:59

## 2024-07-01 RX ADMIN — APIXABAN 5 MG: 5 TABLET, FILM COATED ORAL at 09:21

## 2024-07-01 RX ADMIN — LEVETIRACETAM 1000 MG: 500 TABLET, FILM COATED ORAL at 20:17

## 2024-07-01 RX ADMIN — PANTOPRAZOLE SODIUM 40 MG: 40 TABLET, DELAYED RELEASE ORAL at 06:30

## 2024-07-01 ASSESSMENT — PAIN SCALES - GENERAL
PAINLEVEL_OUTOF10: 0
PAINLEVEL_OUTOF10: 0

## 2024-07-01 NOTE — TELEPHONE ENCOUNTER
Patient is currently admitted and was scheduled for a CT and a 1 month post op on 7/2. However, because he is admitted the post op was cancelled. I spoke with his wife and we rescheduled him for 7/18 (first available). She says he will be d/c on Friday 7/5. It looks like  they went ahead and did the head CT today 7/1. Will we need a repeat head CT before his post op/hospital follow up on 7/18?

## 2024-07-01 NOTE — PROGRESS NOTES
Physical Therapy  Treatment     Supervising therapist : Mar Toth, PT, DPT [id number]  ROOM: [address] [place], [place] [address]  DIAGNOSIS: brain abscess   PRECAUTIONS: Falls, R hemiparesis, R UE sling, R inattention, flat affect, cognition  Diet: Dysphagia 3, Soft/advanced (Soft & Bite-sized) solids with thin liquids (IDDSI level 0)     HPI: Pt is a 63 year old pt who presented to ER from home with complaints of right side weakness and seizure-like activity at home. NIH of 10 upon arrival. Per wife at bedside patient has been having neck and lower back pain and has been going to chiropractor.  He also was having some confusion and they thought it was a UTI and he went to his primary care physician and was prescribed Bactrim.  He went into his daily routine, went to work and returned home.  At around 2:00 when his wife came home found him in the floor, unable to move the right side, appeared that he comprehended but he was unable to speak.  He later said that he had a seizure-like activity sitting on the chair and a slide himself down to the floor, that lasted for about 5 minutes. Workup revealed two masses in the left frontal lobe with adjacent vasogenic edema. EEG positive for left fronto central seizures. Neurosurgery and neurology was consulted. On 06/03/2024 patient underwent a left craniotomy abscess evacuation by Dr. Henderson. ID consulted and placed patient on IV antibiotics. Updated MRI brain w/wo contrast on 6/3/24 showed interval surgical drainage of one of the two left frontal lobe abscesses; mild increase in size of other abscess; slight increase in vasogenic edema; 3mm left-to-right MLS; stable right meningioma.  NSGY reviewed MRI brain prior to admission to ARU with no additional recommendations.  Hospital course was complicated by seizure, weakness, hypotension.       Pt has PMHx of type 2 diabetes mellitus, HLD, BPH, PARTH, multiple orthopedic surgeries, right lower extremities DVT 2 years ago after he had spider bite,  not on anticoagulation      Social:  Pt lives with wife in a 2 story home with 3 step(s) to enter and 1 rail(s).    Prior to admission: Pt was independent and working full time.      Owns [employer].        Initial Evaluation  Date: 6/7/24 AM     PM    Short Term Goals Long Term Goals    Was pt agreeable to Eval/treatment? Yes  Yes Yes       Does pt have pain? Denies pain  Denied  Denied       Bed Mobility  Rolling: ModA  Supine to sit: ModA  Sit to supine: ModA  Scooting: ModA NT  NT SBA Independent   Transfers Sit to stand: MaxA  Stand to sit: MaxA  Stand pivot: ModA x2 with L limb advancement assistance no AD  Slide board: WC <> Mat table Mak     5xSTS: NT  Sit to stand:   CGA  Stand to sit: CGA  Stand pivot: Min A with L SBQC  Sit to stand:   CGA  Stand to sit: CGA  Stand pivot: Min A with L SBQC Mak  5xSTS: TBD Supervision   5xSTS: TBD   Ambulation    15 feet x2 reps with L hallway rail and ModA assistance with R limb advancement (R KI, R DF ace wrap, sock over toe box)      10mWT: 0 m/s  6mWT: 0 m 200 feet with L SBQC with CGA/Mak        10mWT: 0.13 m/s  6mWT: 50 m  (06/24/24) 200 feet with L SBQC with CGA/Mak  150 feet with AAD with Mak     10mWT: TBD  6mWT: TBD >300 feet with AAD with supervision      10mWT: TBD  6mWT: TBD   Walking 10 feet on uneven surface NT  NT NT 10 feet with AAD with Mak 10 feet with AAD with supervision    Wheel Chair Mobility NT NT NT       Car Transfers NT NT NT Mak Supervision    Stair negotiation: ascended and descended  NT  4 steps with L rail and Mak (4 repetitions)    Nonreciprocal  4 steps with L rail and Mak (3 repetitions)    Non reciprocal 4-8 steps with 1 rail with Mak 8-12 steps with 1 rail with supervision    Curb Step:   ascended and descended NT NT NT 4 inch step with AAD and Mak 4 inch step with AAD and supervision    Picking up object off the floor NT  NT NT Will  cone/shoe with Min assist Will  cone/shoe with supervision assist   BLE ROM WFL WNL NT

## 2024-07-01 NOTE — CARE COORDINATION
Discharge planning update:  Spoke this am to both Lincoln matildaeugene Key regarding discharge services ordered.  The patient health insurance contract terminated on June 30 per both. SW spoke to wife.   Per wife, her  company was sold.  New owners have assumed a [employer] policy but there are new policy numbers.  She has not yet received cards.   She will follow with HR to obtain information and will send.  Informed both Lincoln and Shahid  and will follow.

## 2024-07-01 NOTE — PROGRESS NOTES
on B integration skills and increasing R UE AROM and providing gentle stretch. Fair+ tolerance.   -Red Resistive clothespins activity placing clothespins on theraband placed ~6 inches from tabletop. Pt able to pinch and place/remove clothespins with intermittent support at elbow x6 clothespins x3 reps. Rest breaks provided as needed. Fair+ tolerance.   -Shoulder arc completed seated at table. Pt able to move all rings from R>L and L>R with R UE with Minimal support at wrist with fair+ tolerance. Improved initiation and active movement in R shoulder noted this date.   -Adaptive checkers completed with R hand to increase GM/FM coordination for increased independence with functional tasks. Pt required support at wrist/elbow to completed d/t fatigue.    -R UR PROM completed x15 to tolerance to end range at pt request to provide gentle stretch and increase/maintain ROM in all joints. Pt reports decreased stiffness after stretching. Good tolerance.  Sensory / Neuromuscular Re-Education:      Cognitive Skills:follows 1 step commands/decreased STM   Status Comments   Problem   Solving fair Demo'd during therapeutic tasks.    Memory fair \"   Sequencing fair \"   Safety fair \"     Visual Perception:  R inattention    Education:  Pt educated on tub transfer safety.  Pt verbalized/demonstrated fair understanding, recommend continued education.      [] Family teach completed on:    Pain Level: No c/o pain this date.    Additional Notes:   6/14/24 Spoke with nurse in pm about ordering bariatric drop arm 3:1 commode for patient's room.   6/20/24 Spoke with nurse Strong about constant RUE edema still present  and possible test to check DVT issues since patient has hx in his diagnosis.   6/21/24- Pt positive for DVT in RUE upper arm/shoulder area.     Patient has made fair + progress during treatment sessions toward set goals. Therapy emphasis to obtain goals:  Current Treatment Recommendations: Strengthening, Coordination training,  ROM, Balance training, Self-Care / ADL, Functional mobility training, Safety education & training, Home management training, Endurance training, Patient/Caregiver education & training, Gait training, Neuromuscular re-education, Equipment evaluation, education, & procurement, Positioning     Goals  Long Term Goals  Time Frame for Long term goals : 6 weeks to address above problem areas  Long Term Goal 1: Pt demo  independent to eat all meals  Long Term Goal 2: Pt demo  Sup grooming seated  @ sink level & demo G- safety  Long Term Goal 3: Pt demo Min A to bathe @ shower level  & or sponge bathing both seated & standing & demo G- safety & insight  Long Term Goal 4: Pt demo Sup UE & Min A LE dress to don depends &  pants; Min A to don socks & shoes & demo G- safety & insight  Long Term Goal 5: Pt demo Min A commode trf using appropriate DME to ensure pt safety. Pt demo Min A toileting & demo G- safety & insight  Long Term Goal 6: Pt demo Min A tub trf  using appropriate DME to esnure pt safety & pt demo G- safety & insight  Long Term Goal 7: Pt demo Mod A light homemaking activity & demo G- safety & insight  Long Term Goal 8: Pt demo G- endurance for a 45-30 minute functional activity  Long Term Goal 9: Pt will tolerate PROM/AAROM/AROM RUE in all planes to facilitate functional movement & pt ability to functionally use his RUE  Long Term Goal 10: Pt demo G- safety, insight & reasoning with min vc's during all ADLs, transfers, mobility & IADLs.   Patient goals : \"Everything.\"  6/12/24-Pt POC & goals are updated on this date. Pt lives with his wife 2 dogs- farm house- to enter through back & has to walk through grass & 1 STEVEN 1HR & side entrance 4 STEVEN 1HR & 3 steps no HR & 2nd floor 7 steps no HR & 7 steps more no HR & only 1/2 bath on first floor. Currently recommending first floor setup. Pt admitted with a brain abscess & on antibiotics. Pt has an ileus & general surgery consulted & on a clear liquid diet. Pt tentative

## 2024-07-01 NOTE — PROGRESS NOTES
Department of Internal Medicine  Infectious Diseases  Progress  Note      C/C :   Brain abscess s/p craniotomy and drainage  ( 6/3)     Denies fever or chills  Denies headache     Afebrile     Current Facility-Administered Medications   Medication Dose Route Frequency Provider Last Rate Last Admin    traZODone (DESYREL) tablet 25 mg  25 mg Oral Nightly PRN Mercedes De Luna MD   25 mg at 06/30/24 2125    bisacodyl (DULCOLAX) suppository 10 mg  10 mg Rectal Daily PRN Mercedes De Luna MD        apixaban (ELIQUIS) tablet 5 mg  5 mg Oral BID Marek Mcknight L, DO   5 mg at 07/01/24 0921    traZODone (DESYREL) tablet 50 mg  50 mg Oral Nightly Kendra Mcknightct L, DO   50 mg at 06/30/24 1817    tamsulosin (FLOMAX) capsule 0.4 mg  0.4 mg Oral Daily Kendra Mcknightct DAYO, DO   0.4 mg at 07/01/24 0921    [Held by provider] bethanechol (URECHOLINE) tablet 10 mg  10 mg Oral TID Kendra Mcknightct L, DO   10 mg at 06/18/24 0830    [Held by provider] lactulose (CHRONULAC) 10 GM/15ML solution 20 g  20 g Oral TID Venkat Mcknightdict L, DO   20 g at 06/18/24 0830    sennosides-docusate sodium (SENOKOT-S) 8.6-50 MG tablet 2 tablet  2 tablet Oral BID Kendra Mcknightct L, DO   2 tablet at 07/01/24 0921    polyethylene glycol (GLYCOLAX) packet 17 g  17 g Oral BID Kendra Mcknightct L, DO   17 g at 06/30/24 2125    levETIRAcetam (KEPPRA) tablet 1,000 mg  1,000 mg Oral BID Venkat Mcknightdict L, DO   1,000 mg at 07/01/24 0921    acetaminophen (TYLENOL) tablet 650 mg  650 mg Oral Q6H PRN Marek Mcknight L, DO   650 mg at 06/30/24 2130    pantoprazole (PROTONIX) tablet 40 mg  40 mg Oral QAM AC Milanes Marino, Maria, MD   40 mg at 07/01/24 0630    rosuvastatin (CRESTOR) tablet 5 mg  5 mg Oral Nightly Milanes Marino, Maria, MD   5 mg at 06/30/24 2124    heparin (PF) 100 UNIT/ML injection 300 Units  3 mL IntraVENous PRN Milanes Marino, Maria, MD   300 Units at 06/18/24 0855    butalbital-acetaminophen-caffeine (FIORICET, ESGIC) per tablet 1 tablet  1 tablet  abscesses is difficult to gauge.  Further evaluation with contrast   enhanced CT or MRI is recommended.   3. Mild decrease in the small extra-axial fluid collection deep to the site   of the recent left frontal bone craniotomy.     CT abdomen and pelvis -       Impression:     Findings suggestive of colonic ileus.     Sed rate  11       IMPRESSION:     Brain abscess s/p craniotomy and drainage ( Strep intermedius ) (6/3)   Leukocytosis - improved     RECOMMENDATIONS:    IV rocephin 2 grams q 12 hrs ( at least 6 weeks from  6/3)   Monitor labs   CT scan of head

## 2024-07-01 NOTE — PROGRESS NOTES
Comprehensive Nutrition Assessment    Type and Reason for Visit:  Reassess    Nutrition Recommendations/Plan:   Continue current diet regimen  Will continue to monitor     Malnutrition Assessment:  Malnutrition Status:  At risk for malnutrition (Comment) (06/13/24 0900)    Context:  Acute Illness     Findings of the 6 clinical characteristics of malnutrition:  Energy Intake:  Mild decrease in energy intake (Comment)  Weight Loss:  Unable to assess (d/t lack of wt hx per EMR)     Body Fat Loss:  No significant body fat loss     Muscle Mass Loss:  No significant muscle mass loss    Fluid Accumulation:  No significant fluid accumulation     Strength:  Not Performed    Nutrition Assessment:    pt adm for therapy 2/2 brain abscess/AMS s/p crani/abscess evac 6/3; PMhx of DVT,DM; Noted previous pt c/o abd discomfort/bloating and noted previous suspected Ileus however likely now resolved w/ multiple BM's and tolerating PO diet thus far w/ adequate intake; pt prefers soft/bite size solids per SLP evals; pt tolerating therapy and intake remains mostly >75% at meals; continue current diet regimen; will continue to monitor.    Nutrition Related Findings:    A&Ox4; active BS; abd bloating; +2 edema; -I/O Wound Type: Surgical Incision       Current Nutrition Intake & Therapies:    Average Meal Intake: % (most since last RD assessment; avg overall adm ~70%/sporadic at times)  Average Supplements Intake: %  ADULT ORAL NUTRITION SUPPLEMENT; Breakfast, Dinner; Wound Healing Oral Supplement  ADULT DIET; Dysphagia - Soft and Bite Sized  ADULT ORAL NUTRITION SUPPLEMENT; Breakfast, Lunch; Low Calorie/High Protein Oral Supplement    Anthropometric Measures:  Height: 180.3 cm (5' 10.98\")  Ideal Body Weight (IBW): 172 lbs (78 kg)       Current Body Weight: 99 kg (218 lb 4.1 oz) (7/1-est wt; UTO updated wt at this time; recommend measured wt obtained before/after therapy), 126.1 % IBW. Weight Source: Bed Scale  Current BMI  (kg/m2): 30.5  Usual Body Weight:  (UTO d/t lack of wt hx per EMR)     Weight Adjustment For: No Adjustment                 BMI Categories: Obese Class 1 (BMI 30.0-34.9)    Estimated Daily Nutrient Needs:  Energy Requirements Based On: Formula  Weight Used for Energy Requirements: Current  Energy (kcal/day): 3611-5204  Weight Used for Protein Requirements: Ideal  Protein (g/day): 1.5-1.8g/llkWLJ=572-033a  Method Used for Fluid Requirements: 1 ml/kcal  Fluid (ml/day): 8543-6629    Nutrition Diagnosis:   Inadequate oral intake related to altered GI function (concern for ileus) as evidenced by intake 51-75%    Nutrition Interventions:   Food and/or Nutrient Delivery: Continue Current Diet, Continue Oral Nutrition Supplement (emmy BID and Ensure HP BID)  Nutrition Education/Counseling: Education not indicated  Coordination of Nutrition Care: Continue to monitor while inpatient       Goals:  Previous Goal Met: Goal(s) Achieved  Goals: PO intake 75% or greater (to continue)  Specify Other Goals: Nutrition Progression ; intake >75% of most meals/ONS.    Nutrition Monitoring and Evaluation:   Behavioral-Environmental Outcomes: None Identified  Food/Nutrient Intake Outcomes: Food and Nutrient Intake, Supplement Intake  Physical Signs/Symptoms Outcomes: Biochemical Data, Nutrition Focused Physical Findings, Chewing or Swallowing, Skin, Weight, GI Status, Fluid Status or Edema    Discharge Planning:    Continue Oral Nutrition Supplement     Joana Jamison RD, LD  Contact: [phone]

## 2024-07-01 NOTE — PLAN OF CARE
Problem: Safety - Adult  Goal: Free from fall injury  7/1/2024 1153 by Kimberli Jacques RN  Outcome: Progressing  Flowsheets (Taken 7/1/2024 1144)  Free From Fall Injury: Instruct family/caregiver on patient safety  6/30/2024 2322 by Cristian Beach RN  Outcome: Progressing     Problem: Discharge Planning  Goal: Discharge to home or other facility with appropriate resources  7/1/2024 1153 by Kimberli Jacques RN  Outcome: Progressing  6/30/2024 2322 by Cristian Beach RN  Outcome: Progressing     Problem: Skin/Tissue Integrity  Goal: Absence of new skin breakdown  Description: 1.  Monitor for areas of redness and/or skin breakdown  2.  Assess vascular access sites hourly  3.  Every 4-6 hours minimum:  Change oxygen saturation probe site  4.  Every 4-6 hours:  If on nasal continuous positive airway pressure, respiratory therapy assess nares and determine need for appliance change or resting period.  6/30/2024 2322 by Cristian Beach RN  Outcome: Progressing     Problem: ABCDS Injury Assessment  Goal: Absence of physical injury  Recent Flowsheet Documentation  Taken 7/1/2024 1144 by Kimberli Jacques RN  Absence of Physical Injury: Implement safety measures based on patient assessment  6/30/2024 2322 by Cristian Beach RN  Outcome: Progressing     Problem: Pain  Goal: Verbalizes/displays adequate comfort level or baseline comfort level  7/1/2024 1153 by Kimberli Jacques RN  Outcome: Progressing  6/30/2024 2322 by Cristian Beach RN  Outcome: Progressing     Problem: Chronic Conditions and Co-morbidities  Goal: Patient's chronic conditions and co-morbidity symptoms are monitored and maintained or improved  6/30/2024 2322 by Cristian Beach RN  Outcome: Progressing     Problem: Nutrition Deficit:  Goal: Optimize nutritional status  Recent Flowsheet Documentation  Taken 7/1/2024 1139 by Joana Jamison RD, LD  Nutrient intake appropriate for improving, restoring, or maintaining nutritional needs: Assess nutritional status and

## 2024-07-01 NOTE — PROGRESS NOTES
Progress Note  Date:2024       Room:55095509-  Patient Name:Conrado Leach     YOB: 1960     Age:63 y.o.        Subjective    Subjective:  Symptoms:  Improved.    Diet:  Adequate intake.    Activity level: Impaired due to weakness.       Review of Systems  Objective         Vitals Last 24 Hours:  TEMPERATURE:  Temp  Av.4 °F (36.3 °C)  Min: 97.4 °F (36.3 °C)  Max: 97.4 °F (36.3 °C)  RESPIRATIONS RANGE: Resp  Av  Min: 18  Max: 18  PULSE OXIMETRY RANGE: SpO2  Av %  Min: 95 %  Max: 95 %  PULSE RANGE: Pulse  Av  Min: 58  Max: 58  BLOOD PRESSURE RANGE: Systolic (24hrs), Av , Min:119 , Max:119   ; Diastolic (24hrs), Av, Min:62, Max:62    I/O (24Hr):  No intake or output data in the 24 hours ending 24 0917  Objective:  General Appearance:  In no acute distress.    Vital signs: (most recent): Blood pressure 138/75, pulse 62, temperature 98.7 °F (37.1 °C), temperature source Temporal, resp. rate 16, height 1.803 m (5' 10.98\"), weight 99 kg (218 lb 4.1 oz), SpO2 96 %.  Vital signs are normal.    Output: Producing urine and producing stool.    Lungs:  Normal effort and normal respiratory rate.  Breath sounds clear to auscultation.    Heart: Normal rate.  Regular rhythm.  S1 normal and S2 normal.    Abdomen: Abdomen is soft.  Bowel sounds are normal.   There is no abdominal tenderness.     Extremities: Normal range of motion.    Neurological: Patient is alert.  (4/5 strength).      Labs/Imaging/Diagnostics    Labs:  CBC:No results for input(s): \"WBC\", \"RBC\", \"HGB\", \"HCT\", \"MCV\", \"RDW\", \"PLT\" in the last 72 hours.  CHEMISTRIES:No results for input(s): \"NA\", \"K\", \"CL\", \"CO2\", \"BUN\", \"CREATININE\", \"GLUCOSE\", \"PHOS\", \"MG\" in the last 72 hours.    Invalid input(s): \"CA\"  PT/INR:No results for input(s): \"PROTIME\", \"INR\" in the last 72 hours.  APTT:No results for input(s): \"APTT\" in the last 72 hours.  LIVER PROFILE:No results for input(s): \"AST\", \"ALT\", \"BILIDIR\", \"BILITOT\",

## 2024-07-01 NOTE — PROGRESS NOTES
assessment to provide diet modification and compensatory strategy implementation to minimize risk of aspiration associated with PO intake  Pt will complete PO trials of upgraded diet textures with SLP only to determine the least restrictive PO diet to maintain adequate nutrition/hydration with no more than 1 overt s/s of pen/asp.     Long Term Goals:               Pt will improve oropharyngeal swallow function to ensure airway protection during PO intake to maintain adequate nutrition/hydration and decrease signs/symptoms of aspiration to less than 1 x/day.

## 2024-07-02 ENCOUNTER — APPOINTMENT (OUTPATIENT)
Dept: GENERAL RADIOLOGY | Age: 64
End: 2024-07-02
Attending: PHYSICAL MEDICINE & REHABILITATION
Payer: COMMERCIAL

## 2024-07-02 PROBLEM — K56.7 ILEUS (HCC): Status: RESOLVED | Noted: 2024-06-11 | Resolved: 2024-07-02

## 2024-07-02 PROCEDURE — 6360000002 HC RX W HCPCS: Performed by: STUDENT IN AN ORGANIZED HEALTH CARE EDUCATION/TRAINING PROGRAM

## 2024-07-02 PROCEDURE — 97129 THER IVNTJ 1ST 15 MIN: CPT

## 2024-07-02 PROCEDURE — 97130 THER IVNTJ EA ADDL 15 MIN: CPT

## 2024-07-02 PROCEDURE — 74018 RADEX ABDOMEN 1 VIEW: CPT

## 2024-07-02 PROCEDURE — 97535 SELF CARE MNGMENT TRAINING: CPT

## 2024-07-02 PROCEDURE — 6360000002 HC RX W HCPCS: Performed by: PHYSICAL MEDICINE & REHABILITATION

## 2024-07-02 PROCEDURE — 99233 SBSQ HOSP IP/OBS HIGH 50: CPT | Performed by: PHYSICAL MEDICINE & REHABILITATION

## 2024-07-02 PROCEDURE — 6370000000 HC RX 637 (ALT 250 FOR IP): Performed by: STUDENT IN AN ORGANIZED HEALTH CARE EDUCATION/TRAINING PROGRAM

## 2024-07-02 PROCEDURE — 1280000000 HC REHAB R&B

## 2024-07-02 PROCEDURE — 2580000003 HC RX 258: Performed by: PHYSICAL MEDICINE & REHABILITATION

## 2024-07-02 PROCEDURE — 6370000000 HC RX 637 (ALT 250 FOR IP): Performed by: PHYSICAL MEDICINE & REHABILITATION

## 2024-07-02 PROCEDURE — 97112 NEUROMUSCULAR REEDUCATION: CPT

## 2024-07-02 PROCEDURE — 97530 THERAPEUTIC ACTIVITIES: CPT

## 2024-07-02 PROCEDURE — 97110 THERAPEUTIC EXERCISES: CPT

## 2024-07-02 RX ORDER — TRAZODONE HYDROCHLORIDE 50 MG/1
100 TABLET ORAL NIGHTLY
Status: DISCONTINUED | OUTPATIENT
Start: 2024-07-02 | End: 2024-07-03

## 2024-07-02 RX ADMIN — SODIUM CHLORIDE, PRESERVATIVE FREE 10 ML: 5 INJECTION INTRAVENOUS at 09:12

## 2024-07-02 RX ADMIN — LEVETIRACETAM 1000 MG: 500 TABLET, FILM COATED ORAL at 09:11

## 2024-07-02 RX ADMIN — ACETAMINOPHEN 650 MG: 325 TABLET ORAL at 21:48

## 2024-07-02 RX ADMIN — SENNOSIDES AND DOCUSATE SODIUM 2 TABLET: 50; 8.6 TABLET ORAL at 09:12

## 2024-07-02 RX ADMIN — APIXABAN 5 MG: 5 TABLET, FILM COATED ORAL at 09:12

## 2024-07-02 RX ADMIN — WATER 2000 MG: 1 INJECTION INTRAMUSCULAR; INTRAVENOUS; SUBCUTANEOUS at 09:12

## 2024-07-02 RX ADMIN — LEVETIRACETAM 1000 MG: 500 TABLET, FILM COATED ORAL at 21:46

## 2024-07-02 RX ADMIN — APIXABAN 5 MG: 5 TABLET, FILM COATED ORAL at 21:46

## 2024-07-02 RX ADMIN — SODIUM CHLORIDE, PRESERVATIVE FREE 20 ML: 5 INJECTION INTRAVENOUS at 22:20

## 2024-07-02 RX ADMIN — Medication 5 MG: at 21:45

## 2024-07-02 RX ADMIN — TRAZODONE HYDROCHLORIDE 100 MG: 50 TABLET ORAL at 21:45

## 2024-07-02 RX ADMIN — HEPARIN 300 UNITS: 100 SYRINGE at 09:12

## 2024-07-02 RX ADMIN — TAMSULOSIN HYDROCHLORIDE 0.4 MG: 0.4 CAPSULE ORAL at 09:11

## 2024-07-02 RX ADMIN — ROSUVASTATIN CALCIUM 5 MG: 10 TABLET, FILM COATED ORAL at 21:45

## 2024-07-02 RX ADMIN — Medication 1 CAPSULE: at 09:11

## 2024-07-02 RX ADMIN — PANTOPRAZOLE SODIUM 40 MG: 40 TABLET, DELAYED RELEASE ORAL at 05:37

## 2024-07-02 RX ADMIN — WATER 2000 MG: 1 INJECTION INTRAMUSCULAR; INTRAVENOUS; SUBCUTANEOUS at 21:46

## 2024-07-02 RX ADMIN — HEPARIN 300 UNITS: 100 SYRINGE at 21:47

## 2024-07-02 RX ADMIN — SENNOSIDES AND DOCUSATE SODIUM 2 TABLET: 50; 8.6 TABLET ORAL at 21:45

## 2024-07-02 ASSESSMENT — PAIN SCALES - GENERAL: PAINLEVEL_OUTOF10: 0

## 2024-07-02 NOTE — PROGRESS NOTES
Physical Therapy  Weekly Note     Supervising therapist : Elie Segovia PT, DPT [id number]  ROOM: [address] [place], [place] [address]  DIAGNOSIS: brain abscess   PRECAUTIONS: Falls, R hemiparesis, R UE sling, R inattention, flat affect, cognition    HPI: Pt is a 63 year old pt who presented to ER from home with complaints of right side weakness and seizure-like activity at home. NIH of 10 upon arrival. Per wife at bedside patient has been having neck and lower back pain and has been going to chiropractor.  He also was having some confusion and they thought it was a UTI and he went to his primary care physician and was prescribed Bactrim.  He went into his daily routine, went to work and returned home.  At around 2:00 when his wife came home found him in the floor, unable to move the right side, appeared that he comprehended but he was unable to speak.  He later said that he had a seizure-like activity sitting on the chair and a slide himself down to the floor, that lasted for about 5 minutes. Workup revealed two masses in the left frontal lobe with adjacent vasogenic edema. EEG positive for left fronto central seizures. Neurosurgery and neurology was consulted. On 06/03/2024 patient underwent a left craniotomy abscess evacuation by Dr. Henderson. ID consulted and placed patient on IV antibiotics. Updated MRI brain w/wo contrast on 6/3/24 showed interval surgical drainage of one of the two left frontal lobe abscesses; mild increase in size of other abscess; slight increase in vasogenic edema; 3mm left-to-right MLS; stable right meningioma.  NSGY reviewed MRI brain prior to admission to ARU with no additional recommendations.  Hospital course was complicated by seizure, weakness, hypotension.       Pt has PMHx of type 2 diabetes mellitus, HLD, BPH, PARTH, multiple orthopedic surgeries, right lower extremities DVT 2 years ago after he had spider bite, not on anticoagulation      Social:  Pt lives with wife in a 2 story home with 3 step(s) to enter    Elie Segovia, PT, DPT  [id number]   Mar Toth, PT, DPT  [id number] Elie Segovia, PT, DPT  [id number]            Date:  07/03/24  Supporting factors:  Patient motivation, Neurologic return   Barriers to discharge:  Apraxia  Additional comments:  Patient continues highly motivated within sessions. Interventions have progressed in intensity, with facilitation of HIGT principles to target propulsion, R stance control and limb advancement. He will continue to benefit from home simulation and high intensity activities for optimal DC.   DME:  SPC, (patient owns standard wheel chair, may need leg rests that are compatible and needs foam cushion)   After Care:  24/7 supervision; HHPT       Date:  06/25/24  Supporting factors:  Patient motivation, PLOF  Barriers to discharge:  Neurologic Deficits   Additional comments:  Despite persisting deficits noted in grid above, the patient is revealing substantial neurologic return including motor recruitment of the right hemibody. Due to this, HIGT methodologies will be introduced.   DME:  TBD  After Care:  TBD      Date:  06/19/24  Supporting factors:  Patient motivation, PLOF  Barriers to discharge:  Neurologic deficits  Additional comments:  Pt continues pleasant and participatory, and with more social interaction/communication throughout sessions. Neurologic return appreciated regarding distal R LE, UE. DF ACE wrap continued for optimal toe off and improvement of R limb advancement. Patient is initiating R hip flexion with both ambulation and stair negotiation, but continues to require physical assist for complete advancement for ~75% of distances and 100% of steps negotiated.  DME:  TBD; currently utilizing L hemicane with goals of progressing to less restrictive device  After Care:  TBD      Mar Toth, PT, DPT  [id number]

## 2024-07-02 NOTE — PROGRESS NOTES
Speech Language Pathology  ACUTE REHABILITATION--DAILY PROGRESS NOTE            SWALLOWING:      Diet:  Dysphagia 3, Soft/advanced (Soft & Bite-sized) solids with thin liquids (IDDSI level 0)     As per note 6/27:  Patient prefers soft and bite sized solids at this time. All dysphagia goals met. Patient no longer requires supervision during meals.     LANGUAGE:    Not formally addressed, appropriate response times cont to be noted.      COGNITION:      Patient seen in therapy space for skilled cognitive tx. Patient alert and oriented to all concepts. Patient completed inference task in which patient had to infer about various information related to voicemail messages. Patient completed this task with 60% acc w/ min verbal cues required. Patient completed 5-step sequencing tasks assoc w/ ADLs and IADLs with  90% acc w/ occ verbal cues required to increase attention to detail.       SPEECH:    Pt continues to engage in spontaneous conversation with SLP with longer responses and more inflection in voice pattern.      Minute Tracking:    Individual minutes:    45 minutes  Concurrent minutes:     0 minutes  Co-treatment minutes:    0 minutes    Total minutes for 7/2/2024: 45 minutes        SAFETY:      Per chart review, patient shared spouse will be home at discharge to assist with medicine management.     EDUCATION:    Educated on how ST addresses memory and cognition.     OUTCOME:    Progress made towards goals. Will continue SP intervention as per previously established POC.    SP recommended after discharge:  TBD  Supervision recommended at discharge: TBD      FIMS SCORES                           Swallowing                          Current Status          5--Supervision            Short Term Goal         5--Supervision               Long Term Goal          6--Modified Independent                                      Receptive                          Current Status            5--Supervision          Short Term Goal   complete PO trials of upgraded diet textures with SLP only to determine the least restrictive PO diet to maintain adequate nutrition/hydration with no more than 1 overt s/s of pen/asp.     Long Term Goals:               Pt will improve oropharyngeal swallow function to ensure airway protection during PO intake to maintain adequate nutrition/hydration and decrease signs/symptoms of aspiration to less than 1 x/day.

## 2024-07-02 NOTE — PROGRESS NOTES
Department of Internal Medicine  Infectious Diseases  Progress  Note      C/C :   Brain abscess s/p craniotomy and drainage  ( 6/3)     Denies fever or chills  Denies headache     Afebrile     Current Facility-Administered Medications   Medication Dose Route Frequency Provider Last Rate Last Admin    traZODone (DESYREL) tablet 25 mg  25 mg Oral Nightly PRN Mercedes De Luna MD   25 mg at 07/01/24 2359    bisacodyl (DULCOLAX) suppository 10 mg  10 mg Rectal Daily PRN Mercedes De Luna MD        apixaban (ELIQUIS) tablet 5 mg  5 mg Oral BID Marek Mcknight, DO   5 mg at 07/02/24 0912    traZODone (DESYREL) tablet 50 mg  50 mg Oral Nightly Marek Mcknight, DO   50 mg at 07/01/24 2017    tamsulosin (FLOMAX) capsule 0.4 mg  0.4 mg Oral Daily Marek Mcknight, DO   0.4 mg at 07/02/24 0911    [Held by provider] bethanechol (URECHOLINE) tablet 10 mg  10 mg Oral TID Kendra Mcknightct L, DO   10 mg at 06/18/24 0830    [Held by provider] lactulose (CHRONULAC) 10 GM/15ML solution 20 g  20 g Oral TID Kendra Mcknightct L, DO   20 g at 06/18/24 0830    sennosides-docusate sodium (SENOKOT-S) 8.6-50 MG tablet 2 tablet  2 tablet Oral BID Kendra Mcknightct L, DO   2 tablet at 07/02/24 0912    polyethylene glycol (GLYCOLAX) packet 17 g  17 g Oral BID Kendra Mcknightct L, DO   17 g at 06/30/24 2125    levETIRAcetam (KEPPRA) tablet 1,000 mg  1,000 mg Oral BID Kendra Mcknightct L, DO   1,000 mg at 07/02/24 0911    acetaminophen (TYLENOL) tablet 650 mg  650 mg Oral Q6H PRN Marek Mcknight, DO   650 mg at 07/01/24 2033    pantoprazole (PROTONIX) tablet 40 mg  40 mg Oral QAM AC Milanes Marino, Maria, MD   40 mg at 07/02/24 0537    rosuvastatin (CRESTOR) tablet 5 mg  5 mg Oral Nightly Milanes Marino, Maria, MD   5 mg at 07/01/24 2018    heparin (PF) 100 UNIT/ML injection 300 Units  3 mL IntraVENous PRN Milanes Marino, Maria, MD   300 Units at 06/18/24 0855    butalbital-acetaminophen-caffeine (FIORICET, ESGIC) per tablet 1 tablet  1 tablet

## 2024-07-02 NOTE — PROGRESS NOTES
Occupational Therapy  OCCUPATIONAL THERAPY DAILY NOTE    Date:2024  Patient Name: Conrado Leach  MRN: [id number]  : 1960  Room: [address] [place] [address]A     Referring Practitioner: MD Juan Luis  Diagnosis: Brain mass L frontal lobe- 6/3/24- L crani abscess evacuation  Additional Pertinent Hx: hx R shoulder surgery, BPH, hx DVT, HTN, DM, HLD & seizures  Precautions: Fall Risk, R neglect, seizures, R hemiplegia & R UE edema & Soft & Bite size                        *Positive for DVT in RUE as of 24     *R ankle/foot swollen as of 24  .Discharge Recommendations: Home with 24 hour Sup/assist    Functional Assessment:   Date Status AE  Comments   Feeding 24 S/Setup  Assist required to open packages/containers on meal tray and to cut/prep food.   Grooming 24 Min A W/c Pt able to wash face/hands. Assist required to apply deodorant.         Oral Care 24 Setup W/c Completed seated at sink. Pt required assist to open packages and apply toothpaste to toothbrush.   Bathing 24  UB-Mak  LB-Min A Sponge  Sponge bath completed seated/standing at sink. Pt required assist to wash L UE, feet and for standing balance/safety while standing to wash. Min cues for safety and adaptive techniques provided.   UB Dressing 24 Min A  To don/doff pull over shirt. Assist required to manage shirt over head. Pt required min verbal cues to double check safe positioning of R PICC line during dressing task. Fair+ recall of ekaterina techniques.   LB Dressing 24 Min A seated To don shorts. Pt required assist with threading R LE and for balance/safety while standing to manage clothing over hips. Min cues for adaptive techniques provided.   Footwear 24 Mod A reacher To doff B socks and don B slip on shoes. Pt required assist for doffing R sock and to initiate donning R shoe.   Toileting 24 Mod/Min A BSC Pt demo'd functional reach to complete posterior toilet hygiene with assist for balance/safety, required assist to adjust    6/14/24 Spoke with nurse in pm about ordering bariatric drop arm 3:1 commode for patient's room.   6/20/24 Spoke with nurse Tae about constant RUE edema still present  and possible test to check DVT issues since patient has hx in his diagnosis.   6/21/24- Pt positive for DVT in RUE upper arm/shoulder area.     Patient has made fair + progress during treatment sessions toward set goals. Therapy emphasis to obtain goals:  Current Treatment Recommendations: Strengthening, Coordination training, ROM, Balance training, Self-Care / ADL, Functional mobility training, Safety education & training, Home management training, Endurance training, Patient/Caregiver education & training, Gait training, Neuromuscular re-education, Equipment evaluation, education, & procurement, Positioning     Goals  Long Term Goals  Time Frame for Long term goals : 6 weeks to address above problem areas  Long Term Goal 1: Pt demo  independent to eat all meals  Long Term Goal 2: Pt demo  Sup grooming seated  @ sink level & demo G- safety  Long Term Goal 3: Pt demo Min A to bathe @ shower level  & or sponge bathing both seated & standing & demo G- safety & insight  Long Term Goal 4: Pt demo Sup UE & Min A LE dress to don depends &  pants; Min A to don socks & shoes & demo G- safety & insight  Long Term Goal 5: Pt demo Min A commode trf using appropriate DME to ensure pt safety. Pt demo Min A toileting & demo G- safety & insight  Long Term Goal 6: Pt demo Min A tub trf  using appropriate DME to esnure pt safety & pt demo G- safety & insight  Long Term Goal 7: Pt demo Mod A light homemaking activity & demo G- safety & insight  Long Term Goal 8: Pt demo G- endurance for a 45-30 minute functional activity  Long Term Goal 9: Pt will tolerate PROM/AAROM/AROM RUE in all planes to facilitate functional movement & pt ability to functionally use his RUE  Long Term Goal 10: Pt demo G- safety, insight & reasoning with min vc's during all ADLs, transfers,

## 2024-07-02 NOTE — PLAN OF CARE
Problem: Safety - Adult  Goal: Free from fall injury  Outcome: Progressing     Problem: Discharge Planning  Goal: Discharge to home or other facility with appropriate resources  Outcome: Progressing  Flowsheets (Taken 7/2/2024 0910)  Discharge to home or other facility with appropriate resources: Identify barriers to discharge with patient and caregiver     Problem: Skin/Tissue Integrity  Goal: Absence of new skin breakdown  Description: 1.  Monitor for areas of redness and/or skin breakdown  2.  Assess vascular access sites hourly  3.  Every 4-6 hours minimum:  Change oxygen saturation probe site  4.  Every 4-6 hours:  If on nasal continuous positive airway pressure, respiratory therapy assess nares and determine need for appliance change or resting period.  Outcome: Progressing     Problem: ABCDS Injury Assessment  Goal: Absence of physical injury  Outcome: Progressing     Problem: Pain  Goal: Verbalizes/displays adequate comfort level or baseline comfort level  Outcome: Progressing     Problem: Chronic Conditions and Co-morbidities  Goal: Patient's chronic conditions and co-morbidity symptoms are monitored and maintained or improved  Outcome: Progressing  Flowsheets (Taken 7/2/2024 0910)  Care Plan - Patient's Chronic Conditions and Co-Morbidity Symptoms are Monitored and Maintained or Improved: Monitor and assess patient's chronic conditions and comorbid symptoms for stability, deterioration, or improvement     Problem: Nutrition Deficit:  Goal: Optimize nutritional status  Outcome: Progressing

## 2024-07-02 NOTE — PROGRESS NOTES
not on anticoagulation      Social:  Pt lives with wife in a 2 story home with 3 step(s) to enter and 1 rail(s).    Prior to admission: Pt was independent and working full time.      Owns [employer].        Initial Evaluation  Date: 6/7/24 AM     PM    Short Term Goals Long Term Goals    Was pt agreeable to Eval/treatment? Yes  Yes Yes       Does pt have pain? Denies pain  Denied  Denied       Bed Mobility  Rolling: ModA  Supine to sit: ModA  Sit to supine: ModA  Scooting: ModA NT  Rolling: SBA  Supine to sit: SBA  Sit to supine: SBA  Scooting: SBA SBA Independent   Transfers Sit to stand: MaxA  Stand to sit: MaxA  Stand pivot: ModA x2 with L limb advancement assistance no AD  Slide board: WC <> Mat table Mak     5xSTS: NT  Sit to stand:   SBA  Stand to sit: SBA  Stand pivot: CGA with L SBQC  Sit to stand:   SBA  Stand to sit: SBA  Stand pivot: SBA <> CGA with L SPC Mak  5xSTS: TBD Supervision   5xSTS: TBD   Ambulation    15 feet x2 reps with L hallway rail and ModA assistance with R limb advancement (R KI, R DF ace wrap, sock over toe box)      10mWT: 0 m/s  6mWT: 0 m 200 feet with L SBQC with CGA         200 feet with L SPC with CGA       10mWT: 0.70 m/s  6mWT: 150 m  Assist: CGA  AD: SPC  (07/02/24)   150 feet with AAD with Mak     10mWT: TBD  6mWT: TBD >300 feet with AAD with supervision      10mWT: TBD  6mWT: TBD   Walking 10 feet on uneven surface NT  NT NT 10 feet with AAD with Mak 10 feet with AAD with supervision    Wheel Chair Mobility NT NT NT       Car Transfers NT NT CGA Mak Supervision    Stair negotiation: ascended and descended  NT  10 steps with L HR, CGA ascending, Min A descending    Nonreciprocal  12 steps with L HR, CGA ascending and descending     Nonreciprocal  4-8 steps with 1 rail with Mak 8-12 steps with 1 rail with supervision    Curb Step:   ascended and descended NT NT NT 4 inch step with AAD and Mak 4 inch step with AAD and supervision    Picking up object off the floor NT  NT NT Will  pleasant and agreeable to activity. Gait training continued with interventions targeting R limb advancement, propulsion and R stance control. 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 Due to this, motor planning issues with descending steps and cane step overs continue.     PM session continued with outcome measure assessment revealing statistically significant changes in both his 6mWT and 10mWT scores, transitioning him from a household level ambulator to limited community. He additionally revealed improved management of the SPC vs SBQC during conventional ambulation, and will be transitioned to the less restrictive device as primary AD.       AM  Time in: 1045  Time out: 1130    PM  Time in: 1300  Time out: 1345      Pt is making good progress toward established Physical Therapy goals.  Continue with physical therapy current plan of care.    Mar Toth, PT, DPT  [id number]

## 2024-07-02 NOTE — PROGRESS NOTES
PM&R Progress Note  Patient: Conrado Leach  Age/sex: 63 y.o. male  Medical Record #: [id number]  Location: 5509/5509-A  Admission date: 6/6/2024    CC: ARU follow up; brain abscess    S: Patient was seen and examined in his room today.  No acute events reported overnight.  No major issues this AM.  Denies chest pain, sob, abd pain.  Bowels more consistent - will follow up with XR today.  He states he is not sleeping well.  All pertinent labs and imaging reviewed.   Patient has [place] paperwork that author will complete today or tomorrow.  Patient states he is discharging home Friday and family is on board now.    No change in PFSH since H&P done on 6/7/24 unless noted above.    Meds:  Scheduled  traZODone, 100 mg, Nightly  apixaban, 5 mg, BID  tamsulosin, 0.4 mg, Daily  [Held by provider] bethanechol, 10 mg, TID  [Held by provider] lactulose, 20 g, TID  sennosides-docusate sodium, 2 tablet, BID  [Held by provider] polyethylene glycol, 17 g, BID  levETIRAcetam, 1,000 mg, BID  pantoprazole, 40 mg, QAM AC  rosuvastatin, 5 mg, Nightly  heparin flush, 3 mL, 2 times per day  lactobacillus, 1 capsule, Daily  melatonin, 5 mg, Nightly  vitamin D, 50,000 Units, Weekly  cefTRIAXone (ROCEPHIN) IV, 2,000 mg, Q12H  sodium chloride flush, 5-40 mL, BID        PRN  bisacodyl, 10 mg, Daily PRN  acetaminophen, 650 mg, Q6H PRN  heparin flush, 3 mL, PRN  butalbital-acetaminophen-caffeine, 1 tablet, Q4H PRN  ondansetron, 4 mg, Q8H PRN  calcium carbonate, 500 mg, TID PRN  magnesium hydroxide, 30 mL, Daily PRN        O:  Vitals:    07/01/24 0915 07/01/24 0935 07/01/24 2000 07/02/24 0910   BP: 138/75  113/70 (!) 148/64   Pulse: 62  57 74   Resp: 16  16 18   Temp: 98.7 °F (37.1 °C)  98.6 °F (37 °C) 98 °F (36.7 °C)   TempSrc: Temporal  Temporal Temporal   SpO2: 96%  96% 97%   Weight:  99 kg (218 lb 4.1 oz)     Height:           GEN: NAD, conversational   HEENT: left crani, EOMI  RESP: CTAB, no R/R/W   CV: +S1 S2, RR   ABD: NT, ND, normal BS  :  liquids  - Bowel program: Senokot-S 2 tabs BID, MoM PRN  - GI prophylaxis: Protonix 40 mg daily  - Given high prevalence of Vitamin D deficiency in [place]: supplement with ergocalciferol 50K units/week x8 weeks.  - 6/10: KUB showing ileus     # Renal/urinary -   - Renal: No evidence of renal failure on last BMP on rehab admission  - Bladder: Volitional.  C/w Flomax 0.4 mg daily.  Hold bethanechol.        # ID - brain abscess  - ID for continuity of care  - Antibiotics: Per ID, Rocephin 2g q12H for 6-8 weeks.       # Skin - incision  - Maintain skin integrity.    - Turns q2H.     # Psych -   - Trazodone 100 mg nightly for insomnia  - Melatonin 5 mg QHS for insomnia  - Patient declines depressed mood and declines treatment at this time.  He will let me know if he changes his mind.       # Disposition/social - likely discharge home, TDD 7/5, will discuss in team rounds.       # Code status: Full Code    # Rehab Discharge Goals: Modified independent with mobility and ADLs    Marek Mcknight DO, FAAPM&R  Physical Medicine and Rehabilitation   Brain Injury Medicine    Please note that the above documentation was prepared using voice recognition software.  Every attempt was made to ensure accuracy but there may be spelling, grammatical, and contextual errors.

## 2024-07-02 NOTE — CARE COORDINATION
Discharge planning update:  Update insurance information received from wife's empl;[employer].  Faxed to [facility] at [phone] and to [facility]  at [phone].  Will await update .

## 2024-07-03 LAB
MICROORGANISM SPEC CULT: NORMAL
MICROORGANISM SPEC CULT: NORMAL
MICROORGANISM/AGENT SPEC: NORMAL
MICROORGANISM/AGENT SPEC: NORMAL
SERVICE CMNT-IMP: NORMAL
SERVICE CMNT-IMP: NORMAL
SPECIMEN DESCRIPTION: NORMAL
SPECIMEN DESCRIPTION: NORMAL

## 2024-07-03 PROCEDURE — 97535 SELF CARE MNGMENT TRAINING: CPT

## 2024-07-03 PROCEDURE — 97530 THERAPEUTIC ACTIVITIES: CPT

## 2024-07-03 PROCEDURE — 97110 THERAPEUTIC EXERCISES: CPT

## 2024-07-03 PROCEDURE — 97130 THER IVNTJ EA ADDL 15 MIN: CPT | Performed by: SPEECH-LANGUAGE PATHOLOGIST

## 2024-07-03 PROCEDURE — 99232 SBSQ HOSP IP/OBS MODERATE 35: CPT | Performed by: PHYSICAL MEDICINE & REHABILITATION

## 2024-07-03 PROCEDURE — 6370000000 HC RX 637 (ALT 250 FOR IP): Performed by: STUDENT IN AN ORGANIZED HEALTH CARE EDUCATION/TRAINING PROGRAM

## 2024-07-03 PROCEDURE — 6360000002 HC RX W HCPCS: Performed by: STUDENT IN AN ORGANIZED HEALTH CARE EDUCATION/TRAINING PROGRAM

## 2024-07-03 PROCEDURE — 6360000002 HC RX W HCPCS: Performed by: PHYSICAL MEDICINE & REHABILITATION

## 2024-07-03 PROCEDURE — 97129 THER IVNTJ 1ST 15 MIN: CPT | Performed by: SPEECH-LANGUAGE PATHOLOGIST

## 2024-07-03 PROCEDURE — 6370000000 HC RX 637 (ALT 250 FOR IP): Performed by: PHYSICAL MEDICINE & REHABILITATION

## 2024-07-03 PROCEDURE — 1280000000 HC REHAB R&B

## 2024-07-03 PROCEDURE — 97112 NEUROMUSCULAR REEDUCATION: CPT

## 2024-07-03 PROCEDURE — 2580000003 HC RX 258: Performed by: PHYSICAL MEDICINE & REHABILITATION

## 2024-07-03 RX ORDER — TRAZODONE HYDROCHLORIDE 50 MG/1
50 TABLET ORAL NIGHTLY
Status: DISCONTINUED | OUTPATIENT
Start: 2024-07-03 | End: 2024-07-05 | Stop reason: HOSPADM

## 2024-07-03 RX ADMIN — TRAZODONE HYDROCHLORIDE 50 MG: 50 TABLET ORAL at 19:43

## 2024-07-03 RX ADMIN — APIXABAN 5 MG: 5 TABLET, FILM COATED ORAL at 07:24

## 2024-07-03 RX ADMIN — ROSUVASTATIN CALCIUM 5 MG: 10 TABLET, FILM COATED ORAL at 19:41

## 2024-07-03 RX ADMIN — ACETAMINOPHEN 650 MG: 325 TABLET ORAL at 19:42

## 2024-07-03 RX ADMIN — PANTOPRAZOLE SODIUM 40 MG: 40 TABLET, DELAYED RELEASE ORAL at 06:09

## 2024-07-03 RX ADMIN — HEPARIN 300 UNITS: 100 SYRINGE at 20:17

## 2024-07-03 RX ADMIN — SODIUM CHLORIDE, PRESERVATIVE FREE 10 ML: 5 INJECTION INTRAVENOUS at 07:25

## 2024-07-03 RX ADMIN — WATER 2000 MG: 1 INJECTION INTRAMUSCULAR; INTRAVENOUS; SUBCUTANEOUS at 20:17

## 2024-07-03 RX ADMIN — LEVETIRACETAM 1000 MG: 500 TABLET, FILM COATED ORAL at 19:42

## 2024-07-03 RX ADMIN — SENNOSIDES AND DOCUSATE SODIUM 2 TABLET: 50; 8.6 TABLET ORAL at 19:41

## 2024-07-03 RX ADMIN — SODIUM CHLORIDE, PRESERVATIVE FREE 10 ML: 5 INJECTION INTRAVENOUS at 20:17

## 2024-07-03 RX ADMIN — WATER 2000 MG: 1 INJECTION INTRAMUSCULAR; INTRAVENOUS; SUBCUTANEOUS at 10:04

## 2024-07-03 RX ADMIN — Medication 5 MG: at 19:42

## 2024-07-03 RX ADMIN — Medication 1 CAPSULE: at 07:24

## 2024-07-03 RX ADMIN — HEPARIN 300 UNITS: 100 SYRINGE at 07:25

## 2024-07-03 RX ADMIN — LEVETIRACETAM 1000 MG: 500 TABLET, FILM COATED ORAL at 07:24

## 2024-07-03 RX ADMIN — APIXABAN 5 MG: 5 TABLET, FILM COATED ORAL at 19:42

## 2024-07-03 RX ADMIN — SENNOSIDES AND DOCUSATE SODIUM 2 TABLET: 50; 8.6 TABLET ORAL at 07:24

## 2024-07-03 RX ADMIN — TAMSULOSIN HYDROCHLORIDE 0.4 MG: 0.4 CAPSULE ORAL at 07:24

## 2024-07-03 ASSESSMENT — PAIN SCALES - GENERAL
PAINLEVEL_OUTOF10: 3
PAINLEVEL_OUTOF10: 4
PAINLEVEL_OUTOF10: 0

## 2024-07-03 ASSESSMENT — PAIN DESCRIPTION - DESCRIPTORS: DESCRIPTORS: ACHING;SPASM

## 2024-07-03 ASSESSMENT — PAIN SCALES - WONG BAKER: WONGBAKER_NUMERICALRESPONSE: HURTS A LITTLE BIT

## 2024-07-03 ASSESSMENT — PAIN - FUNCTIONAL ASSESSMENT
PAIN_FUNCTIONAL_ASSESSMENT: NONE - DENIES PAIN
PAIN_FUNCTIONAL_ASSESSMENT: ACTIVITIES ARE NOT PREVENTED

## 2024-07-03 ASSESSMENT — PAIN DESCRIPTION - ORIENTATION: ORIENTATION: RIGHT

## 2024-07-03 ASSESSMENT — PAIN DESCRIPTION - LOCATION: LOCATION: ARM

## 2024-07-03 NOTE — PLAN OF CARE
Problem: Safety - Adult  Goal: Free from fall injury  7/2/2024 2253 by Liddle, Staci, RN  Outcome: Progressing  7/2/2024 1538 by Samina Carter RN  Outcome: Progressing     Problem: Discharge Planning  Goal: Discharge to home or other facility with appropriate resources  7/2/2024 2253 by Liddle, Staci, RN  Outcome: Progressing  7/2/2024 1538 by Samina Carter RN  Outcome: Progressing  Flowsheets (Taken 7/2/2024 0910)  Discharge to home or other facility with appropriate resources: Identify barriers to discharge with patient and caregiver     Problem: Skin/Tissue Integrity  Goal: Absence of new skin breakdown  Description: 1.  Monitor for areas of redness and/or skin breakdown  2.  Assess vascular access sites hourly  3.  Every 4-6 hours minimum:  Change oxygen saturation probe site  4.  Every 4-6 hours:  If on nasal continuous positive airway pressure, respiratory therapy assess nares and determine need for appliance change or resting period.  7/2/2024 2253 by Liddle, Staci, RN  Outcome: Progressing  7/2/2024 1538 by Samina Carter RN  Outcome: Progressing     Problem: ABCDS Injury Assessment  Goal: Absence of physical injury  7/2/2024 2253 by Liddle, Staci, RN  Outcome: Progressing  7/2/2024 1538 by Samina Carter RN  Outcome: Progressing  Flowsheets (Taken 7/2/2024 0910)  Absence of Physical Injury: Implement safety measures based on patient assessment     Problem: Pain  Goal: Verbalizes/displays adequate comfort level or baseline comfort level  7/2/2024 2253 by Liddle, Staci, RN  Outcome: Progressing  7/2/2024 1538 by Samina Carter RN  Outcome: Progressing     Problem: Chronic Conditions and Co-morbidities  Goal: Patient's chronic conditions and co-morbidity symptoms are monitored and maintained or improved  7/2/2024 1538 by Samina Carter RN  Outcome: Progressing  Flowsheets (Taken 7/2/2024 0910)  Care Plan - Patient's Chronic Conditions and Co-Morbidity Symptoms are Monitored and Maintained or

## 2024-07-03 NOTE — CARE COORDINATION
Discharge planning update.   [facility] has accepted case.  Updated clinicals requested and faxed   CMS will cover nursing .  Spoke to Julienne at  [phone].  Nurse can only be there in am before noon on Fri.   Patient, Dr. Mcknight , and family ok to plan early am discharge. Confirmed with Julienne.   Staff informed.  [facility]  will deliver supplies in am on Friday.  Wife will arrange to have someone at the house to receive .  SW following to find provider for  in home therapy .  Had to open a new case with [facility].  Spoke to nurse June Lopez RN  [phone]  x [phone].  New case number is [id number].  Requested clinical sent.  She states if provider is not found , she is willing to negotiate an out of network agreement. .  Wife updated.   MARSHALL will follow post discharge to facilitate.

## 2024-07-03 NOTE — PROGRESS NOTES
Occupational Therapy  OCCUPATIONAL THERAPY DAILY NOTE    Date:7/3/2024  Patient Name: Conrado Leach  MRN: [id number]  : 1960  Room: [address] [place] [address]A     Referring Practitioner: MD Juan Luis  Diagnosis: Brain mass L frontal lobe- 6/3/24- L crani abscess evacuation  Additional Pertinent Hx: hx R shoulder surgery, BPH, hx DVT, HTN, DM, HLD & seizures  Precautions: Fall Risk, R neglect, seizures, R hemiplegia & R UE edema & Soft & Bite size                        *Positive for DVT in RUE as of 24     *R ankle/foot swollen as of 24  .Discharge Recommendations: Home with 24 hour Sup/assist    Functional Assessment:   Date Status AE  Comments   Feeding 24 S/Setup     Grooming 24 Min A W/c          Oral Care 24 Setup W/c    Bathing 24  UB-Mak  LB-Min A Sponge     UB Dressing 24 Min A     LB Dressing 24 Min A seated    Footwear 24 Mod A reacher    Toileting 24 Mod/Min A BSC    Homemaking 7/3/24 Mod/Min A  SPC Pt completed simple meal prep activity of making toast. Pt required assist for safe item retrieval d/t unsteadiness when reaching outside CJ and for balance/safety during functional mobility. Assist required for opening packages and to butter bread. Extensive time spent discussing safe item retrieval/transportation techniques to utilize at home upon discharge.      Functional Transfers / Balance:   Date Status DME  Comments   Sit Balance 7/3/24 Independent     Stand Balance 7/3/24 CGA/SBA SPC   Demo'd during functional ADL task   [x] Tub  [] Shower   Transfer 24 Mod/Min A LBQCextended tub bench    Commode   Transfer 24 Min A BSC  LBQC      Functional   Mobility 7/3/24 CGA SPC Completed < household distances within OT kitchen with assist required for balance/safety d/t unsteadiness when directional change when distracted by functional task. Min cues for safety and insight provided.    PM: Completed short distance within pt's room x2 with Min cues required to maneuver  3 steps no HR & 2nd floor 7 steps no HR & 7 steps more no HR & only 1/2 bath on first floor. Currently recommending first floor setup. Pt admitted with a brain abscess & on antibiotics. Pt has an ileus & general surgery consulted & on a clear liquid diet. Pt tentative length of stay 6 weeks. Kim Padillal OTR/L [id number]   6/26/24-Pt POC & goals are updated on this date. Family can provide first floor setup. Pt wife works but will take time off @ discharge. Pt tentative length of stay 3 weeks Kimangel Padillal OTR/L [id number]  7/3/24-Pt POC & goals are updated on this date. Pt tentative length of stay  Kim Padillal OTR/L [id number]      [x] Continue with current OT Plan of care.  [] Prepare for Discharge     DISCHARGE RECOMMENDATIONS  Recommended DME: ETB, LBQC, 3-in-, LB AE    Post Discharge Care:   []Home Independently  []Home with 24hr Care / Supervision []Home with Partial Supervision [x]Home with Home Health OT []Home with Out Pt OT []Other: ___   Comments:         Time in Time out Tx Time Breakdown  Variance:   First Session  1020 1045 [x] Individual Tx- 25 Mins  [] Concurrent Tx-  [] Co-Tx -   [] Group Tx -   [x] Time Missed - 20 Mins Personal phone call and Medications with nursing.   Second Session 1430 1515 [x] Individual Tx- 45 Mins  [] Concurrent Tx -   [] Co-Tx -   [] Group Tx -   [] Time Missed -     Third Session    [] Individual Tx-   [] Concurrent Tx -  [] Co-Tx -   [] Group Tx -   [] Time Missed -         Total Tx Time: 70 Mins     Rani ROWELL/L [id number]

## 2024-07-03 NOTE — PROGRESS NOTES
PM&R Progress Note  Patient: Conrado Leach  Age/sex: 63 y.o. male  Medical Record #: [id number]  Location: 5509/5509-A  Admission date: 6/6/2024    CC: ARU follow up; brain abscess    S: Patient was seen and examined in his room today.  No acute events reported overnight.  No major issues this AM.  Denies chest pain, sob, abd pain.  All pertinent labs and imaging reviewed.   Trazodone 100 mg nightly was too much, per patient.     No change in PFSH since H&P done on 6/7/24 unless noted above.    Meds:  Scheduled  traZODone, 50 mg, Nightly  apixaban, 5 mg, BID  tamsulosin, 0.4 mg, Daily  [Held by provider] bethanechol, 10 mg, TID  [Held by provider] lactulose, 20 g, TID  sennosides-docusate sodium, 2 tablet, BID  [Held by provider] polyethylene glycol, 17 g, BID  levETIRAcetam, 1,000 mg, BID  pantoprazole, 40 mg, QAM AC  rosuvastatin, 5 mg, Nightly  heparin flush, 3 mL, 2 times per day  lactobacillus, 1 capsule, Daily  melatonin, 5 mg, Nightly  vitamin D, 50,000 Units, Weekly  cefTRIAXone (ROCEPHIN) IV, 2,000 mg, Q12H  sodium chloride flush, 5-40 mL, BID        PRN  bisacodyl, 10 mg, Daily PRN  acetaminophen, 650 mg, Q6H PRN  heparin flush, 3 mL, PRN  butalbital-acetaminophen-caffeine, 1 tablet, Q4H PRN  ondansetron, 4 mg, Q8H PRN  calcium carbonate, 500 mg, TID PRN  magnesium hydroxide, 30 mL, Daily PRN        O:  Vitals:    07/01/24 2000 07/02/24 0910 07/02/24 2145 07/03/24 0719   BP: 113/70 (!) 148/64 132/70 131/66   Pulse: 57 74 52 66   Resp: 16 18 16 16   Temp: 98.6 °F (37 °C) 98 °F (36.7 °C) 98.2 °F (36.8 °C) 98.2 °F (36.8 °C)   TempSrc: Temporal Temporal Temporal Temporal   SpO2: 96% 97% 96% 96%   Weight:       Height:           GEN: NAD, conversational   HEENT: left crani, EOMI  RESP: unlabored  ABD: NT, ND, normal BS  : no arrington   EXT: Abnormal aROM, No clubbing/cyanosis  SKIN: No erythema, warm.  L frontal craniotomy, C/D/I  PSYCH: Appropriate mood given current situation with flat affect  NEURO: Alert, no  weeks.  - 6/10: KUB showing ileus     # Renal/urinary -   - Renal: No evidence of renal failure on last BMP on rehab admission  - Bladder: Volitional.  C/w Flomax 0.4 mg daily.  Hold bethanechol.        # ID - brain abscess  - ID for continuity of care  - Antibiotics: Per ID, Rocephin 2g q12H for 6-8 weeks.       # Skin - incision  - Maintain skin integrity.    - Turns q2H.     # Psych -   - Trazodone 50 mg nightly for insomnia  - Melatonin 5 mg QHS for insomnia  - Patient declines depressed mood and declines treatment at this time.  He will let me know if he changes his mind.       # Disposition/social - likely discharge home, TDD 7/5, multiple insurance/home care payment issues we're attempting to resolve for safe discharge on 7/5.  If unable to provide safe discharge, may need to postpone until next week.  Will continue to discuss with team and formally in weekly team rounds.       # Code status: Full Code    # Rehab Discharge Goals: Modified independent with mobility and ADLs    Marek Mcknight DO, FAAPM&R  Physical Medicine and Rehabilitation   Brain Injury Medicine    Please note that the above documentation was prepared using voice recognition software.  Every attempt was made to ensure accuracy but there may be spelling, grammatical, and contextual errors.

## 2024-07-03 NOTE — PROGRESS NOTES
Department of Internal Medicine  Infectious Diseases  Progress  Note      C/C :   Brain abscess s/p craniotomy and drainage  ( 6/3)     Denies fever or chills  Denies headache     Afebrile     Current Facility-Administered Medications   Medication Dose Route Frequency Provider Last Rate Last Admin    traZODone (DESYREL) tablet 100 mg  100 mg Oral Nightly Kendra Mcknightct L, DO   100 mg at 07/02/24 2145    bisacodyl (DULCOLAX) suppository 10 mg  10 mg Rectal Daily PRN Mercedes De Luna MD        apixaban (ELIQUIS) tablet 5 mg  5 mg Oral BID Kendra Mcknightct L, DO   5 mg at 07/03/24 0724    tamsulosin (FLOMAX) capsule 0.4 mg  0.4 mg Oral Daily Kendra Mcknightct L, DO   0.4 mg at 07/03/24 0724    [Held by provider] bethanechol (URECHOLINE) tablet 10 mg  10 mg Oral TID Kendra Mcknightct L, DO   10 mg at 06/18/24 0830    [Held by provider] lactulose (CHRONULAC) 10 GM/15ML solution 20 g  20 g Oral TID Venkat Mcknightdict L, DO   20 g at 06/18/24 0830    sennosides-docusate sodium (SENOKOT-S) 8.6-50 MG tablet 2 tablet  2 tablet Oral BID Venkat Mcknightdict L, DO   2 tablet at 07/03/24 0724    [Held by provider] polyethylene glycol (GLYCOLAX) packet 17 g  17 g Oral BID Kendra Mcknightct L, DO   17 g at 06/30/24 2125    levETIRAcetam (KEPPRA) tablet 1,000 mg  1,000 mg Oral BID Juan Luis Hawks L, DO   1,000 mg at 07/03/24 0724    acetaminophen (TYLENOL) tablet 650 mg  650 mg Oral Q6H PRN Marek Mcknight, DO   650 mg at 07/02/24 2148    pantoprazole (PROTONIX) tablet 40 mg  40 mg Oral QAM AC Milanes Marino, Maria, MD   40 mg at 07/03/24 0609    rosuvastatin (CRESTOR) tablet 5 mg  5 mg Oral Nightly Milanes Marino, Maria, MD   5 mg at 07/02/24 2145    heparin (PF) 100 UNIT/ML injection 300 Units  3 mL IntraVENous PRN Milanes Marino, Maria, MD   300 Units at 06/18/24 0855    butalbital-acetaminophen-caffeine (FIORICET, ESGIC) per tablet 1 tablet  1 tablet Oral Q4H PRN Milanes Marino, Maria, MD   1 tablet at 06/06/24 1845    heparin (PF)

## 2024-07-03 NOTE — PROGRESS NOTES
Speech Language Pathology  ACUTE REHABILITATION--DAILY PROGRESS NOTE            SWALLOWING:      Diet:  Dysphagia 3, Soft/advanced (Soft & Bite-sized) solids with thin liquids (IDDSI level 0)     As per note 6/27:  Patient prefers soft and bite sized solids at this time. All dysphagia goals met. Patient no longer requires supervision during meals.     LANGUAGE:    Not formally addressed, appropriate response times cont to be noted.      COGNITION:      Pt seen in functional kitchen area for skilled cognitive tx as a co-treat with OT. Pt accurately and comprehensively described the steps for making strawberry freezer jam independently. Patient answered all follow-up questions from SLP with appropriate level of detail.     Pt described steps for making toast and later executed with OT present for support. Pt showed insight per OT with gait and balance. Pt required prompting to generate solutions for transporting food at home when ambulating with the cane. OT provided possible suggestions.     Pt identified potential safety hazards that will be present in kitchen at home and was aware of need for support / assistance from spouse.     SPEECH:    Pt continues to engage in spontaneous conversation with SLP with longer responses and more inflection in voice pattern. Pt was of positive affect.       Minute Tracking:    Individual minutes:      0 minutes  Concurrent minutes:     0 minutes  Co-treatment minutes:    30 minutes    Total minutes for 7/3/2024: 30 minutes     15 minute variance d/t personal care needs and IV medication.         SAFETY:      Per chart review, patient shared spouse will be home at discharge to assist with medicine management.     EDUCATION:    Educated on how ST addresses memory and cognition.     OUTCOME:    Progress made towards goals. Will continue SP intervention as per previously established POC.    SP recommended after discharge:  TBD  Supervision recommended at discharge: TBD      FIMS SCORES

## 2024-07-03 NOTE — PLAN OF CARE
Problem: Safety - Adult  Goal: Free from fall injury  7/3/2024 1001 by Aleyda Monteiro LPN  Outcome: Progressing  7/3/2024 0958 by Aleyda Monteiro LPN  Outcome: Progressing  7/2/2024 2253 by Liddle, Staci, RN  Outcome: Progressing     Problem: Discharge Planning  Goal: Discharge to home or other facility with appropriate resources  7/3/2024 1001 by Aleyda Monteiro LPN  Outcome: Progressing  7/3/2024 0958 by Aleyda Monteiro LPN  Outcome: Progressing  7/2/2024 2253 by Liddle, Staci, RN  Outcome: Progressing     Problem: Skin/Tissue Integrity  Goal: Absence of new skin breakdown  Description: 1.  Monitor for areas of redness and/or skin breakdown  2.  Assess vascular access sites hourly  3.  Every 4-6 hours minimum:  Change oxygen saturation probe site  4.  Every 4-6 hours:  If on nasal continuous positive airway pressure, respiratory therapy assess nares and determine need for appliance change or resting period.  7/3/2024 1001 by Aleyda Monteiro LPN  Outcome: Progressing  7/3/2024 0958 by Aleyda Monteiro LPN  Outcome: Progressing  7/2/2024 2253 by Liddle, Staci, RN  Outcome: Progressing     Problem: ABCDS Injury Assessment  Goal: Absence of physical injury  7/3/2024 1001 by Aleyda Monteiro LPN  Outcome: Progressing  7/3/2024 0958 by Aleyda Monteiro LPN  Outcome: Progressing  7/2/2024 2253 by Liddle, Staci, RN  Outcome: Progressing     Problem: Pain  Goal: Verbalizes/displays adequate comfort level or baseline comfort level  7/3/2024 1001 by Aleyda Monteiro LPN  Outcome: Progressing  7/3/2024 0958 by Aleyda Monteiro LPN  Outcome: Progressing  7/2/2024 2253 by Liddle, Staci, RN  Outcome: Progressing     Problem: Chronic Conditions and Co-morbidities  Goal: Patient's chronic conditions and co-morbidity symptoms are monitored and maintained or improved  7/3/2024 1001 by Aleyda Monteiro LPN  Outcome: Progressing  7/3/2024 0958 by Aleyda Monteiro LPN  Outcome: Progressing     Problem: Nutrition

## 2024-07-03 NOTE — PATIENT CARE CONFERENCE
[facility]  INPATIENT ACUTE REHABILITATION  TEAM CONFERENCE NOTE/PATIENT PLAN OF CARE    The physician was present and led this team conference    Date: 7/3/2024  Admission date: 2024  Patient Name: Conrado Leach        MRN: [id number]    : 1960  (63 y.o.)  Gender: male   Rehab diagnosis/surgery with date:  Left frontal lobe brain abscess  Craniotomy with abscess evacuation 6/3/24 per Dr. Henderson   Impairment Group Code:  2.1      MEDICAL/FUNCTIONAL HISTORY/STATUS:  He continues on  IV rocephin for a total of 6 weeks. He is on  Eliquis for DVT in right arm. His ileus has resolved. He continues to show improvement. His PICC line remains in his right arm.     Consultations/Labs/X-rays: KUB of the abdomen on 2024 shows:  IMPRESSION:  No evidence of bowel obstruction or ileus   Latest Reference Range & Units 24 05:47   Sodium 132 - 146 mmol/L 142   Potassium 3.5 - 5.0 mmol/L 4.4   Chloride 98 - 107 mmol/L 103   CARBON DIOXIDE 22 - 29 mmol/L 28   BUN,BUNPL 6 - 23 mg/dL 12   Creatinine 0.70 - 1.20 mg/dL 0.9   Anion Gap 7 - 16 mmol/L 11   Est, Glom Filt Rate >60 mL/min/1.73m2 >90   Glucose 74 - 99 mg/dL 109 (H)   Calcium 8.6 - 10.2 mg/dL 9.8   WBC 4.5 - 11.5 k/uL 4.1 (L)   RBC 3.80 - 5.80 m/uL 4.85   Hemoglobin Quant 12.5 - 16.5 g/dL 13.3   Hematocrit 37.0 - 54.0 % 40.5   MCV 80.0 - 99.9 fL 83.5   MCH 26.0 - 35.0 pg 27.4   MCHC 32.0 - 34.5 g/dL 32.8   MPV 7.0 - 12.0 fL 9.0   RDW 11.5 - 15.0 % 15.4 (H)   Platelet Count 130 - 450 k/uL 225   Neutrophils % 43.0 - 80.0 % 53   Lymphocyte % 20.0 - 42.0 % 30   Monocytes % 2.0 - 12.0 % 10   Eosinophils % 0 - 6 % 6   Basophils % 0.0 - 2.0 % 1   Neutrophils Absolute 1.80 - 7.30 k/uL 2.19   Lymphocytes Absolute 1.50 - 4.00 k/uL 1.21 (L)   Monocytes Absolute 0.10 - 0.95 k/uL 0.39   Eosinophils Absolute 0.05 - 0.50 k/uL 0.25   Basophils Absolute 0.00 - 0.20 k/uL 0.04   Immature Granulocytes % 0.0 - 5.0 % 1   Immature Granulocytes Absolute 0.00 - 0.58

## 2024-07-03 NOTE — PLAN OF CARE
Problem: Safety - Adult  Goal: Free from fall injury  7/3/2024 0958 by Aleyda Monteiro LPN  Outcome: Progressing  7/2/2024 2253 by Liddle, Staci, RN  Outcome: Progressing     Problem: Discharge Planning  Goal: Discharge to home or other facility with appropriate resources  7/3/2024 0958 by Aleyda Monteiro LPN  Outcome: Progressing  7/2/2024 2253 by Liddle, Staci, RN  Outcome: Progressing     Problem: Skin/Tissue Integrity  Goal: Absence of new skin breakdown  Description: 1.  Monitor for areas of redness and/or skin breakdown  2.  Assess vascular access sites hourly  3.  Every 4-6 hours minimum:  Change oxygen saturation probe site  4.  Every 4-6 hours:  If on nasal continuous positive airway pressure, respiratory therapy assess nares and determine need for appliance change or resting period.  7/3/2024 0958 by Aleyda Monteiro LPN  Outcome: Progressing  7/2/2024 2253 by Liddle, Staci, RN  Outcome: Progressing     Problem: ABCDS Injury Assessment  Goal: Absence of physical injury  7/3/2024 0958 by Aleyda Monteiro LPN  Outcome: Progressing  7/2/2024 2253 by Liddle, Staci, RN  Outcome: Progressing     Problem: Pain  Goal: Verbalizes/displays adequate comfort level or baseline comfort level  7/3/2024 0958 by Aleyda Monteiro LPN  Outcome: Progressing  7/2/2024 2253 by Liddle, Staci, RN  Outcome: Progressing     Problem: Chronic Conditions and Co-morbidities  Goal: Patient's chronic conditions and co-morbidity symptoms are monitored and maintained or improved  Outcome: Progressing     Problem: Nutrition Deficit:  Goal: Optimize nutritional status  Outcome: Progressing

## 2024-07-03 NOTE — PROGRESS NOTES
Speech Language Pathology      NAME:  Conrado Leach  :  1960  DATE: 7/3/2024  ROOM:  5505509-A    Updated FIMS scores as below:    FIMS SCORES                           Swallowing                          Current Status           5--Supervision            Short Term Goal         5--Supervision               Long Term Goal         5--Supervision -- Pt prefers Soft and bite size consistency solids (IDDSI level 6) with  thin liquids (IDDSI level 0) but all other goals met.                                         Receptive                          Current Status            5--Supervision          Short Term Goal         5--Supervision               Long Term Goal          6--Modified Independent              Expressive                          Current Status             7--Independent                         Short Term Goal         7--Independent                         Long Term Goal          7--Independent                                                                           Problem Solving                          Current Status            4--Minimal Assistance                       Short Term Goal         5--Supervision              Long Term Goal          5--Supervision                  Memory                          Current Status        4--Minimal Assistance                  Short Term Goal      4--Minimal Assistance                    Long Term Goal      5--Supervision     Social Interaction              Current Status            4--Minimal Assistance               Short Term Goal         5--Supervision               Long Term Goal          5--Supervision    Brain abscess [G06.0]

## 2024-07-03 NOTE — PROGRESS NOTES
Physical Therapy  Treatment     Supervising therapist : Mar Toth, PT, DPT [id number]  ROOM: [address] [place], [place] [address]A  DIAGNOSIS: brain abscess   PRECAUTIONS: Falls, R hemiparesis, R UE sling, R inattention, flat affect, cognition  Diet: Dysphagia 3, Soft/advanced (Soft & Bite-sized) solids with thin liquids (IDDSI level 0)     HPI: Pt is a 63 year old pt who presented to ER from home with complaints of right side weakness and seizure-like activity at home. NIH of 10 upon arrival. Per wife at bedside patient has been having neck and lower back pain and has been going to chiropractor.  He also was having some confusion and they thought it was a UTI and he went to his primary care physician and was prescribed Bactrim.  He went into his daily routine, went to work and returned home.  At around 2:00 when his wife came home found him in the floor, unable to move the right side, appeared that he comprehended but he was unable to speak.  He later said that he had a seizure-like activity sitting on the chair and a slide himself down to the floor, that lasted for about 5 minutes. Workup revealed two masses in the left frontal lobe with adjacent vasogenic edema. EEG positive for left fronto central seizures. Neurosurgery and neurology was consulted. On 06/03/2024 patient underwent a left craniotomy abscess evacuation by Dr. Henderson. ID consulted and placed patient on IV antibiotics. Updated MRI brain w/wo contrast on 6/3/24 showed interval surgical drainage of one of the two left frontal lobe abscesses; mild increase in size of other abscess; slight increase in vasogenic edema; 3mm left-to-right MLS; stable right meningioma.  NSGY reviewed MRI brain prior to admission to ARU with no additional recommendations.  Hospital course was complicated by seizure, weakness, hypotension.       Pt has PMHx of type 2 diabetes mellitus, HLD, BPH, PARTH, multiple orthopedic surgeries, right lower extremities DVT 2 years ago after he had spider bite,  supervision assist   BLE ROM WFL WNL NT       BLE Strength L LE: 5/5 all major muscle groups      R LE: 0/5 hip flexion, extension, knee flexion, extension, ankle DF, PF NT NT       Balance  Static sitting: SBA  Dynamic sitting: Mak     Static standing: Mak at hallway rail     Dynamic standing: ModA at hallway rail      BBS: NT  FGA: NT Static standing with L hemicane Min A     Dynamic standing with L SBQC CGA    BBS: 19/56  (06/24/24)    Static standing with L hemicane Min A     Dynamic standing with L SPC CGA    BBS: TBD  FGA: TBD    BBS: TBD  FGA: TBD   Date Family Teach Completed None to date  Daughter Carmen observation 06/21,   Wife hands on 06/30        Is additional Family Teaching Needed?  Y or N Y Y Y       Hindering Progress R hemiparesis and inattention  R hemiparesis, cognitive processing deficits   R hemiparesis, cognitive processing deficits        PT recommended ELOS 3-4 weeks   TBD TBD       Team's Discharge Plan             Therapist at Team Meeting                 Therapeutic Exercise:   AM:   4 SPC step overs no AD Min A (4 repetitions, 2 sets)  200' ambulation no AD min/cga (2 repetitions)    PM  Stepping over canes using straight cane for support x 5 reps ( min A )   Multidirectional ambulation using cane with min A           Patient education  Pt educated on focusing on proper R foot clearance with gait     Patient response to education:   Pt verbalized understanding Pt demonstrated skill Pt requires further education in this area   x X with occasional verbal instruction  x     Additional Comments: Pt completed functional mobility as noted above. Session focused on gait training with emphasis on R limb advancement and R stance control. Pt is making significant improvements with activity tolerance.  Apraxia still remains a barrier with mobility. Will focus on stepping over canes due to motor planning issues.  Occasional min A with gait due to decreased postural stability from decreased R foot

## 2024-07-04 LAB
ANION GAP SERPL CALCULATED.3IONS-SCNC: 12 MMOL/L (ref 7–16)
BASOPHILS # BLD: 0.04 K/UL (ref 0–0.2)
BASOPHILS NFR BLD: 1 % (ref 0–2)
BUN SERPL-MCNC: 12 MG/DL (ref 6–23)
CALCIUM SERPL-MCNC: 9.5 MG/DL (ref 8.6–10.2)
CHLORIDE SERPL-SCNC: 99 MMOL/L (ref 98–107)
CO2 SERPL-SCNC: 26 MMOL/L (ref 22–29)
CREAT SERPL-MCNC: 0.8 MG/DL (ref 0.7–1.2)
EOSINOPHIL # BLD: 0.11 K/UL (ref 0.05–0.5)
EOSINOPHILS RELATIVE PERCENT: 2 % (ref 0–6)
ERYTHROCYTE [DISTWIDTH] IN BLOOD BY AUTOMATED COUNT: 15.9 % (ref 11.5–15)
GFR, ESTIMATED: >90 ML/MIN/1.73M2
GLUCOSE SERPL-MCNC: 164 MG/DL (ref 74–99)
HCT VFR BLD AUTO: 41 % (ref 37–54)
HGB BLD-MCNC: 13.2 G/DL (ref 12.5–16.5)
IMM GRANULOCYTES # BLD AUTO: <0.03 K/UL (ref 0–0.58)
IMM GRANULOCYTES NFR BLD: 0 % (ref 0–5)
LYMPHOCYTES NFR BLD: 0.85 K/UL (ref 1.5–4)
LYMPHOCYTES RELATIVE PERCENT: 18 % (ref 20–42)
MCH RBC QN AUTO: 26.8 PG (ref 26–35)
MCHC RBC AUTO-ENTMCNC: 32.2 G/DL (ref 32–34.5)
MCV RBC AUTO: 83.2 FL (ref 80–99.9)
MONOCYTES NFR BLD: 0.51 K/UL (ref 0.1–0.95)
MONOCYTES NFR BLD: 11 % (ref 2–12)
NEUTROPHILS NFR BLD: 67 % (ref 43–80)
NEUTS SEG NFR BLD: 3.15 K/UL (ref 1.8–7.3)
PLATELET # BLD AUTO: 220 K/UL (ref 130–450)
PMV BLD AUTO: 9.3 FL (ref 7–12)
POTASSIUM SERPL-SCNC: 4.1 MMOL/L (ref 3.5–5)
RBC # BLD AUTO: 4.93 M/UL (ref 3.8–5.8)
SODIUM SERPL-SCNC: 137 MMOL/L (ref 132–146)
WBC OTHER # BLD: 4.7 K/UL (ref 4.5–11.5)

## 2024-07-04 PROCEDURE — 6360000002 HC RX W HCPCS: Performed by: STUDENT IN AN ORGANIZED HEALTH CARE EDUCATION/TRAINING PROGRAM

## 2024-07-04 PROCEDURE — 6370000000 HC RX 637 (ALT 250 FOR IP): Performed by: STUDENT IN AN ORGANIZED HEALTH CARE EDUCATION/TRAINING PROGRAM

## 2024-07-04 PROCEDURE — 97530 THERAPEUTIC ACTIVITIES: CPT

## 2024-07-04 PROCEDURE — 6370000000 HC RX 637 (ALT 250 FOR IP): Performed by: PHYSICAL MEDICINE & REHABILITATION

## 2024-07-04 PROCEDURE — 99231 SBSQ HOSP IP/OBS SF/LOW 25: CPT | Performed by: PHYSICAL MEDICINE & REHABILITATION

## 2024-07-04 PROCEDURE — 80048 BASIC METABOLIC PNL TOTAL CA: CPT

## 2024-07-04 PROCEDURE — 97110 THERAPEUTIC EXERCISES: CPT

## 2024-07-04 PROCEDURE — 2580000003 HC RX 258: Performed by: PHYSICAL MEDICINE & REHABILITATION

## 2024-07-04 PROCEDURE — 97535 SELF CARE MNGMENT TRAINING: CPT

## 2024-07-04 PROCEDURE — 6360000002 HC RX W HCPCS: Performed by: PHYSICAL MEDICINE & REHABILITATION

## 2024-07-04 PROCEDURE — 85025 COMPLETE CBC W/AUTO DIFF WBC: CPT

## 2024-07-04 PROCEDURE — 1280000000 HC REHAB R&B

## 2024-07-04 RX ORDER — SENNA AND DOCUSATE SODIUM 50; 8.6 MG/1; MG/1
2 TABLET, FILM COATED ORAL 2 TIMES DAILY
Qty: 120 TABLET | Refills: 0 | Status: SHIPPED | OUTPATIENT
Start: 2024-07-04 | End: 2024-08-03

## 2024-07-04 RX ORDER — LEVETIRACETAM 1000 MG/1
1000 TABLET ORAL 2 TIMES DAILY
Qty: 60 TABLET | Refills: 0 | Status: SHIPPED | OUTPATIENT
Start: 2024-07-04 | End: 2024-08-03

## 2024-07-04 RX ORDER — TAMSULOSIN HYDROCHLORIDE 0.4 MG/1
0.4 CAPSULE ORAL DAILY
Qty: 30 CAPSULE | Refills: 0 | Status: SHIPPED | OUTPATIENT
Start: 2024-07-05 | End: 2024-08-04

## 2024-07-04 RX ORDER — TRAZODONE HYDROCHLORIDE 50 MG/1
50 TABLET ORAL NIGHTLY PRN
Qty: 30 TABLET | Refills: 0 | Status: SHIPPED | OUTPATIENT
Start: 2024-07-04 | End: 2024-08-03

## 2024-07-04 RX ADMIN — SENNOSIDES AND DOCUSATE SODIUM 2 TABLET: 50; 8.6 TABLET ORAL at 09:05

## 2024-07-04 RX ADMIN — APIXABAN 5 MG: 5 TABLET, FILM COATED ORAL at 21:20

## 2024-07-04 RX ADMIN — LEVETIRACETAM 1000 MG: 500 TABLET, FILM COATED ORAL at 09:05

## 2024-07-04 RX ADMIN — SODIUM CHLORIDE, PRESERVATIVE FREE 10 ML: 5 INJECTION INTRAVENOUS at 21:23

## 2024-07-04 RX ADMIN — SODIUM CHLORIDE, PRESERVATIVE FREE 10 ML: 5 INJECTION INTRAVENOUS at 09:22

## 2024-07-04 RX ADMIN — SENNOSIDES AND DOCUSATE SODIUM 2 TABLET: 50; 8.6 TABLET ORAL at 21:20

## 2024-07-04 RX ADMIN — TAMSULOSIN HYDROCHLORIDE 0.4 MG: 0.4 CAPSULE ORAL at 09:05

## 2024-07-04 RX ADMIN — HEPARIN 300 UNITS: 100 SYRINGE at 21:22

## 2024-07-04 RX ADMIN — LEVETIRACETAM 1000 MG: 500 TABLET, FILM COATED ORAL at 21:20

## 2024-07-04 RX ADMIN — HEPARIN 300 UNITS: 100 SYRINGE at 09:24

## 2024-07-04 RX ADMIN — Medication 1 CAPSULE: at 09:05

## 2024-07-04 RX ADMIN — WATER 2000 MG: 1 INJECTION INTRAMUSCULAR; INTRAVENOUS; SUBCUTANEOUS at 09:34

## 2024-07-04 RX ADMIN — PANTOPRAZOLE SODIUM 40 MG: 40 TABLET, DELAYED RELEASE ORAL at 05:53

## 2024-07-04 RX ADMIN — APIXABAN 5 MG: 5 TABLET, FILM COATED ORAL at 09:05

## 2024-07-04 RX ADMIN — ROSUVASTATIN CALCIUM 5 MG: 10 TABLET, FILM COATED ORAL at 21:20

## 2024-07-04 RX ADMIN — TRAZODONE HYDROCHLORIDE 50 MG: 50 TABLET ORAL at 19:00

## 2024-07-04 RX ADMIN — Medication 5 MG: at 19:00

## 2024-07-04 RX ADMIN — ACETAMINOPHEN 650 MG: 325 TABLET ORAL at 19:00

## 2024-07-04 RX ADMIN — WATER 2000 MG: 1 INJECTION INTRAMUSCULAR; INTRAVENOUS; SUBCUTANEOUS at 21:22

## 2024-07-04 NOTE — PROGRESS NOTES
PM&R Progress Note  Patient: Conrado Leach  Age/sex: 63 y.o. male  Medical Record #: [id number]  Location: 5509/5509-A  Admission date: 6/6/2024    CC: ARU follow up; brain abscess    S: Patient was seen and examined in his room today.  No acute events reported overnight.  No major issues this AM.  Denies chest pain, sob, abd pain.  All pertinent labs and imaging reviewed.   Plan to discharge home tomorrow AM.      No change in PFSH since H&P done on 6/7/24 unless noted above.    Meds:  Scheduled  traZODone, 50 mg, Nightly  apixaban, 5 mg, BID  tamsulosin, 0.4 mg, Daily  [Held by provider] bethanechol, 10 mg, TID  [Held by provider] lactulose, 20 g, TID  sennosides-docusate sodium, 2 tablet, BID  [Held by provider] polyethylene glycol, 17 g, BID  levETIRAcetam, 1,000 mg, BID  pantoprazole, 40 mg, QAM AC  rosuvastatin, 5 mg, Nightly  heparin flush, 3 mL, 2 times per day  lactobacillus, 1 capsule, Daily  melatonin, 5 mg, Nightly  vitamin D, 50,000 Units, Weekly  cefTRIAXone (ROCEPHIN) IV, 2,000 mg, Q12H  sodium chloride flush, 5-40 mL, BID        PRN  bisacodyl, 10 mg, Daily PRN  acetaminophen, 650 mg, Q6H PRN  heparin flush, 3 mL, PRN  butalbital-acetaminophen-caffeine, 1 tablet, Q4H PRN  ondansetron, 4 mg, Q8H PRN  calcium carbonate, 500 mg, TID PRN  magnesium hydroxide, 30 mL, Daily PRN        O:  Vitals:    07/02/24 2145 07/03/24 0719 07/03/24 1930 07/04/24 0730   BP: 132/70 131/66 124/72 137/74   Pulse: 52 66 64 65   Resp: 16 16 16 17   Temp: 98.2 °F (36.8 °C) 98.2 °F (36.8 °C) 97.8 °F (36.6 °C) 98.4 °F (36.9 °C)   TempSrc: Temporal Temporal Temporal Temporal   SpO2: 96% 96% 97% 99%   Weight:       Height:           GEN: NAD, conversational   HEENT: left crani, EOMI  RESP: unlabored  ABD: NT, ND, normal BS  : no arrington   EXT: Abnormal aROM, No clubbing/cyanosis  SKIN: No erythema, warm.  L frontal craniotomy, C/D/I  PSYCH: Appropriate mood given current situation with flat affect  NEURO: Alert, no new focal  ileus     # Renal/urinary -   - Renal: No evidence of renal failure on last BMP on rehab admission  - Bladder: Volitional.  C/w Flomax 0.4 mg daily.       # ID - brain abscess  - ID for continuity of care  - Antibiotics: Per ID, Rocephin 2g q12H for 6-8 weeks.       # Skin - incision  - Maintain skin integrity.    - Turns q2H.     # Psych -   - Trazodone 50 mg nightly for insomnia  - Melatonin 5 mg QHS for insomnia  - Patient declines depressed mood and declines treatment at this time.  He will let me know if he changes his mind.       # Disposition/social - likely discharge home, TDD 7/5, will continue to discuss with team and formally in weekly team rounds.       # Code status: Full Code    # Rehab Discharge Goals: Modified independent with mobility and ADLs    Marek Mcknight DO, FAAPM&R  Physical Medicine and Rehabilitation   Brain Injury Medicine    Please note that the above documentation was prepared using voice recognition software.  Every attempt was made to ensure accuracy but there may be spelling, grammatical, and contextual errors.

## 2024-07-04 NOTE — PROGRESS NOTES
not on anticoagulation      Social:  Pt lives with wife in a 2 story home with 3 step(s) to enter and 1 rail(s).    Prior to admission: Pt was independent and working full time.      Owns [employer].        Initial Evaluation  Date: 6/7/24 AM         Short Term Goals Long Term Goals    Was pt agreeable to Eval/treatment? Yes  Yes        Does pt have pain? Denies pain  Denied         Bed Mobility  Rolling: ModA  Supine to sit: ModA  Sit to supine: ModA  Scooting: ModA Rolling: Independent  Supine to sit: Independent  Sit to supine: Independent  Scooting: Independent   SBA Independent   Transfers Sit to stand: MaxA  Stand to sit: MaxA  Stand pivot: ModA x2 with L limb advancement assistance no AD  Slide board: WC <> Mat table Mak     5xSTS: NT  Sit to stand:   Supervision   Stand to sit: Supervision   Stand pivot: Supervision <> SBA with L SPC  Mak  5xSTS: TBD Supervision   5xSTS: TBD   Ambulation    15 feet x2 reps with L hallway rail and ModA assistance with R limb advancement (R KI, R DF ace wrap, sock over toe box)      10mWT: 0 m/s  6mWT: 0 m 600 feet with L SPC with SBA          150 feet with AAD with Mak     10mWT: TBD  6mWT: TBD >600 feet with AAD with SBA       10mWT: TBD  6mWT: TBD   Walking 10 feet on uneven surface NT  10' x 2 reps with L SPC SBA  10 feet with AAD with Mak 10 feet with AAD with SBA     Wheel Chair Mobility NT NT        Car Transfers NT SBA with L SPC  Mak  SBA   Stair negotiation: ascended and descended  NT  12 steps with L HR, SBA     Nonreciprocal   4-8 steps with 1 rail with Mak 8-12 steps with 1 rail with SBA     Curb Step:   ascended and descended NT 4\" step x 2 reps with L SPC   4 inch step with AAD and Mak 4 inch step with AAD and SBA     Picking up object off the floor NT  Picked up shoe with stand by assist   Will  cone/shoe with Min assist Will  cone/shoe with stand by  assist   BLE ROM WFL WNL        BLE Strength L LE: 5/5 all major muscle groups      R LE: 0/5 hip  flexion, extension, knee flexion, extension, ankle DF, PF NT        Balance  Static sitting: SBA  Dynamic sitting: Mak     Static standing: Mak at hallway rail     Dynamic standing: ModA at hallway rail      BBS: NT  FGA: NT Static standing with L SPC supervision     Dynamic standing with L SPC with SBA <> Supervision    BBS: 19/56  (06/24/24)        BBS: TBD  FGA: TBD    BBS: TBD  FGA: TBD   Date Family Teach Completed None to date  Daughter Carmen observation 06/21,   Wife hands on 06/30        Is additional Family Teaching Needed?  Y or N Y Y        Hindering Progress R hemiparesis and inattention  R hemiparesis, cognitive processing deficits          PT recommended ELOS 3-4 weeks   TBD        Team's Discharge Plan             Therapist at Team Meeting                 Therapeutic Exercise:   AM:   Functional Mobility     PM:  NA        Patient education  Pt educated on outcome measure assessment and interpretation of results     Patient response to education:   Pt verbalized understanding Pt demonstrated skill Pt requires further education in this area   yes no yes     Additional Comments: Pt remains pleasant and agreeable to activity. Functional activities continued with re-assessment for upcoming discharge, revealing achievement of all set goals regarding assist, activity repetition and distance. Primary barrier of apraxia continues, but functional endurance and postural stability has improved significantly. A SPC continued in use as primary AD for stabilization and will be recommended for home use.       AM  Time in: 1000  Time out: 1040    PM  Time in: -  Time out: -      Pt is making good progress toward established Physical Therapy goals.  Continue with physical therapy current plan of care.    Mar Toth, PT, DPT  [id number]

## 2024-07-04 NOTE — PROGRESS NOTES
Department of Internal Medicine  Infectious Diseases  Progress  Note      C/C :   Brain abscess s/p craniotomy and drainage  ( 6/3)     Denies fever or chills  Denies headache     Afebrile     Current Facility-Administered Medications   Medication Dose Route Frequency Provider Last Rate Last Admin    traZODone (DESYREL) tablet 50 mg  50 mg Oral Nightly Marek Mcknight L, DO   50 mg at 07/03/24 1943    bisacodyl (DULCOLAX) suppository 10 mg  10 mg Rectal Daily PRN Mercedes De Luna MD        apixaban (ELIQUIS) tablet 5 mg  5 mg Oral BID Kendra Mcknightct L, DO   5 mg at 07/04/24 0905    tamsulosin (FLOMAX) capsule 0.4 mg  0.4 mg Oral Daily Marek Mcknight, DO   0.4 mg at 07/04/24 0905    [Held by provider] bethanechol (URECHOLINE) tablet 10 mg  10 mg Oral TID Marek Mcknight, DO   10 mg at 06/18/24 0830    [Held by provider] lactulose (CHRONULAC) 10 GM/15ML solution 20 g  20 g Oral TID Kendra Mcknightct L, DO   20 g at 06/18/24 0830    sennosides-docusate sodium (SENOKOT-S) 8.6-50 MG tablet 2 tablet  2 tablet Oral BID Kendra Mcknightct L, DO   2 tablet at 07/04/24 0905    [Held by provider] polyethylene glycol (GLYCOLAX) packet 17 g  17 g Oral BID Kendra Mcknightct L, DO   17 g at 06/30/24 2125    levETIRAcetam (KEPPRA) tablet 1,000 mg  1,000 mg Oral BID Venkat Mcknightdict L, DO   1,000 mg at 07/04/24 0905    acetaminophen (TYLENOL) tablet 650 mg  650 mg Oral Q6H PRN Marek Mcknight, DO   650 mg at 07/03/24 1942    pantoprazole (PROTONIX) tablet 40 mg  40 mg Oral QAM AC Milanes Marino, Maria, MD   40 mg at 07/04/24 0553    rosuvastatin (CRESTOR) tablet 5 mg  5 mg Oral Nightly Milanes Marino, Maria, MD   5 mg at 07/03/24 1941    heparin (PF) 100 UNIT/ML injection 300 Units  3 mL IntraVENous PRN Milanes Marino, Maria, MD   300 Units at 06/18/24 0855    butalbital-acetaminophen-caffeine (FIORICET, ESGIC) per tablet 1 tablet  1 tablet Oral Q4H PRN Milanes Marino, Maria, MD   1 tablet at 06/06/24 1845    heparin (PF)

## 2024-07-04 NOTE — PLAN OF CARE
Problem: Safety - Adult  Goal: Free from fall injury  7/3/2024 2254 by Miracle Ko RN  Outcome: Progressing  Flowsheets (Taken 7/3/2024 1722 by Fany Vora, RN)  Free From Fall Injury: Instruct family/caregiver on patient safety  7/3/2024 1001 by Aleyad Monteiro LPN  Outcome: Progressing  7/3/2024 0958 by Aleyda Monteiro LPN  Outcome: Progressing     Problem: Discharge Planning  Goal: Discharge to home or other facility with appropriate resources  7/3/2024 2254 by Miracle Ko, RN  Outcome: Progressing  7/3/2024 1001 by Aleyda Monteiro LPN  Outcome: Progressing  7/3/2024 0958 by Aleyda Monteiro LPN  Outcome: Progressing  Flowsheets (Taken 7/3/2024 0830 by Fany Vora, RN)  Discharge to home or other facility with appropriate resources: Identify barriers to discharge with patient and caregiver     Problem: Skin/Tissue Integrity  Goal: Absence of new skin breakdown  Description: 1.  Monitor for areas of redness and/or skin breakdown  2.  Assess vascular access sites hourly  3.  Every 4-6 hours minimum:  Change oxygen saturation probe site  4.  Every 4-6 hours:  If on nasal continuous positive airway pressure, respiratory therapy assess nares and determine need for appliance change or resting period.  7/3/2024 2254 by Miracle Ko RN  Outcome: Progressing  7/3/2024 1001 by Aleyda Monteiro LPN  Outcome: Progressing  7/3/2024 0958 by Aleyda Monteiro LPN  Outcome: Progressing     Problem: ABCDS Injury Assessment  Goal: Absence of physical injury  7/3/2024 2254 by Miracle Ko, RN  Outcome: Progressing  Flowsheets (Taken 7/3/2024 1722 by Fany Vora, RN)  Absence of Physical Injury: Implement safety measures based on patient assessment  7/3/2024 1001 by Aleyda Monteiro LPN  Outcome: Progressing  7/3/2024 0958 by Aleyda Monteiro LPN  Outcome: Progressing     Problem: Pain  Goal: Verbalizes/displays adequate comfort level or baseline comfort level  7/3/2024 2254 by Maikel

## 2024-07-04 NOTE — PROGRESS NOTES
Occupational Therapy  OCCUPATIONAL THERAPY DAILY NOTE    Date:2024  Patient Name: Conrado Leach  MRN: [id number]  : 1960  Room: [address] [place] [address]A     Referring Practitioner: MD Juan Luis  Diagnosis: Brain mass L frontal lobe- 6/3/24- L crani abscess evacuation  Additional Pertinent Hx: hx R shoulder surgery, BPH, hx DVT, HTN, DM, HLD & seizures  Precautions: Fall Risk, R neglect, seizures, R hemiplegia & R UE edema & Soft & Bite size                        *Positive for DVT in RUE as of 24     *R ankle/foot swollen as of 24  .Discharge Recommendations: Home with 24 hour Sup/assist    Functional Assessment:   Date Status AE  Comments   Feeding 24 S/Setup     Grooming 24 CGA/Min A W/c To wash hands and apply deodorant due to limited RUE ROM and using one handed skills with small bottle.  Educated patient if using longer deodorant and how to complete task underneath LUE and positioning RUE.          Oral Care 24 Setup W/c Pt brushed teeth and rinsed mouth seated at sink.  Pt required cues to incorporate R hand to open cap or hold toothpaste & mouthwash versus only one handed skills to increase Right hand ROM/.    Bathing 24 Min A Sponge  Pt washed UB needing cues and assist  to wash LUE due to limited RUE ROM. Pt able to wash neck, chest and stomach needing assist to reach underneath arm pits. Pt washed BLE to shin needing assist to reach feet.  Pt completed frontal juan miguel care needing assist for balance & safety washing buttocks while standing at sink.    UB Dressing 24 Min A W/c Cues and assist for ekaterina technique to doff gown and octaviano t-shirt including watching RUE picc line to safely clear garment over site.    LB Dressing 24 Min A W/c  Reacher  Pt educated using reacher needing cues and assist threading RLE first then LLE while in w/c seated at sink.  Pt required assist for shorts over R side and partial back when standing.    Footwear 24 Min A- L shoe    Mod  A- R shoe

## 2024-07-04 NOTE — PLAN OF CARE
Problem: Safety - Adult  Goal: Free from fall injury  7/4/2024 1053 by Danyell Cormier LPN  Outcome: Progressing  7/3/2024 2254 by Miracle Ko RN  Outcome: Progressing  Flowsheets (Taken 7/3/2024 1722 by Fany Vora, RN)  Free From Fall Injury: Instruct family/caregiver on patient safety     Problem: Discharge Planning  Goal: Discharge to home or other facility with appropriate resources  7/4/2024 1053 by Danyell Cormier LPN  Outcome: Progressing  7/3/2024 2254 by Miracle Ko RN  Outcome: Progressing     Problem: Skin/Tissue Integrity  Goal: Absence of new skin breakdown  Description: 1.  Monitor for areas of redness and/or skin breakdown  2.  Assess vascular access sites hourly  3.  Every 4-6 hours minimum:  Change oxygen saturation probe site  4.  Every 4-6 hours:  If on nasal continuous positive airway pressure, respiratory therapy assess nares and determine need for appliance change or resting period.  7/4/2024 1053 by Danyell Cormier LPN  Outcome: Progressing  7/3/2024 2254 by Miracle Ko RN  Outcome: Progressing     Problem: ABCDS Injury Assessment  Goal: Absence of physical injury  7/4/2024 1053 by Danyell Cormier LPN  Outcome: Progressing  Flowsheets (Taken 7/4/2024 1053)  Absence of Physical Injury: Implement safety measures based on patient assessment  7/3/2024 2254 by Miracle Ko RN  Outcome: Progressing  Flowsheets (Taken 7/3/2024 1722 by Fany Vora, RN)  Absence of Physical Injury: Implement safety measures based on patient assessment     Problem: Pain  Goal: Verbalizes/displays adequate comfort level or baseline comfort level  7/4/2024 1053 by Danyell Cormier LPN  Outcome: Progressing  7/3/2024 2254 by Miracle Ko RN  Outcome: Progressing     Problem: Chronic Conditions and Co-morbidities  Goal: Patient's chronic conditions and co-morbidity symptoms are monitored and maintained or improved  7/4/2024 1053 by Danyell Cormier LPN  Outcome:  Progressing  7/3/2024 2254 by Miracle Ko, RN  Outcome: Progressing     Problem: Nutrition Deficit:  Goal: Optimize nutritional status  7/4/2024 1053 by Danyell Cormier LPN  Outcome: Progressing  7/3/2024 2254 by Miracle Ko, RN  Outcome: Progressing

## 2024-07-05 VITALS
WEIGHT: 218.26 LBS | HEIGHT: 71 IN | TEMPERATURE: 97.5 F | BODY MASS INDEX: 30.56 KG/M2 | RESPIRATION RATE: 18 BRPM | HEART RATE: 74 BPM | DIASTOLIC BLOOD PRESSURE: 74 MMHG | SYSTOLIC BLOOD PRESSURE: 148 MMHG | OXYGEN SATURATION: 98 %

## 2024-07-05 LAB
ANION GAP SERPL CALCULATED.3IONS-SCNC: 10 MMOL/L (ref 7–16)
BUN SERPL-MCNC: 12 MG/DL (ref 6–23)
CALCIUM SERPL-MCNC: 9.8 MG/DL (ref 8.6–10.2)
CHLORIDE SERPL-SCNC: 104 MMOL/L (ref 98–107)
CO2 SERPL-SCNC: 26 MMOL/L (ref 22–29)
CREAT SERPL-MCNC: 0.9 MG/DL (ref 0.7–1.2)
ERYTHROCYTE [DISTWIDTH] IN BLOOD BY AUTOMATED COUNT: 15.7 % (ref 11.5–15)
GFR, ESTIMATED: >90 ML/MIN/1.73M2
GLUCOSE SERPL-MCNC: 119 MG/DL (ref 74–99)
HCT VFR BLD AUTO: 39 % (ref 37–54)
HGB BLD-MCNC: 12.5 G/DL (ref 12.5–16.5)
MCH RBC QN AUTO: 26.7 PG (ref 26–35)
MCHC RBC AUTO-ENTMCNC: 32.1 G/DL (ref 32–34.5)
MCV RBC AUTO: 83.3 FL (ref 80–99.9)
PLATELET # BLD AUTO: 175 K/UL (ref 130–450)
PMV BLD AUTO: 9.5 FL (ref 7–12)
POTASSIUM SERPL-SCNC: 4.3 MMOL/L (ref 3.5–5)
RBC # BLD AUTO: 4.68 M/UL (ref 3.8–5.8)
SODIUM SERPL-SCNC: 140 MMOL/L (ref 132–146)
WBC OTHER # BLD: 3.9 K/UL (ref 4.5–11.5)

## 2024-07-05 PROCEDURE — 99239 HOSP IP/OBS DSCHRG MGMT >30: CPT | Performed by: PHYSICAL MEDICINE & REHABILITATION

## 2024-07-05 PROCEDURE — 85027 COMPLETE CBC AUTOMATED: CPT

## 2024-07-05 PROCEDURE — 6370000000 HC RX 637 (ALT 250 FOR IP): Performed by: STUDENT IN AN ORGANIZED HEALTH CARE EDUCATION/TRAINING PROGRAM

## 2024-07-05 PROCEDURE — 6360000002 HC RX W HCPCS: Performed by: STUDENT IN AN ORGANIZED HEALTH CARE EDUCATION/TRAINING PROGRAM

## 2024-07-05 PROCEDURE — 80048 BASIC METABOLIC PNL TOTAL CA: CPT

## 2024-07-05 PROCEDURE — 6360000002 HC RX W HCPCS: Performed by: PHYSICAL MEDICINE & REHABILITATION

## 2024-07-05 PROCEDURE — 6370000000 HC RX 637 (ALT 250 FOR IP): Performed by: PHYSICAL MEDICINE & REHABILITATION

## 2024-07-05 PROCEDURE — 2580000003 HC RX 258: Performed by: PHYSICAL MEDICINE & REHABILITATION

## 2024-07-05 RX ADMIN — WATER 2000 MG: 1 INJECTION INTRAMUSCULAR; INTRAVENOUS; SUBCUTANEOUS at 08:45

## 2024-07-05 RX ADMIN — SENNOSIDES AND DOCUSATE SODIUM 2 TABLET: 50; 8.6 TABLET ORAL at 08:46

## 2024-07-05 RX ADMIN — SODIUM CHLORIDE, PRESERVATIVE FREE 10 ML: 5 INJECTION INTRAVENOUS at 08:46

## 2024-07-05 RX ADMIN — LEVETIRACETAM 1000 MG: 500 TABLET, FILM COATED ORAL at 08:46

## 2024-07-05 RX ADMIN — Medication 1 CAPSULE: at 08:46

## 2024-07-05 RX ADMIN — HEPARIN 300 UNITS: 100 SYRINGE at 08:46

## 2024-07-05 RX ADMIN — ERGOCALCIFEROL 50000 UNITS: 1.25 CAPSULE ORAL at 08:46

## 2024-07-05 RX ADMIN — APIXABAN 5 MG: 5 TABLET, FILM COATED ORAL at 08:46

## 2024-07-05 RX ADMIN — TAMSULOSIN HYDROCHLORIDE 0.4 MG: 0.4 CAPSULE ORAL at 08:46

## 2024-07-05 RX ADMIN — PANTOPRAZOLE SODIUM 40 MG: 40 TABLET, DELAYED RELEASE ORAL at 05:55

## 2024-07-05 NOTE — PROGRESS NOTES
Discharge instructions reviewed with spouse and patient. Will discharge with PICC line in place to  RUE. Medications to be picked up at in house pharmacy and will transport home via private vehicle with spouse. Cane is to be obtained after discharge.    temporal

## 2024-07-05 NOTE — PROGRESS NOTES
CLINICAL PHARMACY NOTE: MEDS TO BEDS    Total # of Prescriptions Filled: 5   The following medications were delivered to the patient:  Eliquis 5  Levetiracetam 1000  Tamsulosin 0.4  Trazodone 50  Senexon-s 8.6-50    Additional Documentation:  Wife picked up in pharmacy

## 2024-07-05 NOTE — PROGRESS NOTES
Physical Therapy  Discharge Note     Supervising therapist : Mar Toth, PT, DPT [id number]  ROOM: [address] [place], [place] [address]  DIAGNOSIS: brain abscess   PRECAUTIONS: Falls, R hemiparesis, R UE sling, R inattention, flat affect, cognition  Diet: Dysphagia 3, Soft/advanced (Soft & Bite-sized) solids with thin liquids (IDDSI level 0)     HPI: Pt is a 63 year old pt who presented to ER from home with complaints of right side weakness and seizure-like activity at home. NIH of 10 upon arrival. Per wife at bedside patient has been having neck and lower back pain and has been going to chiropractor.  He also was having some confusion and they thought it was a UTI and he went to his primary care physician and was prescribed Bactrim.  He went into his daily routine, went to work and returned home.  At around 2:00 when his wife came home found him in the floor, unable to move the right side, appeared that he comprehended but he was unable to speak.  He later said that he had a seizure-like activity sitting on the chair and a slide himself down to the floor, that lasted for about 5 minutes. Workup revealed two masses in the left frontal lobe with adjacent vasogenic edema. EEG positive for left fronto central seizures. Neurosurgery and neurology was consulted. On 06/03/2024 patient underwent a left craniotomy abscess evacuation by Dr. Henderson. ID consulted and placed patient on IV antibiotics. Updated MRI brain w/wo contrast on 6/3/24 showed interval surgical drainage of one of the two left frontal lobe abscesses; mild increase in size of other abscess; slight increase in vasogenic edema; 3mm left-to-right MLS; stable right meningioma.  NSGY reviewed MRI brain prior to admission to ARU with no additional recommendations.  Hospital course was complicated by seizure, weakness, hypotension.       Pt has PMHx of type 2 diabetes mellitus, HLD, BPH, PARTH, multiple orthopedic surgeries, right lower extremities DVT 2 years ago after he had spider

## 2024-07-05 NOTE — DISCHARGE SUMMARY
[facility]  Discharge Summary    Date of Original Admission: 5/29/24   Date of Rehab Admission: 6/6/2024  Date of Discharge: 7/5/2024    Referring Provider: Renée Buenrostro APRN-CNS  Primary Care Physician: Mohan Velázquez DO     Attending Physician: Marek Mcknight DO  Primary Service:  Acute Inpatient Rehabilitation     Primary Diagnosis: nontraumatic brain dysfunction (brain abscess)   Secondary Diagnosis/es: urinary retention, acute post-op ileus, seizure, weakness, hypotension, BPH, history of DVT, HLD, PARTH, T2DM  Consults:  NSGY, rad-onc, Hem-onc, ID, neurology, PM&R  Procedures: Left craniotomy with abscess evacuation on 6/3/24 with Dr. Henderson    Discharge Disposition:  Home  Condition on Discharge:  Improved  ===================================================================  Brief History of Present Illness:     Conrado Leach is a 63 y.o. male with past medical history significant for BPH, history of DVT, HLD, PARTH, T2DM who presented to [facility] on 5/29/24 for AMS.  Reportedly, he had approximately 3-4 day history of confusion, brain fog, and ataxia.  He presented to ED with an apparent seizure.  Initial CTH revealed 2 frontal lobe brain tumors.  He underwent left craniotomy with abscess evacuation on 6/3/24 with Dr. Henderson.  He was seen by ID and is now on IV Rocephin 2 g q12H x 6-8 weeks.  Updated MRI brain w/wo contrast on 6/3/24 showed interval surgical drainage of one of the two left frontal lobe abscesses; mild increase in size of other abscess; slight increase in vasogenic edema; 3mm left-to-right MLS; stable right meningioma.  NSGY reviewed MRI brain prior to admission to ARU with no additional recommendations.  Hospital course was complicated by seizure, weakness, hypotension.  Patient was admitted to [facility] ARU on 6/6/2024.       Pertinent Admission Physical Examination:   GEN: NAD, conversational   HEENT: left crani,

## 2024-07-05 NOTE — PROGRESS NOTES
General Appearance:    Awake, alert , no acute distress.   Head:    Normocephalic, wound healing    Eyes:    No pallor, no icterus,   Ears:    No obvious deformity or drainage.   Nose:   No nasal drainage   Throat:   Mucosa moist, no oral thrush   Neck:   Supple, no lymphadenopathy   Lungs:     Clear to auscultation bilaterally,   Heart:    Regular rate and rhythm, no murmur   Abdomen:     Soft, non-tender, bowel sounds present    Extremities:   +  edema    Pulses:   Dorsalis pedis palpable    Skin:   no rashes      CBC with Differential:      Lab Results   Component Value Date/Time    WBC 3.9 07/05/2024 05:30 AM    RBC 4.68 07/05/2024 05:30 AM    HGB 12.5 07/05/2024 05:30 AM    HCT 39.0 07/05/2024 05:30 AM     07/05/2024 05:30 AM    MCV 83.3 07/05/2024 05:30 AM    MCH 26.7 07/05/2024 05:30 AM    MCHC 32.1 07/05/2024 05:30 AM    RDW 15.7 07/05/2024 05:30 AM    LYMPHOPCT 18 07/04/2024 10:40 AM    MONOPCT 11 07/04/2024 10:40 AM    EOSPCT 2 07/04/2024 10:40 AM    BASOPCT 1 07/04/2024 10:40 AM    MONOSABS 0.51 07/04/2024 10:40 AM    LYMPHSABS 0.85 07/04/2024 10:40 AM    EOSABS 0.11 07/04/2024 10:40 AM    BASOSABS 0.04 07/04/2024 10:40 AM       CMP     Lab Results   Component Value Date/Time     07/05/2024 05:30 AM    K 4.3 07/05/2024 05:30 AM     07/05/2024 05:30 AM    CO2 26 07/05/2024 05:30 AM    BUN 12 07/05/2024 05:30 AM    CREATININE 0.9 07/05/2024 05:30 AM    LABGLOM >90 07/05/2024 05:30 AM    GLUCOSE 119 07/05/2024 05:30 AM    CALCIUM 9.8 07/05/2024 05:30 AM    BILITOT 0.4 06/07/2024 05:08 AM    ALKPHOS 58 06/07/2024 05:08 AM    AST 14 06/07/2024 05:08 AM    ALT 29 06/07/2024 05:08 AM       Radiology :    CT scan of head -    1. Prominent vasogenic edema in the left frontal lobe related to the left   frontal lobe abscesses, one of which was recently drained.   2. Due to absence of intravenous contrast enhancement oval change in the size   of the abscesses is difficult to gauge.  Further

## 2024-07-05 NOTE — PROGRESS NOTES
Occupational Therapy  OCCUPATIONAL THERAPY DAILY NOTE/DISCHARGE SUMMARY    Date:2024  Patient Name: Conrado Leach  MRN: [id number]  : 1960  Room: [address] [place] [address]A     Referring Practitioner: MD Juan Luis  Diagnosis: Brain mass L frontal lobe- 6/3/24- L crani abscess evacuation  Additional Pertinent Hx: hx R shoulder surgery, BPH, hx DVT, HTN, DM, HLD & seizures  Precautions: Fall Risk, R neglect, seizures, R hemiplegia & R UE edema & Soft & Bite size                        *Positive for DVT in RUE as of 24     *R ankle/foot swollen as of 24  .Discharge Recommendations: Home with 24 hour Sup/assist    Functional Assessment:   Date Status AE  Comments   Feeding 24 S/Setup     Grooming 24 CGA/Min A W/c          Oral Care 24 Setup W/c    Bathing 24 Min A Sponge     UB Dressing 24 Min A W/c    LB Dressing 24 Min A W/c  Reacher     Footwear 24 Min A- L shoe    Mod  A- R shoe      Toileting 24 Mod/Min A BSC    Homemaking 7/3/24 Mod/Min A  SPC       Functional Transfers / Balance:   Date Status DME  Comments   Sit Balance 24 Independent     Stand Balance 24 CGA/SBA SPC  Sink       [x] Tub  [] Shower   Transfer 24 Mod/Min A LBQC  extended tub bench    Commode   Transfer 24 Min A BSC  LBQC      Functional   Mobility 7/3/24 CGA SPC    Other: bed rolling R<>L    Supine>Sit        SPT      Sit <>Stand       W/c to bedside recliner  6/11/24      6/27/24        7/3/24      7/4/24      7/3/24 Mod to  max  A      Min A        Min A      Min/CGA      Min A Bed rails               SPC            SPC      Functional Exercises / Activity:      Sensory / Neuromuscular Re-Education:      Cognitive Skills   Status Comments   Problem   Solving fair    Memory fair    Sequencing fair    Safety fair      Visual Perception:      Education:  -Pt and wife present this AM and report no further questions/concerns in regards to OT skills, safety/fall prevention or ADL techniques.     []

## 2024-07-05 NOTE — DISCHARGE INSTRUCTIONS
[facility]  ([facility])  [address]  [address]  [place] [address]  Phone [phone]   Fax [phone]    Chad Ledezma MD, FACP   MD Louise Smith MD Indra P. Limbu, MD Eunice A. H. Wong, MD Payal Herndon, MD Aba Tong, CNS   Ed Guerra, APRN, CNS  Kimberli Vizcarra, APRN-CNP    Kian Lion, APRN, CNS  Jessica Newton, APRN-CNP   Arturo Flower MD               STANDING ORDERS (“ID Protocol”)     Visiting nurses are to write the Primary Care Physician and their own call back number on all laboratory requisition forms.   Abnormal lab values are called to the physician by the nurse and NOT by the laboratory.   Fax all labs to the office in a timely manner, during office hours. All faxes should include nurse’s name and call back number.  Vascular Access Devices or VADs (TLC, PICC, Midline, etc) will be replaced as necessary.  Draw all blood work from VADs, except for drug levels.  If unable to access a VAD, insert a peripheral catheter temporarily. Contact the Primary Care Physician or [facility] office for surgical referral.  Use tPA (Cathflo®, Alteplase®) as per agency protocol to restore patency of VAD.  Saline flush 10ml or heparin flush 10U/cc IV daily and as needed to maintain line patency.  Remove VAD upon completion of IV antibiotics, unless otherwise specified by the ordering physician.  If VAD cannot be removed, schedule appointment at office for removal.  If VAD was placed by Radiology, schedule appointment for removal.  Notify ordering physician or office if patient requires admission to the hospital with reason for admission.  Discontinue all blood work upon completion of IV antibiotics, unless otherwise specified by ordering physician.  Notify ordering physician if the patient does not receive the scheduled antibiotic for 24 hours or more.  The Pharmacy and Home Health Agency may adjust the timing of

## 2024-07-10 ENCOUNTER — OFFICE VISIT (OUTPATIENT)
Dept: NEUROSURGERY | Age: 64
End: 2024-07-10

## 2024-07-10 VITALS — OXYGEN SATURATION: 98 % | DIASTOLIC BLOOD PRESSURE: 57 MMHG | SYSTOLIC BLOOD PRESSURE: 109 MMHG | HEART RATE: 57 BPM

## 2024-07-10 DIAGNOSIS — G93.89 BRAIN MASS: ICD-10-CM

## 2024-07-10 DIAGNOSIS — Z98.890 S/P CRANIOTOMY: ICD-10-CM

## 2024-07-10 DIAGNOSIS — G06.0 BRAIN ABSCESS: Primary | ICD-10-CM

## 2024-07-10 PROCEDURE — 99024 POSTOP FOLLOW-UP VISIT: CPT | Performed by: PHYSICIAN ASSISTANT

## 2024-07-10 NOTE — PROGRESS NOTES
Post Operative Follow-up     This is a 63 year old male who presents to the office for a 1 month follow-up s/p left craniotomy for abscess evacuation      Subjective: Patient states he has been doing \"okay.\" He was just discharged from acute rehab several days ago and is having issues finding a Home Healthcare agency that will come to his house for continued PT. Continues to have significant weakness on his right side but improving. Also having swelling in all extremities. Patient follows with Infectious Disease and has a PICC line placed. Head CT scan reviewed. No issues with incision site.      Physical Exam:              WDWN, no apparent distress              Non-labored breathing               Vitals Stable              Alert and oriented x3              CN 3-12 intact              PERRL              EOMI              Significant right sided weakness   Edema in all extremities    Incision healing well with no signs of infection                   Imaging: Head CT scan 7/1/24: IMPRESSION:  1. Interval decrease in size of the thick walled fluid collection in the high  medial left mid-anterior parietal subcortical region. There continues to be  prominent surrounding edema.  2. Continued effacement of the frontal horn and anterior body of the left  lateral ventricle, but the previously seen minimal right to left midline  shift of 4 mm has decreased to 1-2 mm.  3. Stable appearance of the left anterior parietal craniotomy. The previously  seen small acute subdural hemorrhage deep to the craniotomy has resolved.  4. No other acute intracranial abnormality.        Assessment: This is a 63 y.o.  male presenting for a 1 month follow-up s/p left craniotomy for abscess evacuation      Plan:  -Head CT scan reviewed  -MRI brain W WO ordered. Patient will call once complete  -Continue follow up with ID for abx  -Handicap placard   -Social work referral   -OARRS report reviewed   -Follow-up in neurosurgery clinic in 2 months

## 2024-07-12 ENCOUNTER — TELEPHONE (OUTPATIENT)
Dept: INTERNAL MEDICINE | Age: 64
End: 2024-07-12

## 2024-07-12 ENCOUNTER — TELEPHONE (OUTPATIENT)
Dept: NEUROSURGERY | Age: 64
End: 2024-07-12

## 2024-07-12 NOTE — TELEPHONE ENCOUNTER
[facility] is requesting a verbal authorization that our providers (last seen by Radha 7/10) will follow the patient and sign orders for his home health care. Please advise. Verbal order can be given to Jerri at [phone]

## 2024-07-16 ENCOUNTER — SOCIAL WORK (OUTPATIENT)
Dept: INTERNAL MEDICINE | Age: 64
End: 2024-07-16

## 2024-07-29 ENCOUNTER — HOSPITAL ENCOUNTER (OUTPATIENT)
Dept: MRI IMAGING | Age: 64
Discharge: HOME | End: 2024-07-31
Payer: COMMERCIAL

## 2024-07-29 DIAGNOSIS — G06.0 BRAIN ABSCESS: ICD-10-CM

## 2024-07-29 DIAGNOSIS — Z98.890 S/P CRANIOTOMY: ICD-10-CM

## 2024-07-29 DIAGNOSIS — G93.89 BRAIN MASS: ICD-10-CM

## 2024-07-29 PROCEDURE — 70553 MRI BRAIN STEM W/O & W/DYE: CPT

## 2024-07-29 PROCEDURE — A9579 GAD-BASE MR CONTRAST NOS,1ML: HCPCS | Performed by: RADIOLOGY

## 2024-07-29 PROCEDURE — 6360000004 HC RX CONTRAST MEDICATION: Performed by: RADIOLOGY

## 2024-07-29 RX ADMIN — GADOTERIDOL 20 ML: 279.3 INJECTION, SOLUTION INTRAVENOUS at 12:46

## 2024-08-13 ENCOUNTER — OFFICE VISIT (OUTPATIENT)
Dept: NEUROSURGERY | Age: 64
End: 2024-08-13
Payer: COMMERCIAL

## 2024-08-13 VITALS — SYSTOLIC BLOOD PRESSURE: 120 MMHG | OXYGEN SATURATION: 98 % | DIASTOLIC BLOOD PRESSURE: 64 MMHG | HEART RATE: 58 BPM

## 2024-08-13 DIAGNOSIS — Z98.890 S/P CRANIOTOMY: Primary | ICD-10-CM

## 2024-08-13 PROCEDURE — 99024 POSTOP FOLLOW-UP VISIT: CPT | Performed by: PHYSICIAN ASSISTANT

## 2024-08-13 PROCEDURE — 99204 OFFICE O/P NEW MOD 45 MIN: CPT | Performed by: PHYSICIAN ASSISTANT

## 2024-08-13 NOTE — PROGRESS NOTES
Post Operative Follow-up    Patient is status post: left craniotomy for abscess.  Right sided weakness improving slowly.  Still getting AB.    Physical Exam  Alert and Oriented X 3  PERRLA, EOMI  GARCIA, right hemiparesis - improving  Wound: C/D/I    A/P: patient is s/p left craniotomy 2 months ago.  Brain MRI reveals improvement with abscess.  Continue with ID.  Will begin PT.  F/u in 2months

## 2024-08-21 ENCOUNTER — HOSPITAL ENCOUNTER (OUTPATIENT)
Dept: MRI IMAGING | Age: 64
Discharge: HOME | End: 2024-08-23
Payer: COMMERCIAL

## 2024-08-21 DIAGNOSIS — G06.0 BRAIN ABSCESS: ICD-10-CM

## 2024-08-21 PROCEDURE — 2580000003 HC RX 258: Performed by: RADIOLOGY

## 2024-08-21 PROCEDURE — 70553 MRI BRAIN STEM W/O & W/DYE: CPT

## 2024-08-21 PROCEDURE — A9577 INJ MULTIHANCE: HCPCS | Performed by: RADIOLOGY

## 2024-08-21 PROCEDURE — 6360000004 HC RX CONTRAST MEDICATION: Performed by: RADIOLOGY

## 2024-08-21 RX ORDER — SODIUM CHLORIDE 0.9 % (FLUSH) 0.9 %
5-40 SYRINGE (ML) INJECTION 2 TIMES DAILY
Status: DISCONTINUED | OUTPATIENT
Start: 2024-08-21 | End: 2024-08-24 | Stop reason: HOSPADM

## 2024-08-21 RX ADMIN — GADOBENATE DIMEGLUMINE 20 ML: 529 INJECTION, SOLUTION INTRAVENOUS at 10:18

## 2024-08-21 RX ADMIN — Medication 10 ML: at 10:18

## 2024-08-29 ENCOUNTER — HOSPITAL ENCOUNTER (EMERGENCY)
Age: 64
Discharge: HOME | End: 2024-08-29
Attending: STUDENT IN AN ORGANIZED HEALTH CARE EDUCATION/TRAINING PROGRAM
Payer: COMMERCIAL

## 2024-08-29 ENCOUNTER — APPOINTMENT (OUTPATIENT)
Dept: CT IMAGING | Age: 64
End: 2024-08-29
Payer: COMMERCIAL

## 2024-08-29 VITALS
HEART RATE: 54 BPM | RESPIRATION RATE: 16 BRPM | DIASTOLIC BLOOD PRESSURE: 82 MMHG | SYSTOLIC BLOOD PRESSURE: 144 MMHG | TEMPERATURE: 97.7 F | OXYGEN SATURATION: 96 %

## 2024-08-29 DIAGNOSIS — R51.9 ACUTE NONINTRACTABLE HEADACHE, UNSPECIFIED HEADACHE TYPE: Primary | ICD-10-CM

## 2024-08-29 LAB
ALBUMIN SERPL-MCNC: 4.2 G/DL (ref 3.5–5.2)
ALP SERPL-CCNC: 66 U/L (ref 40–129)
ALT SERPL-CCNC: 15 U/L (ref 0–40)
ANION GAP SERPL CALCULATED.3IONS-SCNC: 12 MMOL/L (ref 7–16)
AST SERPL-CCNC: 14 U/L (ref 0–39)
BASOPHILS # BLD: 0.05 K/UL (ref 0–0.2)
BASOPHILS NFR BLD: 1 % (ref 0–2)
BILIRUB SERPL-MCNC: 0.4 MG/DL (ref 0–1.2)
BUN SERPL-MCNC: 14 MG/DL (ref 6–23)
CALCIUM SERPL-MCNC: 10 MG/DL (ref 8.6–10.2)
CHLORIDE SERPL-SCNC: 102 MMOL/L (ref 98–107)
CO2 SERPL-SCNC: 27 MMOL/L (ref 22–29)
CREAT SERPL-MCNC: 1 MG/DL (ref 0.7–1.2)
EOSINOPHIL # BLD: 0.27 K/UL (ref 0.05–0.5)
EOSINOPHILS RELATIVE PERCENT: 5 % (ref 0–6)
ERYTHROCYTE [DISTWIDTH] IN BLOOD BY AUTOMATED COUNT: 14.7 % (ref 11.5–15)
GFR, ESTIMATED: 87 ML/MIN/1.73M2
GLUCOSE SERPL-MCNC: 127 MG/DL (ref 74–99)
HCT VFR BLD AUTO: 43.3 % (ref 37–54)
HGB BLD-MCNC: 13.8 G/DL (ref 12.5–16.5)
IMM GRANULOCYTES # BLD AUTO: <0.03 K/UL (ref 0–0.58)
IMM GRANULOCYTES NFR BLD: 0 % (ref 0–5)
LACTATE BLDV-SCNC: 1.2 MMOL/L (ref 0.5–2.2)
LYMPHOCYTES NFR BLD: 1.61 K/UL (ref 1.5–4)
LYMPHOCYTES RELATIVE PERCENT: 29 % (ref 20–42)
MCH RBC QN AUTO: 26.7 PG (ref 26–35)
MCHC RBC AUTO-ENTMCNC: 31.9 G/DL (ref 32–34.5)
MCV RBC AUTO: 83.9 FL (ref 80–99.9)
MONOCYTES NFR BLD: 0.66 K/UL (ref 0.1–0.95)
MONOCYTES NFR BLD: 12 % (ref 2–12)
NEUTROPHILS NFR BLD: 53 % (ref 43–80)
NEUTS SEG NFR BLD: 2.92 K/UL (ref 1.8–7.3)
PLATELET # BLD AUTO: 186 K/UL (ref 130–450)
PMV BLD AUTO: 9.7 FL (ref 7–12)
POTASSIUM SERPL-SCNC: 4.2 MMOL/L (ref 3.5–5)
PROT SERPL-MCNC: 6.5 G/DL (ref 6.4–8.3)
RBC # BLD AUTO: 5.16 M/UL (ref 3.8–5.8)
SODIUM SERPL-SCNC: 141 MMOL/L (ref 132–146)
WBC OTHER # BLD: 5.5 K/UL (ref 4.5–11.5)

## 2024-08-29 PROCEDURE — 72125 CT NECK SPINE W/O DYE: CPT

## 2024-08-29 PROCEDURE — 83605 ASSAY OF LACTIC ACID: CPT

## 2024-08-29 PROCEDURE — 70450 CT HEAD/BRAIN W/O DYE: CPT

## 2024-08-29 PROCEDURE — 70496 CT ANGIOGRAPHY HEAD: CPT

## 2024-08-29 PROCEDURE — 85025 COMPLETE CBC W/AUTO DIFF WBC: CPT

## 2024-08-29 PROCEDURE — 80053 COMPREHEN METABOLIC PANEL: CPT

## 2024-08-29 PROCEDURE — 6360000004 HC RX CONTRAST MEDICATION: Performed by: RADIOLOGY

## 2024-08-29 PROCEDURE — 99285 EMERGENCY DEPT VISIT HI MDM: CPT

## 2024-08-29 RX ORDER — IOPAMIDOL 755 MG/ML
75 INJECTION, SOLUTION INTRAVASCULAR
Status: COMPLETED | OUTPATIENT
Start: 2024-08-29 | End: 2024-08-29

## 2024-08-29 RX ORDER — SOLIFENACIN SUCCINATE 10 MG/1
10 TABLET, FILM COATED ORAL DAILY
COMMUNITY
Start: 2024-08-16

## 2024-08-29 RX ORDER — CARISOPRODOL 350 MG/1
350 TABLET ORAL NIGHTLY
COMMUNITY
Start: 2024-05-29

## 2024-08-29 RX ORDER — ALFUZOSIN HYDROCHLORIDE 10 MG/1
10 TABLET, EXTENDED RELEASE ORAL DAILY
COMMUNITY
Start: 2024-07-22

## 2024-08-29 RX ORDER — CELECOXIB 200 MG/1
200 CAPSULE ORAL 2 TIMES DAILY
COMMUNITY
Start: 2024-08-28

## 2024-08-29 RX ORDER — DOCUSATE SODIUM AND SENNOSIDES 8.6; 5 MG/1; MG/1
1 TABLET, FILM COATED ORAL NIGHTLY
COMMUNITY
Start: 2024-08-21

## 2024-08-29 RX ORDER — SILODOSIN 8 MG/1
8 CAPSULE ORAL EVERY EVENING
COMMUNITY
Start: 2024-08-27

## 2024-08-29 RX ADMIN — IOPAMIDOL 75 ML: 755 INJECTION, SOLUTION INTRAVENOUS at 19:09

## 2024-08-29 ASSESSMENT — LIFESTYLE VARIABLES
HOW MANY STANDARD DRINKS CONTAINING ALCOHOL DO YOU HAVE ON A TYPICAL DAY: PATIENT DOES NOT DRINK
HOW OFTEN DO YOU HAVE A DRINK CONTAINING ALCOHOL: NEVER

## 2024-08-29 NOTE — ED PROVIDER NOTES
[facility] EMERGENCY DEPARTMENT  EMERGENCY DEPARTMENT ENCOUNTER    Pt Name: Conrado Leach  MRN: [id number]  Birthdate 1960  Date of evaluation: 8/29/2024  Provider: Emory Bella MD  PCP: Jules Velázquez DO  Note Started: 5:26 PM EDT 8/29/24    HPI     I personally saw Conrado Leach and made/approved the management plan and take responsibility for the patient management.  This will serve as my supervisory note and shared attestation.  I did perform a substantiative portion of the visit including all aspects of the medical decision making.    In brief, Conrado Leach is a 63 y.o. that presents to the emergency department concerns for brain abscess.  Patient stated that he has had no seizures.  No fevers or chills.  No chest pain or shortness of breath.  Just a mild headache.  Patient denies any changes in urinary or bowel habits.  No abdominal pain.  Patient is otherwise acting at his neurological baseline per the wife who is at bedside.  Patient was previously on antibiotics.    Nursing Notes were all reviewed and agreed with or any disagreements were addressed in the HPI.    History From: Patient and patient's wife    Review of Systems   Pertinent positives and negatives as per HPI.     Focused exam:  Physical Exam  Vitals and nursing note reviewed.   Constitutional:       Appearance: He is well-developed.   HENT:      Head: Normocephalic and atraumatic.   Eyes:      Conjunctiva/sclera: Conjunctivae normal.   Cardiovascular:      Rate and Rhythm: Normal rate and regular rhythm.      Heart sounds: Normal heart sounds. No murmur heard.  Pulmonary:      Effort: Pulmonary effort is normal. No respiratory distress.      Breath sounds: Normal breath sounds. No wheezing or rales.   Abdominal:      General: Bowel sounds are normal.      Palpations: Abdomen is soft.      Tenderness: There is no abdominal tenderness. There is no guarding or rebound.   Musculoskeletal:

## 2024-08-30 ENCOUNTER — TELEPHONE (OUTPATIENT)
Dept: NEUROSURGERY | Age: 64
End: 2024-08-30

## 2024-08-30 NOTE — ED PROVIDER NOTES
[facility] EMERGENCY DEPARTMENT  EMERGENCY DEPARTMENT ENCOUNTER        Pt Name: Conrado Leach  MRN: [id number]  Birthdate 1960  Date of evaluation: 8/29/2024  Provider: Alexis Carranza DO  PCP: Jules Velázquez DO  Note Started: 12:51 PM EDT 8/30/24    CHIEF COMPLAINT       Chief Complaint   Patient presents with    Back Pain     Pt c/o of back and neck pain X 2 weeks. More severe today. (Brain abscess drained in June, on seizure meds.        HISTORY OF PRESENT ILLNESS: 1 or more Elements   History received from: Patient    Conrado Leach is a 63 y.o. male who presents to the emergency department with chief complaint of neck pain.  Patient states that he has had chronic back pain and over the past 2 weeks has had increasing left-sided neck pain.  He states that it is more severe today than it had been.  States that he has a history for an intracranial abscess that was drained in June by neurosurgery at this facility and he was on IV antibiotics.  He is also on seizure medications and is compliant with these medications without recurrence of seizure.  States that the neck pain is not associated with any headache and nothing makes symptoms better or worse.  Has not had any falls or injuries.  Denies any neck stiffness and has no numbness or weakness from baseline in any extremities.  Patient is mentating well.  Does have good follow-up.  Denies any fever, chills, nausea, vomiting, chest pain, shortness breath, abdominal pain, hematuria or dysuria, constipation or diarrhea.    Nursing Notes were all reviewed and agreed with or any disagreements were addressed in the HPI.    REVIEW OF SYSTEMS :    Positives and Pertinent negatives as per HPI.    PAST MEDICAL HISTORY/Chronic Conditions Affecting Care    has a past medical history of BPH (benign prostatic hyperplasia), Brain abscess (05/2024), DVT (deep venous thrombosis) ([facility]), HLD (hyperlipidemia), New onset seizure (HCC) (05/2024),  patient that he should follow-up with neurosurgery [JM]      ED Course User Index  [JM] Emory Bella MD        Social Determinants affecting Dx or Tx: Patient has good medical compliance and fluency as well as established follow-up with family physician.  Stress    Records Reviewed: Office visit with Dr. Peck, infectious disease, on 8/21/2024 reviewed where patient was evaluated and was being treated for brain abscess.    I am the Primary Clinician of Record.    CONSULTS: (Who and What was discussed)  None    FINAL IMPRESSION      1. Acute nonintractable headache, unspecified headache type          DISPOSITION/PLAN   DISPOSITION Decision To Discharge 08/29/2024 09:38:14 PM    PATIENT REFERRED TO:  Jules Velázquez DO  [address]   [address]  [facility] [facility] [address]  [phone]    Call in 1 day      Mary Henderson MD  [address] [facility] [address]  [phone]    Call in 1 day        DISCHARGE MEDICATIONS:  Discharge Medication List as of 8/29/2024 10:47 PM               (Please note that portions of this note were completed with a voice recognition program.  Efforts were made to edit the dictations but occasionally words are mis-transcribed.)    Alexis Carranza DO (electronically signed)

## 2024-08-30 NOTE — DISCHARGE INSTRUCTIONS
Return if any new or worsening symptoms.  Return if any chest pain or shortness of breath.  Follow-up with your primary care provider.      Return to the emergency room immediately if you develop any new or worsening symptoms

## 2024-08-30 NOTE — TELEPHONE ENCOUNTER
Patients wife, Valeri called as he was seen in the ER 8/29. He is still having neck & lower back pain. He had CT's done and they were told to contact Neurosurgery to have Dr. Henderson or a PA review them to determine what they need to do. I did inform Mrs. Leach that Dr. Henderson and Margaret were in surgery all day but that I would get a message over in hopes that the CT's could be reviewed in between cases if time allowed for it. She would like a call back as she is concerned with the finding of gliosis on his head CT. Thank you.

## 2024-09-13 ENCOUNTER — TRANSCRIBE ORDERS (OUTPATIENT)
Dept: ADMINISTRATIVE | Age: 64
End: 2024-09-13

## 2024-09-13 DIAGNOSIS — G06.0 ABSCESS, BRAIN: Primary | ICD-10-CM

## 2024-09-23 ENCOUNTER — HOSPITAL ENCOUNTER (OUTPATIENT)
Dept: MRI IMAGING | Age: 64
Discharge: HOME | End: 2024-09-25
Attending: INTERNAL MEDICINE
Payer: COMMERCIAL

## 2024-09-23 DIAGNOSIS — G06.0 BRAIN ABSCESS: ICD-10-CM

## 2024-09-23 PROCEDURE — 6360000004 HC RX CONTRAST MEDICATION: Performed by: RADIOLOGY

## 2024-09-23 PROCEDURE — 70553 MRI BRAIN STEM W/O & W/DYE: CPT

## 2024-09-23 PROCEDURE — A9577 INJ MULTIHANCE: HCPCS | Performed by: RADIOLOGY

## 2024-09-23 RX ADMIN — GADOBENATE DIMEGLUMINE 20 ML: 529 INJECTION, SOLUTION INTRAVENOUS at 15:30

## 2024-10-15 ENCOUNTER — OFFICE VISIT (OUTPATIENT)
Dept: NEUROSURGERY | Age: 64
End: 2024-10-15
Payer: COMMERCIAL

## 2024-10-15 DIAGNOSIS — G06.0 BRAIN ABSCESS: Primary | ICD-10-CM

## 2024-10-15 PROCEDURE — 99212 OFFICE O/P EST SF 10 MIN: CPT | Performed by: NEUROLOGICAL SURGERY

## 2024-10-15 NOTE — PROGRESS NOTES
Patient is here for follow up after left frontal crani for abscess.  He is doing well.    Physical exam  Alert and Oriented X3  PERRLA, EOMI  GARCIA 5/5  Sensation intact to LT and PP  Reflexes are 2+ and symmetric    A/P: patient is here for follow up for: crani for abscess.  No complaints.  Follow upo in June of next year with brain MRI with and without contrast.    Mary Henderson MD

## 2024-11-11 ENCOUNTER — TELEPHONE (OUTPATIENT)
Dept: NEUROSURGERY | Age: 64
End: 2024-11-11

## 2024-11-11 DIAGNOSIS — Z98.890 S/P CRANIOTOMY: ICD-10-CM

## 2024-11-11 DIAGNOSIS — G06.0 BRAIN ABSCESS: Primary | ICD-10-CM

## 2024-11-11 DIAGNOSIS — M54.2 ACUTE NECK PAIN: ICD-10-CM

## 2024-11-11 NOTE — TELEPHONE ENCOUNTER
Thank you, called Mrs. Leach and informed her we will schedule the CT's for her and provide her with the info. Once completed to call us back to determine if appt is needed.

## 2024-11-11 NOTE — TELEPHONE ENCOUNTER
Patients wife Valeri called in asking if she should schedule him with Dr. Henderson. She says for several weeks he has had pain in his neck, around the same time he started experiencing dizziness and bouts of falling/feeling off balance. He was last seen in office by Dr. Henderson for a crani follow up, no mention of issues following that appt or related to that dx. He has been going to a chiropractor and it has not helped at all. XR was taken about a month ago, Valeri has the report but no imaging disc.Should we schedule with Dr. Henderson or should he complete any testing prior? Please advise.

## 2024-11-11 NOTE — TELEPHONE ENCOUNTER
Head CT scan and Cervical CT scan ordered. Patient will call once completed and will determine if appointment needed with Dr. Henderson

## 2024-11-13 ENCOUNTER — TELEPHONE (OUTPATIENT)
Dept: NEUROSURGERY | Age: 64
End: 2024-11-13

## 2024-11-13 ENCOUNTER — HOSPITAL ENCOUNTER (OUTPATIENT)
Dept: CT IMAGING | Age: 64
Discharge: HOME | End: 2024-11-15
Payer: COMMERCIAL

## 2024-11-13 DIAGNOSIS — G06.0 BRAIN ABSCESS: ICD-10-CM

## 2024-11-13 DIAGNOSIS — Z98.890 S/P CRANIOTOMY: ICD-10-CM

## 2024-11-13 DIAGNOSIS — M54.2 ACUTE NECK PAIN: ICD-10-CM

## 2024-11-13 PROCEDURE — 70450 CT HEAD/BRAIN W/O DYE: CPT

## 2024-11-13 PROCEDURE — 72125 CT NECK SPINE W/O DYE: CPT

## 2024-11-13 NOTE — TELEPHONE ENCOUNTER
Patient had his ct scan done today. Notes show to let them know to determine if he needs another appt. He was seen in October with Dr. Henderson

## 2024-11-13 NOTE — TELEPHONE ENCOUNTER
CT scans reviewed. Stable post operative changes noted on head CT scan. Degenerative changes seen on cervical CT. No acute abnormalities noted. No need for appointment. Recommend follow up with PCP for dizziness and bouts of falling/feeling off balance

## 2024-11-25 ENCOUNTER — OFFICE VISIT (OUTPATIENT)
Dept: PHYSICAL MEDICINE AND REHAB | Age: 64
End: 2024-11-25

## 2024-11-25 VITALS
WEIGHT: 230 LBS | BODY MASS INDEX: 32.2 KG/M2 | HEART RATE: 70 BPM | TEMPERATURE: 98.1 F | HEIGHT: 71 IN | SYSTOLIC BLOOD PRESSURE: 136 MMHG | DIASTOLIC BLOOD PRESSURE: 80 MMHG | OXYGEN SATURATION: 98 %

## 2024-11-25 DIAGNOSIS — M54.2 NECK PAIN: Primary | ICD-10-CM

## 2024-11-25 DIAGNOSIS — M50.30 DDD (DEGENERATIVE DISC DISEASE), CERVICAL: ICD-10-CM

## 2024-11-25 DIAGNOSIS — M19.011 PRIMARY OSTEOARTHRITIS OF RIGHT SHOULDER: ICD-10-CM

## 2024-11-25 DIAGNOSIS — G93.89 BRAIN MASS: ICD-10-CM

## 2024-11-25 DIAGNOSIS — S43.431A LABRAL TEAR OF SHOULDER, RIGHT, INITIAL ENCOUNTER: ICD-10-CM

## 2024-11-25 RX ORDER — METHYLPREDNISOLONE 4 MG/1
TABLET ORAL
Qty: 1 KIT | Refills: 0 | Status: SHIPPED | OUTPATIENT
Start: 2024-11-25

## 2024-11-25 NOTE — PROGRESS NOTES
Elsa Voss M.D.  [facility] [place] [facility]  [address]  [address]  [place] [id number]  Dept: [phone]  Dept Fax: [phone]  Loc: [phone]    PCP: Jules Velázquez DO  Date of visit: 11/25/24    Chief Complaint   Patient presents with    Neck Pain     Patient has cervical pain beginning 5/2024.  No formal PT or injections.         Conrado Leach is a 64 y.o. man with gradual onset of neck pain after no known injury in Spring 2024. Now, the pain is constant.  The pain is rated 6/10 in severity, is described as aching, and is located in the left aspect of the cervical spine without radiation. Neck pain does sometimes trigger headaches. He reports occasional \"clicking\" in the neck with movement. No associated numbness/tingling or weakness. No incontinence. The symptoms have been unchanged since onset.  The pain is better with nothing. The pain is worse with nothing specific.     Patient has history of brain abscess and meningioma, followed by Dr. Henderson. History of left frontotemporal craniotomy 6/3/2024 for resection of abscess. He states that the meningioma was discovered after his surgery.       The prior workup has included:   -CT cervical spine and head 11/13/2024  FINDINGS:  BRAIN/VENTRICLES: There is no acute intracranial hemorrhage, mass effect or  midline shift.  No abnormal extra-axial fluid collection.  The gray-white  differentiation is maintained without evidence of an acute infarct.  There is  no evidence of hydrocephalus. Area of decreased attenuation near the  craniotomy defect is unchanged.  This could reflect a prior infarct.  Other  etiologies are not excluded.  There is no mass effect or edema.     ORBITS: The visualized portion of the orbits demonstrate no acute abnormality.     SINUSES: The visualized paranasal sinuses and mastoid air cells demonstrate  no acute abnormality.  There

## 2025-04-28 ENCOUNTER — TELEPHONE (OUTPATIENT)
Dept: NEUROSURGERY | Age: 65
End: 2025-04-28

## 2025-04-28 NOTE — TELEPHONE ENCOUNTER
Patients wife Valeri called with concerns. Yesterday, he had his head down while in a seated position and when she asked if he was ok he said he felt like he was shaking on the inside and had blurred vision. This feeling went away after a few moments but she did say he had another episode again today. He was last seen October 2024 with Dr. Henderson for a f/u on his crani. Please advise.

## 2025-04-28 NOTE — TELEPHONE ENCOUNTER
We can get an updated head CT scan, but those symptoms should not be due to craniotomy last year. He does have a follow up with MRI brain W WO in June. Let me know if they would like to have the head CT scan

## 2025-06-02 ENCOUNTER — TELEPHONE (OUTPATIENT)
Dept: PHYSICAL MEDICINE AND REHAB | Age: 65
End: 2025-06-02

## 2025-06-02 DIAGNOSIS — Z01.812 PRE-PROCEDURE LAB EXAM: Primary | ICD-10-CM

## 2025-06-10 ENCOUNTER — HOSPITAL ENCOUNTER (OUTPATIENT)
Dept: MRI IMAGING | Age: 65
Discharge: HOME | End: 2025-06-12
Attending: PHYSICAL MEDICINE & REHABILITATION
Payer: COMMERCIAL

## 2025-06-10 ENCOUNTER — HOSPITAL ENCOUNTER (OUTPATIENT)
Age: 65
Discharge: HOME | End: 2025-06-10
Attending: PHYSICAL MEDICINE & REHABILITATION
Payer: COMMERCIAL

## 2025-06-10 DIAGNOSIS — Z01.812 PRE-PROCEDURE LAB EXAM: ICD-10-CM

## 2025-06-10 DIAGNOSIS — G06.0 BRAIN ABSCESS: ICD-10-CM

## 2025-06-10 LAB
BUN SERPL-MCNC: 14 MG/DL (ref 6–23)
CREAT SERPL-MCNC: 1.1 MG/DL (ref 0.7–1.2)
GFR, ESTIMATED: 74 ML/MIN/1.73M2

## 2025-06-10 PROCEDURE — 70553 MRI BRAIN STEM W/O & W/DYE: CPT

## 2025-06-10 PROCEDURE — 84520 ASSAY OF UREA NITROGEN: CPT

## 2025-06-10 PROCEDURE — 82565 ASSAY OF CREATININE: CPT

## 2025-06-10 PROCEDURE — 36415 COLL VENOUS BLD VENIPUNCTURE: CPT

## 2025-06-10 PROCEDURE — A9579 GAD-BASE MR CONTRAST NOS,1ML: HCPCS | Performed by: RADIOLOGY

## 2025-06-10 PROCEDURE — 6360000004 HC RX CONTRAST MEDICATION: Performed by: RADIOLOGY

## 2025-06-10 RX ORDER — GADOTERIDOL 279.3 MG/ML
20 INJECTION INTRAVENOUS
Status: COMPLETED | OUTPATIENT
Start: 2025-06-10 | End: 2025-06-10

## 2025-06-10 RX ADMIN — GADOTERIDOL 20 ML: 279.3 INJECTION, SOLUTION INTRAVENOUS at 19:30

## 2025-06-13 ENCOUNTER — TELEPHONE (OUTPATIENT)
Dept: NEUROSURGERY | Age: 65
End: 2025-06-13

## 2025-06-13 NOTE — TELEPHONE ENCOUNTER
Patient's wife, Valeri, called.  She wanted to talk to a PA.  He has issues with focusing. She says this morning he has blurred vision. Not sure if he needs to be seen in emergency room.    [phone] phone

## 2025-06-17 ENCOUNTER — OFFICE VISIT (OUTPATIENT)
Age: 65
End: 2025-06-17
Payer: COMMERCIAL

## 2025-06-17 VITALS — SYSTOLIC BLOOD PRESSURE: 124 MMHG | HEART RATE: 50 BPM | DIASTOLIC BLOOD PRESSURE: 74 MMHG

## 2025-06-17 DIAGNOSIS — D32.9 MENINGIOMA (HCC): Primary | ICD-10-CM

## 2025-06-17 PROCEDURE — 99212 OFFICE O/P EST SF 10 MIN: CPT | Performed by: NEUROLOGICAL SURGERY

## 2025-06-17 NOTE — PROGRESS NOTES
Patient is here for follow up after crani fr abscess.  He has a small meningioma.  He complains of headaches.     Physical exam  Alert and Oriented X3  PERRLA, EOMI  GARCIA 5/5  Sensation intact to LT and PP  Reflexes are 2+ and symmetric    A/P: patient is here for follow up for: follow up for bran abscess and meningioma.  No evidence of abscess.  Recommend follow up in 1 year with MRI to evaluate meningioma    Mary Henderson MD

## (undated) DEVICE — BANDAGE,GAUZE,CONFORMING,4"X75",STRL,LF: Brand: MEDLINE

## (undated) DEVICE — DRESSING,GAUZE,XEROFORM,CURAD,1"X8",ST: Brand: CURAD

## (undated) DEVICE — GOWN,SIRUS,FABRNF,XL,20/CS: Brand: MEDLINE

## (undated) DEVICE — SPONGE,NEURO,1"X1",XR,STRL,LF,10/PK: Brand: MEDLINE

## (undated) DEVICE — 1LYRTR 16FR10ML100%SIL UMS SNP: Brand: MEDLINE INDUSTRIES, INC.

## (undated) DEVICE — Device

## (undated) DEVICE — PERFORATOR CRAN AD PED 14/11MM DGR O ROUNDED CUT EDGE DISP

## (undated) DEVICE — GLOVE SURG 8.5 PF POLYMER WHT STRL SIGN LTX ESSENTIAL LTX

## (undated) DEVICE — GLOVE ORANGE PI 8   MSG9080

## (undated) DEVICE — SOLUTION SURG PREP 26 CC PURPREP

## (undated) DEVICE — PREMIUM WET SKIN PREP TRAY: Brand: MEDLINE INDUSTRIES, INC.

## (undated) DEVICE — AGENT HEMSTAT 1GM CLLGN MICFIB ACT ABSRB FLOUR AVIT

## (undated) DEVICE — 2.3MM TAPERED ROUTER

## (undated) DEVICE — AGENT HEMOSTATIC SURG ORIGINAL ABS 2X3IN LOOSE KNIT 12/CA

## (undated) DEVICE — TUBING, SUCTION, 3/16" X 12', STRAIGHT: Brand: MEDLINE

## (undated) DEVICE — BLADE,STAINLESS-STEEL,11,STRL,DISPOSABLE: Brand: MEDLINE

## (undated) DEVICE — SYRINGE MED 20ML STD CLR PLAS LUERLOCK TIP N CTRL DISP

## (undated) DEVICE — HYPODERMIC SAFETY NEEDLE: Brand: MAGELLAN

## (undated) DEVICE — GLOVE SURG SZ 65 THK91MIL LTX FREE SYN POLYISOPRENE

## (undated) DEVICE — ELECTRODE PT RET AD L9FT HI MOIST COND ADH HYDRGEL CORDED

## (undated) DEVICE — PAD,NON-ADHERENT,3X8,STERILE,LF,1/PK: Brand: MEDLINE

## (undated) DEVICE — CRANI: Brand: MEDLINE INDUSTRIES, INC.

## (undated) DEVICE — 3M™ IOBAN™ 2 ANTIMICROBIAL INCISE DRAPE 6640EZ: Brand: IOBAN™ 2

## (undated) DEVICE — HERMETIC™ LARGE-STYLE VENTRICULAR CATHETER SET: Brand: HERMETIC™

## (undated) DEVICE — 3M(TM) MEDIPORE(TM) +PAD SOFT CLOTH ADHESIVE WOUND DRESSING 3569: Brand: 3M™ MEDIPORE™